# Patient Record
Sex: MALE | Race: WHITE | Employment: OTHER | URBAN - METROPOLITAN AREA
[De-identification: names, ages, dates, MRNs, and addresses within clinical notes are randomized per-mention and may not be internally consistent; named-entity substitution may affect disease eponyms.]

---

## 2019-12-01 ENCOUNTER — VIRTUAL VISIT (OUTPATIENT)
Dept: FAMILY MEDICINE | Facility: OTHER | Age: 44
End: 2019-12-01

## 2019-12-02 NOTE — PROGRESS NOTES
"Date: 2019 13:42:41  Clinician: Josh Lance  Clinician NPI: [id number]  Patient: Beto Tran  Patient : 1975  Patient Address: [address] [place] [id number]  Patient Phone: [phone]  Visit Protocol: URI  Patient Summary:  Beto is a 44 year old ( : 1975 ) male who initiated a Visit for cold, sinus infection, or influenza. When asked the question \"Please sign me up to receive news, health information and promotions. \", Beto responded \"Yes\".    Beto states his symptoms started gradually 3-6 days ago.   His symptoms consist of facial pain or pressure, nasal congestion, a headache, a sore throat, malaise, and rhinitis.   Symptom details     Nasal secretions: The color of his mucus is yellow.    Sore throat: Beto reports having moderate throat pain (4-6 on a 10 point pain scale), does not have exudate on his tonsils, and can swallow liquids. The lymph nodes in his neck are not enlarged. A rash has not appeared on the skin since the sore throat started.     Facial pain or pressure: The facial pain or pressure feels worse when bending over or leaning forward.     Headache: He states the headache is moderate (4-6 on a 10 point pain scale).      Beto denies having teeth pain, cough, myalgias, enlarged lymph nodes, chills, ear pain, fever, and wheezing. He also denies double sickening (worsening symptoms after initial improvement), taking antibiotic medication for the symptoms, and having recent facial or sinus surgery in the past 60 days. He is not experiencing dyspnea.   Precipitating events  Within the past week, Beto has not been exposed to someone with strep throat.   Pertinent medical history  Beto had 2 sinus infections within the past year.   Weight: 155 lbs   Beto smokes or uses smokeless tobacco.     MEDICATIONS: penicillin V potassium oral, ALLERGIES: NKDA  Clinician Response:  Angella Pérez,  Based on the information " provided, you have viral sinusitis, also known as a sinus infection. Sinus infections are caused by bacteria or a virus and symptoms are almost always identical. The difference between the 2 types of infections is timing.  Sinus infections start as viral infections and symptoms improve on their own in about 7 days. If symptoms have not improved after 7 days or have even worsened, a bacterial infection may have developed.  Medication information  Because you have a viral infection, antibiotics will not help you get better. Treating a viral infection with antibiotics could actually make you feel worse.  For more information on why I am not prescribing antibiotics, please watch this video: Antibiotics Aren't Always the Answer.  I am prescribing:     Fluticasone 50 mcg/actuation nasal spray. Inhale 2 sprays in each nostril 1 time per day; after 1 week, may adjust to 1 - 2 sprays in each nostril 1 time per day. This medication takes several days to start working, so keep taking it even if it doesn't help right away. There are no refills with this prescription.   Unless you are allergic to the over-the-counter medication(s) below, I recommend using:       Ibuprofen (Advil or store brand) 200 mg oral tablet. Take 1-3 tablets (200-600 mg) by mouth every 8 hours to help with the discomfort. Make sure to take the ibuprofen with food. Do not exceed 2400 mg in 24 hours.    A decongestant such as Sudafed PE or store brand.     Over-the-counter medications do not require a prescription. Ask the pharmacist if you have any questions.  Self care  The following tips will keep you as comfortable as possible while you recover:     Rest    Drink plenty of water and other liquids    Take a hot shower to loosen congestion    Use throat lozenges    Gargle with warm salt water (1/4 teaspoon of salt per 8 ounce glass of water)    Suck on frozen items such as popsicles or ice cubes    Drink hot tea with lemon and honey     Also, as your  provider, I need you to know that becoming tobacco-free is the most important thing you can do to protect your current and future health.  When to seek care  Please be seen in a clinic or urgent care if new symptoms develop, or symptoms become worse.  Call 911 or go to the emergency room if you feel that your throat is closing off, you suddenly develop a rash, you are unable to swallow fluids, you are drooling, or you are having difficulty breathing.   Diagnosis: Viral sinusitis  Diagnosis ICD: J01.90  Prescription: fluticasone 50 mcg/actuation nasal spray,suspension 1 120 spray aerosol with adapter (grams), 30 days supply. Inhale 2 sprays in each nostril 1 time per day; after 1 week, may adjust to 1 - 2 sprays in each nostril 1 time per day.. Refills: 0, Refill as needed: no, Allow substitutions: yes

## 2020-02-24 ENCOUNTER — HEALTH MAINTENANCE LETTER (OUTPATIENT)
Age: 45
End: 2020-02-24

## 2020-12-13 ENCOUNTER — HEALTH MAINTENANCE LETTER (OUTPATIENT)
Age: 45
End: 2020-12-13

## 2021-04-17 ENCOUNTER — HEALTH MAINTENANCE LETTER (OUTPATIENT)
Age: 46
End: 2021-04-17

## 2021-09-26 ENCOUNTER — HEALTH MAINTENANCE LETTER (OUTPATIENT)
Age: 46
End: 2021-09-26

## 2021-12-21 ENCOUNTER — APPOINTMENT (OUTPATIENT)
Dept: GENERAL RADIOLOGY | Facility: CLINIC | Age: 46
DRG: 841 | End: 2021-12-21
Attending: EMERGENCY MEDICINE
Payer: MEDICARE

## 2021-12-21 ENCOUNTER — HOSPITAL ENCOUNTER (INPATIENT)
Facility: CLINIC | Age: 46
LOS: 6 days | Discharge: HOME | DRG: 841 | End: 2021-12-28
Attending: EMERGENCY MEDICINE | Admitting: INTERNAL MEDICINE
Payer: MEDICARE

## 2021-12-21 DIAGNOSIS — M25.551 HIP PAIN, RIGHT: ICD-10-CM

## 2021-12-21 DIAGNOSIS — Z51.5 HOSPICE CARE PATIENT: Primary | ICD-10-CM

## 2021-12-21 DIAGNOSIS — C90.00 MULTIPLE MYELOMA NOT HAVING ACHIEVED REMISSION (H): ICD-10-CM

## 2021-12-21 LAB — SARS-COV-2 RNA RESP QL NAA+PROBE: NEGATIVE

## 2021-12-21 PROCEDURE — C9803 HOPD COVID-19 SPEC COLLECT: HCPCS

## 2021-12-21 PROCEDURE — 73502 X-RAY EXAM HIP UNI 2-3 VIEWS: CPT

## 2021-12-21 PROCEDURE — 87635 SARS-COV-2 COVID-19 AMP PRB: CPT | Performed by: INTERNAL MEDICINE

## 2021-12-21 PROCEDURE — 99222 1ST HOSP IP/OBS MODERATE 55: CPT | Performed by: INTERNAL MEDICINE

## 2021-12-21 PROCEDURE — 99285 EMERGENCY DEPT VISIT HI MDM: CPT | Mod: 25

## 2021-12-21 PROCEDURE — 250N000013 HC RX MED GY IP 250 OP 250 PS 637: Performed by: EMERGENCY MEDICINE

## 2021-12-21 PROCEDURE — G0378 HOSPITAL OBSERVATION PER HR: HCPCS

## 2021-12-21 RX ORDER — MORPHINE SULFATE 15 MG/1
30 TABLET, FILM COATED, EXTENDED RELEASE ORAL ONCE
Status: COMPLETED | OUTPATIENT
Start: 2021-12-21 | End: 2021-12-21

## 2021-12-21 RX ORDER — MORPHINE SULFATE 15 MG/1
60 TABLET, FILM COATED, EXTENDED RELEASE ORAL ONCE
Status: COMPLETED | OUTPATIENT
Start: 2021-12-21 | End: 2021-12-21

## 2021-12-21 RX ADMIN — MORPHINE SULFATE 60 MG: 15 TABLET, EXTENDED RELEASE ORAL at 21:50

## 2021-12-21 RX ADMIN — MORPHINE SULFATE 30 MG: 15 TABLET, EXTENDED RELEASE ORAL at 21:50

## 2021-12-21 ASSESSMENT — ENCOUNTER SYMPTOMS
BACK PAIN: 1
COUGH: 0
FEVER: 0

## 2021-12-21 ASSESSMENT — MIFFLIN-ST. JEOR: SCORE: 1568

## 2021-12-22 PROCEDURE — 120N000004 HC R&B MS OVERFLOW

## 2021-12-22 PROCEDURE — 250N000013 HC RX MED GY IP 250 OP 250 PS 637: Performed by: INTERNAL MEDICINE

## 2021-12-22 PROCEDURE — 250N000013 HC RX MED GY IP 250 OP 250 PS 637: Performed by: NURSE PRACTITIONER

## 2021-12-22 PROCEDURE — 250N000013 HC RX MED GY IP 250 OP 250 PS 637: Performed by: HOSPITALIST

## 2021-12-22 PROCEDURE — 99232 SBSQ HOSP IP/OBS MODERATE 35: CPT | Performed by: HOSPITALIST

## 2021-12-22 PROCEDURE — 99223 1ST HOSP IP/OBS HIGH 75: CPT | Performed by: NURSE PRACTITIONER

## 2021-12-22 PROCEDURE — G0378 HOSPITAL OBSERVATION PER HR: HCPCS

## 2021-12-22 PROCEDURE — 99207 PR CDG-MDM COMPONENT: MEETS LOW - DOWN CODED: CPT | Performed by: HOSPITALIST

## 2021-12-22 RX ORDER — GABAPENTIN 600 MG/1
1200 TABLET ORAL 3 TIMES DAILY
Status: ON HOLD | COMMUNITY
End: 2022-01-13

## 2021-12-22 RX ORDER — LACTULOSE 10 G/15ML
20 SOLUTION ORAL DAILY PRN
Status: ON HOLD | COMMUNITY
End: 2022-01-02

## 2021-12-22 RX ORDER — GABAPENTIN 600 MG/1
1200 TABLET ORAL 3 TIMES DAILY
Status: DISCONTINUED | OUTPATIENT
Start: 2021-12-22 | End: 2021-12-22

## 2021-12-22 RX ORDER — ACETAMINOPHEN 500 MG
1000 TABLET ORAL EVERY 8 HOURS PRN
Status: ON HOLD | COMMUNITY
End: 2022-01-13

## 2021-12-22 RX ORDER — IBUPROFEN 600 MG/1
600 TABLET, FILM COATED ORAL EVERY 6 HOURS PRN
Status: DISCONTINUED | OUTPATIENT
Start: 2021-12-22 | End: 2021-12-25

## 2021-12-22 RX ORDER — NALOXONE HYDROCHLORIDE 0.4 MG/ML
0.2 INJECTION, SOLUTION INTRAMUSCULAR; INTRAVENOUS; SUBCUTANEOUS
Status: DISCONTINUED | OUTPATIENT
Start: 2021-12-22 | End: 2021-12-28 | Stop reason: HOSPADM

## 2021-12-22 RX ORDER — ONDANSETRON 2 MG/ML
4 INJECTION INTRAMUSCULAR; INTRAVENOUS EVERY 6 HOURS PRN
Status: DISCONTINUED | OUTPATIENT
Start: 2021-12-22 | End: 2021-12-28 | Stop reason: HOSPADM

## 2021-12-22 RX ORDER — PROCHLORPERAZINE MALEATE 10 MG
10 TABLET ORAL EVERY 4 HOURS PRN
Status: ON HOLD | COMMUNITY
End: 2022-01-13

## 2021-12-22 RX ORDER — GABAPENTIN 600 MG/1
1200 TABLET ORAL 3 TIMES DAILY
Status: DISCONTINUED | OUTPATIENT
Start: 2021-12-22 | End: 2021-12-28 | Stop reason: HOSPADM

## 2021-12-22 RX ORDER — MORPHINE SULFATE 100 MG/1
200 TABLET ORAL EVERY 6 HOURS
Status: DISCONTINUED | OUTPATIENT
Start: 2021-12-22 | End: 2021-12-22

## 2021-12-22 RX ORDER — MORPHINE SULFATE 15 MG/1
60 TABLET ORAL EVERY 4 HOURS PRN
Status: DISCONTINUED | OUTPATIENT
Start: 2021-12-22 | End: 2021-12-22

## 2021-12-22 RX ORDER — MORPHINE SULFATE 100 MG/1
200 TABLET ORAL EVERY 6 HOURS
Status: ON HOLD | COMMUNITY
End: 2021-12-28

## 2021-12-22 RX ORDER — IBUPROFEN 600 MG/1
600 TABLET, FILM COATED ORAL 3 TIMES DAILY PRN
Status: ON HOLD | COMMUNITY
End: 2022-01-13

## 2021-12-22 RX ORDER — SENNOSIDES 8.6 MG
1 TABLET ORAL 2 TIMES DAILY
Status: ON HOLD | COMMUNITY
End: 2022-01-02

## 2021-12-22 RX ORDER — ONDANSETRON 4 MG/1
4 TABLET, ORALLY DISINTEGRATING ORAL EVERY 6 HOURS PRN
Status: DISCONTINUED | OUTPATIENT
Start: 2021-12-22 | End: 2021-12-22

## 2021-12-22 RX ORDER — ONDANSETRON 4 MG/1
8 TABLET, ORALLY DISINTEGRATING ORAL EVERY 8 HOURS PRN
Status: DISCONTINUED | OUTPATIENT
Start: 2021-12-22 | End: 2021-12-28 | Stop reason: HOSPADM

## 2021-12-22 RX ORDER — BISACODYL 10 MG
10 SUPPOSITORY, RECTAL RECTAL DAILY PRN
Status: ON HOLD | COMMUNITY
End: 2022-01-13

## 2021-12-22 RX ORDER — MORPHINE SULFATE 30 MG/1
180 TABLET, FILM COATED, EXTENDED RELEASE ORAL EVERY 6 HOURS
Status: DISCONTINUED | OUTPATIENT
Start: 2021-12-22 | End: 2021-12-28 | Stop reason: HOSPADM

## 2021-12-22 RX ORDER — LORAZEPAM 2 MG/ML
3 CONCENTRATE ORAL
Status: ON HOLD | COMMUNITY
End: 2021-12-28

## 2021-12-22 RX ORDER — LORATADINE 10 MG/1
10 TABLET ORAL DAILY PRN
COMMUNITY

## 2021-12-22 RX ORDER — MORPHINE SULFATE 100 MG/1
200 TABLET ORAL EVERY 12 HOURS SCHEDULED
Status: DISCONTINUED | OUTPATIENT
Start: 2021-12-22 | End: 2021-12-22

## 2021-12-22 RX ORDER — METHOCARBAMOL 750 MG/1
750 TABLET, FILM COATED ORAL 3 TIMES DAILY
Status: DISCONTINUED | OUTPATIENT
Start: 2021-12-22 | End: 2021-12-23

## 2021-12-22 RX ORDER — POLYETHYLENE GLYCOL 3350 17 G/17G
17 POWDER, FOR SOLUTION ORAL 2 TIMES DAILY PRN
Status: DISCONTINUED | OUTPATIENT
Start: 2021-12-22 | End: 2021-12-28 | Stop reason: HOSPADM

## 2021-12-22 RX ORDER — NICOTINE 21 MG/24HR
1 PATCH, TRANSDERMAL 24 HOURS TRANSDERMAL DAILY
Status: DISCONTINUED | OUTPATIENT
Start: 2021-12-22 | End: 2021-12-28 | Stop reason: HOSPADM

## 2021-12-22 RX ORDER — SENNOSIDES 8.6 MG
1 TABLET ORAL DAILY
Status: DISCONTINUED | OUTPATIENT
Start: 2021-12-22 | End: 2021-12-28 | Stop reason: HOSPADM

## 2021-12-22 RX ORDER — IBUPROFEN 600 MG/1
600 TABLET, FILM COATED ORAL 3 TIMES DAILY
Status: DISCONTINUED | OUTPATIENT
Start: 2021-12-22 | End: 2021-12-28 | Stop reason: HOSPADM

## 2021-12-22 RX ORDER — MORPHINE SULFATE 30 MG/1
60 TABLET ORAL
Status: ON HOLD | COMMUNITY
End: 2021-12-28

## 2021-12-22 RX ORDER — NALOXONE HYDROCHLORIDE 0.4 MG/ML
0.4 INJECTION, SOLUTION INTRAMUSCULAR; INTRAVENOUS; SUBCUTANEOUS
Status: DISCONTINUED | OUTPATIENT
Start: 2021-12-22 | End: 2021-12-28 | Stop reason: HOSPADM

## 2021-12-22 RX ORDER — LORAZEPAM 2 MG/ML
3 CONCENTRATE ORAL EVERY 4 HOURS PRN
Status: DISCONTINUED | OUTPATIENT
Start: 2021-12-22 | End: 2021-12-27

## 2021-12-22 RX ORDER — OXYCODONE HYDROCHLORIDE 20 MG/1
90 TABLET ORAL EVERY 6 HOURS PRN
Status: DISCONTINUED | OUTPATIENT
Start: 2021-12-22 | End: 2021-12-22

## 2021-12-22 RX ORDER — POLYETHYLENE GLYCOL 3350 17 G/17G
1 POWDER, FOR SOLUTION ORAL DAILY
Status: ON HOLD | COMMUNITY
End: 2022-01-13

## 2021-12-22 RX ORDER — ACETAMINOPHEN 500 MG
1000 TABLET ORAL EVERY 8 HOURS PRN
Status: DISCONTINUED | OUTPATIENT
Start: 2021-12-22 | End: 2021-12-28 | Stop reason: HOSPADM

## 2021-12-22 RX ORDER — LORAZEPAM 2 MG/ML
3 CONCENTRATE ORAL
Status: DISCONTINUED | OUTPATIENT
Start: 2021-12-22 | End: 2021-12-22

## 2021-12-22 RX ORDER — ONDANSETRON 4 MG/1
4 TABLET, ORALLY DISINTEGRATING ORAL EVERY 6 HOURS PRN
Status: DISCONTINUED | OUTPATIENT
Start: 2021-12-22 | End: 2021-12-28 | Stop reason: HOSPADM

## 2021-12-22 RX ORDER — MULTIPLE VITAMINS W/ MINERALS TAB 9MG-400MCG
1 TAB ORAL DAILY
COMMUNITY

## 2021-12-22 RX ORDER — METHOCARBAMOL 500 MG/1
500 TABLET, FILM COATED ORAL 4 TIMES DAILY
Status: DISCONTINUED | OUTPATIENT
Start: 2021-12-22 | End: 2021-12-22

## 2021-12-22 RX ORDER — ACETAMINOPHEN 325 MG/1
650 TABLET ORAL EVERY 6 HOURS PRN
Status: DISCONTINUED | OUTPATIENT
Start: 2021-12-22 | End: 2021-12-22

## 2021-12-22 RX ORDER — LORAZEPAM 2 MG/ML
3 CONCENTRATE ORAL 2 TIMES DAILY PRN
Status: DISCONTINUED | OUTPATIENT
Start: 2021-12-22 | End: 2021-12-22

## 2021-12-22 RX ORDER — HYDROMORPHONE HCL IN WATER/PF 6 MG/30 ML
0.2 PATIENT CONTROLLED ANALGESIA SYRINGE INTRAVENOUS
Status: DISCONTINUED | OUTPATIENT
Start: 2021-12-22 | End: 2021-12-22

## 2021-12-22 RX ORDER — ONDANSETRON 2 MG/ML
4 INJECTION INTRAMUSCULAR; INTRAVENOUS EVERY 6 HOURS PRN
Status: DISCONTINUED | OUTPATIENT
Start: 2021-12-22 | End: 2021-12-22

## 2021-12-22 RX ORDER — SENNA AND DOCUSATE SODIUM 50; 8.6 MG/1; MG/1
1 TABLET, FILM COATED ORAL 2 TIMES DAILY PRN
Status: ON HOLD | COMMUNITY
End: 2022-01-13

## 2021-12-22 RX ORDER — MORPHINE SULFATE 30 MG/1
30 TABLET, FILM COATED, EXTENDED RELEASE ORAL EVERY 12 HOURS SCHEDULED
Status: DISCONTINUED | OUTPATIENT
Start: 2021-12-22 | End: 2021-12-22

## 2021-12-22 RX ORDER — ACETAMINOPHEN 325 MG/1
975 TABLET ORAL EVERY 8 HOURS
Status: DISCONTINUED | OUTPATIENT
Start: 2021-12-22 | End: 2021-12-28 | Stop reason: HOSPADM

## 2021-12-22 RX ORDER — HYOSCYAMINE SULFATE 0.125 MG
0.12 TABLET ORAL EVERY 4 HOURS PRN
Status: ON HOLD | COMMUNITY
End: 2022-01-02

## 2021-12-22 RX ORDER — METHOCARBAMOL 750 MG/1
750 TABLET, FILM COATED ORAL AT BEDTIME
Status: ON HOLD | COMMUNITY
End: 2022-01-13

## 2021-12-22 RX ORDER — POLYETHYLENE GLYCOL 3350 17 G/17G
17 POWDER, FOR SOLUTION ORAL DAILY
Status: DISCONTINUED | OUTPATIENT
Start: 2021-12-22 | End: 2021-12-28 | Stop reason: HOSPADM

## 2021-12-22 RX ORDER — MORPHINE SULFATE 15 MG/1
60 TABLET ORAL
Status: DISCONTINUED | OUTPATIENT
Start: 2021-12-22 | End: 2021-12-23

## 2021-12-22 RX ORDER — LORAZEPAM 1 MG/1
2 TABLET ORAL EVERY 4 HOURS PRN
Status: DISCONTINUED | OUTPATIENT
Start: 2021-12-22 | End: 2021-12-22

## 2021-12-22 RX ADMIN — STANDARDIZED SENNA CONCENTRATE 1 TABLET: 8.6 TABLET ORAL at 08:22

## 2021-12-22 RX ADMIN — GABAPENTIN 1200 MG: 600 TABLET, FILM COATED ORAL at 19:45

## 2021-12-22 RX ADMIN — MORPHINE SULFATE 180 MG: 30 TABLET, FILM COATED, EXTENDED RELEASE ORAL at 10:15

## 2021-12-22 RX ADMIN — METHOCARBAMOL 750 MG: 750 TABLET ORAL at 13:05

## 2021-12-22 RX ADMIN — IBUPROFEN 600 MG: 600 TABLET ORAL at 15:42

## 2021-12-22 RX ADMIN — GABAPENTIN 1200 MG: 600 TABLET, FILM COATED ORAL at 08:19

## 2021-12-22 RX ADMIN — OMEPRAZOLE 20 MG: 20 CAPSULE, DELAYED RELEASE ORAL at 08:19

## 2021-12-22 RX ADMIN — ACETAMINOPHEN 975 MG: 325 TABLET, FILM COATED ORAL at 01:16

## 2021-12-22 RX ADMIN — ACETAMINOPHEN 975 MG: 325 TABLET, FILM COATED ORAL at 17:19

## 2021-12-22 RX ADMIN — LORAZEPAM 3 MG: 2 LIQUID ORAL at 20:12

## 2021-12-22 RX ADMIN — ACETAMINOPHEN 975 MG: 325 TABLET, FILM COATED ORAL at 10:15

## 2021-12-22 RX ADMIN — DICLOFENAC SODIUM 4 G: 10 GEL TOPICAL at 15:43

## 2021-12-22 RX ADMIN — NICOTINE 1 PATCH: 14 PATCH, EXTENDED RELEASE TRANSDERMAL at 11:50

## 2021-12-22 RX ADMIN — METHOCARBAMOL 750 MG: 750 TABLET ORAL at 19:45

## 2021-12-22 RX ADMIN — MORPHINE SULFATE 60 MG: 15 TABLET ORAL at 06:17

## 2021-12-22 RX ADMIN — MORPHINE SULFATE 180 MG: 30 TABLET, FILM COATED, EXTENDED RELEASE ORAL at 21:56

## 2021-12-22 RX ADMIN — MORPHINE SULFATE 180 MG: 30 TABLET, FILM COATED, EXTENDED RELEASE ORAL at 15:42

## 2021-12-22 RX ADMIN — GABAPENTIN 1200 MG: 600 TABLET, FILM COATED ORAL at 13:04

## 2021-12-22 RX ADMIN — POLYETHYLENE GLYCOL 3350 17 G: 17 POWDER, FOR SOLUTION ORAL at 10:17

## 2021-12-22 RX ADMIN — METHOCARBAMOL 500 MG: 500 TABLET ORAL at 08:19

## 2021-12-22 RX ADMIN — IBUPROFEN 600 MG: 600 TABLET ORAL at 10:15

## 2021-12-22 RX ADMIN — DICLOFENAC SODIUM 4 G: 10 GEL TOPICAL at 19:47

## 2021-12-22 RX ADMIN — MORPHINE SULFATE 30 MG: 30 TABLET, FILM COATED, EXTENDED RELEASE ORAL at 08:20

## 2021-12-22 ASSESSMENT — MIFFLIN-ST. JEOR: SCORE: 1587.5

## 2021-12-22 ASSESSMENT — ACTIVITIES OF DAILY LIVING (ADL)
ADLS_ACUITY_SCORE: 13

## 2021-12-22 NOTE — PLAN OF CARE
PRIMARY DIAGNOSIS: GENERALIZED WEAKNESS    OUTPATIENT/OBSERVATION GOALS TO BE MET BEFORE DISCHARGE  1. Orthostatic performed: N/A    2. Tolerating PO medications: Yes    3. Return to near baseline physical activity: No    4. Cleared for discharge by consultants (if involved): No    Discharge Planner Nurse   Safe discharge environment identified: No  Barriers to discharge: Yes       Entered by: Luna Jang 12/22/2021 4:39 PM     Please review provider order for any additional goals.   Nurse to notify provider when observation goals have been met and patient is ready for discharge.    Refused vitals signs today.

## 2021-12-22 NOTE — PLAN OF CARE
Cares PRIMARY DIAGNOSIS: Multiple Myeloma  OUTPATIENT/OBSERVATION GOALS TO BE MET BEFORE DISCHARGE:  1. ADLs back to baseline: No    2. Activity and level of assistance: Up with standby assistance.    3. Pain status: Improved-controlled with oral pain medications.    4. Return to near baseline physical activity: No     Discharge Planner Nurse   Safe discharge environment identified: Yes  Barriers to discharge: Yes       Entered by: Maria Ines Villarreal 12/22/2021 2:05 AM   Pt is A&Ox4, SBA w/walker. Pt denied any CP, SOB and N/V. Pt complained of pain frustrated he can't have morphine until 8am. Paged doctor and was able to give Morphine. Urinal at bedside. Pt is able to make needs known. Call light within reach and bed alarm on for safety.   Temp: 98.4  F (36.9  C) Temp src: Oral BP: 113/65 Pulse: 69   Resp: 16 SpO2: 97 % O2 Device: None (Room air)    Please review provider order for any additional goals.   Nurse to notify provider when observation goals have been met and patient is ready for discharge.

## 2021-12-22 NOTE — PHARMACY-ADMISSION MEDICATION HISTORY
After med rec completed this morning with [facility] pharmacist via phone.  [facility] faxed a med list to pharmacy.  Based on this med list, the following medications were added (note: these medications were NOT reviewed with the [facility] pharmacist-reviewed fills back to 11/16 with pharmacist there):  Dulcolax prn, loratadine prn, lactulose prn, hyoscyamine prn, omeprazole, mvi, relistor prn, senokot s prn, senna.    Med list has some older Rxs on it that have been reordered with New Rx doses more recently per [facility] pharmacist fill history.    Prior to Admission medications    Medication Sig Last Dose Taking? Auth Provider   acetaminophen (TYLENOL) 500 MG tablet Take 1,000 mg by mouth every 8 hours as needed for mild pain  Yes Unknown, Entered By History   bisacodyl (DULCOLAX) 10 MG suppository Place 10 mg rectally daily as needed for constipation  Yes Unknown, Entered By History   gabapentin (NEURONTIN) 600 MG tablet Take 1,200 mg by mouth 3 times daily  Yes Unknown, Entered By History   hyoscyamine (LEVSIN) 0.125 MG tablet Take 0.125 mg by mouth every 4 hours as needed for cramping  Yes Unknown, Entered By History   ibuprofen (ADVIL/MOTRIN) 600 MG tablet Take 600 mg by mouth 3 times daily  Yes Unknown, Entered By History   lactulose (CHRONULAC) 10 GM/15ML solution Take 20 g by mouth daily as needed for constipation  Yes Unknown, Entered By History   loratadine (CLARITIN) 10 MG tablet Take 10 mg by mouth daily as needed for allergies  Yes Unknown, Entered By History   LORazepam (ATIVAN) 2 MG/ML (HIGH CONC) oral solution Take 3 mg by mouth every 2 hours  Yes Unknown, Entered By History   methocarbamol (ROBAXIN) 750 MG tablet Take 750 mg by mouth 3 times daily  Yes Unknown, Entered By History   methylnaltrexone (RELISTOR) 12 MG/0.6ML SOLN injection Inject 12 mg Subcutaneous Every other day as needed  Yes Unknown, Entered By History   morphine (MS CONTIN) 100 MG 12 hr tablet Take 200 mg by mouth every 6 hours  Yes Unknown,  Entered By History   morphine (MSIR) 30 MG IR tablet Take 60 mg by mouth every 2 hours as needed for severe pain  Yes Unknown, Entered By History   multivitamin w/minerals (THERA-VIT-M) tablet Take 1 tablet by mouth daily  Yes Unknown, Entered By History   omeprazole (PRILOSEC) 20 MG DR capsule Take 40 mg by mouth daily  Yes Unknown, Entered By History   polyethylene glycol (MIRALAX) 17 g packet Take 1 packet by mouth daily  Yes Unknown, Entered By History   prochlorperazine (COMPAZINE) 10 MG tablet Take 10 mg by mouth every 4 hours as needed for nausea or vomiting  Yes Unknown, Entered By History   SENNA-docusate sodium (SENNA S) 8.6-50 MG tablet Take 1 tablet by mouth 2 times daily as needed  Yes Unknown, Entered By History   sennosides (SENOKOT) 8.6 MG tablet Take 1 tablet by mouth 2 times daily  Yes Unknown, Entered By History

## 2021-12-22 NOTE — PROGRESS NOTES
[facility]    Medicine Progress Note - Hospitalist Service       Date of Admission:  12/21/2021    Assessment & Plan           Beto Tran is a 46 year old  male with a significant past medical history of advanced multiple myeloma who has failed multiple treatments, chronic pain, thoracic outlet syndrome now on hospice. Admitted on 12/21/2021 with intractable pain    Advanced multiple myeloma    - treated at [place]    - failed: velcade, carfilzomib, pamalidomide, cytoxin, stem cell transplant    - currently was on hospice ([facility])    - apparently they were making med changes and didn't feel like they were able to control his pain and instructed him to come to the hospital     - Pain/Pall care consult pending    - social work consult    - cont home regimen (although we need to give MS Contin 180mg not his home 200mg due to our lack of the correct mg tabs)    - also currently has MSIR 60mg Q2hour PRN      History of thoracic outlet syndrome    - s/p 1st rib resection and sclenectomy    Home hospice    - SW consult to determine if needs new hospice and home equipment needs/resourced    Offered to call significant other, he states he is in contact with her       Diet: Regular Diet Adult    DVT Prophylaxis: at his baseline mobility  Tavares Catheter: Not present  Central Lines: None  Code Status: No CPR- Do NOT Intubate      Disposition Plan   Expected Discharge: 12/23/2021        The patient's care was discussed with the Bedside Nurse and Patient.    Bob Erickson MD  Hospitalist Service  [facility]  Securely message with the Vocera Web Console (learn more here)  Text page via [employer] Paging/Directory        Clinically Significant Risk Factors Present on Admission                    ______________________________________________________________________    Interval History   Patient doing better. Rough night. Pain was not controlled last night. Better now  after meds.    Data reviewed today: I reviewed all medications, new labs and imaging results over the last 24 hours. I personally reviewed the pelvic xray image(s) showing lytic lesions.    Physical Exam   Vital Signs: Temp: 97.4  F (36.3  C) Temp src: Oral BP: 124/89 Pulse: 65   Resp: 16 SpO2: 95 % O2 Device: None (Room air)    Weight: 162 lbs 11.2 oz  Constitutional: awake, alert, cooperative, no apparent distress, and appears stated age  Eyes: Lids and lashes normal, pupils equal, round and reactive to light, extra ocular muscles intact, sclera clear, conjunctiva normal  ENT: Normocephalic, without obvious abnormality, atraumatic, sinuses nontender on palpation, external ears without lesions, oral pharynx with moist mucous membranes, tonsils without erythema or exudates, gums normal and good dentition.  Respiratory: No increased work of breathing, good air exchange, clear to auscultation bilaterally, no crackles or wheezing  Cardiovascular: Normal apical impulse, regular rate and rhythm, normal S1 and S2, no S3 or S4, and no murmur noted  GI: No scars, normal bowel sounds, soft, non-distended, non-tender, no masses palpated, no hepatosplenomegally    Data   No lab results found in last 7 days.    Recent Results (from the past 24 hour(s))   XR Pelvis w Hip Right 1 View    Narrative    EXAM: XR PELVIS AND HIP RIGHT 1 VIEW  LOCATION: [facility]  DATE/TIME: 12/21/2021 8:40 PM    INDICATION: Multiple myeloma, fall, right hip pain  COMPARISON: Left hip radiographic exam 02/06/2015      Impression    IMPRESSION: Innumerable lytic lesions are present throughout the pelvis, lower lumbar spine and both proximal femora compatible with diffuse multiple myeloma. Findings have markedly progressed compared with prior exam. No acute displaced pathologic   fracture is identified. Largely preserved hip joint spaces. No superior or inferior pubic ramus fracture. Post L5 vertebroplasty with likely chronic L5  compression fracture.

## 2021-12-22 NOTE — PLAN OF CARE
ROOM # 226    Living Situation (if not independent, order SW consult):  Facility name:Lives at home with girlfriend  : Angelito (girlfriend) [phone]    Activity level at baseline: Assist x1 with walker  Activity level on admit: Assist x1 with walker        Patient registered to observation; given Patient Bill of Rights; given the opportunity to ask questions about observation status and their plan of care.  Patient has been oriented to the observation room, bathroom and call light is in place.    Discussed discharge goals and expectations with patient/family.

## 2021-12-22 NOTE — ED PROVIDER NOTES
History   Chief Complaint:  No chief complaint on file.       The history is provided by the patient.      Beto Tran is a 46 year old male with history of osteoporosis who presents with right hip pain after hitting it on the wheelchair. Patient waited a few days before coming in to see if there would be any improvement. The lack of improvement brought him to the ED on recommendation of his , and he was running out of medication. He denies fever, cough, and chest pain.  Apparently, he was on hospice, however, they felt he needed higher level of care than being on at home.  They were unable to secure this and apparently hospice dropped him according to him due to him being unsafe at home.  He does not have anyone prescribing pain medication for him and is about to run out.      Review of Systems   Constitutional: Negative for fever.   Respiratory: Negative for cough.    Cardiovascular: Negative for chest pain.   Musculoskeletal: Positive for back pain.        Right Hip Pain     All other systems reviewed and are negative.      Allergies:  The patient has no known allergies.     Medications:  acyclovir  clonazepam  cyclobenzaprine  dexamethasone  gabapentin  lubiprostone  morphine  omeprazole  ondansetron  oxycodone  polyethylene glycol  prochlorperazine  senna  tamsulosin  haloperidol  loratadine  lorazepam  methocarbamol  sulfamethoxazole  vicodin  medical cannabis  haloperidol  bisacodyl  methylatropine bromide  bactrim  furosemide  alendronate  diazepam  guaifenesin  lactulose  methadone  zolpidem    Past Medical History:     Anesthesia complication   Broken rib   Cancer  Gastro-oesophageal reflux disease   Multiple myeloma   Osteoporosis   Thoracic outlet syndrome  Lesion of ulnar nerve  Generalized anxiety disorder  Anxiety  Depression  Neuropathy  Otitis media  Sinusitis  Tobacco use  Gluten-sensitive enteropathy  Loose stools    Past Surgical History:    Optical tracking system  kyphoplasty  Right thumb surgery  Resect first rib     Family History:    Mother: Fibromyalgia  Father: Fibromyalgia    Social History:  Patient arrived to ED by car.  He is accompanied by a significant other.    Physical Exam     Patient Vitals for the past 24 hrs:   BP Temp Temp src Pulse Resp SpO2   12/21/21 2200 131/80 -- -- 74 -- 97 %   12/21/21 2155 -- -- -- -- -- 97 %   12/21/21 2132 123/76 -- -- 74 -- --   12/21/21 2100 114/73 -- -- 74 -- --   12/21/21 2000 111/70 -- -- 88 -- 94 %   12/21/21 1921 126/83 98.8  F (37.1  C) Temporal 103 18 98 %       Physical Exam  Constitutional: Well appearing.  HEENT: Atraumatic.  PERRL.  EOMI.  Moist mucous membranes.  Neck: Soft.  Supple.  Cardiac: Regular rate and rhythm.  No murmur or rub.  Respiratory: Clear to auscultation bilaterally.  No respiratory distress.  No wheezing, rhonchi, or rales.  Abdomen: Soft and nontender.   Nondistended.  Musculoskeletal: Tenderness to the right hip and pain with any range of motion.  Normal range of motion.  Neurologic: Alert and oriented x3.  Normal tone and bulk. 5/5 strength in bilateral upper and lower extremities, with right leg limited secondary to injury at the hip.  Sensation to light touch intact throughout.   Skin: No rashes.  No edema.  Psych: Normal affect.  Normal behavior.      Emergency Department Course      Imaging:  XR Pelvis w Hip Right 1 View   Final Result   IMPRESSION: Innumerable lytic lesions are present throughout the pelvis, lower lumbar spine and both proximal femora compatible with diffuse multiple myeloma. Findings have markedly progressed compared with prior exam. No acute displaced pathologic    fracture is identified. Largely preserved hip joint spaces. No superior or inferior pubic ramus fracture. Post L5 vertebroplasty with likely chronic L5 compression fracture.        Report per radiology    Laboratory:  Labs Ordered and Resulted from Time of ED Arrival to Time of ED Departure - No data to display      Procedures      Emergency Department Course:             Reviewed:  I reviewed nursing notes, vitals, past medical history and Care Everywhere    Assessments:  1947 I obtained history and examined the patient as noted above.   2122 I rechecked the patient and explained findings.       Consults:  1010 I consulted with Dr. Martínez of hospital services.    Interventions:  P.o. MS Contin    Disposition:  The patient was admitted to the hospital under the care of Dr. Martínez.     Impression & Plan   Medical Decision Making:  Beto Tran is a 46-year-old man who is afebrile and hemodynamically stable.  I reviewed his history.  X-ray of the right hip demonstrated no acute fracture or other acute abnormality.  He was currently on hospice, however, for some unclear reason, he was dropped by his hospice care and he does not have anyone prescribing pain medication and is unable to care for himself at home.  He was admitted to the hospitalist service for observation for pain control and disposition planning.    Diagnosis:    ICD-10-CM    1. Hip pain, right  M25.551    2. Multiple myeloma not having achieved remission (H)  C90.00        Scribe Disclosure:  I, Aakash Mace, am serving as a scribe at 7:47 PM on 12/21/2021 to document services personally performed by Oziel Gill MD based on my observations and the provider's statements to me.            Oziel Gill MD  12/23/21 2123

## 2021-12-22 NOTE — CONSULTS
[facility]  Pain Service Consultation   Text Page    Date of Admission:  12/21/2021    Assessment & Plan   Beto Tran is a 46 year old male who was admitted on 12/21/2021. I was asked by  Rome Martínez MD  to see the patient for pain management in the context of high opioid tolerance, long term hospice patient with multiple myeloma.    PLAN:   1)  Opioids: reasonable to begin slow taper of opioids   Keep Ms Contin at 180 mg every 6 hrs (this is dose reduction from 200 mg every 6 hrs)  Morphine IR 60 mg every 2 hrs prn  (from 90 mg ~10-12 doses per day)    Opioids Treatment Goal:   -Improvement in function    2)Non-opioid multimodal medication therapy  -Topical:Menthol gel to affected areas, Voltaren 1% Topical Gel  4 gram total to affected areas ( right low back, hands and chest)  -N-SAIDS:Ibuprofen 600 mg every 8 hrs, and 600 mg prn for total of 2400 mg per day   -Muscle Relaxants:Methocarbamol  750 mg tid   -Adjuvants:Acetaminophen 975 mg every 8 hrs and 1000 mg prn( limit 2 doses of prn per day) , Gabapentin  1220 mg tid ( consider cross over to lyrica )   -Antidepresants/anxiolytics: consider adding Duloxetine may be helpful,    3)  Non-medication interventions  Relaxation, Meditation, Distraction, Essential oils per nursing    4)  Constipation Prophylaxis    Continue to Monitor, Bowel Meds PRN per Constipation Order Set, Senna-S 1 every day, Polyethylene Glycol every day prn     5) Medication Risk reduction strategies   -monitor for sedation   -Capnography per protocol   -narcan for opioid reversal     6)  Pain Education  -Opioid safe use, storage and disposal information included in DC AVS    7)  DC Planning   Discussed goal of Opioid therapy as above with  Patient, who is willing to taper opioids   Patient is at risk for opioid withdrawal and may benefit from the following therapy:unless faster taper is  indicated 10 % taper every 2 weeks   Continued outpatient management  of pain per  To be determined, needs to est with new hospice, does not have PCP  Disposition: unclear   Support systems:  Girlfriend, has PCA care   Outpatient Referrals: new hospice vendor   The following risk factors have been identified for unintentional overdose: patient is on multiple sedating medications , patient is a smoker, patient is taking a high amount of opioids in 24 hour period and patient has anxiety, depression or PTSD. Discharge with intra-nasal naloxone if discharged to home with opioids  >40 mg MME/day.  Plan for education prior to discharge.    ASSESSMENT  1)  Acute on chronic pain multiple myeloma relapse, long term hospice patient with history of vertebral compression fractures thoracicolumbar     2)  Patient with chronic pain, on chronic opioid therapy managed by  rAiel Morse MD   Baseline 2700 mg Daily Morphine Equivalent as dispensed.  Patient has a high opioid tolerance.     Patient's opioid use in past 24 hours: Morphine  = 150 mg Daily Morphine Equivalent    3)  Risk factors for opioid related harms  -Concurrent benzodiazepine use  -High opioid dose (>50 MME/day)  -Anxiety/depression  -Opioid has recently been changed  -opioid withdrawal mild     4)  Opioid induced side-effects:  -Constipation  No   -Nausea/Vomit no   -Sedation no   -Urinary Retention no     5)  Other/Related:    -Depression/anxiety  -Deconditioning   - suspect opioid hyperalgesia     Time Spent on this Encounter   Total unit/floor time 70  minutes, time consisted of the following, examination of the patient, reviewing the record and completing documentation. >50% of time spent in counseling and coordination of care.  Time spend counseling with patient consisted of the following topics, care planning for discharge and symptom management.  Time spent in coordination of care with Bedside Nurse Susy Jang RN  and   JANE Armas .     Kasia Luis RN, PGMT-BC, APRN, CNP, ACHPN    Pain Management and Palliative Care  [facility]  Pgr: [phone]      Reason for Consult   Reason for consult: I was asked to evaluate this patient for  pain management in the context of high opioid tolerance, long term hospice patient with multiple myeloma..    Primary Care Physician   Primary Care Physician:Physician Carolynn Ref-Primary  Pain Specialist:  Ariel Morse     Chief Complaint   Wide spread intractable cancer pain, new right hip pain  Opioid withdrawal mild     History is obtained from the patient and electronic health record    History of Present Illness   Beto Tran is a 46 year old male who presents with intractable cancer pain from end stage multiple myeloma on high dose opioids and benzodiazepines.  He states he has been on hospice for last 7 years ( other notes say 10 years). He has been with several hospice agencies and was dismissed from [facility], the most recent agency. He is not clear as to why this happened. States he is in need of more PCA service and alternate housing.  He has a history of multiple vertebral fractures, lytic lesions of the thoracic and lumbar spine. He is a long time smoker with pulmonary emphysema. He has a history of anxiety and reflux.  He is not sure how much the medication, specifically opioids and gabapentin are helping him and seemed interested in a dose reduction. He reports his pain in the low back stiffness in both hands, pain in his chest and right hip/leg.  The hip and leg pain are new after a bump into the towel rack recently.  He came to the hospital as he ran out of medication when dismissed from [facility].        CURRENT PAIN:  His pain is located in the as above   It is described as Burning, Penetrating, Sharp, Shooting and Tender  He rates it as ranging between 7/10 and 8/10  The average is 7/10 on a scale of 0-10  Currently it is rated as 7/10  It improves by  Medication, rest   It worsens by  Medication wear off    He  been compliant with the recommendations while in the hospital.      PAIN HISTORY:  The pain is mainly located in the  Same   It is described as Burning, Penetrating, Sharp, Shooting, Stabbing, Tender and Unbearable  Rates it as ranging between 7/10 and 8/10  Currently it is rated as 7/10  It improves by  As above   It worsens by  As above   He  been compliant with the recommendations while in the hospital.      PAST PAIN TREATMENT:   Medications: as above   Non-phamacologic modalities: heat, ice rest   Previous interventions/surgeries: none     [place] Board of Pharmacy Data Base Reviewed:    YES; As expected, no concern for misuse/abuse of controlled medications based on this report.  OPIOID RISK SSMJW=677           D.I.R.E Score: Patient Selection for Chronic Opioid Analgesia    For each factor, rate the patient's score from 1 - 3 based on the explanations on the right.       Diagnosis             3         1 = Benign chronic condition with minimal objective findings or no definite medical diagnosis.  Examples:  fibromyalgia, migraine, headaches, non-specific back pain.  2 = Slowly progressive condition concordant with moderate pain, or fixed condition with moderate objective findings.  Examples: failed back surgery syndrome, back pain with moderate degenerative changes, neuropathic pain.  3 = Advanced condition concordant with severe pain with objective findings.  Examples: severe ischemic vascular disease, advanced neuropathy, severe spinal stenosis.    Intractability             2         1 = Few therapies have been tried and the patient takes a passive role in his/her pain management process.   2 = Most costomary treatments have been tried but the patient is not fully engaged in the pain management process, or barriers prevent (insurance, transportation, medical illness)  3 = Patient fully engaged in a spectrum of appropriate treatments but with inadequate response.    Risk   (Risk = Total of P+C+R+S  below)       Psychological             2         1 = Serious personality dysfunction or mental illness interfering with care.  Examples: personality disorder, severe affective disorder, significant personality issues.  2 = Personality or mental health interferes moderately.  Example: depression or anxiety disorder.  3 = Good communication with the clinic.  No significant personality dysfunction or mental illness.       Chemical      Health             2         1 = Active or very recent use of illicit drugs, excessive alcohol, or prescription drug abuse.  2 = Chemical coper (uses medications to cope with stress) or history of chemical dependency in remission.  3 = No CD history.  Not drug-focused or chemically reliant       Reliability             2         1 = History of numerous problems: medication misuse, missed appointments, rarely follows through.  2 = Occasional difficulties with compliance, but generally reliable.  3 = Highly reliable patient with medications, appointments and treatment.       Social      Support             2         1= Life in chaos.  Little family support and few close relationships.  Loss of most normal life roles.  2 = Reduction in some relationships and life roles.  3 = Supportive family/close relationships.  Involved in work or school and no social isolation.    Efficacy score             2         1 = Poor function or minimal pain relief despite moderate to high doses.  2 = Moderate benefit with function improved in a number of ways (or insufficient info - hasn't tried opioid yet or very low doses or too short a trial.  3 = Good improvement in pain and function and quality of life with stable doses over time.                                    15    Total score = D + I + R + E    Score 7-13: Not a suitable candidate for long-term opioid analgesia  Score 14-21: May be a good candidate for long-term opioid analgesia    Copyright 2013 Timothy León MD, The DIRE Score: Predicting  Outcomes of Opioid Prescribing for Chronic Pain. The Journal of Pain. 7(9) (September), 2006:671-681      COWs 4 (chills 1, joint pain 1,anxiety 2)     Past Medical History   I have reviewed this patient's medical history and updated it with pertinent information if needed.   Past Medical History:   Diagnosis Date     Anesthesia complication     states woke up during procedure (endoscopic ultrasound) in June of 2014     Broken rib      Cancer (H)      Gastro-oesophageal reflux disease      Multiple myeloma (H)      Osteoporosis        Past Surgical History   I have reviewed this patient's surgical history and updated it with pertinent information if needed.  Past Surgical History:   Procedure Laterality Date     OPTICAL TRACKING SYSTEM KYPHOPLASTY N/A 12/16/2014    Procedure: OPTICAL TRACKING SYSTEM KYPHOPLASTY;  Surgeon: Abner Vasquez MD;  Location: [facility] OR     ORTHOPEDIC SURGERY      right thumb surgery     RESECT FIRST RIB  8/14/2012    Procedure: RESECT FIRST RIB;  Left First Rib Resection & Scalentectomy;  Surgeon: Keith Tucker MD;  Location: [facility] OR         Prior to Admission Medications   Prior to Admission Medications   Prescriptions Last Dose Informant Patient Reported? Taking?   LORazepam (ATIVAN) 2 MG/ML (HIGH CONC) oral solution   Yes Yes   Sig: Take 3 mg by mouth every 2 hours   acetaminophen (TYLENOL) 500 MG tablet   Yes Yes   Sig: Take 1,000 mg by mouth every 8 hours as needed for mild pain   gabapentin (NEURONTIN) 600 MG tablet   Yes Yes   Sig: Take 1,200 mg by mouth 3 times daily   ibuprofen (ADVIL/MOTRIN) 600 MG tablet   Yes Yes   Sig: Take 600 mg by mouth 3 times daily   methocarbamol (ROBAXIN) 750 MG tablet   Yes Yes   Sig: Take 750 mg by mouth 3 times daily   morphine (MS CONTIN) 100 MG 12 hr tablet   Yes Yes   Sig: Take 200 mg by mouth every 6 hours   morphine (MSIR) 30 MG IR tablet   Yes Yes   Sig: Take 60 mg by mouth every 2 hours as needed for severe pain   polyethylene glycol  (MIRALAX) 17 g packet   Yes Yes   Sig: Take 1 packet by mouth daily      Facility-Administered Medications: None     Allergies   Allergies   Allergen Reactions     Nka [No Known Allergies]        Social History   I have reviewed this patient's social history and updated it with pertinent information if needed. Beto RODRIGUEZ Peggydwightsalteresa  reports that he has been smoking cigarettes. He has a 6.25 pack-year smoking history. He has never used smokeless tobacco. He reports current alcohol use. He reports that he does not use drugs.    Family History   I have reviewed this patient's family history and updated it with pertinent information if needed.   Family History   Problem Relation Age of Onset     Unknown/Adopted No family hx of      Family History Negative No family hx of      Asthma No family hx of      C.A.D. No family hx of      Diabetes No family hx of      Hypertension No family hx of      Cerebrovascular Disease No family hx of      Breast Cancer No family hx of      Cancer - colorectal No family hx of      Prostate Cancer No family hx of      Alcohol/Drug No family hx of      Allergies No family hx of      Alzheimer Disease No family hx of      Anesthesia Reaction No family hx of      Arthritis No family hx of      Blood Disease No family hx of      Cancer No family hx of      Cardiovascular No family hx of      Circulatory No family hx of      Congenital Anomalies No family hx of      Connective Tissue Disorder No family hx of      Depression No family hx of      Endocrine Disease No family hx of      Eye Disorder No family hx of      Genetic Disorder No family hx of      Gastrointestinal Disease No family hx of      Genitourinary Problems No family hx of      Gynecology No family hx of      Heart Disease No family hx of      Lipids No family hx of      Musculoskeletal Disorder No family hx of      Neurologic Disorder No family hx of      Obesity No family hx of      Osteoporosis No family hx of      Psychotic  Disorder No family hx of      Respiratory No family hx of      Thyroid Disease No family hx of      Hearing Loss No family hx of      Family history of addiction  None     Review of Systems   The 10 point Review of Systems is negative other than noted in the HPI or here.    Denies Bowel or bladder dysfunction    Physical Exam   Temp:  [97.4  F (36.3  C)-98.8  F (37.1  C)] 97.4  F (36.3  C)  Pulse:  [] 65  Resp:  [16-18] 16  BP: (111-140)/(65-95) 124/89  SpO2:  [94 %-98 %] 95 %  162 lbs 11.2 oz  GEN:  Alert, oriented x 3, appears comfortable, No apparent distress. Talkative tangential   HEENT:  Normocephalic/atraumatic, no scleral icterus, no nasal discharge, mouth moist.  CV:  RRR, S1, S2; no murmurs or other irregularities noted.  +3 DP/PT pulses bilaterally; no edema bilateral lower extremeties.  RESP:  Clear to auscultation bilaterally without rales/rhonchi/wheezing/retractions.  Symmetric chest rise on inhalation noted.  Normal respiratory effort.  ABD:  Rounded, soft, non-tender/non-distended.  +BS  EXT:  Edema & pulses as noted above.  Color, moisture and sensation intact x 4.     M/S:   Tender to palpation  Right hip and leg .    SKIN:  Dry to touch, no exanthems noted in the visualized areas.    NEURO: Symmetric strength +5/5.  Sensation to touch intact all extremities.   There is no area of allodynia or hyperesthesia.  PAIN BEHAVIOR: Cooperative  Psych: mildly anxious affect.  Calm, cooperative, conversant appropriately.       Data   Results for orders placed or performed during the hospital encounter of 12/21/21 (from the past 24 hour(s))   XR Pelvis w Hip Right 1 View    Narrative    EXAM: XR PELVIS AND HIP RIGHT 1 VIEW  LOCATION: [facility]  DATE/TIME: 12/21/2021 8:40 PM    INDICATION: Multiple myeloma, fall, right hip pain  COMPARISON: Left hip radiographic exam 02/06/2015      Impression    IMPRESSION: Innumerable lytic lesions are present throughout the pelvis, lower  lumbar spine and both proximal femora compatible with diffuse multiple myeloma. Findings have markedly progressed compared with prior exam. No acute displaced pathologic   fracture is identified. Largely preserved hip joint spaces. No superior or inferior pubic ramus fracture. Post L5 vertebroplasty with likely chronic L5 compression fracture.   Asymptomatic COVID-19 Virus (Coronavirus) by PCR Nasopharyngeal    Specimen: Nasopharyngeal; Swab   Result Value Ref Range    SARS CoV2 PCR Negative Negative    Narrative    Testing was performed using the [employer]  SARS-CoV-2 & Influenza A/B Assay on the [employer]  Peri  System.  This test should be ordered for the detection of SARS-COV-2 in individuals who meet SARS-CoV-2 clinical and/or epidemiological criteria. Test performance is unknown in asymptomatic patients.  This test is for in vitro diagnostic use under the FDA EUA for laboratories certified under CLIA to perform moderate and/or high complexity testing. This test has not been FDA cleared or approved.  A negative test does not rule out the presence of PCR inhibitors in the specimen or target RNA in concentration below the limit of detection for the assay. The possibility of a false negative should be considered if the patient's recent exposure or clinical presentation suggests COVID-19.  [facility] [email or web address] are certified under the Clinical Laboratory Improvement Amendments of 1988 (CLIA-88) as qualified to perform moderate and/or high complexity laboratory testing.

## 2021-12-22 NOTE — PROVIDER NOTIFICATION
Pt is requesting morphine he's frustrated he cant have until 8am. Can you please change Dilaudid (says doesn't work but will try) to PO he wants all meds to be PO

## 2021-12-22 NOTE — ED TRIAGE NOTES
Stated bump his right hip on against the rail while attempting to get into his wheelchair. Stated that he is on hospice care at home, but his care team stated that he needs more assistance than can be provided at home, so patient wants to look for a transition care unit or a long term care facility. History of multiple myeloma. ABCs intact.

## 2021-12-22 NOTE — PROGRESS NOTES
Care Management Follow Up    Length of Stay (days): 0    Expected Discharge Date: 12/23/2021     Concerns to be Addressed:       Patient plan of care discussed at interdisciplinary rounds: Yes    Anticipated Discharge Disposition:  Home     Anticipated Discharge Services:  TBD  Anticipated Discharge DME:  None    Education Provided on the Discharge Plan:  Yes  Patient/Family in Agreement with the Plan:  Yes    Referrals Placed by CM/SW:  New primary care provider appointment  Private pay costs discussed: Not applicable    Additional Information:  CM updated by ROGER that patient wants to establish with a [facility] provider at the [facility]. Contacted scheduling line ([phone]). Patient prefers a male, an MD (not a PA or NP) and more mature. Scheduled a hospital follow up appointment to establish care with Dr. Abner Beltran for Friday, January 7, 2022 at 1:30pm. Updated ROGER and AVS.     RN CC will continue to follow for discharge planning.    Adilene Perez RN, BSN, CPHN, CM  Inpatient Care Coordination - Obs  [facility]  [phone]

## 2021-12-22 NOTE — PLAN OF CARE
PRIMARY DIAGNOSIS: GENERALIZED WEAKNESS    OUTPATIENT/OBSERVATION GOALS TO BE MET BEFORE DISCHARGE  1. Orthostatic performed: N/A    2. Tolerating PO medications: Yes    3. Return to near baseline physical activity: No    4. Cleared for discharge by consultants (if involved): No    Discharge Planner Nurse   Safe discharge environment identified: No  Barriers to discharge: Yes, pain management, PT consult       Entered by: Luna Jang 12/22/2021 12:10 PM     Please review provider order for any additional goals.   Nurse to notify provider when observation goals have been met and patient is ready for discharge.

## 2021-12-22 NOTE — H&P
[facility]    Hospitalist Admission Note    Name: Beto Tran    MRN: [id number]  YOB: 1975    Age: 46 year old  Date of admission: 12/21/2021  Primary care provider: No Ref-Primary, Physician  Admitting physician : Rome Martínez M.D. ,M.B.A.            Assessment:       Brief summary of admission assessment:    Beto Tran is a 46 year old  male with a significant past medical history of advanced multiple myeloma which failed multiple treatments, malignant pain on chronic narcotic medications and hospice care who presents with intractable symptoms of pain and inability of hospice care team to provide care at home.    Patient has been on hospice since 2015 and his significant other has been assisting him at home in addition to hospice team visits twice a week and medication refills until last Friday  When they decided to stop their service and fill narcotic medications.    He developed exacerbation of his pain in his right hip bumped against a wheelchair during transfer last Friday.    On presentation to emergency department, patient was hemodynamically stable but required pain medication and x-ray of the right hip and pelvis showed no acute fracture but innumerable lytic lesions throughout the pelvis, lower lumbar spine and both proximal femoral compatible with multiple myeloma.    Patient was recommended admission to observation for pain control and placement needs.          Admission diagnoses:       #1.  Right hip pain following hitting it on the wheelchair last Friday     #2.  Multiple myeloma with innumerable bony lytic lesions on hospice-hospice team unable to care for him due to increased need for assistance at home    #3.  Chronic intractable pain secondary to malignancy on multiple pain medications  --Pharmacy team to reconcile medications but per patient and significant other at bedside patient has been on MS Contin 200 mg every  2 hours, oxycodone 90 mg every 4 hours as needed, gabapentin 900 mg 3 times a day and multiple medications to be reviewed by pharmacy team    #4.  Failure to thrive at home:  --Inability to continue hospice care at home due to increased need  --Patient unable to ambulate due to pain last few months now        Active comorbid medical conditions:    Adjustment disorder with mixed anxiety and depressed mood    Medical marijuana use    Cancer associated pain    Pain Low Back Unspecified    Neuropathy Peripheral    Abuse Tobacco Smoking      Care Teams      Team Member Relationship Specialty Start Date End Date   [place] P.A.-C.    [address] [place] [address]    [phone] (Work)    [phone] (Fax)   PCP - General Family Medicine 5/18/21     Palomo Marcano M.D.    [address] [place] [id number]    [phone] (Work)    [phone] (Fax)   Primary Hematologist Hematology 9/3/21     Susy Chacon R.N., O.C.N.    [address] [place] [id number]    [phone] (Work)   Registered Nurse Hematology 9/3/21          COVID-19 Testing : pending   COVID -19 Vaccination Status :   COVID-19 vaccine series (3 - Booster for Pfizer series) 11/08/2021 05/08/2021, 04/17/2021                    Plan/MDM:       # Admission Status: Will admit patient to hospitalist service as inpatient as patient likely need over two mid night stay  in the hospital.     # Care plan(MDM):      Admit to obs bed     Pain control - resume home medications, IV Dilaudid as needed.     consult for placement need and coordination of care     Management team consultation to assist with review of his medications which appears to be too much narcotic medications    Bowel regimen    Pharmacy consult for medication reconciliation-I entered some of his home medications in consultation with him    # Supportive care:Pain management: acetominophen, anxiolytics, oral narcotics and IV narcotics    #  Diet:Diet advanced    # Activity:Advance activity as tolerated    # Education/Counseling :Anticipatory guidance offered    # Consults:Inpatient consult with      # VTE prophylactic measures:prophylaxis against venous thromboembolism with lovenox     # Therapies:none       # Additional orders:    --Care plan discussed with the patient/family and agreed to care plan   --Patient will be transferred to care of hospitalist attending for further evaluation and management as appropriate   --Old medical orders reviewed   --imaging result independently reviewed by me     (See orders placed for this visit by me )     - Home medication reviewed and will be continued as appropriate once pharmacy reconciliation is completed         Code Status/Disposition:     # Code Status:DNR / DNI      # Disposition:anticipate discharge to skilled care facility and Anticipate discharge in 2 days        Disclaimer: This note consists of symbols derived from keyboarding, dictation and/or voice recognition software. As a result, there may be errors in the script that have gone undetected. Please consider this when interpreting information found in this chart.             Chief Complaint:     Failure to thrive at home, hip pain  History is obtained from the patient          History of Present Illness:      This patient is a 46 year old  male with a significant past medical history of unfortunate history of multiple myeloma with lytic bone lesions and intractable pain who presents with the following condition requiring a hospital admission:    Failure to thrive at home, intractable pain at home    Nikko is a 46-year-old male who has multiple myeloma which failed multiple treatments in the past including [facility] providers.  He has been on hospice since 2015 and is relatively doing well with hospice care at home and help of his significant other.  Over the last couple months, patient has been progressively getting very weak and  painful and unable to ambulate.  Hospice team has been visiting him twice a week with PCA in the middle of the week caring for him.  Due to increased need for care at home, hospice care team decided to stop their services and medication refills.  He also had right hip pain after he bumped against his wheelchair.  He did not come to the emergency department at time but his pain progressively got worse and they decided to come the emergency department for evaluation.                   Past Medical History:     Past Medical History:   Diagnosis Date     Anesthesia complication     states woke up during procedure (endoscopic ultrasound) in 2014     Broken rib      Cancer (H)      Gastro-oesophageal reflux disease      Multiple myeloma (H)      Osteoporosis             Past Surgical History:     Past Surgical History:   Procedure Laterality Date     OPTICAL TRACKING SYSTEM KYPHOPLASTY N/A 2014    Procedure: OPTICAL TRACKING SYSTEM KYPHOPLASTY;  Surgeon: Abner Vasquez MD;  Location: [facility] OR     ORTHOPEDIC SURGERY      right thumb surgery     RESECT FIRST RIB  2012    Procedure: RESECT FIRST RIB;  Left First Rib Resection & Scalentectomy;  Surgeon: Keith Tucker MD;  Location:  OR             Social History:     Social History     Tobacco Use     Smoking status: Current Some Day Smoker     Packs/day: 0.25     Years: 25.00     Pack years: 6.25     Types: Cigarettes     Last attempt to quit: 2012     Years since quittin.8     Smokeless tobacco: Never Used     Tobacco comment: 5-7 cigarettes/day    Substance Use Topics     Alcohol use: Yes     Comment: social             Family History:     Family History   Problem Relation Age of Onset     Unknown/Adopted No family hx of      Family History Negative No family hx of      Asthma No family hx of      C.A.D. No family hx of      Diabetes No family hx of      Hypertension No family hx of      Cerebrovascular Disease No family hx of       Breast Cancer No family hx of      Cancer - colorectal No family hx of      Prostate Cancer No family hx of      Alcohol/Drug No family hx of      Allergies No family hx of      Alzheimer Disease No family hx of      Anesthesia Reaction No family hx of      Arthritis No family hx of      Blood Disease No family hx of      Cancer No family hx of      Cardiovascular No family hx of      Circulatory No family hx of      Congenital Anomalies No family hx of      Connective Tissue Disorder No family hx of      Depression No family hx of      Endocrine Disease No family hx of      Eye Disorder No family hx of      Genetic Disorder No family hx of      Gastrointestinal Disease No family hx of      Genitourinary Problems No family hx of      Gynecology No family hx of      Heart Disease No family hx of      Lipids No family hx of      Musculoskeletal Disorder No family hx of      Neurologic Disorder No family hx of      Obesity No family hx of      Osteoporosis No family hx of      Psychotic Disorder No family hx of      Respiratory No family hx of      Thyroid Disease No family hx of      Hearing Loss No family hx of             Allergies:     Allergies   Allergen Reactions     Nka [No Known Allergies]              Medications:        Prior to Admission medications    Medication Sig Last Dose Taking? Auth Provider   acyclovir (ZOVIRAX) 400 MG tablet Take 1 tablet (400 mg) by mouth 2 times daily   Bruce Lee MD   calcium-vitamin D (CALTRATE) 600-400 MG-UNIT per tablet Take 1 tablet by mouth 2 times daily (with meals)   Jyothi Lagos APRN CNP   clonazePAM (KLONOPIN) 1 MG tablet Take 1 tablet (1 mg) by mouth every 12 hours   Bruce Lee MD   cyclobenzaprine (FLEXERIL) 10 MG tablet Take 1 tablet (10 mg) by mouth 3 times daily as needed for muscle spasms   Bruce Lee MD   dexamethasone (DECADRON) 4 MG tablet Take 10 tablets (40 mg) by mouth once a week   Mica Gross MD    gabapentin (NEURONTIN) 600 MG tablet Take 1.5 tablets (900 mg) by mouth 3 times daily   Bruce Lee MD   lactulose (CHRONULAC) 10 GM/15ML solution Take 30 mLs (20 g) by mouth 2 times daily   Bruce Lee MD   lidocaine (LIDODERM) 5 % patch Place 1 patch onto the skin every 24 hours   Bruce Lee MD   lubiprostone (AMITIZA) 24 MCG capsule Take 1 capsule (24 mcg) by mouth 2 times daily (with meals)   Bruce Lee MD   morphine (MS CONTIN) 100 MG 12 hr tablet Take 1 tablet (100 mg) by mouth 3 times daily   Bruce Lee MD   omeprazole 20 MG tablet Take 1 tablet (20 mg) by mouth daily   Kimberly Ko PA-C   ondansetron (ZOFRAN) 4 MG tablet Take 2 tablets (8 mg) by mouth every 8 hours as needed for nausea   Mica Gross MD   oxyCODONE HCl (ROXICODONE) 20 MG TABS immediate release tablet Take 20-30 mg by mouth every 4 hours as needed   Bruce Lee MD   polyethylene glycol (MIRALAX) powder Take 17 g by mouth 2 times daily   Christine Hanley MD   prochlorperazine (COMPAZINE) 10 MG tablet Take 1 tablet (10 mg) by mouth every 6 hours as needed (Nausea/Vomiting)   Mica Gross MD   senna (SENOKOT) 8.6 MG tablet Take 2 tablets by mouth 2 times daily   Christine Hanley MD   tamsulosin (FLOMAX) 0.4 MG 24 hr capsule Take 1 capsule (0.4 mg) by mouth daily   Bruce Lee MD          Review of Systems:     A Comprehensive greater than 10 system review of systems was carried out.  Pertinent positives and negatives are noted above in HPI.  Otherwise negative for contributory information.           Physical Exam:     Vital signs were reviewed    Temp:  [98.8  F (37.1  C)] 98.8  F (37.1  C)  Pulse:  [] 74  Resp:  [18] 18  BP: (111-131)/(70-83) 131/80  SpO2:  [94 %-98 %] 97 %        GEN: awake, alert, cooperative, no apparent distress, oriented x 3.  Chronically sick looking    NECK:Supple ,no mass or thyromegaly     HEENT:   Normocephalic/atraumatic, no scleral icterus, no nasal discharge, mouth moist.    CV:  Regular rate and rhythm, no murmur or JVD.  S1 + S2 noted, no S3 or S4.    LUNGS:  Clear to auscultation bilaterally without rales/rhonchi/wheezing/retractions.  Symmetric chest rise on inhalation noted.    ABD:  Active bowel sounds, soft, non-tender/non-distended.  No rebound/guarding/rigidity.    EXT:  No edema.  No cyanosis.  No joint synovitis noted.Lower extremity pulses are normal bilaterally     LGS: No cervical or axillary lymphadenopathy     SKIN:  Dry to touch, warm ,no exanthems noted in the visualized areas.    Neurologic:Grossly intact,non focal . No acute focal neurologic deficit     Psychaitric exam: Mood and affect normal                Data:       All laboratory and imaging data in the past 24 hours reviewed     Results for orders placed or performed during the hospital encounter of 12/21/21   XR Pelvis w Hip Right 1 View     Status: None    Narrative    EXAM: XR PELVIS AND HIP RIGHT 1 VIEW  LOCATION: [facility]  DATE/TIME: 12/21/2021 8:40 PM    INDICATION: Multiple myeloma, fall, right hip pain  COMPARISON: Left hip radiographic exam 02/06/2015      Impression    IMPRESSION: Innumerable lytic lesions are present throughout the pelvis, lower lumbar spine and both proximal femora compatible with diffuse multiple myeloma. Findings have markedly progressed compared with prior exam. No acute displaced pathologic   fracture is identified. Largely preserved hip joint spaces. No superior or inferior pubic ramus fracture. Post L5 vertebroplasty with likely chronic L5 compression fracture.   Asymptomatic COVID-19 Virus (Coronavirus) by PCR Nasopharyngeal     Status: Normal    Specimen: Nasopharyngeal; Swab   Result Value Ref Range    SARS CoV2 PCR Negative Negative    Narrative    Testing was performed using the [employer]  SARS-CoV-2 & Influenza A/B Assay on the karey  Peri  System.  This test should be  ordered for the detection of SARS-COV-2 in individuals who meet SARS-CoV-2 clinical and/or epidemiological criteria. Test performance is unknown in asymptomatic patients.  This test is for in vitro diagnostic use under the FDA EUA for laboratories certified under CLIA to perform moderate and/or high complexity testing. This test has not been FDA cleared or approved.  A negative test does not rule out the presence of PCR inhibitors in the specimen or target RNA in concentration below the limit of detection for the assay. The possibility of a false negative should be considered if the patient's recent exposure or clinical presentation suggests COVID-19.  [facility] are certified under the Clinical Laboratory Improvement Amendments of 1988 (CLIA-88) as qualified to perform moderate and/or high complexity laboratory testing.            Recent Results (from the past 48 hour(s))   XR Pelvis w Hip Right 1 View    Narrative    EXAM: XR PELVIS AND HIP RIGHT 1 VIEW  LOCATION: [facility]  DATE/TIME: 12/21/2021 8:40 PM    INDICATION: Multiple myeloma, fall, right hip pain  COMPARISON: Left hip radiographic exam 02/06/2015      Impression    IMPRESSION: Innumerable lytic lesions are present throughout the pelvis, lower lumbar spine and both proximal femora compatible with diffuse multiple myeloma. Findings have markedly progressed compared with prior exam. No acute displaced pathologic   fracture is identified. Largely preserved hip joint spaces. No superior or inferior pubic ramus fracture. Post L5 vertebroplasty with likely chronic L5 compression fracture.        All imaging studies reviewed by me.         Patient`s old medical records reviewed and case discussed with the ED physician.    ED course-Reviewed  and care plan discussed with Oziel Gill MD

## 2021-12-22 NOTE — UTILIZATION REVIEW
Admission Status; Secondary Review Determination    Under the authority of the Utilization Management Committee, the utilization review process indicated a secondary review on the above patient. The review outcome is based on review of the medical records, discussions with staff, and applying clinical experience noted on the date of the review.    (x) Inpatient Status Appropriate - This patient's medical care is consistent with medical management for inpatient care and reasonable inpatient medical practice.    RATIONALE FOR DETERMINATION: 46-year-old male with history of advanced multiple myeloma that has been refractive to multiple treatments and suffering from significant intractable pain due to the widespread lytic lesions in his bones.  Due to the significant need for pain medications hospice felt they could no longer manage patient as an outpatient and referred patient to hospitalization.  Patient requiring significant multifactorial medical management with large doses of narcotics, NSAIDs and acetaminophen.  Patient will require greater than 2 nights in the hospital for adequate pain control prior to discharge arrangements appropriate for inpatient care.    At the time of admission with the information available to the attending physician more than 2 nights Hospital complex care was anticipated, based on patient risk of adverse outcome if treated as outpatient and complex care required. Inpatient admission is appropriate based on the Medicare guidelines.    This document was produced using voice recognition software    The information on this document is developed by the utilization review team in order for the business office to ensure compliance. This only denotes the appropriateness of proper admission status and does not reflect the quality of care rendered.    The definitions of Inpatient Status and Observation Status used in making the determination above are those provided in the CMS Coverage  Manual, Chapter 1 and Chapter 6, section 70.4.    Sincerely,    Edmundo Brown MD  Utilization Review  Physician Advisor  [facility].

## 2021-12-22 NOTE — ED NOTES
[facility]  ED Nurse Handoff Report    Beto Tran is a 46 year old male   ED Chief complaint: No chief complaint on file.  . ED Diagnosis:   Final diagnoses:   None     Allergies:   Allergies   Allergen Reactions     Nka [No Known Allergies]        Code Status: Full Code  Activity level - Baseline/Home:  Stand by Assist. Activity Level - Current:   Stand by Assist. Lift room needed: No. Bariatric: No   Needed: No   Isolation: No. Infection: Not Applicable.     Vital Signs:   Vitals:    12/21/21 2100 12/21/21 2132 12/21/21 2155 12/21/21 2200   BP: 114/73 123/76  131/80   Pulse: 74 74  74   Resp:       Temp:       TempSrc:       SpO2:   97% 97%       Cardiac Rhythm:  ,      Pain level:    Patient confused: No. Patient Falls Risk: No.   Elimination Status: Has voided   Patient Report - Initial Complaint: Hip pain. Focused Assessment: Hip pain   Tests Performed:   XR Pelvis w Hip Right 1 View   Final Result   IMPRESSION: Innumerable lytic lesions are present throughout the pelvis, lower lumbar spine and both proximal femora compatible with diffuse multiple myeloma. Findings have markedly progressed compared with prior exam. No acute displaced pathologic    fracture is identified. Largely preserved hip joint spaces. No superior or inferior pubic ramus fracture. Post L5 vertebroplasty with likely chronic L5 compression fracture.        . Abnormal Results: See above.   Treatments provided: PO pain meds  Family Comments: Fiance at bedside  OBS brochure/video discussed/provided to patient:  N/A  ED Medications:   Medications   morphine (MS CONTIN) 12 hr tablet 30 mg (30 mg Oral Given 12/21/21 2150)   morphine (MS CONTIN) 12 hr tablet 60 mg (60 mg Oral Given 12/21/21 2150)     Drips infusing:  No  For the majority of the shift, the patient's behavior Green. Interventions performed were NA.    Sepsis treatment initiated: No     Patient tested for COVID 19 prior to admission: NO    ED Nurse  Name/Phone Number: Oksana White RN,   10:11 PM  RECEIVING UNIT ED HANDOFF REVIEW    Above ED Nurse Handoff Report was reviewed: Yes  Reviewed by: Maria Ines Villarreal RN on December 21, 2021 at 11:05 PM

## 2021-12-22 NOTE — DISCHARGE INSTRUCTIONS
Your hospital follow up appointment to establish care has been scheduled for you with Dr. Abner Beltran at [facility] for Friday, January 7, 2022 at 1:30pm. Please wear a mask, arrive at 1:10pm to complete paperwork; bring your hospital discharge instructions, a photo ID, insurance information and any new medications with you to your appointment. Please call the clinic at [phone] if you need to reschedule.    [address]   [address]   [facility] [place] [address] [phone]

## 2021-12-22 NOTE — PHARMACY-ADMISSION MEDICATION HISTORY
Admission medication history interview status for this patient is complete. See [facility] admission navigator for allergy information, prior to admission medications and immunization status.     Medication history interview source(s):Patient and [facility]  Medication history resources (including written lists, pill bottles, clinic record):None  Primary pharmacy:[facility] delivered meds through [facility] ([phone])    Changes made to PTA medication list:  Added: MS Contin 200mg, Morphine IR 60mg, methocarbamol, ibuprofen, lorazepam, gabapentin, tylenol, mirlax  Deleted: acyclovir, ca+d, clonazeapm, flexeril, dexamethasone, gabapentin 900mg tid, lactulose, lidocaine patch, lubiprostone, MScontin 100mg, prilosec, zofran, oxycodone 20mg tabs, miralax bid, compazine, senna, flomax (all on Epic PTA med list from 2015)  Changed: ----    Actions taken by pharmacist (provider contacted, etc):phoned [facility] (pt never enrolled in their program), spoke to pt-pt states all meds come packaged from pharmacy hospice program uses.  Called [facility] x 2 for RN/pharmacy assistance     Additional medication history information:pt upset with delay in pain med.   [facility], pt had rx for dexamethasone 4mg daily x 14 days on 11/26/21-this medicaiton should be gone, not added to PTA med list    Medication reconciliation/reorder completed by provider prior to medication history? Yes, sticky note and MD paged to address PTA meds      For patients on insulin therapy:N    Prior to Admission medications    Medication Sig Last Dose Taking? Auth Provider   acetaminophen (TYLENOL) 500 MG tablet Take 1,000 mg by mouth every 8 hours as needed for mild pain  Yes Unknown, Entered By History   gabapentin (NEURONTIN) 600 MG tablet Take 1,200 mg by mouth 3 times daily  Yes Unknown, Entered By History   ibuprofen (ADVIL/MOTRIN) 600 MG tablet Take 600 mg by mouth 3 times daily  Yes Unknown, Entered By History    LORazepam (ATIVAN) 2 MG/ML (HIGH CONC) oral solution Take 3 mg by mouth every 2 hours  Yes Unknown, Entered By History   methocarbamol (ROBAXIN) 750 MG tablet Take 750 mg by mouth 3 times daily  Yes Unknown, Entered By History   morphine (MS CONTIN) 100 MG 12 hr tablet Take 200 mg by mouth every 6 hours  Yes Unknown, Entered By History   morphine (MSIR) 30 MG IR tablet Take 60 mg by mouth every 2 hours as needed for severe pain  Yes Unknown, Entered By History   polyethylene glycol (MIRALAX) 17 g packet Take 1 packet by mouth daily  Yes Unknown, Entered By History

## 2021-12-22 NOTE — PROGRESS NOTES
Cross cover notified of patient frustrated with current pain medication orders.  He has metastatic multiple myeloma and is on very high doses of opiates at baseline.  Unfortunately I do not see any documentation through our chart or Care Everywhere to say exactly what is on.  He is requesting oxycodone IR 90 mg tablets and says he has been taking this at home.  Our pharmacist and I did review his .  He has not received oxycodone at least the [place]  under this name in the last 6 months.   He has been receiving very large quantities of extended release morphine sulfate 100 mg tablets every week to 2 weeks.  Additionally he receives large quantities of 30 mg morphine sulfate instant release tablets at a similar time.  I think it may be possible he is confusing oxycodone IR with morphine sulfate IR and if so he may be taking 90 mg dosing.  -His admitting provider ordered MS Contin total 230 mg every 12 hours  -I have discontinued the IV Dilaudid and ordered morphine sulfate instant release 60 mg every 4 hours as needed until we can gather further information.  If not sedated and pain not alleviated or if we have collateral information increase to 90 mg.  He may be confusing oxycodone IR with morphine sulfate IR if that is the case we should increase back to the 90 mg he is been taking at previous frequency.

## 2021-12-22 NOTE — CONSULTS
Care Management Initial Consult    General Information  Assessment completed with: Patient,    Type of CM/SW Visit: Initial Assessment    Primary Care Provider verified and updated as needed:     Readmission within the last 30 days:        Reason for Consult: discharge planning  Advance Care Planning:            Communication Assessment  Patient's communication style: spoken language (English or Bilingual)    Hearing Difficulty or Deaf: no   Wear Glasses or Blind: yes    Cognitive  Cognitive/Neuro/Behavioral: WDL                      Living Environment:   People in home: other (see comments) (on and off with girlfriend)     Current living Arrangements: condominium      Able to return to prior arrangements: other (see comments)  Living Arrangement Comments:  (cant return home alone)    Family/Social Support:  Care provided by: self  Provides care for: no one, unable/limited ability to care for self  Marital Status: Single  Significant Other          Description of Support System: Uninvolved    Support Assessment: Lacks necessary supervision and assistance    Current Resources:   Patient receiving home care services: No     Community Resources: County Worker  Equipment currently used at home: walker, standard  Supplies currently used at home:  None noted    Employment/Financial:  Employment Status: disabled        Financial Concerns:   none          Lifestyle & Psychosocial Needs:  Social Determinants of Health     Tobacco Use: High Risk     Smoking Tobacco Use: Current Some Day Smoker     Smokeless Tobacco Use: Never Used   Alcohol Use: Not on file   Financial Resource Strain: Not on file   Food Insecurity: Not on file   Transportation Needs: Not on file   Physical Activity: Not on file   Stress: Not on file   Social Connections: Not on file   Intimate Partner Violence: Not on file   Depression: Not on file   Housing Stability: Not on file       Functional Status:  Prior to admission patient needed assistance:  Patient lives at his moms isis in [place] as he cant stay on his 2nd level apartment in [place]. His long term significant other was staying with him but she has left and is unknown when she will return. Apparently, this is a pattern according to his previous Hospice provider [facility]. Patient was discharged from their services on 12/16 due to it no longer being a safe situation. Patient was unwilling to explore a facility at that time.        Mental Health Status:      None noted    Chemical Dependency Status:      None noted          Values/Beliefs:  Spiritual, Cultural Beliefs, Hoahaoism Practices, Values that affect care:     None noted            Additional Information:  SW met with patient at bedside for assessment of discharge planning needs. Patient is alert and oriented X3, conversant, and able to participate in discharge planning. Patient was not very forth coming on why he was discharged from hospice services. He is more concerned at this time about getting his pain medications. ROGER explained we would need to set him up with a local PCP.     Patient has a CADI Waiver and his  is Franny Mijares ([phone]). He currently has no services in his home but says things are being worked. As of now the  is off until after the holidays so no way to know how soon services could get started.     ROGER will follow up with patient tomorrow about the VA report and the concerns about returning home alone. ROGER sent TCU referrals in the local area.     JANE Acevedo [facility]  [phone]  [address]   [facility]. [id number]  [facility]                       JANE Beltrán       no

## 2021-12-22 NOTE — PLAN OF CARE
PRIMARY DIAGNOSIS: GENERALIZED WEAKNESS    OUTPATIENT/OBSERVATION GOALS TO BE MET BEFORE DISCHARGE  1. Orthostatic performed: N/A    2. Tolerating PO medications: Yes    3. Return to near baseline physical activity: No    4. Cleared for discharge by consultants (if involved): No    Discharge Planner Nurse   Safe discharge environment identified: No  Barriers to discharge: Yes, pain management, PT consult       Entered by: Luna Jang 12/22/2021 9:35 AM     Please review provider order for any additional goals.   Nurse to notify provider when observation goals have been met and patient is ready for discharge.

## 2021-12-23 LAB
CREAT SERPL-MCNC: 0.63 MG/DL (ref 0.66–1.25)
GFR SERPL CREATININE-BSD FRML MDRD: >90 ML/MIN/1.73M2

## 2021-12-23 PROCEDURE — 36415 COLL VENOUS BLD VENIPUNCTURE: CPT | Performed by: INTERNAL MEDICINE

## 2021-12-23 PROCEDURE — 120N000004 HC R&B MS OVERFLOW

## 2021-12-23 PROCEDURE — 99232 SBSQ HOSP IP/OBS MODERATE 35: CPT | Performed by: HOSPITALIST

## 2021-12-23 PROCEDURE — 99207 PR CDG-MDM COMPONENT: MEETS LOW - DOWN CODED: CPT | Performed by: HOSPITALIST

## 2021-12-23 PROCEDURE — 82565 ASSAY OF CREATININE: CPT | Performed by: INTERNAL MEDICINE

## 2021-12-23 PROCEDURE — 250N000013 HC RX MED GY IP 250 OP 250 PS 637: Performed by: INTERNAL MEDICINE

## 2021-12-23 PROCEDURE — 99232 SBSQ HOSP IP/OBS MODERATE 35: CPT | Performed by: NURSE PRACTITIONER

## 2021-12-23 PROCEDURE — 250N000013 HC RX MED GY IP 250 OP 250 PS 637: Performed by: HOSPITALIST

## 2021-12-23 PROCEDURE — 250N000013 HC RX MED GY IP 250 OP 250 PS 637: Performed by: NURSE PRACTITIONER

## 2021-12-23 RX ORDER — LORAZEPAM 2 MG/ML
2 CONCENTRATE ORAL ONCE
Status: COMPLETED | OUTPATIENT
Start: 2021-12-23 | End: 2021-12-23

## 2021-12-23 RX ORDER — MORPHINE SULFATE 15 MG/1
60 TABLET ORAL ONCE
Status: COMPLETED | OUTPATIENT
Start: 2021-12-23 | End: 2021-12-23

## 2021-12-23 RX ORDER — METHOCARBAMOL 750 MG/1
750 TABLET, FILM COATED ORAL 4 TIMES DAILY
Status: DISCONTINUED | OUTPATIENT
Start: 2021-12-23 | End: 2021-12-28

## 2021-12-23 RX ORDER — FLUORIDE TOOTHPASTE
5 TOOTHPASTE DENTAL 4 TIMES DAILY
Status: DISCONTINUED | OUTPATIENT
Start: 2021-12-23 | End: 2021-12-28 | Stop reason: HOSPADM

## 2021-12-23 RX ORDER — MORPHINE SULFATE 15 MG/1
90 TABLET ORAL
Status: DISCONTINUED | OUTPATIENT
Start: 2021-12-23 | End: 2021-12-28

## 2021-12-23 RX ADMIN — Medication 5 ML: at 19:52

## 2021-12-23 RX ADMIN — IBUPROFEN 600 MG: 600 TABLET ORAL at 23:41

## 2021-12-23 RX ADMIN — LORAZEPAM 3 MG: 2 LIQUID ORAL at 22:02

## 2021-12-23 RX ADMIN — STANDARDIZED SENNA CONCENTRATE 1 TABLET: 8.6 TABLET ORAL at 08:27

## 2021-12-23 RX ADMIN — GABAPENTIN 1200 MG: 600 TABLET, FILM COATED ORAL at 14:14

## 2021-12-23 RX ADMIN — IBUPROFEN 600 MG: 600 TABLET ORAL at 10:02

## 2021-12-23 RX ADMIN — MORPHINE SULFATE 60 MG: 15 TABLET ORAL at 16:20

## 2021-12-23 RX ADMIN — MORPHINE SULFATE 60 MG: 15 TABLET ORAL at 08:27

## 2021-12-23 RX ADMIN — GABAPENTIN 1200 MG: 600 TABLET, FILM COATED ORAL at 08:27

## 2021-12-23 RX ADMIN — POLYETHYLENE GLYCOL 3350 17 G: 17 POWDER, FOR SOLUTION ORAL at 08:27

## 2021-12-23 RX ADMIN — METHOCARBAMOL 750 MG: 750 TABLET ORAL at 19:50

## 2021-12-23 RX ADMIN — OMEPRAZOLE 20 MG: 20 CAPSULE, DELAYED RELEASE ORAL at 08:27

## 2021-12-23 RX ADMIN — MORPHINE SULFATE 180 MG: 30 TABLET, FILM COATED, EXTENDED RELEASE ORAL at 22:02

## 2021-12-23 RX ADMIN — ACETAMINOPHEN 975 MG: 325 TABLET, FILM COATED ORAL at 08:27

## 2021-12-23 RX ADMIN — IBUPROFEN 600 MG: 600 TABLET ORAL at 15:52

## 2021-12-23 RX ADMIN — MORPHINE SULFATE 90 MG: 15 TABLET ORAL at 23:41

## 2021-12-23 RX ADMIN — NICOTINE 1 PATCH: 14 PATCH, EXTENDED RELEASE TRANSDERMAL at 08:26

## 2021-12-23 RX ADMIN — METHOCARBAMOL 750 MG: 750 TABLET ORAL at 14:14

## 2021-12-23 RX ADMIN — MORPHINE SULFATE 60 MG: 15 TABLET ORAL at 14:14

## 2021-12-23 RX ADMIN — MORPHINE SULFATE 180 MG: 30 TABLET, FILM COATED, EXTENDED RELEASE ORAL at 03:44

## 2021-12-23 RX ADMIN — MORPHINE SULFATE 90 MG: 15 TABLET ORAL at 20:00

## 2021-12-23 RX ADMIN — LORAZEPAM 2 MG: 2 LIQUID ORAL at 17:31

## 2021-12-23 RX ADMIN — MORPHINE SULFATE 180 MG: 30 TABLET, FILM COATED, EXTENDED RELEASE ORAL at 16:09

## 2021-12-23 RX ADMIN — MORPHINE SULFATE 60 MG: 15 TABLET ORAL at 06:04

## 2021-12-23 RX ADMIN — DICLOFENAC SODIUM 4 G: 10 GEL TOPICAL at 17:33

## 2021-12-23 RX ADMIN — GABAPENTIN 1200 MG: 600 TABLET, FILM COATED ORAL at 19:50

## 2021-12-23 RX ADMIN — MORPHINE SULFATE 180 MG: 30 TABLET, FILM COATED, EXTENDED RELEASE ORAL at 10:02

## 2021-12-23 RX ADMIN — LORAZEPAM 3 MG: 2 LIQUID ORAL at 03:57

## 2021-12-23 RX ADMIN — Medication 5 ML: at 17:32

## 2021-12-23 RX ADMIN — METHOCARBAMOL 750 MG: 750 TABLET ORAL at 08:27

## 2021-12-23 RX ADMIN — ACETAMINOPHEN 975 MG: 325 TABLET, FILM COATED ORAL at 17:31

## 2021-12-23 RX ADMIN — METHOCARBAMOL 750 MG: 750 TABLET ORAL at 16:19

## 2021-12-23 ASSESSMENT — ACTIVITIES OF DAILY LIVING (ADL)
ADLS_ACUITY_SCORE: 13
ADLS_ACUITY_SCORE: 11
ADLS_ACUITY_SCORE: 11
ADLS_ACUITY_SCORE: 13
ADLS_ACUITY_SCORE: 13
ADLS_ACUITY_SCORE: 11
ADLS_ACUITY_SCORE: 13
ADLS_ACUITY_SCORE: 13
ADLS_ACUITY_SCORE: 11
ADLS_ACUITY_SCORE: 10
ADLS_ACUITY_SCORE: 13
ADLS_ACUITY_SCORE: 13
ADLS_ACUITY_SCORE: 10
ADLS_ACUITY_SCORE: 10
ADLS_ACUITY_SCORE: 13
ADLS_ACUITY_SCORE: 10
ADLS_ACUITY_SCORE: 11
ADLS_ACUITY_SCORE: 13
ADLS_ACUITY_SCORE: 10
ADLS_ACUITY_SCORE: 13
ADLS_ACUITY_SCORE: 9
ADLS_ACUITY_SCORE: 13
ADLS_ACUITY_SCORE: 11
ADLS_ACUITY_SCORE: 10

## 2021-12-23 NOTE — PLAN OF CARE
"Pt complaining of pain very frustrated w/pain management plans on calling the \"Big guys\" tomorrow as he met w/ SW today & feels she \"labeled him\"& now thinks plan is to just D/C him too soon. Pt is asking me to page for pain medication o :  "

## 2021-12-23 NOTE — PLAN OF CARE
"Cares From: 1900 to 0700    Pt is A&Ox4, SBA w/walker. Pt denied any CP, SOB and N/V. Pt complained of pain rating it 10/10. Pt calm, cooperative but frustrated with his treatment plan for pain thus far. Pt said his day 12/22 here was a \"total waste\" Pt used bedside commode had BM, Urinal at bedside. Pt is able to make needs known. Call light within reach and bed alarm on for safety.     Temp: 98.4  F (36.9  C) Temp src: Oral BP: 118/72 Pulse: 76   Resp: 18 SpO2: 94 % O2 Device: None (Room air)    "

## 2021-12-23 NOTE — PROGRESS NOTES
Care Management Follow Up    Length of Stay (days): 1    Expected Discharge Date: 12/23/2021       Additional Information:  SW received a call from patients  Franny Mijares. She said patient needs a reassessment with the [facility]. He currently does not receive any services on the CADI Waiver. Franny says services will not be starting any time soon, the earliest would be Feb 1st.     She was aware of the VA report, it was opened but now that he is in the hospital it will probably be closed since he is in the hospital.     She said she talked with Elio about going to a group home but he refused. Franny said that he would probably need to go to a facility since he would not be able to care from himself.     Franny  ([phone]) will be off next week but she asks that we update her when he is discharged.     Addendum (1:30pm): SW spoke with patient about things. He has changed from obs to inpatient. SW also discussed he would have tp have a primary care giver in order to discharge home. SW also informed him that PT/OT were ordered to determine his appropriate  for a TCU.  called and said she spoke to patients mom July ([phone]). She herself is in a hospital out of state and would not be able to help.     Patients [place] address is [address] [place] [address] [address] [address].        Addendum (2:45pm): SW received a page that patient wanted to see me and call his mom Jessica together. Patient called his mom on his iphone. It should be noted that patient reordered ROGER's conversation. His mom had the impression that we were kicking him out of the hospital tonight. ROGER assured her that we were not and that we did not have a safe discharge plan. ROGER explained we had PT/OT evals pending and once those are done we would have a better idea of the discharge plan. ROGER also explained [facility] would not be able to get services in place until 2/1/2022. Jessica wanted patients Doctor to give her a call. ROGER informed Dr Erickson of  this.     Patient wanted to see in writing his records of why he was admitted. SW explained he would need to go through medical records for those. Patient said he was thinking of leaving. SW explained if he did that I would need to file a VA report as no one is at the house to assist him.           JANE Acevedo [facility]  [phone]  [address]   [facility]. [id number]  [facility]

## 2021-12-23 NOTE — PROGRESS NOTES
[facility]  Pain Management Progress Note  Text Page   will transition to palliative follow up vs pain follow up in am   Assessment & Plan   Beto Tran is a 46 year old male who was admitted on 12/21/2021.   I was asked by  Rome Martínez MD  to see the patient for pain management in the context of high opioid tolerance, long term hospice patient with multiple myeloma.  PLAN:   1)  Opioids: reasonable to begin slow taper of opioids   Keep Ms Contin at 180 mg every 6 hrs (this is dose reduction from 200 mg every 6 hrs)  Morphine IR 90 mg every 2 hrs prn  (baseline  90 mg ~10-12 doses per day)     Opioids Treatment Goal:   -Improvement in function     2)Non-opioid multimodal medication therapy  -Topical:Menthol gel to affected areas, Voltaren 1% Topical Gel  4 gram total to affected areas ( right low back, hands and chest)  -N-SAIDS:Ibuprofen 600 mg every 8 hrs, and 600 mg prn for total of 2400 mg per day   -Muscle Relaxants:Methocarbamol  750 mg tid   -Adjuvants:Acetaminophen 975 mg every 8 hrs and 1000 mg prn( limit 2 doses of prn per day) , Gabapentin  1220 mg tid ( consider cross over to lyrica )   -Antidepresants/anxiolytics: consider adding Duloxetine may be helpful,  -biotene qid, oral gel prn, hospitalist to assess need for oral abx-- possible tooth abscess    3)  Non-medication interventions  Relaxation, Meditation, Distraction, Essential oils per nursing     4)  Constipation Prophylaxis    Continue to Monitor, Bowel Meds PRN per Constipation Order Set, Senna-S 1 every day, Polyethylene Glycol every day prn      5) Medication Risk reduction strategies   -monitor for sedation   -Capnography per protocol   -narcan for opioid reversal   -opioid withdrawal      6)  Pain Education  -Opioid safe use, storage and disposal information included in DC AVS     7)  DC Planning   Discussed goal of Opioid therapy as above with  Patient, who is willing to taper opioids   Patient is at  risk for opioid withdrawal and may benefit from the following therapy:unless faster taper is  indicated 10 % taper every 2 weeks   Continued outpatient management of pain per  To be determined, needs to est with new hospice, does not have PCP  Disposition: unclear   Support systems:  Girlfriend, has PCA care   Outpatient Referrals: new hospice vendor   The following risk factors have been identified for unintentional overdose: patient is on multiple sedating medications , patient is a smoker, patient is taking a high amount of opioids in 24 hour period and patient has anxiety, depression or PTSD. Discharge with intra-nasal naloxone if discharged to home with opioids  >40 mg MME/day.  Plan for education prior to discharge.     ASSESSMENT  1)  Acute on chronic pain multiple myeloma relapse, long term hospice patient with history of vertebral compression fractures thoracicolumbar      2)  Patient with chronic pain, on chronic opioid therapy managed by  Ariel Morse MD   Baseline 2700 mg Daily Morphine Equivalent as dispensed.  Patient has a high opioid tolerance.      Patient's opioid use in past 24 hours: Morphine  = 810 mg Daily Morphine Equivalent   (baseline 2700 mg per day)    3)  Risk factors for opioid related harms  -Concurrent benzodiazepine use  -High opioid dose (>50 MME/day)  -Anxiety/depression  -Opioid has recently been changed  -opioid withdrawal mild      4)  Opioid induced side-effects:  -Constipation  No   -Nausea/Vomit no   -Sedation no   -Urinary Retention no      5)  Other/Related:    -Depression/anxiety  -Deconditioning   - suspect opioid hyperalgesia   -placement concerns/palliative complex concerns     Kasia Luis RN, PGMT-BC, APRN, CNP, ACHPN  Pain Management and Palliative Care   [facility]  Pgr: [phone]    Time Spent on this Encounter   Total unit/floor time 25 minutes, time consisted of the following, examination of the patient, reviewing the  record and completing documentation. >50% of time spent in counseling and coordination of care.  Time spend counseling with patient consisted of the following topics, symptom management.  Time spent in coordination of care with Bedside Nurse Erasto Gore RN  and Hospitalist Bob Erickson MD.       Interval History   Chart reviewed  Patient upset and anxious painful all over. Can't do self cares due to pain  Does not understand why he is in hospital.  States his dad will come to take care of him until other arrangement. Feels he has been lied to.    Review of Systems    CONSTITUTIONAL: NEGATIVE for fever, chills, change in weight  ENT/MOUTH: NEGATIVE for ear, mouth and throat problems  RESP: NEGATIVE for significant cough or SOB  CV: NEGATIVE for chest pain, palpitations or peripheral edema    COWS= moderate opioid w/drawl     7/26    Physical Exam   Temp:  [97.6  F (36.4  C)-98.4  F (36.9  C)] 97.6  F (36.4  C)  Pulse:  [69-87] 87  Resp:  [16-18] 16  BP: (118-123)/(71-77) 121/71  SpO2:  [94 %-97 %] 97 %  162 lbs 11.2 oz  GEN:  Alert, oriented x 3, appears comfortable, NAD.  HEENT:  Normocephalic/atraumatic, no scleral icterus, no nasal discharge, mouth moist.  CV:  RRR, S1, S2; no murmurs or other irregularities noted.  +3 DP/PT pulses bilatererally; no edema BLE.  RESP:  Clear to auscultation bilaterally without rales/rhonchi/wheezing/retractions.  Symmetric chest rise on inhalation noted.  Normal respiratory effort.  ABD:  Rounded, soft, non-tender/non-distended.  +BS  EXT:  Edema & pulses as noted above.  CMS intact x 4.     M/S:   Tender to palpation exquisite throughout more in hands .    SKIN:  Dry to touch, no exanthems noted in the visualized areas.    NEURO: Symmetric strength +5/5.  Sensation to touch intact all extremities.   There is no area of allodynia or hyperesthesia.  PAIN BEHAVIOR: Cooperative  Psych: anxious/ agitated affect.  cooperative, conversant appropriately.    Medications        acetaminophen  975 mg Oral Q8H     artificial saliva  5 mL Swish & Spit 4x Daily     diclofenac  4 g Topical 4x Daily     gabapentin  1,200 mg Oral TID     ibuprofen  600 mg Oral TID     LORazepam  2 mg Oral Once     methocarbamol  750 mg Oral 4x Daily     morphine  180 mg Oral Q6H     morphine  60 mg Oral Once     nicotine  1 patch Transdermal Daily     nicotine   Transdermal Q8H     omeprazole  20 mg Oral Daily     polyethylene glycol  17 g Oral Daily     sennosides  1 tablet Oral Daily       Data   Results for orders placed or performed during the hospital encounter of 12/21/21 (from the past 24 hour(s))   Creatinine   Result Value Ref Range    Creatinine 0.63 (L) 0.66 - 1.25 mg/dL    GFR Estimate >90 >60 mL/min/1.73m2

## 2021-12-23 NOTE — PROGRESS NOTES
[facility]    Medicine Progress Note - Hospitalist Service       Date of Admission:  12/21/2021    Assessment & Plan           Beto Tran is a 46 year old  male with a significant past medical history of advanced multiple myeloma who has failed multiple treatments, chronic pain, thoracic outlet syndrome now on hospice. Admitted on 12/21/2021 with intractable pain    Advanced multiple myeloma    - treated at [facility]    - failed: velcade, carfilzomib, pamalidomide, cytoxin, stem cell transplant    - currently was on hospice ([facility])    - apparently they were making med changes and didn't feel like they were able to control his pain and instructed him to come to the hospital    - Pain/Pall care have seen patient and made recs    - social work is working on placement    - will need long term placement    - cont home regimen (although we need to give MS Contin 180mg not his home 200mg due to our lack of the correct mg tabs)    - also currently has MSIR 60mg Q2hour PRN      History of thoracic outlet syndrome    - s/p 1st rib resection and sclenectomy    Home hospice    -Apparently his previous home hospice dropped him as a patient, because the girlfriend got mad and left and therefore he had no caregiver at home and the hospice did not feel like this was a safe living situation for him    - Plan is for long-term care with hospice    No labs needed in am    I did call his significant other.  She states that she has multiple musculoskeletal issues and cannot care for him by herself anymore.  They have tried to get more help in, but are unable to.  She feels that he needs to go to a facility.  In terms of the event that resulted in him being dropped by his hospice, she states that she just had needed a break from taking care of him and left to go to her home.    Called his mother as well ([phone]). Had to leave a message.       Diet: Regular Diet Adult    DVT Prophylaxis: at  his baseline mobility  Tavares Catheter: Not present  Central Lines: None  Code Status: No CPR- Do NOT Intubate      Disposition Plan   Expected Discharge: 12/23/2021        The patient's care was discussed with the Bedside Nurse and Patient.    Bob Erickson MD  Hospitalist Service  [facility]  Securely message with the Vocera Web Console (learn more here)  Text page via Two[employer] Paging/Directory        Clinically Significant Risk Factors Present on Admission                    ______________________________________________________________________    Interval History   Patient is not happy about not being able to go home. Would like to speak with social work.    Data reviewed today: I reviewed all medications, new labs and imaging results over the last 24 hours. I personally reviewed the pelvic xray image(s) showing lytic lesions.    Physical Exam   Vital Signs: Temp: 97.6  F (36.4  C) Temp src: Oral BP: 121/71 Pulse: 87   Resp: 16 SpO2: 97 % O2 Device: None (Room air)    Weight: 162 lbs 11.2 oz  Constitutional: awake, alert, cooperative, no apparent distress, and appears stated age  Eyes: Lids and lashes normal, pupils equal, round and reactive to light, extra ocular muscles intact, sclera clear, conjunctiva normal  ENT: Normocephalic, without obvious abnormality, atraumatic, sinuses nontender on palpation, external ears without lesions, oral pharynx with moist mucous membranes, tonsils without erythema or exudates, gums normal and good dentition.  Respiratory: No increased work of breathing, good air exchange, clear to auscultation bilaterally, no crackles or wheezing  Cardiovascular: Normal apical impulse, regular rate and rhythm, normal S1 and S2, no S3 or S4, and no murmur noted  GI: No scars, normal bowel sounds, soft, non-distended, non-tender, no masses palpated, no hepatosplenomegally    Data   Recent Labs   Lab 12/23/21  0519   CR 0.63*       No results found for this or any previous  visit (from the past 24 hour(s)).

## 2021-12-24 PROCEDURE — 99233 SBSQ HOSP IP/OBS HIGH 50: CPT | Performed by: NURSE PRACTITIONER

## 2021-12-24 PROCEDURE — 250N000009 HC RX 250: Performed by: NURSE PRACTITIONER

## 2021-12-24 PROCEDURE — 250N000013 HC RX MED GY IP 250 OP 250 PS 637: Performed by: HOSPITALIST

## 2021-12-24 PROCEDURE — 250N000013 HC RX MED GY IP 250 OP 250 PS 637: Performed by: INTERNAL MEDICINE

## 2021-12-24 PROCEDURE — 120N000004 HC R&B MS OVERFLOW

## 2021-12-24 PROCEDURE — 250N000013 HC RX MED GY IP 250 OP 250 PS 637: Performed by: NURSE PRACTITIONER

## 2021-12-24 PROCEDURE — 99232 SBSQ HOSP IP/OBS MODERATE 35: CPT | Performed by: INTERNAL MEDICINE

## 2021-12-24 PROCEDURE — 999N000111 HC STATISTIC OT IP EVAL DEFER

## 2021-12-24 RX ORDER — PROCHLORPERAZINE MALEATE 5 MG
10 TABLET ORAL EVERY 6 HOURS PRN
Status: DISCONTINUED | OUTPATIENT
Start: 2021-12-24 | End: 2021-12-28 | Stop reason: HOSPADM

## 2021-12-24 RX ORDER — MORPHINE SULFATE 15 MG/1
15 TABLET, FILM COATED, EXTENDED RELEASE ORAL ONCE
Status: COMPLETED | OUTPATIENT
Start: 2021-12-24 | End: 2021-12-24

## 2021-12-24 RX ORDER — BISACODYL 10 MG
10 SUPPOSITORY, RECTAL RECTAL DAILY PRN
Status: DISCONTINUED | OUTPATIENT
Start: 2021-12-24 | End: 2021-12-28 | Stop reason: HOSPADM

## 2021-12-24 RX ORDER — LACTULOSE 10 G/15ML
20 SOLUTION ORAL DAILY PRN
Status: DISCONTINUED | OUTPATIENT
Start: 2021-12-24 | End: 2021-12-28 | Stop reason: HOSPADM

## 2021-12-24 RX ORDER — LORATADINE 10 MG/1
10 TABLET ORAL DAILY PRN
Status: DISCONTINUED | OUTPATIENT
Start: 2021-12-24 | End: 2021-12-28 | Stop reason: HOSPADM

## 2021-12-24 RX ORDER — MORPHINE SULFATE 15 MG/1
15 TABLET, FILM COATED, EXTENDED RELEASE ORAL EVERY 6 HOURS
Status: DISPENSED | OUTPATIENT
Start: 2021-12-24 | End: 2021-12-27

## 2021-12-24 RX ADMIN — OMEPRAZOLE 20 MG: 20 CAPSULE, DELAYED RELEASE ORAL at 08:37

## 2021-12-24 RX ADMIN — PROCHLORPERAZINE MALEATE 10 MG: 5 TABLET ORAL at 21:59

## 2021-12-24 RX ADMIN — MORPHINE SULFATE 180 MG: 30 TABLET, FILM COATED, EXTENDED RELEASE ORAL at 21:52

## 2021-12-24 RX ADMIN — ACETAMINOPHEN 975 MG: 325 TABLET, FILM COATED ORAL at 16:48

## 2021-12-24 RX ADMIN — METHOCARBAMOL 750 MG: 750 TABLET ORAL at 16:51

## 2021-12-24 RX ADMIN — MORPHINE SULFATE 90 MG: 15 TABLET ORAL at 01:50

## 2021-12-24 RX ADMIN — Medication: at 00:24

## 2021-12-24 RX ADMIN — GABAPENTIN 1200 MG: 600 TABLET, FILM COATED ORAL at 08:37

## 2021-12-24 RX ADMIN — METHOCARBAMOL 750 MG: 750 TABLET ORAL at 08:37

## 2021-12-24 RX ADMIN — MORPHINE SULFATE 180 MG: 30 TABLET, FILM COATED, EXTENDED RELEASE ORAL at 16:50

## 2021-12-24 RX ADMIN — ACETAMINOPHEN 975 MG: 325 TABLET, FILM COATED ORAL at 10:25

## 2021-12-24 RX ADMIN — METHOCARBAMOL 750 MG: 750 TABLET ORAL at 19:29

## 2021-12-24 RX ADMIN — MORPHINE SULFATE 15 MG: 15 TABLET, EXTENDED RELEASE ORAL at 16:54

## 2021-12-24 RX ADMIN — MORPHINE SULFATE 90 MG: 15 TABLET ORAL at 16:49

## 2021-12-24 RX ADMIN — GABAPENTIN 1200 MG: 600 TABLET, FILM COATED ORAL at 19:29

## 2021-12-24 RX ADMIN — IBUPROFEN 600 MG: 600 TABLET ORAL at 16:51

## 2021-12-24 RX ADMIN — GABAPENTIN 1200 MG: 600 TABLET, FILM COATED ORAL at 16:50

## 2021-12-24 RX ADMIN — MORPHINE SULFATE 15 MG: 15 TABLET, EXTENDED RELEASE ORAL at 12:19

## 2021-12-24 RX ADMIN — POLYETHYLENE GLYCOL 3350 17 G: 17 POWDER, FOR SOLUTION ORAL at 08:37

## 2021-12-24 RX ADMIN — LORAZEPAM 3 MG: 2 LIQUID ORAL at 21:53

## 2021-12-24 RX ADMIN — ACETAMINOPHEN 1000 MG: 500 TABLET, FILM COATED ORAL at 00:56

## 2021-12-24 RX ADMIN — NICOTINE 1 PATCH: 14 PATCH, EXTENDED RELEASE TRANSDERMAL at 08:37

## 2021-12-24 RX ADMIN — MORPHINE SULFATE 90 MG: 15 TABLET ORAL at 08:42

## 2021-12-24 RX ADMIN — MORPHINE SULFATE 180 MG: 30 TABLET, FILM COATED, EXTENDED RELEASE ORAL at 10:26

## 2021-12-24 RX ADMIN — DICLOFENAC SODIUM 4 G: 10 GEL TOPICAL at 19:33

## 2021-12-24 RX ADMIN — MORPHINE SULFATE 180 MG: 30 TABLET, FILM COATED, EXTENDED RELEASE ORAL at 04:00

## 2021-12-24 RX ADMIN — MORPHINE SULFATE 15 MG: 15 TABLET, EXTENDED RELEASE ORAL at 21:52

## 2021-12-24 RX ADMIN — IBUPROFEN 600 MG: 600 TABLET ORAL at 12:19

## 2021-12-24 RX ADMIN — Medication 5 ML: at 16:54

## 2021-12-24 RX ADMIN — MORPHINE SULFATE 90 MG: 15 TABLET ORAL at 06:10

## 2021-12-24 RX ADMIN — MORPHINE SULFATE 90 MG: 15 TABLET ORAL at 19:29

## 2021-12-24 RX ADMIN — METHOCARBAMOL 750 MG: 750 TABLET ORAL at 12:19

## 2021-12-24 ASSESSMENT — ACTIVITIES OF DAILY LIVING (ADL)
ADLS_ACUITY_SCORE: 11
ADLS_ACUITY_SCORE: 12
ADLS_ACUITY_SCORE: 11
ADLS_ACUITY_SCORE: 12
ADLS_ACUITY_SCORE: 12
ADLS_ACUITY_SCORE: 11
ADLS_ACUITY_SCORE: 12
ADLS_ACUITY_SCORE: 11
ADLS_ACUITY_SCORE: 11
ADLS_ACUITY_SCORE: 12
ADLS_ACUITY_SCORE: 11
ADLS_ACUITY_SCORE: 12
ADLS_ACUITY_SCORE: 11

## 2021-12-24 NOTE — PLAN OF CARE
"1500 - 0730  /68 (BP Location: Right arm)   Pulse 78   Temp 97.3  F (36.3  C) (Oral)   Resp 16   Ht 1.727 m (5' 8\")   Wt 73.8 kg (162 lb 11.2 oz)   SpO2 95%   BMI 24.74 kg/m    VSS on RA. AxOx4 and able to make needs known. Ambulating Ax1 w/ walker pivot, bed alarm active. Tolerating regular diet. Reporting 7-10/10 pain in hips, chest, and back. Schd analgesia administered w/ mild relief, also requiring PRN analgesia (see MAR). Pt stated that he is experiencing more upper body pain than usual, repositioning declined, heat applied w/ mild relief. No IV access. Nicotine patch in place on right shoulder. Pt received shower, and shaved. Plan to ADAT, administer Lovenox, PTAs, and analgesia as ordered (see MAR), while consulting SW and Pain management team. Will continue to provide supportive cares.  "

## 2021-12-24 NOTE — PLAN OF CARE
PT: Per discussion with pt and Pain & Palliative, not appropriate for PT consult at this time. Pt on hospice at this time. Will complete orders at this time. TCU per pain MD not appropriate in his situation.

## 2021-12-24 NOTE — PROGRESS NOTES
[facility]    Medicine Progress Note - Hospitalist Service       Date of Admission:  12/21/2021    Assessment & Plan           Beto Tran is a 46 year old  man with a significant past medical history of advanced multiple myeloma who has failed multiple treatments, chronic pain, thoracic outlet syndrome now on hospice. Admitted on 12/21/2021 with intractable pain    Advanced multiple myeloma  Has been on home hospice    - treated at [facility]    - failed: velcade, carfilzomib, pamalidomide, cytoxin, stem cell transplant    - most recently was on home hospice ([facility]), however, agency seemed to have abandoned patient without clear plan moving forward and he ran out of medications. Patient should have been admitted for either inpatient hospice or respite, however, instead they discharged him and recommended that he come to the hospital.   - palliative care team has been following and getting patient back on his MS Contin. Currently on increased MS Contin 195 mg PO q 6 hours for 3 days.   - Morphine IR 90 mg PRN.  - Robaxin 750 mg QID.  - Continue gabapentin, though consider change to Lyrica in future.  - Could also consider addition of duloxetine in future.    History of thoracic outlet syndrome    - s/p 1st rib resection and sclenectomy    Advanced Care Planning in setting of Multiple Myeloma and chronic pain  - Code status: DNR/DNI  - Decision-maker if patient unable to participate: Previous advanced directives noted girlfriend. However, per patient, unclear on what direction he wants to go moving forward. Thinks he plans to change this to his cousin, Landy, but is still thinking about it. Would be reasonable to complete new POLST on discharge.    - Disposition / goals: Patient's goals are to return home with hospice (new agency). Working on seeing if father able to go home with him for period. Patient open to hiring additional help if needed. Girlfriend no longer involved  and not option for assistance. Patient does not want PT/OT or desire placement.      Diet: Regular Diet Adult    DVT Prophylaxis: at his baseline mobility. SCDs.   Tavares Catheter: Not present  Central Lines: None  Code Status: No CPR- Do NOT Intubate      Disposition Plan   Expected Discharge: 12/27/2021        The patient's care was discussed with the Bedside Nurse, Patient and Palliative Care Consultant.    Laurence Muir MD  Hospitalist Service  [facility]  Securely message with the Vocera Web Console (learn more here)  Text page via [email or web address] Paging/Directory        Clinically Significant Risk Factors Present on Admission                  ______________________________________________________________________    Interval History   Patient seen by palliative care with medications adjusted to improve pain control after withdrawal from MS Contin. Continues to have pain. Patient clear that his goals are to return home with hospice. Clarifying discharge options with that goal in mind.     Data reviewed today: I reviewed all medications, new labs and imaging results over the last 24 hours. I personally reviewed the pelvic xray image(s) showing lytic lesions. Discussed findings with patient.     Physical Exam   Vital Signs: Temp: 97.9  F (36.6  C) Temp src: Oral BP: 110/68 Pulse: 77   Resp: 16 SpO2: 92 % O2 Device: None (Room air)    Weight: 162 lbs 11.2 oz    Constitutional: Patent resting in room, talking to dad on phone on my entering. Alert and oriented. Non-toxic. No acute distress.  HEENT: NCAT. Pupils equally round and reactive to light. EOMI. Moist oral mucosa, though put mask on shortly after I entered.   Respiratory: Clear to auscultation bilaterally. No crackles or wheezes. No increased work of breathing.   Cardiovascular: Regular rate and rhythm. No murmur.  GI: Soft, nontender, nondistended. Normoactive bowel sounds.   Musculoskeletal: No gross deformities.   Neurologic: Alert and  oriented x3. No focal neurologic deficits. Did not assess gait.      Data   Recent Labs   Lab 12/23/21  0519   CR 0.63*       No results found for this or any previous visit (from the past 24 hour(s)).

## 2021-12-24 NOTE — PROGRESS NOTES
Care from 6979-5696:    A&Ox4, VSS on RA. Pain 10/10 to back, lower legs, arms, generalized. Pt receiving scheduled MS and PRN MS, robaxin, ibuprofen. Denies nausea/ SOB. Tolerating diet. Using urinal at bedside, voiding adequately. Pain/pallative following. Continue to monitor.       Temp: 97.6  F (36.4  C) Temp src: Oral BP: 121/71 Pulse: 87   Resp: 16 SpO2: 97 % O2 Device: None (Room air)

## 2021-12-24 NOTE — PROGRESS NOTES
Care from 5322-6545:    Pt alert and oriented x4, vitally stable on RA. Pt states his pain is increased this morning to 9/10 to entire back, legs and arms. Pain/pallative following, continuing current pain regimen, one time dose MS 15mg added. Assist x1 with walker. Tolerating regular diet, denies nausea. LS CTA, BS active x4. OT/PT following. Pt needing long term placement at discharge. Continue to monitor.       Temp: 97.9  F (36.6  C) Temp src: Oral BP: 110/68 Pulse: 77   Resp: 16 SpO2: 92 % O2 Device: None (Room air)

## 2021-12-24 NOTE — PLAN OF CARE
OT: Orders received. Chart reviewed and discussed with care team.  OT not indicated as goals of care still being established with likely hospice.  Defer discharge recommendations to care team.  Will complete IP OT orders.

## 2021-12-24 NOTE — PROGRESS NOTES
[facility]  Palliative Care Progress Note  Text Page     Assessment & Plan   Recommendations:  1. Goals of Care- No CPR- Do NOT Intubate  Hospitalization goals discussed  Comfort driven care, no escalation of care and stay with in the IP hospice framework   Decisional Capacity- Intact. Patient does not have an advance directive. Per  informed consent policy next of kin should be involved if patient becomes unable. Next of kin is father Matty Tran,   FRANCI  Would be reasonable to complete on discharge. Patient currently on hospice      2. Pain metastatic multiple myeloma. Opioid withdrawal   Patient's opioid use in past 24 hours:  Morphine 1260 mg Daily Morphine Equivalent ( 50% less from baseline   [place] Board of Pharmacy Data Base Reviewed:    YES; As expected, no concern for misuse/abuse of controlled medications based on this report.  ORT  NA    -Ms Contin at 180 mg every 6 hrs , ADD 15 mg to each dose x 3 day (12/24-12/27) Ms Contin 15 mg once today   -Morphine IR 90 mg every 2 hrs prn  (baseline  90 mg ~10-12 doses per day)  - Robaxin 750 mg qid (consider reduction to tid 12/27)  -Gabapentin 1200 mg tid (future consideration with cross over to Lyrica  -topicals diclofenac, menthol gel may self administer  - no Physical Therapy /OT not covered with hospice benefit, up as tolerated   -consider in future to add duloxetine  -oral gel prn may self administer     3. Dyspnea   none noted continue to monitor     4. Anxiety increase related to social distress  -ativan 0.5 mg oral solution every 4 hrs prn  - decline  4. Spiritual Care  Oriented to Spiritual Health as part of Palliative Care team. Spiritual Health Services declined at this time.  Spiritual Background: spiritual not Mandaeism     5. Care Planning  Appreciate Care of Laurence Zaidi in care coordination and discharge planning. LTC vs home with father and increase care services on hospice with new vendor   Medications  for discharge  Too be determined     Medical Decision Making and Goals of Care:  Discussed on December 24, 2021    Kasia Luis, KAREN CNP   Feeling much better today, pain continues, but not as wide spread.  Still confused and anxious about what the plan is and why he was admitted.  Does not want to go to long term care and states his dad can provide care.      Kasia Luis RN,PGMT-BC,  APRN, CNP, ACHPN  Pain Management and Palliative Care  [facility]  Pgr: [phone]      Time Spent on this Encounter   Total unit/floor time 90 minutes, time consisted of the following, examination of the patient, reviewing the record and completing documentation. >50% of time spent in counseling and coordination of care, Bedside Nurse Meena Hayes RN , Hospitalist  Laurence Muir MD  and Physical Therapist Carlee Still.  Time spend counseling with patient consisted of the following topics, symptom management.    Total Visit Time: 90  o For Outpatient, 'Total Visit Time' = Face-to-Face time only.  o For Inpatient, 'Total Visit Time' = Unit Floor + Face-to-Face time.    Total Face-to-Face Prolonged Service Time: 35    Content of the Prolonged Time:  Symptom management and care coordination    Review of Systems    CONSTITUTIONAL: NEGATIVE for fever, chills, change in weight  ENT/MOUTH: NEGATIVE for ear, mouth and throat problems  RESP: NEGATIVE for significant cough or SOB  CV: NEGATIVE for chest pain, palpitations or peripheral edema    Palliative Symptom Review (0=no symptom/no concern, 1=mild, 2=moderate, 3=severe):      Pain: 2-moderate      Fatigue: 1-mild      Nausea: 0-none      Constipation: 0-none      Diarrhea: 0-none      Depressive Symptoms: 0-none      Anxiety: 1-mild      Drowsiness: 0-none      Poor Appetite: 0-none      Shortness of Breath: 0-none      Insomnia: 2-moderate      Other:        Overall (0 good/no concerns, 3 very poor):  2    Physical Exam    Temp:  [97.3  F (36.3  C)-98  F (36.7  C)] 97.9  F (36.6  C)  Pulse:  [77-95] 77  Resp:  [16] 16  BP: (104-129)/(68-79) 110/68  SpO2:  [92 %-97 %] 92 %  162 lbs 11.2 oz  Exam:  GEN:  Alert, oriented x 3, appears comfortable, NAD.  HEENT:  Normocephalic/atraumatic, no scleral icterus, no nasal discharge, mouth moist.  CV:  RRR, S1, S2; no murmurs or other irregularities noted.  +3 DP/PT pulses bilatererally; no edema BLE.  RESP:  Clear to auscultation bilaterally without rales/rhonchi/wheezing/retractions.  Symmetric chest rise on inhalation noted.  Normal respiratory effort.  ABD:  Rounded, soft, non-tender/non-distended.  +BS  EXT:  Edema & pulses as noted above.  CMS intact x 4.     M/S:   Tender to palpation  Hands and shoulder,but less.    SKIN:  Dry to touch, no exanthems noted in the visualized areas.    NEURO: Symmetric strength +5/5.  Sensation to touch intact all extremities.   Less widespread allodynia or hyperesthesia.  PAIN BEHAVIOR: Cooperative  Psych:  Normal affect.  Calm, cooperative, conversant appropriately.    Medications       acetaminophen  975 mg Oral Q8H     artificial saliva  5 mL Swish & Spit 4x Daily     diclofenac  4 g Topical 4x Daily     gabapentin  1,200 mg Oral TID     ibuprofen  600 mg Oral TID     methocarbamol  750 mg Oral 4x Daily     morphine  180 mg Oral Q6H     nicotine  1 patch Transdermal Daily     nicotine   Transdermal Q8H     omeprazole  20 mg Oral Daily     polyethylene glycol  17 g Oral Daily     sennosides  1 tablet Oral Daily       Data   No results found for this or any previous visit (from the past 24 hour(s)).

## 2021-12-24 NOTE — PROGRESS NOTES
Care Management Follow Up    Length of Stay (days): 2    Expected Discharge Date: 12/25/2021     Referrals Placed by CM/ROGER:  hospice  Private pay costs discussed: Not applicable    Additional Information:  Sw called and spoke with [facility] ([phone]). They would consider taking the patient if he has someone that is staying with him 24/7. They would take him if he admits to a LTC placement.     ROGER faxed referral at [phone].    JANE Acevedo [facility]  [phone]  [address]   [facility]. [id number]  [facility]             JANE Beltrán

## 2021-12-25 PROCEDURE — 120N000004 HC R&B MS OVERFLOW

## 2021-12-25 PROCEDURE — 250N000013 HC RX MED GY IP 250 OP 250 PS 637: Performed by: INTERNAL MEDICINE

## 2021-12-25 PROCEDURE — 250N000013 HC RX MED GY IP 250 OP 250 PS 637: Performed by: NURSE PRACTITIONER

## 2021-12-25 PROCEDURE — 99232 SBSQ HOSP IP/OBS MODERATE 35: CPT | Performed by: INTERNAL MEDICINE

## 2021-12-25 PROCEDURE — 250N000013 HC RX MED GY IP 250 OP 250 PS 637: Performed by: HOSPITALIST

## 2021-12-25 RX ORDER — IBUPROFEN 600 MG/1
600 TABLET, FILM COATED ORAL DAILY PRN
Status: DISCONTINUED | OUTPATIENT
Start: 2021-12-25 | End: 2021-12-28 | Stop reason: HOSPADM

## 2021-12-25 RX ADMIN — GABAPENTIN 1200 MG: 600 TABLET, FILM COATED ORAL at 13:19

## 2021-12-25 RX ADMIN — MORPHINE SULFATE 180 MG: 30 TABLET, FILM COATED, EXTENDED RELEASE ORAL at 21:34

## 2021-12-25 RX ADMIN — METHOCARBAMOL 750 MG: 750 TABLET ORAL at 16:34

## 2021-12-25 RX ADMIN — IBUPROFEN 600 MG: 600 TABLET ORAL at 23:17

## 2021-12-25 RX ADMIN — IBUPROFEN 600 MG: 600 TABLET ORAL at 10:56

## 2021-12-25 RX ADMIN — IBUPROFEN 600 MG: 600 TABLET ORAL at 16:34

## 2021-12-25 RX ADMIN — ACETAMINOPHEN 975 MG: 325 TABLET, FILM COATED ORAL at 01:01

## 2021-12-25 RX ADMIN — MORPHINE SULFATE 15 MG: 15 TABLET, EXTENDED RELEASE ORAL at 10:25

## 2021-12-25 RX ADMIN — GABAPENTIN 1200 MG: 600 TABLET, FILM COATED ORAL at 21:35

## 2021-12-25 RX ADMIN — MORPHINE SULFATE 15 MG: 15 TABLET, EXTENDED RELEASE ORAL at 21:36

## 2021-12-25 RX ADMIN — LORAZEPAM 3 MG: 2 LIQUID ORAL at 10:59

## 2021-12-25 RX ADMIN — GABAPENTIN 1200 MG: 600 TABLET, FILM COATED ORAL at 10:56

## 2021-12-25 RX ADMIN — NICOTINE 1 PATCH: 14 PATCH, EXTENDED RELEASE TRANSDERMAL at 10:58

## 2021-12-25 RX ADMIN — LORAZEPAM 3 MG: 2 LIQUID ORAL at 02:05

## 2021-12-25 RX ADMIN — MORPHINE SULFATE 180 MG: 30 TABLET, FILM COATED, EXTENDED RELEASE ORAL at 10:25

## 2021-12-25 RX ADMIN — MORPHINE SULFATE 15 MG: 15 TABLET, EXTENDED RELEASE ORAL at 16:34

## 2021-12-25 RX ADMIN — MORPHINE SULFATE 180 MG: 30 TABLET, FILM COATED, EXTENDED RELEASE ORAL at 16:35

## 2021-12-25 RX ADMIN — MORPHINE SULFATE 90 MG: 15 TABLET ORAL at 00:49

## 2021-12-25 RX ADMIN — ACETAMINOPHEN 975 MG: 325 TABLET, FILM COATED ORAL at 16:42

## 2021-12-25 RX ADMIN — LORAZEPAM 3 MG: 2 LIQUID ORAL at 23:17

## 2021-12-25 RX ADMIN — OMEPRAZOLE 20 MG: 20 CAPSULE, DELAYED RELEASE ORAL at 10:56

## 2021-12-25 RX ADMIN — IBUPROFEN 600 MG: 600 TABLET ORAL at 01:01

## 2021-12-25 RX ADMIN — ACETAMINOPHEN 975 MG: 325 TABLET, FILM COATED ORAL at 10:55

## 2021-12-25 RX ADMIN — METHOCARBAMOL 750 MG: 750 TABLET ORAL at 21:36

## 2021-12-25 RX ADMIN — METHOCARBAMOL 750 MG: 750 TABLET ORAL at 10:55

## 2021-12-25 ASSESSMENT — ACTIVITIES OF DAILY LIVING (ADL)
ADLS_ACUITY_SCORE: 10
ADLS_ACUITY_SCORE: 12
ADLS_ACUITY_SCORE: 10
ADLS_ACUITY_SCORE: 12
ADLS_ACUITY_SCORE: 10
ADLS_ACUITY_SCORE: 12
ADLS_ACUITY_SCORE: 10
ADLS_ACUITY_SCORE: 12
ADLS_ACUITY_SCORE: 10
ADLS_ACUITY_SCORE: 12
ADLS_ACUITY_SCORE: 12
ADLS_ACUITY_SCORE: 10
ADLS_ACUITY_SCORE: 12
ADLS_ACUITY_SCORE: 10
ADLS_ACUITY_SCORE: 12
ADLS_ACUITY_SCORE: 10
ADLS_ACUITY_SCORE: 12

## 2021-12-25 NOTE — PROGRESS NOTES
"PRIMARY DIAGNOSIS: Pain  OUTPATIENT/OBSERVATION GOALS TO BE MET BEFORE DISCHARGE:  1. ADLs back to baseline: No    2. Activity and level of assistance: Up with standby assistance. Up with GB and walker    3. Pain status: Improved but still requiring IV narcotics.    4. Return to near baseline physical activity: No     Discharge Planner Nurse   Safe discharge environment identified: TBD  Barriers to discharge: Yes       Entered by: Franny De La Garza 12/25/2021 4:37 AM     Please review provider order for any additional goals.   Nurse to notify provider when observation goals have been met and patient is ready for discharge.  Patient very hostile/uncooperative with cares. Fired nursing assistant and only wanted writer. Patient refused full head to toe and would only let writer get VS but stated, \"I don't know why you have to do an assessment or take my vitals, I'm on hospice.\" Told writer he was upset with multiple aspects regarding his care, specifically pain management. He feels that \"no one here is trying to help.\" He told writer he \"just wanted to sleep\" he does not want to be woken up for his 0400 pain medications. Writer educated patient on staying on top of his scheduled pain medications so that he doesn't have to jordan his pain but patient informed writer that \"he doesn't care\" and that \"he wants to be left alone and sleep.\" Ativan given. Writer and MITALI Coughlin verified that patient was asleep at 0400 and did not give him his scheduled pain medications as requested. Will continue to monitor. [facility] will take if patient admits to LTC placement.   "

## 2021-12-25 NOTE — PROGRESS NOTES
"PRIMARY DIAGNOSIS: Pain  OUTPATIENT/OBSERVATION GOALS TO BE MET BEFORE DISCHARGE:  1. ADLs back to baseline: No    2. Activity and level of assistance: Up with standby assistance.    3. Pain status: Improved but still requiring IV narcotics.    4. Return to near baseline physical activity: No     Discharge Planner Nurse   Safe discharge environment identified: No  Barriers to discharge: No       Entered by: Franny De La Garza 12/25/2021 5:21 AM     Please review provider order for any additional goals.   Nurse to notify prPlease review provider order for any additional goals.   Nurse to notify provider when observation goals have been met and patient is ready for discharge.  Patient very hostile/uncooperative with cares. Fired nursing assistant and only wanted writer. Patient refused full head to toe and would only let writer get VS but stated, \"I don't know why you have to do an assessment or take my vitals, I'm on hospice.\" Told writer he was upset with multiple aspects regarding his care, specifically pain management. He feels that \"no one here is trying to help.\" He told writer he \"just wanted to sleep\" he does not want to be woken up for his 0400 pain medications. Writer educated patient on staying on top of his scheduled pain medications so that he doesn't have to jordan his pain but patient informed writer that \"he doesn't care\" and that \"he wants to be left alone and sleep.\" Writer and MITALI Coughlin verified that patient was asleep at 0400 and did not give him his scheduled pain medications as requested. Will continue to monitor. [facility] will take if patient admits to LTC placement. ovider when observation goals have been met and patient is ready for discharge.  "

## 2021-12-25 NOTE — PROGRESS NOTES
Per previous nurse, do not wake up for morning meds and assessment. Patient alert and oriented and able to make needs known. Safety check done and patient sleeping.

## 2021-12-25 NOTE — PLAN OF CARE
"/80 (BP Location: Right arm)   Pulse 90   Temp 98  F (36.7  C) (Oral)   Resp 16   Ht 1.727 m (5' 8\")   Wt 73.8 kg (162 lb 11.2 oz)   SpO2 96%   BMI 24.74 kg/m    Neuro: a/o x4  Cardiac: WNL  Lungs: WNL  GI: bedside commode  : urinal  Pain: 8-10/10  IV: none  Meds: neurontin, tylenol, robaxin, ativan, morphine  Diet: Reg  Activity: assist x1, walker  Plan: Long term care being investigated. Will continue to monitor and provide cares.      "

## 2021-12-25 NOTE — PROGRESS NOTES
[facility]    Medicine Progress Note - Hospitalist Service       Date of Admission:  12/21/2021    Assessment & Plan           Beto Tran is a 46 year old  man with a significant past medical history of advanced multiple myeloma who has failed multiple treatments, chronic pain, thoracic outlet syndrome now on hospice. Admitted on 12/21/2021 with intractable pain    Advanced multiple myeloma  Has been on home hospice    - treated at [facility]    - failed: velcade, carfilzomib, pamalidomide, cytoxin, stem cell transplant    - most recently was on home hospice ([facility]), however, agency discharged patient when no one home with him, allowing him to run out of medications. Rather than admitting for  either inpatient hospice or respite, they discharged him and recommended that he come to the hospital.   - palliative care team has been following and getting patient back on his MS Contin. Currently on increased MS Contin 195 mg PO q 6 hours for 3 days.   - Morphine IR 90 mg PRN.  - Robaxin 750 mg QID.  - Continue gabapentin, though can consider change to Lyrica in future.  - Could also consider addition of duloxetine in future.    History of thoracic outlet syndrome    - s/p 1st rib resection and sclenectomy    Advanced Care Planning in setting of Multiple Myeloma and chronic pain  - Code status: DNR/DNI  - Decision-maker if patient unable to participate: Previous advanced directives noted girlfriend. However, per patient, unclear on what direction he wants to go moving forward. Thinks he plans to change this to his cousin, Landy, but is still thinking about it. Would be reasonable to complete new POLST on discharge.    - Disposition / goals: Patient's goals are to return home with hospice (new agency). Working on seeing if father able to go home with him for period. Patient open to hiring additional help if needed. Girlfriend no longer involved and not option for assistance. Patient  does not want PT/OT or desire placement. Ultimately, if no hospice agency able to  patient based on needs at home, other option would be to consider outpatient palliative care follow up for management of pain medications. Could continue to follow patient in outpatient setting until evidence of further decline given patient's wishes.      Diet: Regular Diet Adult    DVT Prophylaxis: at his baseline mobility. SCDs.   Tavares Catheter: Not present  Central Lines: None  Code Status: No CPR- Do NOT Intubate      Disposition Plan   Expected Discharge: 12/27/2021        The patient's care was discussed with the Bedside Nurse, Care Coordinator/ and Patient.    Laurence Muir MD  Hospitalist Service  [facility]  Securely message with the Vocera Web Console (learn more here)  Text page via [employer] Paging/Directory        Clinically Significant Risk Factors Present on Admission                  ______________________________________________________________________    Interval History   Patient did decline some of his morning medications though takes them when he would like them. Patient reports that he did not sleep well overnight and had a rough morning regarding pain (note missed doses when patient wanting to be sleeping), but is feeling better now and planning to take a nap. No new complaints.     Data reviewed today: I reviewed all medications, new labs and imaging results over the last 24 hours. No new imaging.     Physical Exam   Vital Signs: Temp: 98  F (36.7  C) Temp src: Oral BP: 119/66 Pulse: 66   Resp: 16 SpO2: 95 % O2 Device: None (Room air)    Weight: 162 lbs 11.2 oz    Constitutional: Patent resting in room. Alert and oriented. Non-toxic. No acute distress.  HEENT: NCAT. Pupils equally round and reactive to light. EOMI.   Respiratory: Clear to auscultation bilaterally. No crackles or wheezes. No increased work of breathing.   Cardiovascular: Regular rate and rhythm. No  murmur.  GI: Soft, nontender, nondistended. Normoactive bowel sounds.   Musculoskeletal: No gross deformities.   Neurologic: Alert and oriented x3. No focal neurologic deficits. Did not assess gait.      Data   Recent Labs   Lab 12/23/21  0519   CR 0.63*       No results found for this or any previous visit (from the past 24 hour(s)).

## 2021-12-25 NOTE — PROGRESS NOTES
Care Management Follow Up    Length of Stay (days): 3    Expected Discharge Date: 12/27/2021     Additional Information:  SW received a message from Alyce at [facility] ([phone]). She wanted to know if anyone was able to stay with the patient once he discharged home. Informed her at this time I dont have confirmation on this. They will not run a benefit check until they get confirmation. Informed her to follow up with the SW on Monday.     JANE Acevedo [facility]  [phone]  [address]   [facility]. [address]  [facility]           JANE Beltrán

## 2021-12-25 NOTE — PLAN OF CARE
"Blood pressure 118/80, pulse 90, temperature 98  F (36.7  C), temperature source Oral, resp. rate 16, height 1.727 m (5' 8\"), weight 73.8 kg (162 lb 11.2 oz), SpO2 96 %.    Care: 0372-1082 pm    Neuro:  Patient is alert, orientated x 4, expressing his frustration with medical  Data he is receiving.  Pain:     Regular scheduled morphine given with additional prn dose.  Improvement in pain noted.  Patient rates his pain a 7 on a 1-10 scale with improvement to a 3-4.  Patient centers over his back, bilateral legs and arms.    CV:       WDL  PULM:   WDL  GI:         WDL  :        Patient is using urinal to void.    Plan:      TCU and Long term care being investigated.      "

## 2021-12-25 NOTE — PROGRESS NOTES
NUTRITION BRIEF NOTE  Assessment due for positive malnutrition risk screen on admit. Upon chart review, hospice with LTC at discharge. Defer assessment. Please page/consult as needed.     Taylor Smiley MS, RDN-AP, LD, CNSC  3rd floor/ICU: [phone]  All other floors: [phone]  Weekend/holiday: [phone]  Office: [phone]

## 2021-12-25 NOTE — PLAN OF CARE
"0900 - 1530  /66 (BP Location: Right arm)   Pulse 66   Temp 98  F (36.7  C) (Oral)   Resp 16   Ht 1.727 m (5' 8\")   Wt 73.8 kg (162 lb 11.2 oz)   SpO2 95%   BMI 24.74 kg/m    VSS on RA. AxOx4 and able to make needs known. Ambulating SBA to Ax1 w/ walker, bed alarm active. Tolerating regular diet. Reported 10/10 generalized pain, Schd analgesia administered w/ moderate relief, down to 6/10. Schd Senna and Miralax held for loose stool. 0800 given late due to Pt's need to rest, 1200 Robaxin held to avoid concurrent dosing. Plan to manage pain while working on home care or placement. Pt is agreeable to POC and resting peacefully. Will continue to provide supportive cares.  "

## 2021-12-26 PROCEDURE — 99232 SBSQ HOSP IP/OBS MODERATE 35: CPT | Performed by: INTERNAL MEDICINE

## 2021-12-26 PROCEDURE — 250N000013 HC RX MED GY IP 250 OP 250 PS 637: Performed by: HOSPITALIST

## 2021-12-26 PROCEDURE — 250N000013 HC RX MED GY IP 250 OP 250 PS 637: Performed by: NURSE PRACTITIONER

## 2021-12-26 PROCEDURE — 250N000013 HC RX MED GY IP 250 OP 250 PS 637: Performed by: INTERNAL MEDICINE

## 2021-12-26 PROCEDURE — 120N000004 HC R&B MS OVERFLOW

## 2021-12-26 RX ADMIN — LORAZEPAM 3 MG: 2 LIQUID ORAL at 22:18

## 2021-12-26 RX ADMIN — POLYETHYLENE GLYCOL 3350 17 G: 17 POWDER, FOR SOLUTION ORAL at 08:10

## 2021-12-26 RX ADMIN — ACETAMINOPHEN 975 MG: 325 TABLET, FILM COATED ORAL at 10:33

## 2021-12-26 RX ADMIN — METHOCARBAMOL 750 MG: 750 TABLET ORAL at 12:50

## 2021-12-26 RX ADMIN — MORPHINE SULFATE 90 MG: 15 TABLET ORAL at 12:49

## 2021-12-26 RX ADMIN — MORPHINE SULFATE 15 MG: 15 TABLET, EXTENDED RELEASE ORAL at 10:33

## 2021-12-26 RX ADMIN — MORPHINE SULFATE 90 MG: 15 TABLET ORAL at 08:09

## 2021-12-26 RX ADMIN — ACETAMINOPHEN 975 MG: 325 TABLET, FILM COATED ORAL at 00:35

## 2021-12-26 RX ADMIN — MORPHINE SULFATE 15 MG: 15 TABLET, EXTENDED RELEASE ORAL at 15:52

## 2021-12-26 RX ADMIN — MORPHINE SULFATE 180 MG: 30 TABLET, FILM COATED, EXTENDED RELEASE ORAL at 22:12

## 2021-12-26 RX ADMIN — OMEPRAZOLE 20 MG: 20 CAPSULE, DELAYED RELEASE ORAL at 08:09

## 2021-12-26 RX ADMIN — MORPHINE SULFATE 180 MG: 30 TABLET, FILM COATED, EXTENDED RELEASE ORAL at 15:52

## 2021-12-26 RX ADMIN — MORPHINE SULFATE 90 MG: 15 TABLET ORAL at 20:00

## 2021-12-26 RX ADMIN — GABAPENTIN 1200 MG: 600 TABLET, FILM COATED ORAL at 19:55

## 2021-12-26 RX ADMIN — ACETAMINOPHEN 975 MG: 325 TABLET, FILM COATED ORAL at 17:47

## 2021-12-26 RX ADMIN — IBUPROFEN 600 MG: 600 TABLET ORAL at 12:50

## 2021-12-26 RX ADMIN — IBUPROFEN 600 MG: 600 TABLET ORAL at 15:52

## 2021-12-26 RX ADMIN — GABAPENTIN 1200 MG: 600 TABLET, FILM COATED ORAL at 08:09

## 2021-12-26 RX ADMIN — MORPHINE SULFATE 180 MG: 30 TABLET, FILM COATED, EXTENDED RELEASE ORAL at 04:27

## 2021-12-26 RX ADMIN — MORPHINE SULFATE 90 MG: 15 TABLET ORAL at 17:47

## 2021-12-26 RX ADMIN — NICOTINE 1 PATCH: 14 PATCH, EXTENDED RELEASE TRANSDERMAL at 08:10

## 2021-12-26 RX ADMIN — GABAPENTIN 1200 MG: 600 TABLET, FILM COATED ORAL at 12:52

## 2021-12-26 RX ADMIN — LORAZEPAM 3 MG: 2 LIQUID ORAL at 18:09

## 2021-12-26 RX ADMIN — Medication 5 ML: at 19:55

## 2021-12-26 RX ADMIN — DICLOFENAC SODIUM 4 G: 10 GEL TOPICAL at 19:55

## 2021-12-26 RX ADMIN — Medication 5 ML: at 15:54

## 2021-12-26 RX ADMIN — METHOCARBAMOL 750 MG: 750 TABLET ORAL at 15:52

## 2021-12-26 RX ADMIN — METHOCARBAMOL 750 MG: 750 TABLET ORAL at 19:55

## 2021-12-26 RX ADMIN — DICLOFENAC SODIUM 4 G: 10 GEL TOPICAL at 15:54

## 2021-12-26 RX ADMIN — MORPHINE SULFATE 15 MG: 15 TABLET, EXTENDED RELEASE ORAL at 04:28

## 2021-12-26 RX ADMIN — LORAZEPAM 3 MG: 2 LIQUID ORAL at 08:18

## 2021-12-26 RX ADMIN — METHOCARBAMOL 750 MG: 750 TABLET ORAL at 08:09

## 2021-12-26 RX ADMIN — MORPHINE SULFATE 180 MG: 30 TABLET, FILM COATED, EXTENDED RELEASE ORAL at 10:33

## 2021-12-26 RX ADMIN — MORPHINE SULFATE 15 MG: 15 TABLET, EXTENDED RELEASE ORAL at 22:12

## 2021-12-26 ASSESSMENT — ACTIVITIES OF DAILY LIVING (ADL)
ADLS_ACUITY_SCORE: 11
ADLS_ACUITY_SCORE: 12
ADLS_ACUITY_SCORE: 11

## 2021-12-26 NOTE — PLAN OF CARE
"INPATIENT NOTE [id number]    Patient alert and oriented, tolerating regular diet and PO medications - prefers Ativan in apple juice, assist x1 with walker/pivot to commode. Pain - generalized pain 5-7/10; scheduled morphine, tylenol, ibuprofen and robaxin given with patient reported improved - no PRN morphine given, one PRN dose Ativan given prior to bedtime. One loose BM this shift, pt using urinals at bedside independently. Handoff for pt to rest undisturbed as able throughout the night. SW and PTA hospice coordinating discharge planning - either placement or increased help from family (father). Continued monitoring, pain management, and supportive cares provided.    /72 (BP Location: Right arm)   Pulse 87   Temp 97.3  F (36.3  C) (Oral)   Resp 16   Ht 1.727 m (5' 8\")   Wt 73.8 kg (162 lb 11.2 oz)   SpO2 95%   BMI 24.74 kg/m      Shawn Mccord RN on 12/25/2021    "

## 2021-12-26 NOTE — PROGRESS NOTES
[facility]    Medicine Progress Note - Hospitalist Service       Date of Admission:  12/21/2021    Assessment & Plan           Beto Tran is a 46 year old  man with a significant past medical history of advanced multiple myeloma who has failed multiple treatments, chronic pain, thoracic outlet syndrome now on hospice. Admitted on 12/21/2021 with intractable pain    Advanced multiple myeloma  Has been on home hospice    - has been treated at [facility]    - failed: velcade, carfilzomib, pamalidomide, cytoxin, stem cell transplant    - most recently was on home hospice ([facility]), however, agency discharged patient when no one home with him, allowing him to run out of medications. Rather than admitting for  either inpatient hospice or respite, they discharged him and recommended that he come to the hospital.   - palliative care team has been following and getting patient back on his MS Contin. Currently on increased MS Contin 195 mg PO q 6 hours for 3 days through 12/27 morning and then back to 180 mg PO q 6 hours.   - Morphine IR 90 mg PRN.  - Robaxin 750 mg QID.  - Continue gabapentin, though can consider change to Lyrica in future.  - Could also consider addition of duloxetine in future.  - Pain and palliative team to see again on 12/27 with any additional recommendations. See below for disposition planning, though suspect patient may be best served staying home independently with outpatient palliative care. At some point if he cannot live independently, his 82-year-old father (Matty, [phone]) has also offered that patient could come live with him in [place] for further care/hospice.     History of thoracic outlet syndrome    - s/p 1st rib resection and sclenectomy    Advanced Care Planning in setting of Multiple Myeloma and chronic pain  - Code status: DNR/DNI  - Decision-maker if patient unable to participate: Previous advanced directives noted girlfriend. However,  per patient, unclear on what direction he wants to go moving forward. Thinks he plans to change this to his cousin, Landy, but is still thinking about it. Would be reasonable to complete new POLST on discharge depending on ongoing discussion.    - Disposition / goals:    -Patient's goals are to return home with hospice (new agency). There have been questions about whether father could stay with him, if patient required to have someone at home. Patient's father is 82 and lives in [place]. If patient cannot live independently, his 82-year-old father (Matty, [phone]) has  offered that patient could come live with him in [place] for further care/hospice. Matty notes that it would be more difficult for him to come here to stay at patient's condo. Patient is also open to hiring in-home help if needed. His girlfriend is no longer consistently involved and not option for assistance.    - Patient does not want PT/OT or desire placement.    - Ultimately, if no hospice agency able to  patient based on perceived needs at home, other option would be to consider outpatient palliative care follow up for management of pain medications. Could continue to follow patient in outpatient setting until evidence of further decline given patient's wishes. Placing outpatient referral in case this is ultimately best option in line with patient's goals of care. Suspect patient may be best served staying home independently with outpatient palliative care (if a clinic would agree to follow him and prescribe medications).         Diet: Regular Diet Adult    DVT Prophylaxis: at his baseline mobility. SCDs.   Tavares Catheter: Not present  Central Lines: None  Code Status: No CPR- Do NOT Intubate      Disposition Plan   Expected Discharge: 12/27/2021        The patient's care was discussed with the Bedside Nurse, Patient and Patient's Family.    Laurence Muir MD  Hospitalist Service  [facility]  Hospital  Securely message with the Vocera Web Console (learn more here)  Text page via [employer] Paging/Directory        Clinically Significant Risk Factors Present on Admission                  ______________________________________________________________________    Interval History   No acute events. Awaiting further information from social work tomorrow regarding hospice agency availability. Patient independent in room. Updated patient's father. At some point if patient cannot live independently, his 82-year-old father (Matty, [phone]) has offered that patient could come live with him in [place] for further care/hospice. Seems would be more difficult for father to come here and stay with patient.     Data reviewed today: I reviewed all medications, new labs and imaging results over the last 24 hours. No new imaging.     Physical Exam   Vital Signs: Temp: 97  F (36.1  C) Temp src: Oral BP: 129/78 Pulse: 79   Resp: 15 SpO2: 95 % O2 Device: None (Room air)    Weight: 162 lbs 11.2 oz    Constitutional: Appears older than actual age. Patent resting in room. Independent. Alert and oriented. Non-toxic. No acute distress.  HEENT: NCAT. Pupils equally round and reactive to light. EOMI.   Respiratory: Clear to auscultation bilaterally. No crackles or wheezes. No increased work of breathing.   Cardiovascular: Regular rate and rhythm. No murmur.  GI: Soft, nontender, nondistended. Normoactive bowel sounds.   Musculoskeletal: No gross deformities.   Neurologic: Alert and oriented x3. No focal neurologic deficits. Normal gait at time of exam.       Data   Recent Labs   Lab 12/23/21  0519   CR 0.63*       No results found for this or any previous visit (from the past 24 hour(s)).

## 2021-12-26 NOTE — PROGRESS NOTES
Care from [id number]:    Pt alert and oriented x4, vitally stable on RA. Pt having pain to back, bilat arms and bilat legs. Pain/pallative following, continuing current pain regimen, see MAR. Numbness to LUE and BLE's. Assist x1 with walker. Tolerating regular diet, denies nausea. OT/PT following. Plan for pt to possibly discharge home on hospice. Continue to monitor.     Temp: 97  F (36.1  C) Temp src: Oral BP: 129/78 Pulse: 79   Resp: 15 SpO2: 95 % O2 Device: None (Room air)

## 2021-12-26 NOTE — PLAN OF CARE
"PRIMARY DIAGNOSIS: ACUTE PAIN  OUTPATIENT/OBSERVATION GOALS TO BE MET BEFORE DISCHARGE:  1. Pain Status: Improved-controlled with oral pain medications.    2. Return to near baseline physical activity: Yes    3. Cleared for discharge by consultants (if involved): Yes    Discharge Planner Nurse   Safe discharge environment identified: Yes  Barriers to discharge: Yes       Entered by: Luis Miguel Alvarado 12/26/2021 3:25 AM    /74 (BP Location: Right arm)   Pulse 80   Temp 98.3  F (36.8  C) (Oral)   Resp 18   Ht 1.727 m (5' 8\")   Wt 73.8 kg (162 lb 11.2 oz)   SpO2 95%   BMI 24.74 kg/m       Pt is alert and oriented x4. Pt VSS. No acute distress noted. Pt states pain 7/10. Pt is on scheduled morphine, tylenol. Pt is assist of one in transfer with a walker. Pt abdomen is non tender and soft. Pt lungs are clear to auscultation bilaterally. Pt states he needs to sleep and should not be waken up until 4am. Pt is sleeping in bed and call light within reach. Will continue to monitor and assess pt.   Please review provider order for any additional goals.   Nurse to notify provider when observation goals have been met and patient is ready for discharge.  "

## 2021-12-27 PROCEDURE — 250N000013 HC RX MED GY IP 250 OP 250 PS 637: Performed by: NURSE PRACTITIONER

## 2021-12-27 PROCEDURE — 99232 SBSQ HOSP IP/OBS MODERATE 35: CPT | Performed by: NURSE PRACTITIONER

## 2021-12-27 PROCEDURE — 99207 PR CDG-CODE CATEGORY CHANGED: CPT | Performed by: HOSPITALIST

## 2021-12-27 PROCEDURE — 250N000013 HC RX MED GY IP 250 OP 250 PS 637: Performed by: INTERNAL MEDICINE

## 2021-12-27 PROCEDURE — 250N000013 HC RX MED GY IP 250 OP 250 PS 637: Performed by: HOSPITALIST

## 2021-12-27 PROCEDURE — 99232 SBSQ HOSP IP/OBS MODERATE 35: CPT | Performed by: HOSPITALIST

## 2021-12-27 PROCEDURE — 120N000004 HC R&B MS OVERFLOW

## 2021-12-27 RX ORDER — MORPHINE SULFATE 20 MG/ML
90 SOLUTION ORAL
Status: DISCONTINUED | OUTPATIENT
Start: 2021-12-27 | End: 2021-12-28

## 2021-12-27 RX ORDER — LORAZEPAM 2 MG/ML
4 CONCENTRATE ORAL EVERY 6 HOURS
Status: DISCONTINUED | OUTPATIENT
Start: 2021-12-27 | End: 2021-12-28 | Stop reason: HOSPADM

## 2021-12-27 RX ORDER — MORPHINE SULFATE 30 MG/1
30 TABLET, FILM COATED, EXTENDED RELEASE ORAL EVERY 6 HOURS
Status: DISCONTINUED | OUTPATIENT
Start: 2021-12-27 | End: 2021-12-28 | Stop reason: HOSPADM

## 2021-12-27 RX ADMIN — MORPHINE SULFATE 90 MG: 15 TABLET ORAL at 18:13

## 2021-12-27 RX ADMIN — MORPHINE SULFATE 90 MG: 15 TABLET ORAL at 05:38

## 2021-12-27 RX ADMIN — MORPHINE SULFATE 90 MG: 15 TABLET ORAL at 02:35

## 2021-12-27 RX ADMIN — GABAPENTIN 1200 MG: 600 TABLET, FILM COATED ORAL at 20:25

## 2021-12-27 RX ADMIN — IBUPROFEN 600 MG: 600 TABLET ORAL at 00:35

## 2021-12-27 RX ADMIN — MORPHINE SULFATE 180 MG: 30 TABLET, FILM COATED, EXTENDED RELEASE ORAL at 16:06

## 2021-12-27 RX ADMIN — LORAZEPAM 3 MG: 2 LIQUID ORAL at 10:24

## 2021-12-27 RX ADMIN — ACETAMINOPHEN 975 MG: 325 TABLET, FILM COATED ORAL at 09:56

## 2021-12-27 RX ADMIN — OMEPRAZOLE 20 MG: 20 CAPSULE, DELAYED RELEASE ORAL at 09:23

## 2021-12-27 RX ADMIN — METHOCARBAMOL 750 MG: 750 TABLET ORAL at 20:25

## 2021-12-27 RX ADMIN — MORPHINE SULFATE 90 MG: 15 TABLET ORAL at 13:45

## 2021-12-27 RX ADMIN — LORAZEPAM 3 MG: 2 LIQUID ORAL at 02:35

## 2021-12-27 RX ADMIN — MORPHINE SULFATE 90 MG: 15 TABLET ORAL at 11:23

## 2021-12-27 RX ADMIN — GABAPENTIN 1200 MG: 600 TABLET, FILM COATED ORAL at 14:37

## 2021-12-27 RX ADMIN — MORPHINE SULFATE 90 MG: 15 TABLET ORAL at 00:36

## 2021-12-27 RX ADMIN — NICOTINE 1 PATCH: 14 PATCH, EXTENDED RELEASE TRANSDERMAL at 18:13

## 2021-12-27 RX ADMIN — METHOCARBAMOL 750 MG: 750 TABLET ORAL at 16:08

## 2021-12-27 RX ADMIN — MORPHINE SULFATE 30 MG: 30 TABLET, FILM COATED, EXTENDED RELEASE ORAL at 22:33

## 2021-12-27 RX ADMIN — METHOCARBAMOL 750 MG: 750 TABLET ORAL at 09:22

## 2021-12-27 RX ADMIN — POLYETHYLENE GLYCOL 3350 17 G: 17 POWDER, FOR SOLUTION ORAL at 09:23

## 2021-12-27 RX ADMIN — LORAZEPAM 4 MG: 2 LIQUID ORAL at 14:37

## 2021-12-27 RX ADMIN — IBUPROFEN 600 MG: 600 TABLET ORAL at 16:08

## 2021-12-27 RX ADMIN — ACETAMINOPHEN 975 MG: 325 TABLET, FILM COATED ORAL at 00:36

## 2021-12-27 RX ADMIN — ACETAMINOPHEN 975 MG: 325 TABLET, FILM COATED ORAL at 18:13

## 2021-12-27 RX ADMIN — NICOTINE 1 PATCH: 14 PATCH, EXTENDED RELEASE TRANSDERMAL at 09:30

## 2021-12-27 RX ADMIN — MORPHINE SULFATE 180 MG: 30 TABLET, FILM COATED, EXTENDED RELEASE ORAL at 22:31

## 2021-12-27 RX ADMIN — LORAZEPAM 4 MG: 2 LIQUID ORAL at 20:25

## 2021-12-27 RX ADMIN — GABAPENTIN 1200 MG: 600 TABLET, FILM COATED ORAL at 09:22

## 2021-12-27 RX ADMIN — MORPHINE SULFATE 180 MG: 30 TABLET, FILM COATED, EXTENDED RELEASE ORAL at 09:56

## 2021-12-27 RX ADMIN — MORPHINE SULFATE 90 MG: 15 TABLET ORAL at 20:25

## 2021-12-27 RX ADMIN — IBUPROFEN 600 MG: 600 TABLET ORAL at 09:57

## 2021-12-27 RX ADMIN — PROCHLORPERAZINE MALEATE 10 MG: 5 TABLET ORAL at 09:55

## 2021-12-27 RX ADMIN — MORPHINE SULFATE 30 MG: 30 TABLET, FILM COATED, EXTENDED RELEASE ORAL at 16:07

## 2021-12-27 RX ADMIN — MORPHINE SULFATE 90 MG: 20 SOLUTION ORAL at 22:33

## 2021-12-27 RX ADMIN — MORPHINE SULFATE 90 MG: 15 TABLET ORAL at 09:22

## 2021-12-27 RX ADMIN — MORPHINE SULFATE 90 MG: 15 TABLET ORAL at 16:06

## 2021-12-27 RX ADMIN — MORPHINE SULFATE 15 MG: 15 TABLET, EXTENDED RELEASE ORAL at 04:06

## 2021-12-27 RX ADMIN — MORPHINE SULFATE 180 MG: 30 TABLET, FILM COATED, EXTENDED RELEASE ORAL at 04:04

## 2021-12-27 RX ADMIN — LORAZEPAM 3 MG: 2 LIQUID ORAL at 06:28

## 2021-12-27 RX ADMIN — METHOCARBAMOL 750 MG: 750 TABLET ORAL at 11:23

## 2021-12-27 ASSESSMENT — ACTIVITIES OF DAILY LIVING (ADL)
ADLS_ACUITY_SCORE: 11

## 2021-12-27 NOTE — PLAN OF CARE
[id number]    VSS on RA. AxOx4 and able to make needs known. Ambulating Ax1 w/ walker to WC, which is baseline, bed alarm active. No IV access. Reporting 9/10 pain from his neck down his back. Pt states no relief from pain regimen. Pain time adjusted meds to try and get him back on track to home dosing. SW working to try and find hospice vs palliative plan for discharge.

## 2021-12-27 NOTE — PLAN OF CARE
"1500 - 2300  /75 (BP Location: Right arm)   Pulse 84   Temp 98.1  F (36.7  C) (Oral)   Resp 16   Ht 1.727 m (5' 8\")   Wt 73.8 kg (162 lb 11.2 oz)   SpO2 95%   BMI 24.74 kg/m    VSS on RA. AxOx4 and able to make needs known. Ambulating Ax1 w/ walker, bed alarm active. Tolerating regular diet. No IV access. Reporting 6/10 upper body pain, Schd and PRN analgesia administered w/ mild relief. Plan to continue pain management while consulting pain team and SW. Pt is agreeable to POC and resting peacefully. Will continue to provide supportive cares.  "

## 2021-12-27 NOTE — CONSULTS
Hospice referral received from Elizabeth DURAN.  Pt chart reviewed, [facility] will need a solid caregiving plan in place prior to being able to accept this pt onto hospice.  Writer left  for pt to discuss and awaiting call back.  Of note, pt will require a F2F prior to being accepted into hospice.    Thank you for this referral.    Kerry Khoury RN PN  [facility]  Referral Specialist  [phone]

## 2021-12-27 NOTE — PROGRESS NOTES
"[facility]    Hospitalist Progress Note      Assessment & Plan   Beto Tran is a 46 year old  man with a significant past medical history of advanced multiple myeloma who has failed multiple treatments, chronic pain, thoracic outlet syndrome now on hospice who was admitted after continued pain, discharged from hospice agency due to concern of his ability to care for himself at home.     #Advanced multiple myeloma.  Has been on home hospice: Has been treated at [facility]. Failed velcade, carfilzomib, pamalidomide, cytoxin, stem cell transplant.  Most recently was on home hospice ([facility]), however, agency discharged patient due to nobody being home with him and he was running out of his pain medications.  Rather than admitting for either inpatient hospice or respite, they discharged him and recommended that he come to the hospital.  - Reviewed medical record. Patient was discharged from [facility] back in Sep 2021 where he was on Hematology service.  Per discharge summary, \"given his progressive disease, combined with debility, he is no longer a candidate for any disease directed therapy and life expectancy is likely < 6 months.\"  He was transitioned to more comfort focused plan with hospice support on discharge.     - palliative care team has been following and getting patient back on his home pain regimen. Patient requiring significant pain medications at baseline.  At baseline at home he is on 200 mg MS Contin every 6 hours, 60 mg IR morphine every 2 hours prn.  He also uses ativan 3 mg every 2 hours.    -Appreciate palliative assistance with management of pain medications.  Continue 180 mg MS contin every 6 hours, 60 mg IR morphine every 2 hours prn, lorazepam 3 mg every 4 hours prn.  -Bowel regimen.   - Robaxin 750 mg QID.  - Continue gabapentin, though can consider change to Lyrica in future.  - Could also consider addition of duloxetine in future.  - Pain and " palliative team consulted.  Working on finding another hospice agency to take over his care.  He voices strong preference for discharge back home and is not willing to go to a facility. At some point if he cannot live independently, his 82-year-old father (Matty, [phone]) has also offered that patient could come live with him in [place] for further care/hospice.      #History of thoracic outlet syndrome: s/p 1st rib resection and sclenectomy     Diet: Regular Diet Adult    DVT Prophylaxis: at his baseline mobility. SCDs.   Dispo: Pending placement with hospice agency vs. Palliative clinic. Appreciate SW assistance.   Code Status: No CPR- Do NOT Intubate      Demetrio Herrera MD  Text Page    Interval History   Assumed care. No events. Still endorsing diffuse pain but helped with pain meds. No CP. Lying in bed. Conversational. He again affirms he is not willing to go to a facility today.     -Data reviewed today: I reviewed all new labs and imaging results over the last 24 hours.    Physical Exam   Temp: 97.9  F (36.6  C) Temp src: Oral BP: 112/76 Pulse: 67   Resp: 16 SpO2: 94 % O2 Device: None (Room air)    Vitals:    12/21/21 2346 12/22/21 0433   Weight: 71.8 kg (158 lb 6.4 oz) 73.8 kg (162 lb 11.2 oz)     Vital Signs with Ranges  Temp:  [97.5  F (36.4  C)-98.3  F (36.8  C)] 97.9  F (36.6  C)  Pulse:  [] 67  Resp:  [16-18] 16  BP: (112-132)/(75-86) 112/76  SpO2:  [94 %-96 %] 94 %  I/O last 3 completed shifts:  In: 500 [P.O.:500]  Out: 1225 [Urine:1225]    Constitutional: Appears older than actual age. NAD. Conversational.   HEENT: MMM. No oral lesions. No elevation of JVD noted.   Respiratory: Clear to auscultation bilaterally. No crackles or wheezes. No increased work of breathing.   Cardiovascular: Regular rate and rhythm. No murmur.  GI: Soft, nontender, nondistended. Normoactive bowel sounds.   Musculoskeletal: No gross deformities.   Neurologic: Alert and oriented x3. No focal neurologic deficits.  Normal gait at time of exam.     Medications       acetaminophen  975 mg Oral Q8H     artificial saliva  5 mL Swish & Spit 4x Daily     diclofenac  4 g Topical 4x Daily     gabapentin  1,200 mg Oral TID     ibuprofen  600 mg Oral TID     methocarbamol  750 mg Oral 4x Daily     morphine  180 mg Oral Q6H     nicotine  1 patch Transdermal Daily     nicotine   Transdermal Q8H     omeprazole  20 mg Oral Daily     polyethylene glycol  17 g Oral Daily     sennosides  1 tablet Oral Daily       Data   Recent Labs   Lab 12/23/21  0519   CR 0.63*       No results found for this or any previous visit (from the past 24 hour(s)).

## 2021-12-27 NOTE — PLAN OF CARE
For VS and complete assessments, please see documentation in flowsheets.     Pertinent shift assessments: VSS. A&Ox4. Transfers x1 assist. C/O generalized pain PRN morphine given x2. Denies CP/SOB/Dizziness/ Light-headedness. No IV access, will continue current POC.

## 2021-12-27 NOTE — PROGRESS NOTES
[facility]  Palliative Care Progress Note  Text Page     Assessment & Plan   Recommendations:  1. Goals of Care- No CPR- Do NOT Intubate  Hospitalization goals discussed  Comfort driven care, no escalation of care and stay with in the IP hospice framework   Decisional Capacity- Intact. Patient does not have an advance directive. Per  informed consent policy next of kin should be involved if patient becomes unable. Next of kin is father Matty Tran,   FRANCI  Would be reasonable to complete on discharge. Patient currently on hospice      2. Pain metastatic multiple myeloma. Opioid hyperalgesia   DAILY CHRONIC USE= 2280 DAILY MME/DAY  Patient's opioid use in past 24 hours:  Morphine 1800 mg Daily Morphine Equivalent   Anticipated Morphine total for 12/27-28 = 1905 MG    [place] Board of Pharmacy Data Base Reviewed:    YES; As expected, no concern for misuse/abuse of controlled medications based on this report.  ORT  NA    -Ms Contin at 210 mg every 6 hrs    mg-Morphine IR 90 mg every 2 hrs prn  (baseline  90 mg ~10-12 doses per day),  Adjust 12/28  - Robaxin 750 mg qid (consider reduction to tid 12/27)  -Gabapentin 1200 mg tid (future consideration with cross over to Lyrica  -topicals diclofenac, menthol gel may self administer  - no Physical Therapy /OT not covered with hospice benefit, up as tolerated   -consider in future to add duloxetine  -oral gel prn may self administer     3. Dyspnea   none noted continue to monitor     4. Anxiety increase related to social distress  -ativan 4  mg oral solution every 6 hrs ,stagger doses from MsContin   - decline  4. Spiritual Care  Oriented to Spiritual Health as part of Palliative Care team. Spiritual Health Services declined at this time.  Spiritual Background: spiritual not Jehovah's witness     5. Care Planning  Appreciate Care of  JANE Martin in care coordination and discharge planning. LTC vs home vs group home with father and  increase care services on hospice with new vendor   Medications for discharge  Too be determined     Interval visit:  Wide spread pain, feels from weather front. Hands right more than left, shoulder and back still worse  Wants to be awaken for meds,  Challenged to keep on time with frequent doses.     Medical Decision Making and Goals of Care:    Discussed on December 24, 2021  KAREN Bustos CNP   Feeling much better today, pain continues, but not as wide spread.  Still confused and anxious about what the plan is and why he was admitted.  Does not want to go to long term care and states his dad can provide care.      Kasia Luis RN,PGMT-BC,  APRN, CNP, ACHPN  Pain Management and Palliative Care  [facility]  Pgr: [phone]      Time Spent on this Encounter   Total unit/floor time 25 minutes, time consisted of the following, examination of the patient, reviewing the record and completing documentation. >50% of time spent in counseling and coordination of care, Bedside Nurse Cris Mejia RN , Hospitalist Demetrio Herrera MD  and  JANE Martin.  Time spend counseling with patient consisted of the following topics, symptom management.    Review of Systems    CONSTITUTIONAL: NEGATIVE for fever, chills, change in weight  ENT/MOUTH: NEGATIVE for ear, mouth and throat problems  RESP: NEGATIVE for significant cough or SOB  CV: NEGATIVE for chest pain, palpitations or peripheral edema    Palliative Symptom Review (0=no symptom/no concern, 1=mild, 2=moderate, 3=severe):      Pain: 3-severe      Fatigue: 1-mild      Nausea: 0-none      Constipation: 0-none      Diarrhea: 0-none      Depressive Symptoms: 0-none      Anxiety: 1-mild      Drowsiness: 0-none      Poor Appetite: 0-none      Shortness of Breath: 0-none      Insomnia: 2-moderate      Other:        Overall (0 good/no concerns, 3 very poor):  2    Physical Exam   Temp:  [97.5  F  (36.4  C)-98.3  F (36.8  C)] 97.9  F (36.6  C)  Pulse:  [] 67  Resp:  [16-18] 16  BP: (112-132)/(75-86) 112/76  SpO2:  [94 %-96 %] 94 %  162 lbs 11.2 oz  Exam:  GEN:  Alert, oriented x 3, appears comfortable, NAD.  HEENT:  Normocephalic/atraumatic, no scleral icterus, no nasal discharge, mouth moist.  CV: normal rate no edema BLE.  RESP:  Symmetric chest rise on inhalation noted.  Normal respiratory effort.  ABD:  Rounded, soft, non-tender/non-distended.  +BS  EXT:  Edema & pulses as noted above.  CMS intact x 4.     M/S:   Tender to palpation  Hands and shoulder,but less.    SKIN:  Dry to touch, no exanthems noted in the visualized areas.    NEURO:   Sensation to touch intact all extremities.    widespread allodynia or hyperesthesia.  PAIN BEHAVIOR: Cooperative  Psych:  Normal affect.  Calm, cooperative, conversant appropriately.    Medications       acetaminophen  975 mg Oral Q8H     artificial saliva  5 mL Swish & Spit 4x Daily     diclofenac  4 g Topical 4x Daily     gabapentin  1,200 mg Oral TID     ibuprofen  600 mg Oral TID     LORazepam  4 mg Oral Q6H     methocarbamol  750 mg Oral 4x Daily     morphine  180 mg Oral Q6H     morphine  30 mg Oral Q6H     nicotine  1 patch Transdermal Daily     nicotine   Transdermal Q8H     omeprazole  20 mg Oral Daily     polyethylene glycol  17 g Oral Daily     sennosides  1 tablet Oral Daily       Data   No results found for this or any previous visit (from the past 24 hour(s)).

## 2021-12-28 VITALS
DIASTOLIC BLOOD PRESSURE: 71 MMHG | RESPIRATION RATE: 18 BRPM | TEMPERATURE: 98.3 F | OXYGEN SATURATION: 95 % | WEIGHT: 162.7 LBS | HEART RATE: 99 BPM | BODY MASS INDEX: 24.66 KG/M2 | HEIGHT: 68 IN | SYSTOLIC BLOOD PRESSURE: 123 MMHG

## 2021-12-28 PROCEDURE — 250N000013 HC RX MED GY IP 250 OP 250 PS 637: Performed by: NURSE PRACTITIONER

## 2021-12-28 PROCEDURE — 99239 HOSP IP/OBS DSCHRG MGMT >30: CPT | Performed by: HOSPITALIST

## 2021-12-28 PROCEDURE — 99231 SBSQ HOSP IP/OBS SF/LOW 25: CPT | Performed by: NURSE PRACTITIONER

## 2021-12-28 PROCEDURE — 99207 PR CDG-CODE CATEGORY CHANGED: CPT | Performed by: HOSPITALIST

## 2021-12-28 PROCEDURE — 250N000013 HC RX MED GY IP 250 OP 250 PS 637: Performed by: INTERNAL MEDICINE

## 2021-12-28 PROCEDURE — 250N000013 HC RX MED GY IP 250 OP 250 PS 637: Performed by: HOSPITALIST

## 2021-12-28 RX ORDER — LORAZEPAM 2 MG/ML
4 CONCENTRATE ORAL EVERY 6 HOURS
Qty: 15 ML | Refills: 0 | Status: ON HOLD | OUTPATIENT
Start: 2021-12-28 | End: 2022-01-13

## 2021-12-28 RX ORDER — MORPHINE SULFATE 30 MG/1
90 TABLET ORAL
Qty: 24 TABLET | Refills: 0 | Status: ON HOLD | OUTPATIENT
Start: 2021-12-28 | End: 2022-01-02

## 2021-12-28 RX ORDER — ONDANSETRON 8 MG/1
8 TABLET, ORALLY DISINTEGRATING ORAL EVERY 8 HOURS PRN
Qty: 3 TABLET | Refills: 0 | Status: ON HOLD | OUTPATIENT
Start: 2021-12-28 | End: 2022-01-13

## 2021-12-28 RX ORDER — MORPHINE SULFATE 15 MG/1
90 TABLET ORAL
Status: DISCONTINUED | OUTPATIENT
Start: 2021-12-28 | End: 2021-12-28 | Stop reason: HOSPADM

## 2021-12-28 RX ORDER — MORPHINE SULFATE 30 MG/1
30 TABLET, FILM COATED, EXTENDED RELEASE ORAL EVERY 6 HOURS
Qty: 4 TABLET | Refills: 0 | Status: ON HOLD | OUTPATIENT
Start: 2021-12-28 | End: 2022-01-13

## 2021-12-28 RX ORDER — METHOCARBAMOL 750 MG/1
750 TABLET, FILM COATED ORAL 3 TIMES DAILY
Status: DISCONTINUED | OUTPATIENT
Start: 2021-12-28 | End: 2021-12-28 | Stop reason: HOSPADM

## 2021-12-28 RX ORDER — BISACODYL 10 MG
10 SUPPOSITORY, RECTAL RECTAL DAILY PRN
Qty: 1 SUPPOSITORY | Refills: 0 | Status: ON HOLD | OUTPATIENT
Start: 2021-12-28 | End: 2022-01-02

## 2021-12-28 RX ORDER — HALOPERIDOL 2 MG/1
2 TABLET ORAL 4 TIMES DAILY
Qty: 6 TABLET | Refills: 0 | Status: ON HOLD | OUTPATIENT
Start: 2021-12-28 | End: 2022-01-13

## 2021-12-28 RX ORDER — FLUORIDE TOOTHPASTE
5 TOOTHPASTE DENTAL 4 TIMES DAILY
Qty: 59 ML | Refills: 0 | Status: ON HOLD | OUTPATIENT
Start: 2021-12-28 | End: 2022-01-02

## 2021-12-28 RX ORDER — MORPHINE SULFATE 20 MG/ML
90 SOLUTION ORAL
Status: DISCONTINUED | OUTPATIENT
Start: 2021-12-28 | End: 2021-12-28

## 2021-12-28 RX ORDER — MORPHINE SULFATE 60 MG/1
180 TABLET, FILM COATED, EXTENDED RELEASE ORAL EVERY 6 HOURS
Qty: 12 TABLET | Refills: 0 | Status: ON HOLD | OUTPATIENT
Start: 2021-12-28 | End: 2022-01-13

## 2021-12-28 RX ADMIN — MORPHINE SULFATE 180 MG: 30 TABLET, FILM COATED, EXTENDED RELEASE ORAL at 10:29

## 2021-12-28 RX ADMIN — MORPHINE SULFATE 90 MG: 20 SOLUTION ORAL at 01:04

## 2021-12-28 RX ADMIN — GABAPENTIN 1200 MG: 600 TABLET, FILM COATED ORAL at 14:21

## 2021-12-28 RX ADMIN — LORAZEPAM 4 MG: 2 LIQUID ORAL at 03:47

## 2021-12-28 RX ADMIN — METHOCARBAMOL 750 MG: 750 TABLET ORAL at 08:49

## 2021-12-28 RX ADMIN — MORPHINE SULFATE 90 MG: 15 TABLET ORAL at 10:45

## 2021-12-28 RX ADMIN — NICOTINE 1 PATCH: 14 PATCH, EXTENDED RELEASE TRANSDERMAL at 10:35

## 2021-12-28 RX ADMIN — ACETAMINOPHEN 975 MG: 325 TABLET, FILM COATED ORAL at 08:50

## 2021-12-28 RX ADMIN — OMEPRAZOLE 20 MG: 20 CAPSULE, DELAYED RELEASE ORAL at 08:49

## 2021-12-28 RX ADMIN — MORPHINE SULFATE 90 MG: 15 TABLET ORAL at 14:31

## 2021-12-28 RX ADMIN — MORPHINE SULFATE 180 MG: 30 TABLET, FILM COATED, EXTENDED RELEASE ORAL at 03:47

## 2021-12-28 RX ADMIN — METHOCARBAMOL 750 MG: 750 TABLET ORAL at 14:21

## 2021-12-28 RX ADMIN — IBUPROFEN 600 MG: 600 TABLET ORAL at 01:04

## 2021-12-28 RX ADMIN — LORAZEPAM 4 MG: 2 LIQUID ORAL at 14:32

## 2021-12-28 RX ADMIN — MORPHINE SULFATE 90 MG: 20 SOLUTION ORAL at 06:49

## 2021-12-28 RX ADMIN — MORPHINE SULFATE 30 MG: 30 TABLET, FILM COATED, EXTENDED RELEASE ORAL at 10:30

## 2021-12-28 RX ADMIN — ACETAMINOPHEN 975 MG: 325 TABLET, FILM COATED ORAL at 01:04

## 2021-12-28 RX ADMIN — LORAZEPAM 4 MG: 2 LIQUID ORAL at 08:51

## 2021-12-28 RX ADMIN — MORPHINE SULFATE 30 MG: 30 TABLET, FILM COATED, EXTENDED RELEASE ORAL at 03:54

## 2021-12-28 RX ADMIN — GABAPENTIN 1200 MG: 600 TABLET, FILM COATED ORAL at 08:49

## 2021-12-28 RX ADMIN — IBUPROFEN 600 MG: 600 TABLET ORAL at 10:30

## 2021-12-28 RX ADMIN — POLYETHYLENE GLYCOL 3350 17 G: 17 POWDER, FOR SOLUTION ORAL at 08:51

## 2021-12-28 RX ADMIN — MORPHINE SULFATE 90 MG: 20 SOLUTION ORAL at 03:48

## 2021-12-28 ASSESSMENT — ACTIVITIES OF DAILY LIVING (ADL)
ADLS_ACUITY_SCORE: 12
ADLS_ACUITY_SCORE: 11
ADLS_ACUITY_SCORE: 11
ADLS_ACUITY_SCORE: 12
ADLS_ACUITY_SCORE: 11
ADLS_ACUITY_SCORE: 11
ADLS_ACUITY_SCORE: 12
ADLS_ACUITY_SCORE: 11
ADLS_ACUITY_SCORE: 11
ADLS_ACUITY_SCORE: 12
ADLS_ACUITY_SCORE: 11
ADLS_ACUITY_SCORE: 12
ADLS_ACUITY_SCORE: 11

## 2021-12-28 NOTE — PLAN OF CARE
"Patient's After Visit Summary was reviewed with patient.   Patient verbalized understanding of After Visit Summary, recommended follow up and was given an opportunity to ask questions.   Discharge medications sent home with patient/family: See Discharge summary to see what medications were sent home.    Discharged with [facility] with all belongings. Patient medically cleared to discharge. Patient stated he would take pain medications when he gets home. HE will transport home with an hospice agency.   /71 (BP Location: Right arm)   Pulse 99   Temp 98.3  F (36.8  C) (Oral)   Resp 18   Ht 1.727 m (5' 8\")   Wt 73.8 kg (162 lb 11.2 oz)   SpO2 95%   BMI 24.74 kg/m          "

## 2021-12-28 NOTE — DISCHARGE SUMMARY
"[facility]    Discharge Summary  Hospitalist    Date of Admission:  12/21/2021  Date of Discharge:  12/28/2021  Discharging Provider: Demetrio Herrera MD  Date of Service (when I saw the patient): 12/28/21    Discharge Diagnoses   #Advanced multiple myeloma  #Chronic Pain secondary to malignancy  #History of thoracic outlet syndrome     Hospital Course   Beto Tran is a 46 year old  man with a significant past medical history of advanced multiple myeloma who has failed multiple treatments, chronic pain, thoracic outlet syndrome now on hospice who was admitted after continued pain, discharged from hospice agency due to concern of his ability to care for himself at home.      #Advanced multiple myeloma.  Has been on home hospice: Has been treated at [facility]. Failed velcade, carfilzomib, pamalidomide, cytoxin, stem cell transplant.  Most recently was on home hospice, however agency discharged patient due to nobody being home with him and he was running out of his pain medications.  Rather than admitting for either inpatient hospice or respite, they discharged him and recommended that he come to the hospital.  - Reviewed medical record. Patient was discharged from [facility] back in Sep 2021 where he was on Hematology service.  Per discharge summary, \"given his progressive disease, combined with debility, he is no longer a candidate for any disease directed therapy and life expectancy is likely < 6 months.\"  He was transitioned to more comfort focused plan with hospice support on discharge.     - palliative care team has been following and getting patient back on his home pain regimen. Patient requiring significant pain medications at baseline.   -Pt was adamant on going home as he notes he is fairly independent and not willing to go to facility.  It is his primary goal to be home with hospice support.  SW was consulted and [facility] did accept him for admission.  He was " offered to stay another night for admission to hospice tomorrow athome but was not willing to stay another night. Palliative care did order discharge pain medications prior to discharge.  He will follow up with home hospice in next 24 hours.  I did offer to call his father at time of discharge but he declined.      #History of thoracic outlet syndrome: s/p 1st rib resection and sclenectomy    Demetrio Herrera MD    Code Status   DNR / DNI       Primary Care Physician   Physician No Ref-Primary    Physical Exam   Temp: 98  F (36.7  C) Temp src: Oral BP: 123/71 Pulse: 80   Resp: 16 SpO2: 93 % O2 Device: None (Room air)    Vitals:    12/21/21 2346 12/22/21 0433   Weight: 71.8 kg (158 lb 6.4 oz) 73.8 kg (162 lb 11.2 oz)     Vital Signs with Ranges  Temp:  [97.7  F (36.5  C)-98  F (36.7  C)] 98  F (36.7  C)  Pulse:  [] 80  Resp:  [16] 16  BP: (120-155)/() 123/71  SpO2:  [93 %-96 %] 93 %  I/O last 3 completed shifts:  In: -   Out: 400 [Urine:400]    Constitutional: Appears older than actual age. NAD. Conversational.   HEENT: MMM. No oral lesions. No elevation of JVD noted.   Respiratory: Clear to auscultation bilaterally. No crackles or wheezes. No increased work of breathing.   Cardiovascular: Regular rate and rhythm. No murmur.  GI: Soft, nontender, nondistended. Normoactive bowel sounds.   Musculoskeletal: No gross deformities.   Neurologic: Alert and oriented x3. No focal neurologic deficits. Normal gait at time of exam.     Discharge Disposition   Discharged to home  Condition at discharge: Stable    Consultations This Hospital Stay   PHARMACY IP CONSULT  SOCIAL WORK IP CONSULT  PAIN MANAGEMENT ADULT IP CONSULT  SOCIAL WORK IP CONSULT  PHYSICAL THERAPY ADULT IP CONSULT  OCCUPATIONAL THERAPY ADULT IP CONSULT    Time Spent on this Encounter   Demetrio CARD MD, personally saw the patient today and spent greater than 30 minutes discharging this patient.    Discharge Orders      Reason for your hospital stay     You were hospitalized due to acute on chronic pain related to multiple myeloma.  You were discharged by previous hospice agency and while resuming your previous pain regimen, SW did assist with setting up home hospice with new agency.  You will follow up with home hospice in coming day for continued symptom management.     Follow-up and recommended labs and tests     Follow up with home hospice in next day.     Activity    Your activity upon discharge: activity as tolerated     No CPR- Do NOT Intubate     Diet    Follow this diet upon discharge: Orders Placed This Encounter      Regular Diet Adult     Discharge Medications   Current Discharge Medication List      START taking these medications    Details   artificial saliva (BIOTENE DRY MOUTHWASH) LIQD liquid Swish and spit 5 mLs in mouth 4 times daily  Qty: 59 mL, Refills: 0    Associated Diagnoses: Multiple myeloma not having achieved remission (H); Hospice care patient      !! bisacodyl (DULCOLAX) 10 MG suppository Place 1 suppository (10 mg) rectally daily as needed for constipation  Qty: 1 suppository, Refills: 0    Associated Diagnoses: Multiple myeloma not having achieved remission (H); Hospice care patient      haloperidol (HALDOL) 2 MG tablet Take 1 tablet (2 mg) by mouth 4 times daily  Qty: 6 tablet, Refills: 0    Associated Diagnoses: Multiple myeloma not having achieved remission (H); Hospice care patient      ondansetron (ZOFRAN-ODT) 8 MG ODT tab Take 1 tablet (8 mg) by mouth every 8 hours as needed for nausea  Qty: 3 tablet, Refills: 0    Associated Diagnoses: Multiple myeloma not having achieved remission (H); Hospice care patient       !! - Potential duplicate medications found. Please discuss with provider.      CONTINUE these medications which have CHANGED    Details   LORazepam (ATIVAN) 2 MG/ML (HIGH CONC) oral solution Take 2 mLs (4 mg) by mouth every 6 hours  Qty: 15 mL, Refills: 0    Associated Diagnoses: Multiple myeloma not having achieved  remission (H); Hospice care patient      !! morphine (MS CONTIN) 30 MG CR tablet Take 1 tablet (30 mg) by mouth every 6 hours  Qty: 4 tablet, Refills: 0    Associated Diagnoses: Multiple myeloma not having achieved remission (H); Hospice care patient      !! morphine (MS CONTIN) 60 MG 12 hr tablet Take 3 tablets (180 mg) by mouth every 6 hours  Qty: 12 tablet, Refills: 0    Associated Diagnoses: Multiple myeloma not having achieved remission (H); Hospice care patient      morphine (MSIR) 30 MG IR tablet Take 3 tablets (90 mg) by mouth every 3 hours as needed for severe pain  Qty: 24 tablet, Refills: 0    Associated Diagnoses: Multiple myeloma not having achieved remission (H); Hospice care patient       !! - Potential duplicate medications found. Please discuss with provider.      CONTINUE these medications which have NOT CHANGED    Details   acetaminophen (TYLENOL) 500 MG tablet Take 1,000 mg by mouth every 8 hours as needed for mild pain      !! bisacodyl (DULCOLAX) 10 MG suppository Place 10 mg rectally daily as needed for constipation      gabapentin (NEURONTIN) 600 MG tablet Take 1,200 mg by mouth 3 times daily      hyoscyamine (LEVSIN) 0.125 MG tablet Take 0.125 mg by mouth every 4 hours as needed for cramping      ibuprofen (ADVIL/MOTRIN) 600 MG tablet Take 600 mg by mouth 3 times daily      lactulose (CHRONULAC) 10 GM/15ML solution Take 20 g by mouth daily as needed for constipation      loratadine (CLARITIN) 10 MG tablet Take 10 mg by mouth daily as needed for allergies      methocarbamol (ROBAXIN) 750 MG tablet Take 750 mg by mouth 3 times daily      methylnaltrexone (RELISTOR) 12 MG/0.6ML SOLN injection Inject 12 mg Subcutaneous Every other day as needed      multivitamin w/minerals (THERA-VIT-M) tablet Take 1 tablet by mouth daily      omeprazole (PRILOSEC) 20 MG DR capsule Take 40 mg by mouth daily      polyethylene glycol (MIRALAX) 17 g packet Take 1 packet by mouth daily      prochlorperazine  (COMPAZINE) 10 MG tablet Take 10 mg by mouth every 4 hours as needed for nausea or vomiting      SENNA-docusate sodium (SENNA S) 8.6-50 MG tablet Take 1 tablet by mouth 2 times daily as needed      sennosides (SENOKOT) 8.6 MG tablet Take 1 tablet by mouth 2 times daily       !! - Potential duplicate medications found. Please discuss with provider.      STOP taking these medications       omeprazole 20 MG tablet Comments:   Reason for Stopping:         ondansetron (ZOFRAN) 4 MG tablet Comments:   Reason for Stopping:             Allergies   Allergies   Allergen Reactions     Nka [No Known Allergies]      Data   Most Recent 3 CBC's:  Recent Labs   Lab Test 06/18/15  1330 06/12/15  1018 06/08/15  1413   WBC 6.4 3.9* 4.2   HGB 11.7* 11.3* 10.9*   * 101* 101*   PLT 53* 44* 52*      Most Recent 3 BMP's:  Recent Labs   Lab Test 12/23/21  0519 06/18/15  1330 06/12/15  1018 06/08/15  1413 05/19/15  1159 02/20/15  1236 01/30/15  0933 01/07/15  1155 12/23/14  1116   NA  --   --   --   --   --   --  138 137 138   POTASSIUM  --  3.2* 3.6 3.9   < >  --  3.2* 3.9 3.7   CHLORIDE  --   --   --   --   --   --  104 104 106   CO2  --   --   --   --   --   --  28 32 27   BUN  --   --   --   --   --   --  12 8 10   CR 0.63*  --   --   --   --  0.60* 0.61* 0.52* 0.67   ANIONGAP  --   --   --   --   --   --  6 1* 5   JOSE  --   --   --   --   --  9.5 9.2 9.6 9.1   GLC  --   --   --   --   --   --  97 93 80    < > = values in this interval not displayed.     Most Recent 2 LFT's:  Recent Labs   Lab Test 01/30/15  0933 01/07/15  1155   AST 9 11   ALT 23 21   ALKPHOS 77 93   BILITOTAL 0.2 0.2     Most Recent INR's and Anticoagulation Dosing History:  Anticoagulation Dose History     Recent Dosing and Labs 5/20/2014    INR 1.0        Most Recent 3 Troponin's:No lab results found.  Most Recent Cholesterol Panel:  Recent Labs   Lab Test 04/18/14  0000 04/14/14  1125   CHOL  --  159   LDL  --  93   HDL  --  33*   TRIG 244 164*     Most  Recent 6 Bacteria Isolates From Any Culture (See EPIC Reports for Culture Details):  Recent Labs   Lab Test 12/19/14  0151 12/12/14  1415 10/21/14  0146   CULT No growth No growth No growth     Most Recent TSH, T4 and A1c Labs:  Recent Labs   Lab Test 06/11/14  1747 04/14/14  1125   TSH 1.72 1.25   T4  --  0.78

## 2021-12-28 NOTE — PROGRESS NOTES
[facility]  Palliative Care Progress Note  Text Page     Assessment & Plan   Recommendations:  1. Goals of Care- No CPR- Do NOT Intubate  Hospitalization goals discussed  Comfort driven care, no escalation of care and stay with in the IP hospice framework   Decisional Capacity- Intact. Patient does not have an advance directive. Per  informed consent policy next of kin should be involved if patient becomes unable. Next of kin is father Matty Tran,   FRANCI  Would be reasonable to complete on discharge. Patient currently on hospice      2. Pain metastatic multiple myeloma. Opioid hyperalgesia   DAILY CHRONIC USE= 2280 DAILY MME/DAY  Patient's opioid use in past 24 hours:  Morphine 1710 mg Daily Morphine Equivalent   [place] Board of Pharmacy Data Base Reviewed:    YES; As expected, no concern for misuse/abuse of controlled medications based on this report.  ORT  NA    -Ms Contin at 210 mg every 6 hrs    -Morphine IR 90 mg every 3 hrs prn  (baseline  90 mg ~10-12 doses per day)  - Robaxin 750 mg tid  -Gabapentin 1200 mg tid (future consideration with cross over to Lyrica)  -topicals diclofenac, menthol gel may self administer  - no Physical Therapy /OT not covered with hospice benefit, up as tolerated   -consider in future to add duloxetine  -oral gel prn may self administer     3. Dyspnea   none noted continue to monitor     4. Anxiety increase related to social distress  -ativan 4  mg oral solution every 6 hrs ,stagger doses from MsContin     4. Spiritual Care  Oriented to Spiritual Health as part of Palliative Care team. Spiritual Health Services declined at this time.  Spiritual Background: spiritual not [employer]     5. Care Planning  Appreciate Care of  JANE Martin in care coordination and discharge planning. LTC vs home vs group home with father and increase care services on hospice with new vendor     Interval visit:  Plan for discharge to hospice today.  Symptoms are  stable.  Prescriptions written to discharge pharmacy, [facility] to complete assessment this evening/tomorrow.    Medical Decision Making and Goals of Care:    Discussed on December 28, 2021 :  Stable condition, discussed morphine wean and reviewed plan set forth by Kasia Luis yesterday.  Denied questions or concerns.      Paulette Amador RN, APRN, CNP  Pain Management and Palliative Care  [facility]  Pgr: [phone]      Time Spent on this Encounter   Total unit/floor time 15 minutes, time consisted of the following, examination of the patient, reviewing the record and completing documentation. >50% of time spent in counseling and coordination of care, Bedside Nurse Mary Marshall, Hospitalist Demetrio Herrera MD  and  JANE Martin.  Time spend counseling with patient consisted of the following topics, symptom management.    Review of Systems    CONSTITUTIONAL: NEGATIVE for fever, chills, change in weight  ENT/MOUTH: NEGATIVE for ear, mouth and throat problems  RESP: NEGATIVE for significant cough or SOB  CV: NEGATIVE for chest pain, palpitations or peripheral edema    Palliative Symptom Review (0=no symptom/no concern, 1=mild, 2=moderate, 3=severe):      Pain: 3-severe      Fatigue: 1-mild      Nausea: 0-none      Constipation: 0-none      Diarrhea: 0-none      Depressive Symptoms: 0-none      Anxiety: 1-mild      Drowsiness: 0-none      Poor Appetite: 0-none      Shortness of Breath: 0-none      Insomnia: 2-moderate      Other:        Overall (0 good/no concerns, 3 very poor):  2    Physical Exam   Temp:  [97.7  F (36.5  C)-98  F (36.7  C)] 98  F (36.7  C)  Pulse:  [] 80  Resp:  [16] 16  BP: (112-155)/() 123/71  SpO2:  [93 %-96 %] 93 %  162 lbs 11.2 oz  Exam:  GEN:  Alert, oriented x 3, appears comfortable, NAD.  HEENT:  Normocephalic/atraumatic, no scleral icterus, no nasal discharge, mouth moist.  CV: normal rate no edema BLE.  RESP:   Symmetric chest rise on inhalation noted.  Normal respiratory effort.  ABD:  Rounded, soft, non-tender/non-distended.  +BS  EXT:  Edema & pulses as noted above.  CMS intact x 4.     M/S:   Tender to palpation  Hands and shoulder,but less.    SKIN:  Dry to touch, no exanthems noted in the visualized areas.    NEURO:   Sensation to touch intact all extremities.    widespread allodynia or hyperesthesia.  PAIN BEHAVIOR: Cooperative  Psych:  Normal affect.  Calm, cooperative, conversant appropriately.    Medications       acetaminophen  975 mg Oral Q8H     artificial saliva  5 mL Swish & Spit 4x Daily     diclofenac  4 g Topical 4x Daily     gabapentin  1,200 mg Oral TID     ibuprofen  600 mg Oral TID     LORazepam  4 mg Oral Q6H     methocarbamol  750 mg Oral 4x Daily     morphine  180 mg Oral Q6H     morphine  30 mg Oral Q6H     nicotine  1 patch Transdermal Daily     nicotine   Transdermal Q8H     omeprazole  20 mg Oral Daily     polyethylene glycol  17 g Oral Daily     sennosides  1 tablet Oral Daily       Data   No results found for this or any previous visit (from the past 24 hour(s)).

## 2021-12-28 NOTE — PLAN OF CARE
"Care from [id number]    Inpatient Progress Note:  For complete assessment see flow sheet documentation.    /76 (BP Location: Right arm)   Pulse 77   Temp 97.8  F (36.6  C) (Oral)   Resp 16   Ht 1.727 m (5' 8\")   Wt 73.8 kg (162 lb 11.2 oz)   SpO2 95%   BMI 24.74 kg/m         Orientation: A&O x 4  Neuro: WNL  Pain status: All over pain from neck to low back.   Activity: Assist 1 with walker  Peripheral edema: None noted  Resp: WNL  Cardiac: WNL  GI: WNL  :  WNL  Skin: WNL  LDA: No IV access  Diet: Regular  Consults: Pain and SW is following  Discharge Plan: SW working on palliative vs hospice and working on a plan.    Will continue to monitor and provide cares.     Estefani Gold RN   "

## 2021-12-28 NOTE — PROGRESS NOTES
"Care Management Follow Up    Length of Stay (days): 6    Expected Discharge Date: 12/29/2021     Concerns to be Addressed:   Discharge planning    Patient plan of care discussed at interdisciplinary rounds: Yes    Anticipated Discharge Disposition:  Home with Hospice     Anticipated Discharge Services:  Hospice, CADI waiver services (PCA, Homemaking, Volunteer services)  Anticipated Discharge DME:  TBD    Patient/family educated on Medicare website which has current facility and service quality ratings:  Yes  Education Provided on the Discharge Plan:  Yes  Patient/Family in Agreement with the Plan:  Yes    Referrals Placed by CM/SW:  Hospice  Private pay costs discussed: Not applicable    Additional Information:  ROGER received email response from [facility]-\"per chart review, [facility] will need a solid caregiving plan in place prior to being able to accept this pt onto hospice. Writer left  for pt to discuss and awaiting call back. Of note, patient will require a face to face prior to being accepted into hospice\".     ROGER placed call to [facility], [phone], spoke with Belen. Fax: [phone]. Explained patient's situation and made referral. They can review via Epic. They will review and return call to .     ROGER will continue to follow.     ROLAND Martin        "

## 2021-12-28 NOTE — PROGRESS NOTES
Care Management Discharge Note    Discharge Date: 12/29/2021     Discharge Disposition: Home with hospice     Discharge Services: [facility]    Discharge DME: Chair, bed, bedside table, walker, commode    Discharge Transportation:  [facility] WC transport arranged for 1530 today.     Private pay costs discussed: Not applicable    Patient/family educated on Medicare website which has current facility and service quality ratings:  Yes    Education Provided on the Discharge Plan:  Yes  Persons Notified of Discharge Plans: Patient, [facility]  Patient/Family in Agreement with the Plan:  Yes    Handoff Referral Completed: No    Additional Information:  [facility] has accepted referral. Spoke with Ana ([phone]). She has ordered DME (listed above) and will call patient to arrange delivery as he has DME from previous agency still at his home. Ana will sign consents at patients home this evening or tomorrow, with hospice RN to enroll patient at his home tomorrow. Hospice agency requests 24 hours of comfort meds be filled and sent with--paged palliative who is ordering meds.     HE WC transport (patient has his own WC) arranged for 1530 today to [address] [facility] [address].     ROLAND Martin

## 2021-12-29 NOTE — PLAN OF CARE
Pt A&Ox4, VSS on RA. Pt c/o 6-7/10 pain, controlled w/ scheduled morphine, ativan, and robaxin as well as PRN morphine. Up w/ SBA - Ax1 w/ walker, pivot to w/c. Using urinal at bedside. Nicotine patch in place L arm. Plan for pt to discharge home this afternoon via [facility] w/c transport, once medications for discharge obtained.

## 2022-01-01 ENCOUNTER — HOSPITAL ENCOUNTER (INPATIENT)
Facility: CLINIC | Age: 47
LOS: 12 days | Discharge: HOSPICE | DRG: 842 | End: 2022-01-13
Attending: EMERGENCY MEDICINE | Admitting: INTERNAL MEDICINE
Payer: MEDICARE

## 2022-01-01 ENCOUNTER — APPOINTMENT (OUTPATIENT)
Dept: PHYSICAL THERAPY | Facility: CLINIC | Age: 47
DRG: 480 | End: 2022-01-01
Payer: MEDICARE

## 2022-01-01 ENCOUNTER — APPOINTMENT (OUTPATIENT)
Dept: GENERAL RADIOLOGY | Facility: CLINIC | Age: 47
DRG: 480 | End: 2022-01-01
Attending: ORTHOPAEDIC SURGERY
Payer: MEDICARE

## 2022-01-01 ENCOUNTER — APPOINTMENT (OUTPATIENT)
Dept: CT IMAGING | Facility: CLINIC | Age: 47
DRG: 842 | End: 2022-01-01
Attending: EMERGENCY MEDICINE
Payer: MEDICARE

## 2022-01-01 ENCOUNTER — APPOINTMENT (OUTPATIENT)
Dept: GENERAL RADIOLOGY | Facility: CLINIC | Age: 47
DRG: 480 | End: 2022-01-01
Attending: PHYSICIAN ASSISTANT
Payer: MEDICARE

## 2022-01-01 VITALS
RESPIRATION RATE: 18 BRPM | WEIGHT: 156.6 LBS | HEIGHT: 69 IN | HEART RATE: 85 BPM | TEMPERATURE: 97.5 F | SYSTOLIC BLOOD PRESSURE: 103 MMHG | BODY MASS INDEX: 23.19 KG/M2 | OXYGEN SATURATION: 95 % | DIASTOLIC BLOOD PRESSURE: 71 MMHG

## 2022-01-01 DIAGNOSIS — M54.6 CHRONIC THORACIC BACK PAIN, UNSPECIFIED BACK PAIN LATERALITY: ICD-10-CM

## 2022-01-01 DIAGNOSIS — C90.00 MULTIPLE MYELOMA NOT HAVING ACHIEVED REMISSION (H): ICD-10-CM

## 2022-01-01 DIAGNOSIS — Z51.5 HOSPICE CARE PATIENT: ICD-10-CM

## 2022-01-01 DIAGNOSIS — G89.29 CHRONIC THORACIC BACK PAIN, UNSPECIFIED BACK PAIN LATERALITY: ICD-10-CM

## 2022-01-01 DIAGNOSIS — M54.2 NECK PAIN: ICD-10-CM

## 2022-01-01 LAB
ANION GAP SERPL CALCULATED.3IONS-SCNC: 4 MMOL/L (ref 3–14)
ANION GAP SERPL CALCULATED.3IONS-SCNC: 5 MMOL/L (ref 3–14)
ANION GAP SERPL CALCULATED.3IONS-SCNC: 5 MMOL/L (ref 3–14)
ANION GAP SERPL CALCULATED.3IONS-SCNC: 6 MMOL/L (ref 3–14)
BACTERIA SPT CULT: NORMAL
BASE EXCESS BLDA CALC-SCNC: -2.1 MMOL/L (ref -9–1.8)
BASOPHILS # BLD AUTO: 0 10E3/UL (ref 0–0.2)
BASOPHILS NFR BLD AUTO: 0 %
BASOPHILS NFR BLD AUTO: 0 %
BASOPHILS NFR BLD AUTO: 1 %
BUN SERPL-MCNC: 11 MG/DL (ref 7–30)
BUN SERPL-MCNC: 12 MG/DL (ref 7–30)
BUN SERPL-MCNC: 17 MG/DL (ref 7–30)
BUN SERPL-MCNC: 31 MG/DL (ref 7–30)
CALCIUM SERPL-MCNC: 14 MG/DL (ref 8.5–10.1)
CALCIUM SERPL-MCNC: 7.8 MG/DL (ref 8.5–10.1)
CALCIUM SERPL-MCNC: 7.8 MG/DL (ref 8.5–10.1)
CALCIUM SERPL-MCNC: 7.9 MG/DL (ref 8.5–10.1)
CHLORIDE BLD-SCNC: 107 MMOL/L (ref 94–109)
CHLORIDE BLD-SCNC: 111 MMOL/L (ref 94–109)
CHLORIDE BLD-SCNC: 112 MMOL/L (ref 94–109)
CHLORIDE BLD-SCNC: 112 MMOL/L (ref 94–109)
CO2 SERPL-SCNC: 21 MMOL/L (ref 20–32)
CO2 SERPL-SCNC: 23 MMOL/L (ref 20–32)
CO2 SERPL-SCNC: 24 MMOL/L (ref 20–32)
CO2 SERPL-SCNC: 26 MMOL/L (ref 20–32)
CREAT SERPL-MCNC: 0.39 MG/DL (ref 0.66–1.25)
CREAT SERPL-MCNC: 0.5 MG/DL (ref 0.66–1.25)
CREAT SERPL-MCNC: 0.5 MG/DL (ref 0.66–1.25)
CREAT SERPL-MCNC: 0.63 MG/DL (ref 0.66–1.25)
EOSINOPHIL # BLD AUTO: 0 10E3/UL (ref 0–0.7)
EOSINOPHIL # BLD AUTO: 0 10E3/UL (ref 0–0.7)
EOSINOPHIL # BLD AUTO: 0.4 10E3/UL (ref 0–0.7)
EOSINOPHIL NFR BLD AUTO: 0 %
EOSINOPHIL NFR BLD AUTO: 1 %
EOSINOPHIL NFR BLD AUTO: 4 %
ERYTHROCYTE [DISTWIDTH] IN BLOOD BY AUTOMATED COUNT: 13.8 % (ref 10–15)
ERYTHROCYTE [DISTWIDTH] IN BLOOD BY AUTOMATED COUNT: 16.3 % (ref 10–15)
ERYTHROCYTE [DISTWIDTH] IN BLOOD BY AUTOMATED COUNT: 16.7 % (ref 10–15)
ERYTHROCYTE [DISTWIDTH] IN BLOOD BY AUTOMATED COUNT: 16.7 % (ref 10–15)
ERYTHROCYTE [DISTWIDTH] IN BLOOD BY AUTOMATED COUNT: 16.9 % (ref 10–15)
GFR SERPL CREATININE-BSD FRML MDRD: >90 ML/MIN/1.73M2
GLUCOSE BLD-MCNC: 115 MG/DL (ref 70–99)
GLUCOSE BLD-MCNC: 130 MG/DL (ref 70–99)
GLUCOSE BLD-MCNC: 92 MG/DL (ref 70–99)
GLUCOSE BLD-MCNC: 98 MG/DL (ref 70–99)
GRAM STAIN RESULT: NORMAL
HCO3 BLD-SCNC: 21 MMOL/L (ref 21–28)
HCT VFR BLD AUTO: 22.2 % (ref 40–53)
HCT VFR BLD AUTO: 22.7 % (ref 40–53)
HCT VFR BLD AUTO: 22.9 % (ref 40–53)
HCT VFR BLD AUTO: 25.2 % (ref 40–53)
HCT VFR BLD AUTO: 27 % (ref 40–53)
HGB BLD-MCNC: 7.6 G/DL (ref 13.3–17.7)
HGB BLD-MCNC: 7.7 G/DL (ref 13.3–17.7)
HGB BLD-MCNC: 7.7 G/DL (ref 13.3–17.7)
HGB BLD-MCNC: 8.6 G/DL (ref 13.3–17.7)
HGB BLD-MCNC: 8.7 G/DL (ref 13.3–17.7)
IMM GRANULOCYTES # BLD: 0 10E3/UL
IMM GRANULOCYTES # BLD: 0.1 10E3/UL
IMM GRANULOCYTES # BLD: 0.2 10E3/UL
IMM GRANULOCYTES NFR BLD: 0 %
IMM GRANULOCYTES NFR BLD: 1 %
IMM GRANULOCYTES NFR BLD: 2 %
LYMPHOCYTES # BLD AUTO: 1 10E3/UL (ref 0.8–5.3)
LYMPHOCYTES # BLD AUTO: 1.7 10E3/UL (ref 0.8–5.3)
LYMPHOCYTES # BLD AUTO: 3.3 10E3/UL (ref 0.8–5.3)
LYMPHOCYTES NFR BLD AUTO: 11 %
LYMPHOCYTES NFR BLD AUTO: 28 %
LYMPHOCYTES NFR BLD AUTO: 34 %
MAGNESIUM SERPL-MCNC: 1.7 MG/DL (ref 1.6–2.3)
MAGNESIUM SERPL-MCNC: 1.8 MG/DL (ref 1.6–2.3)
MAGNESIUM SERPL-MCNC: 1.9 MG/DL (ref 1.6–2.3)
MAGNESIUM SERPL-MCNC: 2.2 MG/DL (ref 1.6–2.3)
MAGNESIUM SERPL-MCNC: 2.3 MG/DL (ref 1.6–2.3)
MAGNESIUM SERPL-MCNC: 2.4 MG/DL (ref 1.6–2.3)
MAGNESIUM SERPL-MCNC: 2.5 MG/DL (ref 1.6–2.3)
MAGNESIUM SERPL-MCNC: 2.6 MG/DL (ref 1.6–2.3)
MCH RBC QN AUTO: 31.8 PG (ref 26.5–33)
MCH RBC QN AUTO: 31.9 PG (ref 26.5–33)
MCH RBC QN AUTO: 32.2 PG (ref 26.5–33)
MCH RBC QN AUTO: 32.2 PG (ref 26.5–33)
MCH RBC QN AUTO: 33.2 PG (ref 26.5–33)
MCHC RBC AUTO-ENTMCNC: 31.9 G/DL (ref 31.5–36.5)
MCHC RBC AUTO-ENTMCNC: 33.6 G/DL (ref 31.5–36.5)
MCHC RBC AUTO-ENTMCNC: 33.9 G/DL (ref 31.5–36.5)
MCHC RBC AUTO-ENTMCNC: 34.2 G/DL (ref 31.5–36.5)
MCHC RBC AUTO-ENTMCNC: 34.5 G/DL (ref 31.5–36.5)
MCV RBC AUTO: 100 FL (ref 78–100)
MCV RBC AUTO: 94 FL (ref 78–100)
MCV RBC AUTO: 95 FL (ref 78–100)
MCV RBC AUTO: 95 FL (ref 78–100)
MCV RBC AUTO: 96 FL (ref 78–100)
MONOCYTES # BLD AUTO: 0.3 10E3/UL (ref 0–1.3)
MONOCYTES # BLD AUTO: 0.5 10E3/UL (ref 0–1.3)
MONOCYTES # BLD AUTO: 0.5 10E3/UL (ref 0–1.3)
MONOCYTES NFR BLD AUTO: 3 %
MONOCYTES NFR BLD AUTO: 5 %
MONOCYTES NFR BLD AUTO: 8 %
NEUTROPHILS # BLD AUTO: 3.8 10E3/UL (ref 1.6–8.3)
NEUTROPHILS # BLD AUTO: 5.6 10E3/UL (ref 1.6–8.3)
NEUTROPHILS # BLD AUTO: 7.9 10E3/UL (ref 1.6–8.3)
NEUTROPHILS NFR BLD AUTO: 57 %
NEUTROPHILS NFR BLD AUTO: 62 %
NEUTROPHILS NFR BLD AUTO: 83 %
NRBC # BLD AUTO: 0 10E3/UL
NRBC BLD AUTO-RTO: 0 /100
O2/TOTAL GAS SETTING VFR VENT: 95 %
PCO2 BLD: 28 MM HG (ref 35–45)
PH BLD: 7.48 [PH] (ref 7.35–7.45)
PHOSPHATE SERPL-MCNC: 1.3 MG/DL (ref 2.5–4.5)
PHOSPHATE SERPL-MCNC: 2 MG/DL (ref 2.5–4.5)
PHOSPHATE SERPL-MCNC: 2.2 MG/DL (ref 2.5–4.5)
PHOSPHATE SERPL-MCNC: 2.3 MG/DL (ref 2.5–4.5)
PHOSPHATE SERPL-MCNC: 2.5 MG/DL (ref 2.5–4.5)
PHOSPHATE SERPL-MCNC: 2.6 MG/DL (ref 2.5–4.5)
PHOSPHATE SERPL-MCNC: 2.8 MG/DL (ref 2.5–4.5)
PHOSPHATE SERPL-MCNC: 2.8 MG/DL (ref 2.5–4.5)
PHOSPHATE SERPL-MCNC: 3 MG/DL (ref 2.5–4.5)
PLATELET # BLD AUTO: 136 10E3/UL (ref 150–450)
PLATELET # BLD AUTO: 155 10E3/UL (ref 150–450)
PLATELET # BLD AUTO: 175 10E3/UL (ref 150–450)
PLATELET # BLD AUTO: 179 10E3/UL (ref 150–450)
PLATELET # BLD AUTO: 201 10E3/UL (ref 150–450)
PLATELET # BLD AUTO: 224 10E3/UL (ref 150–450)
PLATELET # BLD AUTO: 271 10E3/UL (ref 150–450)
PO2 BLD: 50 MM HG (ref 80–105)
POTASSIUM BLD-SCNC: 3 MMOL/L (ref 3.4–5.3)
POTASSIUM BLD-SCNC: 3.8 MMOL/L (ref 3.4–5.3)
POTASSIUM BLD-SCNC: 3.8 MMOL/L (ref 3.4–5.3)
POTASSIUM BLD-SCNC: 3.9 MMOL/L (ref 3.4–5.3)
POTASSIUM BLD-SCNC: 3.9 MMOL/L (ref 3.4–5.3)
POTASSIUM BLD-SCNC: 4.2 MMOL/L (ref 3.4–5.3)
POTASSIUM BLD-SCNC: 4.5 MMOL/L (ref 3.4–5.3)
POTASSIUM BLD-SCNC: 4.7 MMOL/L (ref 3.4–5.3)
POTASSIUM BLD-SCNC: 4.7 MMOL/L (ref 3.4–5.3)
RBC # BLD AUTO: 2.36 10E6/UL (ref 4.4–5.9)
RBC # BLD AUTO: 2.39 10E6/UL (ref 4.4–5.9)
RBC # BLD AUTO: 2.42 10E6/UL (ref 4.4–5.9)
RBC # BLD AUTO: 2.62 10E6/UL (ref 4.4–5.9)
RBC # BLD AUTO: 2.7 10E6/UL (ref 4.4–5.9)
SARS-COV-2 RNA RESP QL NAA+PROBE: NEGATIVE
SODIUM SERPL-SCNC: 138 MMOL/L (ref 133–144)
SODIUM SERPL-SCNC: 138 MMOL/L (ref 133–144)
SODIUM SERPL-SCNC: 139 MMOL/L (ref 133–144)
SODIUM SERPL-SCNC: 141 MMOL/L (ref 133–144)
TRANSFERRIN SERPL-MCNC: 184 MG/DL (ref 212–360)
WBC # BLD AUTO: 6.1 10E3/UL (ref 4–11)
WBC # BLD AUTO: 7.8 10E3/UL (ref 4–11)
WBC # BLD AUTO: 9.4 10E3/UL (ref 4–11)
WBC # BLD AUTO: 9.5 10E3/UL (ref 4–11)
WBC # BLD AUTO: 9.8 10E3/UL (ref 4–11)

## 2022-01-01 PROCEDURE — 82803 BLOOD GASES ANY COMBINATION: CPT | Performed by: STUDENT IN AN ORGANIZED HEALTH CARE EDUCATION/TRAINING PROGRAM

## 2022-01-01 PROCEDURE — C9113 INJ PANTOPRAZOLE SODIUM, VIA: HCPCS | Performed by: STUDENT IN AN ORGANIZED HEALTH CARE EDUCATION/TRAINING PROGRAM

## 2022-01-01 PROCEDURE — 250N000011 HC RX IP 250 OP 636: Performed by: STUDENT IN AN ORGANIZED HEALTH CARE EDUCATION/TRAINING PROGRAM

## 2022-01-01 PROCEDURE — 250N000009 HC RX 250: Performed by: INTERNAL MEDICINE

## 2022-01-01 PROCEDURE — 250N000013 HC RX MED GY IP 250 OP 250 PS 637: Performed by: INTERNAL MEDICINE

## 2022-01-01 PROCEDURE — 85025 COMPLETE CBC W/AUTO DIFF WBC: CPT | Performed by: INTERNAL MEDICINE

## 2022-01-01 PROCEDURE — 250N000012 HC RX MED GY IP 250 OP 636 PS 637: Performed by: INTERNAL MEDICINE

## 2022-01-01 PROCEDURE — 84100 ASSAY OF PHOSPHORUS: CPT | Performed by: ORTHOPAEDIC SURGERY

## 2022-01-01 PROCEDURE — 250N000013 HC RX MED GY IP 250 OP 250 PS 637

## 2022-01-01 PROCEDURE — 99232 SBSQ HOSP IP/OBS MODERATE 35: CPT | Performed by: INTERNAL MEDICINE

## 2022-01-01 PROCEDURE — 83735 ASSAY OF MAGNESIUM: CPT | Performed by: ORTHOPAEDIC SURGERY

## 2022-01-01 PROCEDURE — 84132 ASSAY OF SERUM POTASSIUM: CPT | Performed by: INTERNAL MEDICINE

## 2022-01-01 PROCEDURE — 36600 WITHDRAWAL OF ARTERIAL BLOOD: CPT

## 2022-01-01 PROCEDURE — 999N000157 HC STATISTIC RCP TIME EA 10 MIN

## 2022-01-01 PROCEDURE — 250N000013 HC RX MED GY IP 250 OP 250 PS 637: Performed by: NURSE PRACTITIONER

## 2022-01-01 PROCEDURE — 250N000011 HC RX IP 250 OP 636: Performed by: EMERGENCY MEDICINE

## 2022-01-01 PROCEDURE — 97116 GAIT TRAINING THERAPY: CPT | Mod: GP | Performed by: PHYSICAL THERAPIST

## 2022-01-01 PROCEDURE — 36415 COLL VENOUS BLD VENIPUNCTURE: CPT | Performed by: ORTHOPAEDIC SURGERY

## 2022-01-01 PROCEDURE — 250N000011 HC RX IP 250 OP 636: Performed by: INTERNAL MEDICINE

## 2022-01-01 PROCEDURE — 250N000011 HC RX IP 250 OP 636

## 2022-01-01 PROCEDURE — 250N000011 HC RX IP 250 OP 636: Performed by: ORTHOPAEDIC SURGERY

## 2022-01-01 PROCEDURE — C9803 HOPD COVID-19 SPEC COLLECT: HCPCS

## 2022-01-01 PROCEDURE — 250N000013 HC RX MED GY IP 250 OP 250 PS 637: Performed by: ORTHOPAEDIC SURGERY

## 2022-01-01 PROCEDURE — 99356 PR PROLONGED SERV,INPATIENT,1ST HR: CPT | Performed by: NURSE PRACTITIONER

## 2022-01-01 PROCEDURE — 97530 THERAPEUTIC ACTIVITIES: CPT | Mod: GP | Performed by: PHYSICAL THERAPIST

## 2022-01-01 PROCEDURE — 36415 COLL VENOUS BLD VENIPUNCTURE: CPT | Performed by: EMERGENCY MEDICINE

## 2022-01-01 PROCEDURE — 120N000001 HC R&B MED SURG/OB

## 2022-01-01 PROCEDURE — 258N000003 HC RX IP 258 OP 636: Performed by: STUDENT IN AN ORGANIZED HEALTH CARE EDUCATION/TRAINING PROGRAM

## 2022-01-01 PROCEDURE — 85027 COMPLETE CBC AUTOMATED: CPT | Performed by: STUDENT IN AN ORGANIZED HEALTH CARE EDUCATION/TRAINING PROGRAM

## 2022-01-01 PROCEDURE — 999N000215 HC STATISTIC HFNC ADULT NON-CPAP

## 2022-01-01 PROCEDURE — 99233 SBSQ HOSP IP/OBS HIGH 50: CPT | Performed by: INTERNAL MEDICINE

## 2022-01-01 PROCEDURE — 94660 CPAP INITIATION&MGMT: CPT

## 2022-01-01 PROCEDURE — 99233 SBSQ HOSP IP/OBS HIGH 50: CPT | Performed by: STUDENT IN AN ORGANIZED HEALTH CARE EDUCATION/TRAINING PROGRAM

## 2022-01-01 PROCEDURE — 96375 TX/PRO/DX INJ NEW DRUG ADDON: CPT

## 2022-01-01 PROCEDURE — 99233 SBSQ HOSP IP/OBS HIGH 50: CPT | Performed by: NURSE PRACTITIONER

## 2022-01-01 PROCEDURE — 85025 COMPLETE CBC W/AUTO DIFF WBC: CPT | Performed by: EMERGENCY MEDICINE

## 2022-01-01 PROCEDURE — 250N000011 HC RX IP 250 OP 636: Performed by: NURSE PRACTITIONER

## 2022-01-01 PROCEDURE — 99239 HOSP IP/OBS DSCHRG MGMT >30: CPT | Performed by: HOSPITALIST

## 2022-01-01 PROCEDURE — U0005 INFEC AGEN DETEC AMPLI PROBE: HCPCS | Performed by: STUDENT IN AN ORGANIZED HEALTH CARE EDUCATION/TRAINING PROGRAM

## 2022-01-01 PROCEDURE — 99220 PR INITIAL OBSERVATION CARE,LEVEL III: CPT | Performed by: PHYSICIAN ASSISTANT

## 2022-01-01 PROCEDURE — 84132 ASSAY OF SERUM POTASSIUM: CPT | Performed by: STUDENT IN AN ORGANIZED HEALTH CARE EDUCATION/TRAINING PROGRAM

## 2022-01-01 PROCEDURE — 80048 BASIC METABOLIC PNL TOTAL CA: CPT | Performed by: EMERGENCY MEDICINE

## 2022-01-01 PROCEDURE — G1004 CDSM NDSC: HCPCS

## 2022-01-01 PROCEDURE — 250N000013 HC RX MED GY IP 250 OP 250 PS 637: Performed by: EMERGENCY MEDICINE

## 2022-01-01 PROCEDURE — 80048 BASIC METABOLIC PNL TOTAL CA: CPT | Performed by: INTERNAL MEDICINE

## 2022-01-01 PROCEDURE — 97110 THERAPEUTIC EXERCISES: CPT | Mod: GP | Performed by: PHYSICAL THERAPIST

## 2022-01-01 PROCEDURE — 80048 BASIC METABOLIC PNL TOTAL CA: CPT | Performed by: STUDENT IN AN ORGANIZED HEALTH CARE EDUCATION/TRAINING PROGRAM

## 2022-01-01 PROCEDURE — 71045 X-RAY EXAM CHEST 1 VIEW: CPT

## 2022-01-01 PROCEDURE — 85049 AUTOMATED PLATELET COUNT: CPT | Performed by: ORTHOPAEDIC SURGERY

## 2022-01-01 PROCEDURE — 96374 THER/PROPH/DIAG INJ IV PUSH: CPT

## 2022-01-01 PROCEDURE — 258N000003 HC RX IP 258 OP 636: Performed by: EMERGENCY MEDICINE

## 2022-01-01 PROCEDURE — 97110 THERAPEUTIC EXERCISES: CPT | Mod: GP

## 2022-01-01 PROCEDURE — 36415 COLL VENOUS BLD VENIPUNCTURE: CPT | Performed by: INTERNAL MEDICINE

## 2022-01-01 PROCEDURE — 97530 THERAPEUTIC ACTIVITIES: CPT | Mod: GP

## 2022-01-01 PROCEDURE — 999N000226 HC STATISTIC SLP IP EVAL DEFER: Performed by: SPEECH-LANGUAGE PATHOLOGIST

## 2022-01-01 PROCEDURE — 72125 CT NECK SPINE W/O DYE: CPT | Mod: ME

## 2022-01-01 PROCEDURE — 73552 X-RAY EXAM OF FEMUR 2/>: CPT | Mod: 50

## 2022-01-01 PROCEDURE — 99285 EMERGENCY DEPT VISIT HI MDM: CPT | Mod: 25

## 2022-01-01 RX ORDER — HYDROXYZINE HYDROCHLORIDE 50 MG/1
50 TABLET, FILM COATED ORAL EVERY 6 HOURS
Status: DISCONTINUED | OUTPATIENT
Start: 2022-01-01 | End: 2022-01-01 | Stop reason: HOSPADM

## 2022-01-01 RX ORDER — FUROSEMIDE 10 MG/ML
20 INJECTION INTRAMUSCULAR; INTRAVENOUS EVERY 6 HOURS
Status: COMPLETED | OUTPATIENT
Start: 2022-01-01 | End: 2022-01-01

## 2022-01-01 RX ORDER — GABAPENTIN 600 MG/1
1200 TABLET ORAL ONCE
Status: COMPLETED | OUTPATIENT
Start: 2022-01-01 | End: 2022-01-01

## 2022-01-01 RX ORDER — LORAZEPAM 2 MG/ML
.5-1 CONCENTRATE ORAL EVERY 4 HOURS PRN
Status: DISCONTINUED | OUTPATIENT
Start: 2022-01-01 | End: 2022-01-01 | Stop reason: HOSPADM

## 2022-01-01 RX ORDER — PREDNISONE 20 MG/1
40 TABLET ORAL DAILY
Status: DISCONTINUED | OUTPATIENT
Start: 2022-01-01 | End: 2022-01-01 | Stop reason: HOSPADM

## 2022-01-01 RX ORDER — LORAZEPAM 2 MG/ML
.5-1 CONCENTRATE ORAL EVERY 6 HOURS PRN
Status: DISCONTINUED | OUTPATIENT
Start: 2022-01-01 | End: 2022-01-01

## 2022-01-01 RX ORDER — MORPHINE SULFATE 30 MG/1
30 TABLET, FILM COATED, EXTENDED RELEASE ORAL 4 TIMES DAILY
Qty: 4 TABLET | Refills: 0 | Status: SHIPPED | OUTPATIENT
Start: 2022-01-01 | End: 2022-01-01

## 2022-01-01 RX ORDER — PREGABALIN 300 MG/1
300 CAPSULE ORAL 2 TIMES DAILY
Qty: 8 CAPSULE | Refills: 0 | Status: SHIPPED | OUTPATIENT
Start: 2022-01-01

## 2022-01-01 RX ORDER — MORPHINE SULFATE 4 MG/ML
4 INJECTION, SOLUTION INTRAMUSCULAR; INTRAVENOUS
Status: DISCONTINUED | OUTPATIENT
Start: 2022-01-01 | End: 2022-01-01

## 2022-01-01 RX ORDER — LORAZEPAM 2 MG/ML
.5-1 CONCENTRATE ORAL 2 TIMES DAILY PRN
Qty: 1 ML | Refills: 0 | Status: SHIPPED | OUTPATIENT
Start: 2022-01-01

## 2022-01-01 RX ORDER — MORPHINE SULFATE 15 MG/1
15 TABLET, FILM COATED, EXTENDED RELEASE ORAL 4 TIMES DAILY
Qty: 4 TABLET | Refills: 0 | Status: SHIPPED | OUTPATIENT
Start: 2022-01-01 | End: 2022-01-01

## 2022-01-01 RX ORDER — CALCIUM CARBONATE 500 MG/1
500 TABLET, CHEWABLE ORAL 3 TIMES DAILY PRN
Status: DISCONTINUED | OUTPATIENT
Start: 2022-01-01 | End: 2022-01-01

## 2022-01-01 RX ORDER — LORAZEPAM 2 MG/ML
.5-1 CONCENTRATE ORAL 2 TIMES DAILY
Status: DISCONTINUED | OUTPATIENT
Start: 2022-01-01 | End: 2022-01-01 | Stop reason: HOSPADM

## 2022-01-01 RX ORDER — PREDNISONE 10 MG/1
TABLET ORAL
Qty: 23 TABLET | Refills: 0 | Status: SHIPPED | OUTPATIENT
Start: 2022-01-01 | End: 2022-01-01

## 2022-01-01 RX ORDER — BISACODYL 10 MG
10 SUPPOSITORY, RECTAL RECTAL DAILY PRN
Qty: 2 SUPPOSITORY | Refills: 0 | Status: SHIPPED | OUTPATIENT
Start: 2022-01-01

## 2022-01-01 RX ORDER — LORAZEPAM 1 MG/1
2 TABLET ORAL
Status: DISCONTINUED | OUTPATIENT
Start: 2022-01-01 | End: 2022-01-01

## 2022-01-01 RX ORDER — LORAZEPAM 2 MG/ML
1-2 CONCENTRATE ORAL 2 TIMES DAILY
Status: DISCONTINUED | OUTPATIENT
Start: 2022-01-01 | End: 2022-01-01

## 2022-01-01 RX ORDER — AMOXICILLIN 250 MG
1 CAPSULE ORAL 2 TIMES DAILY PRN
Status: DISCONTINUED | OUTPATIENT
Start: 2022-01-01 | End: 2022-01-01 | Stop reason: HOSPADM

## 2022-01-01 RX ORDER — FUROSEMIDE 10 MG/ML
20 INJECTION INTRAMUSCULAR; INTRAVENOUS ONCE
Status: COMPLETED | OUTPATIENT
Start: 2022-01-01 | End: 2022-01-01

## 2022-01-01 RX ADMIN — HYDROXYZINE HYDROCHLORIDE 50 MG: 50 TABLET, FILM COATED ORAL at 01:03

## 2022-01-01 RX ADMIN — MORPHINE SULFATE 4 MG: 4 INJECTION INTRAVENOUS at 21:30

## 2022-01-01 RX ADMIN — NAPROXEN 500 MG: 250 TABLET ORAL at 08:41

## 2022-01-01 RX ADMIN — MORPHINE SULFATE 60 MG: 15 TABLET ORAL at 21:35

## 2022-01-01 RX ADMIN — CALCIUM CARBONATE (ANTACID) CHEW TAB 500 MG 1000 MG: 500 CHEW TAB at 12:09

## 2022-01-01 RX ADMIN — MULTIPLE VITAMINS W/ MINERALS TAB 1 TABLET: TAB at 08:41

## 2022-01-01 RX ADMIN — FAMOTIDINE 10 MG: 10 TABLET ORAL at 10:04

## 2022-01-01 RX ADMIN — HYDROXYZINE HYDROCHLORIDE 100 MG: 50 TABLET, FILM COATED ORAL at 06:11

## 2022-01-01 RX ADMIN — MORPHINE SULFATE 330 MG: 100 TABLET, FILM COATED, EXTENDED RELEASE ORAL at 18:41

## 2022-01-01 RX ADMIN — GABAPENTIN 1200 MG: 600 TABLET, FILM COATED ORAL at 19:51

## 2022-01-01 RX ADMIN — PREGABALIN 300 MG: 300 CAPSULE ORAL at 08:41

## 2022-01-01 RX ADMIN — GABAPENTIN 1200 MG: 600 TABLET, FILM COATED ORAL at 21:29

## 2022-01-01 RX ADMIN — FLUTICASONE PROPIONATE 1 SPRAY: 50 SPRAY, METERED NASAL at 10:06

## 2022-01-01 RX ADMIN — ACETAMINOPHEN 975 MG: 325 TABLET, FILM COATED ORAL at 21:35

## 2022-01-01 RX ADMIN — NAPROXEN 500 MG: 250 TABLET ORAL at 17:58

## 2022-01-01 RX ADMIN — LORAZEPAM 2 MG: 2 LIQUID ORAL at 02:46

## 2022-01-01 RX ADMIN — HYDROXYZINE HYDROCHLORIDE 50 MG: 50 TABLET, FILM COATED ORAL at 18:07

## 2022-01-01 RX ADMIN — ENOXAPARIN SODIUM 40 MG: 40 INJECTION SUBCUTANEOUS at 10:27

## 2022-01-01 RX ADMIN — METHOCARBAMOL 750 MG: 750 TABLET ORAL at 08:58

## 2022-01-01 RX ADMIN — FUROSEMIDE 20 MG: 10 INJECTION, SOLUTION INTRAMUSCULAR; INTRAVENOUS at 13:08

## 2022-01-01 RX ADMIN — MORPHINE SULFATE 345 MG: 100 TABLET, FILM COATED, EXTENDED RELEASE ORAL at 14:09

## 2022-01-01 RX ADMIN — METHOCARBAMOL 750 MG: 750 TABLET ORAL at 20:25

## 2022-01-01 RX ADMIN — POTASSIUM & SODIUM PHOSPHATES POWDER PACK 280-160-250 MG 1 PACKET: 280-160-250 PACK at 09:56

## 2022-01-01 RX ADMIN — HYDROXYZINE HYDROCHLORIDE 50 MG: 50 TABLET, FILM COATED ORAL at 18:09

## 2022-01-01 RX ADMIN — HYDROXYZINE HYDROCHLORIDE 100 MG: 50 TABLET, FILM COATED ORAL at 00:01

## 2022-01-01 RX ADMIN — GABAPENTIN 1200 MG: 600 TABLET, FILM COATED ORAL at 08:24

## 2022-01-01 RX ADMIN — SODIUM CHLORIDE 500 MG: 9 INJECTION, SOLUTION INTRAVENOUS at 04:32

## 2022-01-01 RX ADMIN — PREGABALIN 300 MG: 300 CAPSULE ORAL at 08:27

## 2022-01-01 RX ADMIN — NICOTINE 1 PATCH: 21 PATCH, EXTENDED RELEASE TRANSDERMAL at 08:23

## 2022-01-01 RX ADMIN — LORAZEPAM 1 MG: 2 LIQUID ORAL at 17:08

## 2022-01-01 RX ADMIN — Medication 2 G: at 21:26

## 2022-01-01 RX ADMIN — PREDNISONE 40 MG: 20 TABLET ORAL at 09:20

## 2022-01-01 RX ADMIN — METHOCARBAMOL 750 MG: 750 TABLET ORAL at 08:24

## 2022-01-01 RX ADMIN — MORPHINE SULFATE 60 MG: 15 TABLET ORAL at 11:51

## 2022-01-01 RX ADMIN — HYDROXYZINE HYDROCHLORIDE 50 MG: 50 TABLET, FILM COATED ORAL at 12:03

## 2022-01-01 RX ADMIN — LORAZEPAM 1 MG: 2 LIQUID ORAL at 10:05

## 2022-01-01 RX ADMIN — ENOXAPARIN SODIUM 40 MG: 40 INJECTION SUBCUTANEOUS at 11:30

## 2022-01-01 RX ADMIN — MORPHINE SULFATE 60 MG: 15 TABLET ORAL at 20:25

## 2022-01-01 RX ADMIN — MORPHINE SULFATE 60 MG: 15 TABLET ORAL at 15:49

## 2022-01-01 RX ADMIN — HYDROXYZINE HYDROCHLORIDE 50 MG: 50 TABLET, FILM COATED ORAL at 00:16

## 2022-01-01 RX ADMIN — HYDROXYZINE HYDROCHLORIDE 100 MG: 50 TABLET, FILM COATED ORAL at 18:41

## 2022-01-01 RX ADMIN — METHOCARBAMOL 750 MG: 750 TABLET ORAL at 20:21

## 2022-01-01 RX ADMIN — METHOCARBAMOL 750 MG: 750 TABLET ORAL at 08:27

## 2022-01-01 RX ADMIN — MORPHINE SULFATE 345 MG: 100 TABLET, FILM COATED, EXTENDED RELEASE ORAL at 11:51

## 2022-01-01 RX ADMIN — GABAPENTIN 1200 MG: 600 TABLET, FILM COATED ORAL at 08:40

## 2022-01-01 RX ADMIN — METHOCARBAMOL 750 MG: 750 TABLET ORAL at 21:26

## 2022-01-01 RX ADMIN — PREDNISONE 40 MG: 20 TABLET ORAL at 10:04

## 2022-01-01 RX ADMIN — CALCIUM CARBONATE (ANTACID) CHEW TAB 500 MG 1000 MG: 500 CHEW TAB at 18:16

## 2022-01-01 RX ADMIN — NICOTINE 1 PATCH: 21 PATCH, EXTENDED RELEASE TRANSDERMAL at 09:20

## 2022-01-01 RX ADMIN — LORAZEPAM 1 MG: 2 LIQUID ORAL at 20:15

## 2022-01-01 RX ADMIN — GABAPENTIN 1200 MG: 600 TABLET, FILM COATED ORAL at 14:20

## 2022-01-01 RX ADMIN — FLUTICASONE PROPIONATE 1 SPRAY: 50 SPRAY, METERED NASAL at 08:33

## 2022-01-01 RX ADMIN — NICOTINE 1 PATCH: 21 PATCH, EXTENDED RELEASE TRANSDERMAL at 08:58

## 2022-01-01 RX ADMIN — LORAZEPAM 1 MG: 2 LIQUID ORAL at 00:11

## 2022-01-01 RX ADMIN — ACETAMINOPHEN 975 MG: 325 TABLET, FILM COATED ORAL at 21:27

## 2022-01-01 RX ADMIN — ACETAMINOPHEN 975 MG: 325 TABLET, FILM COATED ORAL at 05:56

## 2022-01-01 RX ADMIN — MULTIPLE VITAMINS W/ MINERALS TAB 1 TABLET: TAB at 09:30

## 2022-01-01 RX ADMIN — MORPHINE SULFATE 300 MG: 30 TABLET, FILM COATED, EXTENDED RELEASE ORAL at 05:56

## 2022-01-01 RX ADMIN — MORPHINE SULFATE 345 MG: 100 TABLET, FILM COATED, EXTENDED RELEASE ORAL at 18:30

## 2022-01-01 RX ADMIN — GABAPENTIN 1200 MG: 600 TABLET, FILM COATED ORAL at 21:27

## 2022-01-01 RX ADMIN — FAMOTIDINE 10 MG: 10 TABLET ORAL at 08:25

## 2022-01-01 RX ADMIN — PREGABALIN 300 MG: 300 CAPSULE ORAL at 20:25

## 2022-01-01 RX ADMIN — GABAPENTIN 1200 MG: 600 TABLET, FILM COATED ORAL at 08:58

## 2022-01-01 RX ADMIN — MORPHINE SULFATE 330 MG: 100 TABLET, FILM COATED, EXTENDED RELEASE ORAL at 06:11

## 2022-01-01 RX ADMIN — MORPHINE SULFATE 60 MG: 15 TABLET ORAL at 13:12

## 2022-01-01 RX ADMIN — FAMOTIDINE 10 MG: 10 TABLET ORAL at 08:58

## 2022-01-01 RX ADMIN — MORPHINE SULFATE 300 MG: 30 TABLET, FILM COATED, EXTENDED RELEASE ORAL at 00:00

## 2022-01-01 RX ADMIN — ACETAMINOPHEN 975 MG: 325 TABLET, FILM COATED ORAL at 22:55

## 2022-01-01 RX ADMIN — GABAPENTIN 1200 MG: 600 TABLET, FILM COATED ORAL at 14:11

## 2022-01-01 RX ADMIN — NAPROXEN 500 MG: 250 TABLET ORAL at 11:33

## 2022-01-01 RX ADMIN — POTASSIUM & SODIUM PHOSPHATES POWDER PACK 280-160-250 MG 1 PACKET: 280-160-250 PACK at 10:05

## 2022-01-01 RX ADMIN — MORPHINE SULFATE 60 MG: 15 TABLET ORAL at 16:17

## 2022-01-01 RX ADMIN — LORAZEPAM 2 MG: 2 LIQUID ORAL at 20:24

## 2022-01-01 RX ADMIN — MORPHINE SULFATE 300 MG: 30 TABLET, FILM COATED, EXTENDED RELEASE ORAL at 17:40

## 2022-01-01 RX ADMIN — FLUTICASONE PROPIONATE 1 SPRAY: 50 SPRAY, METERED NASAL at 08:26

## 2022-01-01 RX ADMIN — PREGABALIN 300 MG: 300 CAPSULE ORAL at 08:24

## 2022-01-01 RX ADMIN — HYDROXYZINE HYDROCHLORIDE 100 MG: 50 TABLET, FILM COATED ORAL at 23:59

## 2022-01-01 RX ADMIN — LORAZEPAM 2 MG: 2 LIQUID ORAL at 21:19

## 2022-01-01 RX ADMIN — MORPHINE SULFATE 330 MG: 100 TABLET, FILM COATED, EXTENDED RELEASE ORAL at 00:07

## 2022-01-01 RX ADMIN — FLUTICASONE PROPIONATE 1 SPRAY: 50 SPRAY, METERED NASAL at 08:32

## 2022-01-01 RX ADMIN — NICOTINE 1 PATCH: 21 PATCH, EXTENDED RELEASE TRANSDERMAL at 10:04

## 2022-01-01 RX ADMIN — MORPHINE SULFATE 330 MG: 100 TABLET, FILM COATED, EXTENDED RELEASE ORAL at 12:02

## 2022-01-01 RX ADMIN — HYDROXYZINE HYDROCHLORIDE 50 MG: 50 TABLET, FILM COATED ORAL at 06:10

## 2022-01-01 RX ADMIN — HYDROXYZINE HYDROCHLORIDE 50 MG: 50 TABLET, FILM COATED ORAL at 06:04

## 2022-01-01 RX ADMIN — SULFAMETHOXAZOLE AND TRIMETHOPRIM 320 MG: 80; 16 INJECTION, SOLUTION, CONCENTRATE INTRAVENOUS at 00:48

## 2022-01-01 RX ADMIN — HYDROXYZINE HYDROCHLORIDE 50 MG: 50 TABLET, FILM COATED ORAL at 00:20

## 2022-01-01 RX ADMIN — SULFAMETHOXAZOLE AND TRIMETHOPRIM 320 MG: 80; 16 INJECTION, SOLUTION, CONCENTRATE INTRAVENOUS at 08:37

## 2022-01-01 RX ADMIN — Medication 4 G: at 20:48

## 2022-01-01 RX ADMIN — MULTIPLE VITAMINS W/ MINERALS TAB 1 TABLET: TAB at 08:59

## 2022-01-01 RX ADMIN — ACETAMINOPHEN 975 MG: 325 TABLET, FILM COATED ORAL at 06:04

## 2022-01-01 RX ADMIN — ACETAMINOPHEN 975 MG: 325 TABLET, FILM COATED ORAL at 13:49

## 2022-01-01 RX ADMIN — FLUTICASONE PROPIONATE 1 SPRAY: 50 SPRAY, METERED NASAL at 08:47

## 2022-01-01 RX ADMIN — KETOROLAC TROMETHAMINE 30 MG: 30 INJECTION, SOLUTION INTRAMUSCULAR; INTRAVENOUS at 04:32

## 2022-01-01 RX ADMIN — NAPROXEN 500 MG: 250 TABLET ORAL at 18:32

## 2022-01-01 RX ADMIN — GABAPENTIN 1200 MG: 600 TABLET, FILM COATED ORAL at 09:19

## 2022-01-01 RX ADMIN — MULTIPLE VITAMINS W/ MINERALS TAB 1 TABLET: TAB at 08:24

## 2022-01-01 RX ADMIN — ENOXAPARIN SODIUM 40 MG: 40 INJECTION SUBCUTANEOUS at 11:52

## 2022-01-01 RX ADMIN — ENOXAPARIN SODIUM 40 MG: 40 INJECTION SUBCUTANEOUS at 08:23

## 2022-01-01 RX ADMIN — LORAZEPAM 1 MG: 2 LIQUID ORAL at 18:18

## 2022-01-01 RX ADMIN — GABAPENTIN 1200 MG: 600 TABLET, FILM COATED ORAL at 08:28

## 2022-01-01 RX ADMIN — HYDROXYZINE HYDROCHLORIDE 50 MG: 50 TABLET, FILM COATED ORAL at 00:02

## 2022-01-01 RX ADMIN — SODIUM CHLORIDE 500 MG: 9 INJECTION, SOLUTION INTRAVENOUS at 05:59

## 2022-01-01 RX ADMIN — MORPHINE SULFATE 345 MG: 100 TABLET, FILM COATED, EXTENDED RELEASE ORAL at 06:39

## 2022-01-01 RX ADMIN — FAMOTIDINE 10 MG: 10 TABLET ORAL at 08:27

## 2022-01-01 RX ADMIN — FAMOTIDINE 10 MG: 10 TABLET ORAL at 08:41

## 2022-01-01 RX ADMIN — GABAPENTIN 1200 MG: 600 TABLET, FILM COATED ORAL at 13:49

## 2022-01-01 RX ADMIN — POTASSIUM & SODIUM PHOSPHATES POWDER PACK 280-160-250 MG 2 PACKET: 280-160-250 PACK at 13:49

## 2022-01-01 RX ADMIN — ACETAMINOPHEN 975 MG: 325 TABLET, FILM COATED ORAL at 00:01

## 2022-01-01 RX ADMIN — METHOCARBAMOL 750 MG: 750 TABLET ORAL at 20:06

## 2022-01-01 RX ADMIN — PREGABALIN 300 MG: 300 CAPSULE ORAL at 09:20

## 2022-01-01 RX ADMIN — PREGABALIN 300 MG: 300 CAPSULE ORAL at 19:52

## 2022-01-01 RX ADMIN — POTASSIUM & SODIUM PHOSPHATES POWDER PACK 280-160-250 MG 2 PACKET: 280-160-250 PACK at 09:49

## 2022-01-01 RX ADMIN — PREGABALIN 300 MG: 300 CAPSULE ORAL at 10:03

## 2022-01-01 RX ADMIN — LORAZEPAM 1 MG: 2 LIQUID ORAL at 00:19

## 2022-01-01 RX ADMIN — MORPHINE SULFATE 4 MG: 4 INJECTION INTRAVENOUS at 21:22

## 2022-01-01 RX ADMIN — LORAZEPAM 0.5 MG: 2 LIQUID ORAL at 09:16

## 2022-01-01 RX ADMIN — MORPHINE SULFATE 345 MG: 100 TABLET, FILM COATED, EXTENDED RELEASE ORAL at 00:02

## 2022-01-01 RX ADMIN — ACETAMINOPHEN 975 MG: 325 TABLET, FILM COATED ORAL at 13:50

## 2022-01-01 RX ADMIN — PROCHLORPERAZINE EDISYLATE 10 MG: 5 INJECTION INTRAMUSCULAR; INTRAVENOUS at 17:04

## 2022-01-01 RX ADMIN — MORPHINE SULFATE 330 MG: 100 TABLET, FILM COATED, EXTENDED RELEASE ORAL at 06:00

## 2022-01-01 RX ADMIN — MORPHINE SULFATE 345 MG: 100 TABLET, FILM COATED, EXTENDED RELEASE ORAL at 18:06

## 2022-01-01 RX ADMIN — ACETAMINOPHEN 975 MG: 325 TABLET, FILM COATED ORAL at 14:27

## 2022-01-01 RX ADMIN — LORAZEPAM 1 MG: 1 TABLET ORAL at 00:14

## 2022-01-01 RX ADMIN — POTASSIUM & SODIUM PHOSPHATES POWDER PACK 280-160-250 MG 1 PACKET: 280-160-250 PACK at 14:00

## 2022-01-01 RX ADMIN — MORPHINE SULFATE 60 MG: 15 TABLET ORAL at 01:50

## 2022-01-01 RX ADMIN — NAPROXEN 500 MG: 250 TABLET ORAL at 10:03

## 2022-01-01 RX ADMIN — METHOCARBAMOL 750 MG: 750 TABLET ORAL at 08:40

## 2022-01-01 RX ADMIN — ACETAMINOPHEN 975 MG: 325 TABLET, FILM COATED ORAL at 06:13

## 2022-01-01 RX ADMIN — HYDROXYZINE HYDROCHLORIDE 50 MG: 50 TABLET, FILM COATED ORAL at 06:13

## 2022-01-01 RX ADMIN — ACETAMINOPHEN 975 MG: 325 TABLET, FILM COATED ORAL at 14:11

## 2022-01-01 RX ADMIN — METHOCARBAMOL 750 MG: 750 TABLET ORAL at 20:41

## 2022-01-01 RX ADMIN — PANTOPRAZOLE SODIUM 40 MG: 40 INJECTION, POWDER, FOR SOLUTION INTRAVENOUS at 08:41

## 2022-01-01 RX ADMIN — CALCIUM CARBONATE (ANTACID) CHEW TAB 500 MG 1000 MG: 500 CHEW TAB at 18:32

## 2022-01-01 RX ADMIN — PREDNISONE 40 MG: 20 TABLET ORAL at 08:24

## 2022-01-01 RX ADMIN — LORAZEPAM 2 MG: 2 LIQUID ORAL at 22:56

## 2022-01-01 RX ADMIN — LORAZEPAM 1 MG: 2 LIQUID ORAL at 00:01

## 2022-01-01 RX ADMIN — MORPHINE SULFATE 60 MG: 15 TABLET ORAL at 08:57

## 2022-01-01 RX ADMIN — ACETAMINOPHEN 975 MG: 325 TABLET, FILM COATED ORAL at 06:10

## 2022-01-01 RX ADMIN — ACETAMINOPHEN 975 MG: 325 TABLET, FILM COATED ORAL at 06:18

## 2022-01-01 RX ADMIN — GABAPENTIN 1200 MG: 600 TABLET, FILM COATED ORAL at 19:58

## 2022-01-01 RX ADMIN — ENOXAPARIN SODIUM 40 MG: 40 INJECTION SUBCUTANEOUS at 12:16

## 2022-01-01 RX ADMIN — PREDNISONE 40 MG: 20 TABLET ORAL at 08:26

## 2022-01-01 RX ADMIN — NICOTINE 1 PATCH: 21 PATCH, EXTENDED RELEASE TRANSDERMAL at 08:56

## 2022-01-01 RX ADMIN — MORPHINE SULFATE 330 MG: 100 TABLET, FILM COATED, EXTENDED RELEASE ORAL at 17:57

## 2022-01-01 RX ADMIN — METHOCARBAMOL 750 MG: 750 TABLET ORAL at 13:49

## 2022-01-01 RX ADMIN — ACETAMINOPHEN 975 MG: 325 TABLET, FILM COATED ORAL at 14:20

## 2022-01-01 RX ADMIN — FLUTICASONE PROPIONATE 1 SPRAY: 50 SPRAY, METERED NASAL at 11:26

## 2022-01-01 RX ADMIN — PREDNISONE 40 MG: 20 TABLET ORAL at 08:58

## 2022-01-01 RX ADMIN — POLYETHYLENE GLYCOL 3350 17 G: 17 POWDER, FOR SOLUTION ORAL at 08:57

## 2022-01-01 RX ADMIN — GABAPENTIN 1200 MG: 600 TABLET, FILM COATED ORAL at 20:06

## 2022-01-01 RX ADMIN — MORPHINE SULFATE 60 MG: 15 TABLET ORAL at 11:31

## 2022-01-01 RX ADMIN — MORPHINE SULFATE 330 MG: 100 TABLET, FILM COATED, EXTENDED RELEASE ORAL at 00:19

## 2022-01-01 RX ADMIN — NAPROXEN 500 MG: 250 TABLET ORAL at 18:09

## 2022-01-01 RX ADMIN — MORPHINE SULFATE 345 MG: 100 TABLET, FILM COATED, EXTENDED RELEASE ORAL at 12:22

## 2022-01-01 RX ADMIN — FAMOTIDINE 10 MG: 10 TABLET ORAL at 09:19

## 2022-01-01 RX ADMIN — NAPROXEN 500 MG: 250 TABLET ORAL at 08:29

## 2022-01-01 RX ADMIN — NAPROXEN 500 MG: 250 TABLET ORAL at 18:07

## 2022-01-01 RX ADMIN — MULTIPLE VITAMINS W/ MINERALS TAB 1 TABLET: TAB at 11:33

## 2022-01-01 RX ADMIN — HYDROXYZINE HYDROCHLORIDE 50 MG: 50 TABLET, FILM COATED ORAL at 00:25

## 2022-01-01 RX ADMIN — DICLOFENAC SODIUM 4 G: 10 GEL TOPICAL at 15:50

## 2022-01-01 RX ADMIN — MORPHINE SULFATE 345 MG: 100 TABLET, FILM COATED, EXTENDED RELEASE ORAL at 00:12

## 2022-01-01 RX ADMIN — ACETAMINOPHEN 975 MG: 325 TABLET, FILM COATED ORAL at 00:13

## 2022-01-01 RX ADMIN — ONDANSETRON HYDROCHLORIDE 4 MG: 2 INJECTION, SOLUTION INTRAMUSCULAR; INTRAVENOUS at 21:07

## 2022-01-01 RX ADMIN — NICOTINE 1 PATCH: 21 PATCH, EXTENDED RELEASE TRANSDERMAL at 08:45

## 2022-01-01 RX ADMIN — Medication 1 G: at 10:07

## 2022-01-01 RX ADMIN — MORPHINE SULFATE 330 MG: 100 TABLET, FILM COATED, EXTENDED RELEASE ORAL at 17:58

## 2022-01-01 RX ADMIN — PREGABALIN 300 MG: 300 CAPSULE ORAL at 20:06

## 2022-01-01 RX ADMIN — LORAZEPAM 1 MG: 2 LIQUID ORAL at 20:25

## 2022-01-01 RX ADMIN — Medication 1 G: at 09:16

## 2022-01-01 RX ADMIN — ACETAMINOPHEN 975 MG: 325 TABLET, FILM COATED ORAL at 22:11

## 2022-01-01 RX ADMIN — GABAPENTIN 1200 MG: 600 TABLET, FILM COATED ORAL at 14:00

## 2022-01-01 RX ADMIN — ENOXAPARIN SODIUM 40 MG: 40 INJECTION SUBCUTANEOUS at 11:51

## 2022-01-01 RX ADMIN — ACETAMINOPHEN 975 MG: 325 TABLET, FILM COATED ORAL at 00:58

## 2022-01-01 RX ADMIN — METHOCARBAMOL 750 MG: 750 TABLET ORAL at 09:19

## 2022-01-01 RX ADMIN — HYDROXYZINE HYDROCHLORIDE 50 MG: 50 TABLET, FILM COATED ORAL at 12:23

## 2022-01-01 RX ADMIN — HYDROXYZINE HYDROCHLORIDE 50 MG: 50 TABLET, FILM COATED ORAL at 06:03

## 2022-01-01 RX ADMIN — HYDROXYZINE HYDROCHLORIDE 50 MG: 50 TABLET, FILM COATED ORAL at 18:32

## 2022-01-01 RX ADMIN — HYDROXYZINE HYDROCHLORIDE 100 MG: 50 TABLET, FILM COATED ORAL at 11:34

## 2022-01-01 RX ADMIN — MORPHINE SULFATE 345 MG: 100 TABLET, FILM COATED, EXTENDED RELEASE ORAL at 18:08

## 2022-01-01 RX ADMIN — MULTIPLE VITAMINS W/ MINERALS TAB 1 TABLET: TAB at 16:20

## 2022-01-01 RX ADMIN — LORAZEPAM 1 MG: 2 LIQUID ORAL at 16:07

## 2022-01-01 RX ADMIN — MORPHINE SULFATE 330 MG: 100 TABLET, FILM COATED, EXTENDED RELEASE ORAL at 12:14

## 2022-01-01 RX ADMIN — PREGABALIN 300 MG: 300 CAPSULE ORAL at 20:21

## 2022-01-01 RX ADMIN — NAPROXEN 500 MG: 250 TABLET ORAL at 17:57

## 2022-01-01 RX ADMIN — KETOROLAC TROMETHAMINE 30 MG: 30 INJECTION, SOLUTION INTRAMUSCULAR; INTRAVENOUS at 10:43

## 2022-01-01 RX ADMIN — MORPHINE SULFATE 300 MG: 30 TABLET, FILM COATED, EXTENDED RELEASE ORAL at 12:07

## 2022-01-01 RX ADMIN — MORPHINE SULFATE 345 MG: 100 TABLET, FILM COATED, EXTENDED RELEASE ORAL at 06:12

## 2022-01-01 RX ADMIN — NAPROXEN 500 MG: 250 TABLET ORAL at 08:26

## 2022-01-01 RX ADMIN — PANTOPRAZOLE SODIUM 40 MG: 40 INJECTION, POWDER, FOR SOLUTION INTRAVENOUS at 08:26

## 2022-01-01 RX ADMIN — METHOCARBAMOL 750 MG: 750 TABLET ORAL at 19:58

## 2022-01-01 RX ADMIN — HYDROXYZINE HYDROCHLORIDE 100 MG: 50 TABLET, FILM COATED ORAL at 18:19

## 2022-01-01 RX ADMIN — MORPHINE SULFATE 60 MG: 15 TABLET ORAL at 19:51

## 2022-01-01 RX ADMIN — POTASSIUM & SODIUM PHOSPHATES POWDER PACK 280-160-250 MG 1 PACKET: 280-160-250 PACK at 21:26

## 2022-01-01 RX ADMIN — DICLOFENAC SODIUM 4 G: 10 GEL TOPICAL at 16:35

## 2022-01-01 RX ADMIN — HYDROXYZINE HYDROCHLORIDE 100 MG: 50 TABLET, FILM COATED ORAL at 05:56

## 2022-01-01 RX ADMIN — POTASSIUM & SODIUM PHOSPHATES POWDER PACK 280-160-250 MG 1 PACKET: 280-160-250 PACK at 13:51

## 2022-01-01 RX ADMIN — NICOTINE 1 PATCH: 21 PATCH, EXTENDED RELEASE TRANSDERMAL at 08:40

## 2022-01-01 RX ADMIN — MORPHINE SULFATE 60 MG: 15 TABLET ORAL at 14:52

## 2022-01-01 RX ADMIN — MORPHINE SULFATE 60 MG: 15 TABLET ORAL at 23:12

## 2022-01-01 RX ADMIN — POLYETHYLENE GLYCOL 3350 17 G: 17 POWDER, FOR SOLUTION ORAL at 09:18

## 2022-01-01 RX ADMIN — POLYETHYLENE GLYCOL 3350 17 G: 17 POWDER, FOR SOLUTION ORAL at 10:05

## 2022-01-01 RX ADMIN — SODIUM CHLORIDE 500 MG: 9 INJECTION, SOLUTION INTRAVENOUS at 06:09

## 2022-01-01 RX ADMIN — HYDROXYZINE HYDROCHLORIDE 100 MG: 50 TABLET, FILM COATED ORAL at 00:20

## 2022-01-01 RX ADMIN — LORAZEPAM 1 MG: 2 LIQUID ORAL at 20:41

## 2022-01-01 RX ADMIN — PANTOPRAZOLE SODIUM 40 MG: 40 INJECTION, POWDER, FOR SOLUTION INTRAVENOUS at 08:57

## 2022-01-01 RX ADMIN — METHOCARBAMOL 750 MG: 750 TABLET ORAL at 10:03

## 2022-01-01 RX ADMIN — HYDROXYZINE HYDROCHLORIDE 50 MG: 50 TABLET, FILM COATED ORAL at 06:43

## 2022-01-01 RX ADMIN — MORPHINE SULFATE 330 MG: 100 TABLET, FILM COATED, EXTENDED RELEASE ORAL at 00:59

## 2022-01-01 RX ADMIN — FUROSEMIDE 20 MG: 10 INJECTION, SOLUTION INTRAMUSCULAR; INTRAVENOUS at 18:23

## 2022-01-01 RX ADMIN — Medication 4 G: at 14:31

## 2022-01-01 RX ADMIN — GABAPENTIN 1200 MG: 600 TABLET, FILM COATED ORAL at 20:41

## 2022-01-01 RX ADMIN — MORPHINE SULFATE 60 MG: 15 TABLET ORAL at 17:06

## 2022-01-01 RX ADMIN — POTASSIUM & SODIUM PHOSPHATES POWDER PACK 280-160-250 MG 1 PACKET: 280-160-250 PACK at 19:58

## 2022-01-01 RX ADMIN — GABAPENTIN 1200 MG: 600 TABLET, FILM COATED ORAL at 20:24

## 2022-01-01 RX ADMIN — MORPHINE SULFATE 60 MG: 15 TABLET ORAL at 06:15

## 2022-01-01 RX ADMIN — HYDROXYZINE HYDROCHLORIDE 50 MG: 50 TABLET, FILM COATED ORAL at 12:17

## 2022-01-01 RX ADMIN — MORPHINE SULFATE 60 MG: 15 TABLET ORAL at 01:25

## 2022-01-01 RX ADMIN — MORPHINE SULFATE 60 MG: 15 TABLET ORAL at 08:27

## 2022-01-01 RX ADMIN — HYDROMORPHONE HYDROCHLORIDE 1 MG: 1 INJECTION, SOLUTION INTRAMUSCULAR; INTRAVENOUS; SUBCUTANEOUS at 23:03

## 2022-01-01 RX ADMIN — MORPHINE SULFATE 60 MG: 15 TABLET ORAL at 12:34

## 2022-01-01 RX ADMIN — PREGABALIN 300 MG: 300 CAPSULE ORAL at 08:58

## 2022-01-01 RX ADMIN — ACETAMINOPHEN 975 MG: 325 TABLET, FILM COATED ORAL at 14:09

## 2022-01-01 RX ADMIN — MORPHINE SULFATE 60 MG: 15 TABLET ORAL at 12:54

## 2022-01-01 RX ADMIN — HYDROXYZINE HYDROCHLORIDE 50 MG: 50 TABLET, FILM COATED ORAL at 17:57

## 2022-01-01 RX ADMIN — ACETAMINOPHEN 975 MG: 325 TABLET, FILM COATED ORAL at 00:22

## 2022-01-01 RX ADMIN — MORPHINE SULFATE 330 MG: 100 TABLET, FILM COATED, EXTENDED RELEASE ORAL at 11:54

## 2022-01-01 RX ADMIN — LORAZEPAM 0.5 MG: 2 LIQUID ORAL at 20:19

## 2022-01-01 RX ADMIN — HYDROXYZINE HYDROCHLORIDE 100 MG: 50 TABLET, FILM COATED ORAL at 06:18

## 2022-01-01 RX ADMIN — MORPHINE SULFATE 300 MG: 30 TABLET, FILM COATED, EXTENDED RELEASE ORAL at 06:18

## 2022-01-01 RX ADMIN — GABAPENTIN 1200 MG: 600 TABLET, FILM COATED ORAL at 08:25

## 2022-01-01 RX ADMIN — LORAZEPAM 1 MG: 2 LIQUID ORAL at 20:04

## 2022-01-01 RX ADMIN — PROCHLORPERAZINE MALEATE 10 MG: 5 TABLET ORAL at 20:43

## 2022-01-01 RX ADMIN — NICOTINE 1 PATCH: 21 PATCH, EXTENDED RELEASE TRANSDERMAL at 08:22

## 2022-01-01 RX ADMIN — CEFTRIAXONE 1 G: 1 INJECTION, POWDER, FOR SOLUTION INTRAMUSCULAR; INTRAVENOUS at 18:31

## 2022-01-01 RX ADMIN — METHOCARBAMOL 750 MG: 750 TABLET ORAL at 13:59

## 2022-01-01 RX ADMIN — MORPHINE SULFATE 330 MG: 100 TABLET, FILM COATED, EXTENDED RELEASE ORAL at 23:59

## 2022-01-01 RX ADMIN — MORPHINE SULFATE 330 MG: 100 TABLET, FILM COATED, EXTENDED RELEASE ORAL at 06:09

## 2022-01-01 RX ADMIN — GABAPENTIN 1200 MG: 600 TABLET, FILM COATED ORAL at 08:27

## 2022-01-01 RX ADMIN — POTASSIUM & SODIUM PHOSPHATES POWDER PACK 280-160-250 MG 2 PACKET: 280-160-250 PACK at 20:24

## 2022-01-01 RX ADMIN — MORPHINE SULFATE 60 MG: 15 TABLET ORAL at 17:35

## 2022-01-01 RX ADMIN — MORPHINE SULFATE 4 MG: 4 INJECTION INTRAVENOUS at 17:59

## 2022-01-01 RX ADMIN — CEFTRIAXONE 1 G: 1 INJECTION, POWDER, FOR SOLUTION INTRAMUSCULAR; INTRAVENOUS at 17:44

## 2022-01-01 RX ADMIN — HYDROXYZINE HYDROCHLORIDE 100 MG: 50 TABLET, FILM COATED ORAL at 11:54

## 2022-01-01 RX ADMIN — HYDROXYZINE HYDROCHLORIDE 100 MG: 50 TABLET, FILM COATED ORAL at 17:41

## 2022-01-01 RX ADMIN — MULTIPLE VITAMINS W/ MINERALS TAB 1 TABLET: TAB at 08:25

## 2022-01-01 RX ADMIN — FUROSEMIDE 20 MG: 10 INJECTION, SOLUTION INTRAMUSCULAR; INTRAVENOUS at 00:02

## 2022-01-01 RX ADMIN — MORPHINE SULFATE 345 MG: 100 TABLET, FILM COATED, EXTENDED RELEASE ORAL at 12:16

## 2022-01-01 RX ADMIN — METHOCARBAMOL 750 MG: 750 TABLET ORAL at 14:27

## 2022-01-01 RX ADMIN — PANTOPRAZOLE SODIUM 40 MG: 40 INJECTION, POWDER, FOR SOLUTION INTRAVENOUS at 08:29

## 2022-01-01 RX ADMIN — HYDROXYZINE HYDROCHLORIDE 100 MG: 50 TABLET, FILM COATED ORAL at 12:06

## 2022-01-01 RX ADMIN — METHOCARBAMOL 750 MG: 750 TABLET ORAL at 08:25

## 2022-01-01 RX ADMIN — POTASSIUM & SODIUM PHOSPHATES POWDER PACK 280-160-250 MG 1 PACKET: 280-160-250 PACK at 20:24

## 2022-01-01 RX ADMIN — PREDNISONE 40 MG: 20 TABLET ORAL at 13:50

## 2022-01-01 RX ADMIN — METHOCARBAMOL 750 MG: 750 TABLET ORAL at 14:08

## 2022-01-01 RX ADMIN — NAPROXEN 500 MG: 250 TABLET ORAL at 18:41

## 2022-01-01 RX ADMIN — LORAZEPAM 1 MG: 2 LIQUID ORAL at 10:58

## 2022-01-01 RX ADMIN — ACETAMINOPHEN 975 MG: 325 TABLET, FILM COATED ORAL at 06:42

## 2022-01-01 RX ADMIN — PREGABALIN 300 MG: 300 CAPSULE ORAL at 08:28

## 2022-01-01 RX ADMIN — SODIUM CHLORIDE 500 MG: 9 INJECTION, SOLUTION INTRAVENOUS at 17:29

## 2022-01-01 RX ADMIN — PREGABALIN 300 MG: 300 CAPSULE ORAL at 21:26

## 2022-01-01 RX ADMIN — MORPHINE SULFATE 60 MG: 15 TABLET ORAL at 22:55

## 2022-01-01 RX ADMIN — METHOCARBAMOL 750 MG: 750 TABLET ORAL at 08:28

## 2022-01-01 RX ADMIN — NAPROXEN 500 MG: 250 TABLET ORAL at 09:20

## 2022-01-01 RX ADMIN — GABAPENTIN 1200 MG: 600 TABLET, FILM COATED ORAL at 10:03

## 2022-01-01 RX ADMIN — PREGABALIN 300 MG: 300 CAPSULE ORAL at 19:59

## 2022-01-01 RX ADMIN — MORPHINE SULFATE 60 MG: 15 TABLET ORAL at 16:15

## 2022-01-01 RX ADMIN — HYDROXYZINE HYDROCHLORIDE 100 MG: 50 TABLET, FILM COATED ORAL at 12:14

## 2022-01-01 RX ADMIN — GABAPENTIN 1200 MG: 600 TABLET, FILM COATED ORAL at 14:27

## 2022-01-01 RX ADMIN — GABAPENTIN 1200 MG: 600 TABLET, FILM COATED ORAL at 20:21

## 2022-01-01 RX ADMIN — LORAZEPAM 1 MG: 2 LIQUID ORAL at 00:46

## 2022-01-01 RX ADMIN — GABAPENTIN 1200 MG: 600 TABLET, FILM COATED ORAL at 13:50

## 2022-01-01 RX ADMIN — MORPHINE SULFATE 8 MG: 4 INJECTION INTRAVENOUS at 14:11

## 2022-01-01 RX ADMIN — LORAZEPAM 1 MG: 2 LIQUID ORAL at 00:57

## 2022-01-01 RX ADMIN — ACETAMINOPHEN 975 MG: 325 TABLET, FILM COATED ORAL at 06:03

## 2022-01-01 RX ADMIN — MORPHINE SULFATE 345 MG: 100 TABLET, FILM COATED, EXTENDED RELEASE ORAL at 00:20

## 2022-01-01 RX ADMIN — POTASSIUM & SODIUM PHOSPHATES POWDER PACK 280-160-250 MG 1 PACKET: 280-160-250 PACK at 20:47

## 2022-01-01 RX ADMIN — MORPHINE SULFATE 60 MG: 15 TABLET ORAL at 15:35

## 2022-01-01 RX ADMIN — POLYETHYLENE GLYCOL 3350 17 G: 17 POWDER, FOR SOLUTION ORAL at 08:47

## 2022-01-01 RX ADMIN — POTASSIUM & SODIUM PHOSPHATES POWDER PACK 280-160-250 MG 1 PACKET: 280-160-250 PACK at 08:28

## 2022-01-01 RX ADMIN — LORAZEPAM 1 MG: 2 LIQUID ORAL at 00:14

## 2022-01-01 RX ADMIN — FAMOTIDINE 10 MG: 10 TABLET ORAL at 11:33

## 2022-01-01 RX ADMIN — ACETAMINOPHEN 975 MG: 325 TABLET, FILM COATED ORAL at 23:17

## 2022-01-01 RX ADMIN — LORAZEPAM 1 MG: 2 LIQUID ORAL at 00:07

## 2022-01-01 RX ADMIN — NAPROXEN 500 MG: 250 TABLET ORAL at 08:57

## 2022-01-01 RX ADMIN — FAMOTIDINE 10 MG: 10 TABLET ORAL at 08:28

## 2022-01-01 RX ADMIN — HYDROXYZINE HYDROCHLORIDE 50 MG: 50 TABLET, FILM COATED ORAL at 11:52

## 2022-01-01 RX ADMIN — SODIUM CHLORIDE 500 MG: 9 INJECTION, SOLUTION INTRAVENOUS at 16:33

## 2022-01-01 RX ADMIN — METHOCARBAMOL 750 MG: 750 TABLET ORAL at 13:50

## 2022-01-01 RX ADMIN — LORAZEPAM 1 MG: 2 LIQUID ORAL at 08:26

## 2022-01-01 RX ADMIN — FLUTICASONE PROPIONATE 1 SPRAY: 50 SPRAY, METERED NASAL at 08:35

## 2022-01-01 RX ADMIN — PANTOPRAZOLE SODIUM 40 MG: 40 INJECTION, POWDER, FOR SOLUTION INTRAVENOUS at 09:05

## 2022-01-01 RX ADMIN — HYDROXYZINE HYDROCHLORIDE 100 MG: 50 TABLET, FILM COATED ORAL at 00:00

## 2022-01-01 RX ADMIN — DICLOFENAC SODIUM 4 G: 10 GEL TOPICAL at 08:30

## 2022-01-01 RX ADMIN — MORPHINE SULFATE 330 MG: 100 TABLET, FILM COATED, EXTENDED RELEASE ORAL at 11:33

## 2022-01-01 RX ADMIN — GABAPENTIN 1200 MG: 600 TABLET, FILM COATED ORAL at 14:08

## 2022-01-01 RX ADMIN — FLUTICASONE PROPIONATE 1 SPRAY: 50 SPRAY, METERED NASAL at 09:16

## 2022-01-01 RX ADMIN — LORAZEPAM 1 MG: 2 LIQUID ORAL at 11:31

## 2022-01-01 RX ADMIN — NAPROXEN 500 MG: 250 TABLET ORAL at 18:19

## 2022-01-01 RX ADMIN — PREGABALIN 300 MG: 300 CAPSULE ORAL at 08:25

## 2022-01-01 RX ADMIN — ACETAMINOPHEN 975 MG: 325 TABLET, FILM COATED ORAL at 14:00

## 2022-01-01 RX ADMIN — PANTOPRAZOLE SODIUM 40 MG: 40 INJECTION, POWDER, FOR SOLUTION INTRAVENOUS at 08:56

## 2022-01-01 RX ADMIN — HYDROXYZINE HYDROCHLORIDE 100 MG: 50 TABLET, FILM COATED ORAL at 17:57

## 2022-01-01 RX ADMIN — MORPHINE SULFATE 60 MG: 15 TABLET ORAL at 20:51

## 2022-01-01 RX ADMIN — POTASSIUM & SODIUM PHOSPHATES POWDER PACK 280-160-250 MG 1 PACKET: 280-160-250 PACK at 14:28

## 2022-01-01 RX ADMIN — PANTOPRAZOLE SODIUM 40 MG: 40 INJECTION, POWDER, FOR SOLUTION INTRAVENOUS at 08:23

## 2022-01-01 RX ADMIN — MORPHINE SULFATE 345 MG: 100 TABLET, FILM COATED, EXTENDED RELEASE ORAL at 06:05

## 2022-01-01 RX ADMIN — METHOCARBAMOL 750 MG: 750 TABLET ORAL at 14:20

## 2022-01-01 RX ADMIN — POLYETHYLENE GLYCOL 3350 17 G: 17 POWDER, FOR SOLUTION ORAL at 14:02

## 2022-01-01 RX ADMIN — NICOTINE 1 PATCH: 21 PATCH, EXTENDED RELEASE TRANSDERMAL at 08:28

## 2022-01-01 RX ADMIN — KETOROLAC TROMETHAMINE 30 MG: 30 INJECTION, SOLUTION INTRAMUSCULAR; INTRAVENOUS at 16:26

## 2022-01-01 RX ADMIN — LORAZEPAM 1 MG: 2 LIQUID ORAL at 08:59

## 2022-01-01 RX ADMIN — NAPROXEN 500 MG: 250 TABLET ORAL at 08:24

## 2022-01-01 RX ADMIN — GABAPENTIN 1200 MG: 600 TABLET, FILM COATED ORAL at 20:25

## 2022-01-01 RX ADMIN — MORPHINE SULFATE 300 MG: 30 TABLET, FILM COATED, EXTENDED RELEASE ORAL at 00:01

## 2022-01-01 RX ADMIN — ACETAMINOPHEN 975 MG: 325 TABLET, FILM COATED ORAL at 06:11

## 2022-01-01 RX ADMIN — METHOCARBAMOL 750 MG: 750 TABLET ORAL at 14:11

## 2022-01-01 RX ADMIN — PREGABALIN 300 MG: 300 CAPSULE ORAL at 20:41

## 2022-01-01 RX ADMIN — METHOCARBAMOL 750 MG: 750 TABLET ORAL at 19:51

## 2022-01-01 RX ADMIN — LORAZEPAM 1 MG: 2 LIQUID ORAL at 12:13

## 2022-01-01 RX ADMIN — LORAZEPAM 1 MG: 2 LIQUID ORAL at 06:05

## 2022-01-01 RX ADMIN — MORPHINE SULFATE 8 MG: 4 INJECTION INTRAVENOUS at 14:04

## 2022-01-01 RX ADMIN — MORPHINE SULFATE 330 MG: 100 TABLET, FILM COATED, EXTENDED RELEASE ORAL at 05:55

## 2022-01-01 RX ADMIN — MORPHINE SULFATE 330 MG: 100 TABLET, FILM COATED, EXTENDED RELEASE ORAL at 18:19

## 2022-01-01 ASSESSMENT — ACTIVITIES OF DAILY LIVING (ADL)
CHANGE_IN_FUNCTIONAL_STATUS_SINCE_ONSET_OF_CURRENT_ILLNESS/INJURY: NO
ADLS_ACUITY_SCORE: 14
ADLS_ACUITY_SCORE: 16
ADLS_ACUITY_SCORE: 12
ADLS_ACUITY_SCORE: 16
ADLS_ACUITY_SCORE: 14
DIFFICULTY_COMMUNICATING: NO
ADLS_ACUITY_SCORE: 14
ADLS_ACUITY_SCORE: 14
TRANSFERRING: 1-->ASSISTANCE (EQUIPMENT/PERSON) NEEDED
ADLS_ACUITY_SCORE: 16
ADLS_ACUITY_SCORE: 18
ADLS_ACUITY_SCORE: 16
ADLS_ACUITY_SCORE: 14
ADLS_ACUITY_SCORE: 18
ADLS_ACUITY_SCORE: 14
ADLS_ACUITY_SCORE: 22
ADLS_ACUITY_SCORE: 16
ADLS_ACUITY_SCORE: 14
ADLS_ACUITY_SCORE: 14
ADLS_ACUITY_SCORE: 22
ADLS_ACUITY_SCORE: 12
ADLS_ACUITY_SCORE: 14
ADLS_ACUITY_SCORE: 15
ADLS_ACUITY_SCORE: 16
ADLS_ACUITY_SCORE: 16
ADLS_ACUITY_SCORE: 18
ADLS_ACUITY_SCORE: 14
ADLS_ACUITY_SCORE: 17
ADLS_ACUITY_SCORE: 16
ADLS_ACUITY_SCORE: 14
ADLS_ACUITY_SCORE: 12
ADLS_ACUITY_SCORE: 14
ADLS_ACUITY_SCORE: 14
ADLS_ACUITY_SCORE: 17
ADLS_ACUITY_SCORE: 19
ADLS_ACUITY_SCORE: 17
ADLS_ACUITY_SCORE: 14
ADLS_ACUITY_SCORE: 14
ADLS_ACUITY_SCORE: 16
ADLS_ACUITY_SCORE: 17
ADLS_ACUITY_SCORE: 17
ADLS_ACUITY_SCORE: 22
ADLS_ACUITY_SCORE: 16
ADLS_ACUITY_SCORE: 17
ADLS_ACUITY_SCORE: 16
ADLS_ACUITY_SCORE: 19
ADLS_ACUITY_SCORE: 15
ADLS_ACUITY_SCORE: 17
ADLS_ACUITY_SCORE: 14
ADLS_ACUITY_SCORE: 17
ADLS_ACUITY_SCORE: 14
ADLS_ACUITY_SCORE: 22
ADLS_ACUITY_SCORE: 22
ADLS_ACUITY_SCORE: 17
ADLS_ACUITY_SCORE: 14
ADLS_ACUITY_SCORE: 16
ADLS_ACUITY_SCORE: 19
ADLS_ACUITY_SCORE: 17
ADLS_ACUITY_SCORE: 18
ADLS_ACUITY_SCORE: 12
ADLS_ACUITY_SCORE: 19
ADLS_ACUITY_SCORE: 15
ADLS_ACUITY_SCORE: 14
ADLS_ACUITY_SCORE: 14
ADLS_ACUITY_SCORE: 19
ADLS_ACUITY_SCORE: 14
ADLS_ACUITY_SCORE: 14
WALKING_OR_CLIMBING_STAIRS: AMBULATION DIFFICULTY, REQUIRES EQUIPMENT;AMBULATION DIFFICULTY, ASSISTANCE 1 PERSON
ADLS_ACUITY_SCORE: 17
ADLS_ACUITY_SCORE: 16
ADLS_ACUITY_SCORE: 15
ADLS_ACUITY_SCORE: 14
ADLS_ACUITY_SCORE: 16
ADLS_ACUITY_SCORE: 19
ADLS_ACUITY_SCORE: 19
ADLS_ACUITY_SCORE: 18
ADLS_ACUITY_SCORE: 14
ADLS_ACUITY_SCORE: 16
ADLS_ACUITY_SCORE: 17
ADLS_ACUITY_SCORE: 18
ADLS_ACUITY_SCORE: 14
ADLS_ACUITY_SCORE: 16
ADLS_ACUITY_SCORE: 14
ADLS_ACUITY_SCORE: 16
ADLS_ACUITY_SCORE: 12
ADLS_ACUITY_SCORE: 14
ADLS_ACUITY_SCORE: 15
ADLS_ACUITY_SCORE: 12
ADLS_ACUITY_SCORE: 14
ADLS_ACUITY_SCORE: 15
ADLS_ACUITY_SCORE: 14
ADLS_ACUITY_SCORE: 19
ADLS_ACUITY_SCORE: 16
DOING_ERRANDS_INDEPENDENTLY_DIFFICULTY: YES
ADLS_ACUITY_SCORE: 14
ADLS_ACUITY_SCORE: 18
ADLS_ACUITY_SCORE: 15
CONCENTRATING,_REMEMBERING_OR_MAKING_DECISIONS_DIFFICULTY: NO
ADLS_ACUITY_SCORE: 14
TOILETING_ISSUES: NO
ADLS_ACUITY_SCORE: 16
ADLS_ACUITY_SCORE: 19
ADLS_ACUITY_SCORE: 22
ADLS_ACUITY_SCORE: 14
ADLS_ACUITY_SCORE: 16
ADLS_ACUITY_SCORE: 15
ADLS_ACUITY_SCORE: 14
ADLS_ACUITY_SCORE: 14
ADLS_ACUITY_SCORE: 18
ADLS_ACUITY_SCORE: 12
ADLS_ACUITY_SCORE: 22
ADLS_ACUITY_SCORE: 18
ADLS_ACUITY_SCORE: 17
ADLS_ACUITY_SCORE: 17
ADLS_ACUITY_SCORE: 14
ADLS_ACUITY_SCORE: 22
ADLS_ACUITY_SCORE: 12
ADLS_ACUITY_SCORE: 16
ADLS_ACUITY_SCORE: 17
ADLS_ACUITY_SCORE: 12
ADLS_ACUITY_SCORE: 19
ADLS_ACUITY_SCORE: 14
ADLS_ACUITY_SCORE: 12
ADLS_ACUITY_SCORE: 19
ADLS_ACUITY_SCORE: 16
ADLS_ACUITY_SCORE: 15
ADLS_ACUITY_SCORE: 14
ADLS_ACUITY_SCORE: 22
ADLS_ACUITY_SCORE: 17
ADLS_ACUITY_SCORE: 12
ADLS_ACUITY_SCORE: 16
ADLS_ACUITY_SCORE: 18
ADLS_ACUITY_SCORE: 14
ADLS_ACUITY_SCORE: 15
ADLS_ACUITY_SCORE: 17
ADLS_ACUITY_SCORE: 14
ADLS_ACUITY_SCORE: 12
ADLS_ACUITY_SCORE: 18
ADLS_ACUITY_SCORE: 19
ADLS_ACUITY_SCORE: 16
ADLS_ACUITY_SCORE: 15
ADLS_ACUITY_SCORE: 17
ADLS_ACUITY_SCORE: 14
ADLS_ACUITY_SCORE: 22
ADLS_ACUITY_SCORE: 16
ADLS_ACUITY_SCORE: 18
ADLS_ACUITY_SCORE: 16
ADLS_ACUITY_SCORE: 22
ADLS_ACUITY_SCORE: 22
ADLS_ACUITY_SCORE: 16
TRANSFERRING: 1-->ASSISTANCE (EQUIPMENT/PERSON) NEEDED (NOT DEVELOPMENTALLY APPROPRIATE)
ADLS_ACUITY_SCORE: 17
ADLS_ACUITY_SCORE: 12
ADLS_ACUITY_SCORE: 18
ADLS_ACUITY_SCORE: 14
WALKING_OR_CLIMBING_STAIRS_DIFFICULTY: YES
ADLS_ACUITY_SCORE: 16
DRESSING/BATHING_DIFFICULTY: NO
FALL_HISTORY_WITHIN_LAST_SIX_MONTHS: YES
ADLS_ACUITY_SCORE: 12
WEAR_GLASSES_OR_BLIND: YES
ADLS_ACUITY_SCORE: 19
ADLS_ACUITY_SCORE: 16
ADLS_ACUITY_SCORE: 19
ADLS_ACUITY_SCORE: 17
ADLS_ACUITY_SCORE: 14
ADLS_ACUITY_SCORE: 17
ADLS_ACUITY_SCORE: 15
ADLS_ACUITY_SCORE: 16
ADLS_ACUITY_SCORE: 16
ADLS_ACUITY_SCORE: 19
ADLS_ACUITY_SCORE: 12
ADLS_ACUITY_SCORE: 18
ADLS_ACUITY_SCORE: 17
ADLS_ACUITY_SCORE: 14
ADLS_ACUITY_SCORE: 16
ADLS_ACUITY_SCORE: 16
NUMBER_OF_TIMES_PATIENT_HAS_FALLEN_WITHIN_LAST_SIX_MONTHS: 1
ADLS_ACUITY_SCORE: 14
ADLS_ACUITY_SCORE: 14
ADLS_ACUITY_SCORE: 17
ADLS_ACUITY_SCORE: 14
VISION_MANAGEMENT: GLASSES
ADLS_ACUITY_SCORE: 16
ADLS_ACUITY_SCORE: 19
ADLS_ACUITY_SCORE: 18
ADLS_ACUITY_SCORE: 17
ADLS_ACUITY_SCORE: 17
ADLS_ACUITY_SCORE: 14
ADLS_ACUITY_SCORE: 17
ADLS_ACUITY_SCORE: 16
ADLS_ACUITY_SCORE: 12
ADLS_ACUITY_SCORE: 22
DIFFICULTY_EATING/SWALLOWING: NO
ADLS_ACUITY_SCORE: 16
ADLS_ACUITY_SCORE: 12
ADLS_ACUITY_SCORE: 15
ADLS_ACUITY_SCORE: 16
ADLS_ACUITY_SCORE: 17

## 2022-01-01 ASSESSMENT — ENCOUNTER SYMPTOMS
BACK PAIN: 1
NUMBNESS: 1

## 2022-01-01 NOTE — LETTER
Carlee Marquez, [facility] ROGER   Inpatient Care Coordination   Supervisor  [facility]  [phone]

## 2022-01-02 LAB
POTASSIUM BLD-SCNC: 3.4 MMOL/L (ref 3.4–5.3)
SARS-COV-2 RNA RESP QL NAA+PROBE: NEGATIVE

## 2022-01-02 PROCEDURE — 84132 ASSAY OF SERUM POTASSIUM: CPT | Performed by: INTERNAL MEDICINE

## 2022-01-02 PROCEDURE — 99225 PR SUBSEQUENT OBSERVATION CARE,LEVEL II: CPT | Performed by: INTERNAL MEDICINE

## 2022-01-02 PROCEDURE — 250N000013 HC RX MED GY IP 250 OP 250 PS 637: Performed by: INTERNAL MEDICINE

## 2022-01-02 PROCEDURE — G0378 HOSPITAL OBSERVATION PER HR: HCPCS

## 2022-01-02 PROCEDURE — 250N000011 HC RX IP 250 OP 636: Performed by: PHYSICIAN ASSISTANT

## 2022-01-02 PROCEDURE — 87635 SARS-COV-2 COVID-19 AMP PRB: CPT | Performed by: EMERGENCY MEDICINE

## 2022-01-02 PROCEDURE — 120N000001 HC R&B MED SURG/OB

## 2022-01-02 PROCEDURE — 250N000011 HC RX IP 250 OP 636: Performed by: INTERNAL MEDICINE

## 2022-01-02 PROCEDURE — 99207 PR CDG-CODE CATEGORY CHANGED: CPT | Performed by: INTERNAL MEDICINE

## 2022-01-02 PROCEDURE — 36415 COLL VENOUS BLD VENIPUNCTURE: CPT | Performed by: INTERNAL MEDICINE

## 2022-01-02 PROCEDURE — 250N000013 HC RX MED GY IP 250 OP 250 PS 637: Performed by: EMERGENCY MEDICINE

## 2022-01-02 PROCEDURE — 250N000013 HC RX MED GY IP 250 OP 250 PS 637: Performed by: NURSE PRACTITIONER

## 2022-01-02 PROCEDURE — 250N000013 HC RX MED GY IP 250 OP 250 PS 637: Performed by: PHYSICIAN ASSISTANT

## 2022-01-02 RX ORDER — MORPHINE SULFATE 15 MG/1
90 TABLET ORAL
Status: ON HOLD | COMMUNITY
End: 2022-01-13

## 2022-01-02 RX ORDER — ONDANSETRON 4 MG/1
4 TABLET, ORALLY DISINTEGRATING ORAL EVERY 6 HOURS PRN
Status: DISCONTINUED | OUTPATIENT
Start: 2022-01-02 | End: 2022-01-13 | Stop reason: HOSPADM

## 2022-01-02 RX ORDER — NALOXONE HYDROCHLORIDE 0.4 MG/ML
0.4 INJECTION, SOLUTION INTRAMUSCULAR; INTRAVENOUS; SUBCUTANEOUS
Status: DISCONTINUED | OUTPATIENT
Start: 2022-01-02 | End: 2022-01-13 | Stop reason: HOSPADM

## 2022-01-02 RX ORDER — MORPHINE SULFATE 15 MG/1
30 TABLET, FILM COATED, EXTENDED RELEASE ORAL EVERY 6 HOURS
Status: DISCONTINUED | OUTPATIENT
Start: 2022-01-02 | End: 2022-01-02

## 2022-01-02 RX ORDER — MULTIPLE VITAMINS W/ MINERALS TAB 9MG-400MCG
1 TAB ORAL DAILY
Status: DISCONTINUED | OUTPATIENT
Start: 2022-01-02 | End: 2022-01-13 | Stop reason: HOSPADM

## 2022-01-02 RX ORDER — SODIUM CHLORIDE AND POTASSIUM CHLORIDE 150; 900 MG/100ML; MG/100ML
INJECTION, SOLUTION INTRAVENOUS CONTINUOUS
Status: DISCONTINUED | OUTPATIENT
Start: 2022-01-02 | End: 2022-01-02

## 2022-01-02 RX ORDER — ACETAMINOPHEN 500 MG
1000 TABLET ORAL 3 TIMES DAILY PRN
Status: DISCONTINUED | OUTPATIENT
Start: 2022-01-02 | End: 2022-01-03

## 2022-01-02 RX ORDER — NICOTINE 21 MG/24HR
1 PATCH, TRANSDERMAL 24 HOURS TRANSDERMAL DAILY
Status: DISCONTINUED | OUTPATIENT
Start: 2022-01-02 | End: 2022-01-13 | Stop reason: HOSPADM

## 2022-01-02 RX ORDER — METHOCARBAMOL 750 MG/1
750 TABLET, FILM COATED ORAL AT BEDTIME
Status: DISCONTINUED | OUTPATIENT
Start: 2022-01-02 | End: 2022-01-13 | Stop reason: HOSPADM

## 2022-01-02 RX ORDER — LORAZEPAM 2 MG/ML
4 CONCENTRATE ORAL EVERY 6 HOURS
Status: DISCONTINUED | OUTPATIENT
Start: 2022-01-02 | End: 2022-01-02

## 2022-01-02 RX ORDER — ONDANSETRON 4 MG/1
8 TABLET, ORALLY DISINTEGRATING ORAL EVERY 8 HOURS PRN
Status: DISCONTINUED | OUTPATIENT
Start: 2022-01-02 | End: 2022-01-02

## 2022-01-02 RX ORDER — POLYETHYLENE GLYCOL 3350 17 G/17G
17 POWDER, FOR SOLUTION ORAL DAILY
Status: DISCONTINUED | OUTPATIENT
Start: 2022-01-02 | End: 2022-01-13 | Stop reason: HOSPADM

## 2022-01-02 RX ORDER — HYOSCYAMINE SULFATE 0.125 MG
125 TABLET ORAL EVERY 4 HOURS PRN
Status: DISCONTINUED | OUTPATIENT
Start: 2022-01-02 | End: 2022-01-02

## 2022-01-02 RX ORDER — MORPHINE SULFATE 30 MG/1
180 TABLET, FILM COATED, EXTENDED RELEASE ORAL EVERY 6 HOURS
Status: DISCONTINUED | OUTPATIENT
Start: 2022-01-02 | End: 2022-01-13 | Stop reason: HOSPADM

## 2022-01-02 RX ORDER — SENNOSIDES 8.6 MG
1 TABLET ORAL 2 TIMES DAILY
Status: DISCONTINUED | OUTPATIENT
Start: 2022-01-02 | End: 2022-01-02

## 2022-01-02 RX ORDER — IBUPROFEN 600 MG/1
600 TABLET, FILM COATED ORAL 3 TIMES DAILY PRN
Status: DISCONTINUED | OUTPATIENT
Start: 2022-01-02 | End: 2022-01-08

## 2022-01-02 RX ORDER — BISACODYL 10 MG
10 SUPPOSITORY, RECTAL RECTAL DAILY PRN
Status: DISCONTINUED | OUTPATIENT
Start: 2022-01-02 | End: 2022-01-13 | Stop reason: HOSPADM

## 2022-01-02 RX ORDER — PROCHLORPERAZINE 25 MG
25 SUPPOSITORY, RECTAL RECTAL EVERY 12 HOURS PRN
Status: DISCONTINUED | OUTPATIENT
Start: 2022-01-02 | End: 2022-01-13 | Stop reason: HOSPADM

## 2022-01-02 RX ORDER — AMOXICILLIN 250 MG
1 CAPSULE ORAL 2 TIMES DAILY PRN
Status: DISCONTINUED | OUTPATIENT
Start: 2022-01-02 | End: 2022-01-13 | Stop reason: HOSPADM

## 2022-01-02 RX ORDER — NALOXONE HYDROCHLORIDE 0.4 MG/ML
0.2 INJECTION, SOLUTION INTRAMUSCULAR; INTRAVENOUS; SUBCUTANEOUS
Status: DISCONTINUED | OUTPATIENT
Start: 2022-01-02 | End: 2022-01-13 | Stop reason: HOSPADM

## 2022-01-02 RX ORDER — MORPHINE SULFATE 30 MG/1
30 TABLET, FILM COATED, EXTENDED RELEASE ORAL EVERY 6 HOURS
Status: DISCONTINUED | OUTPATIENT
Start: 2022-01-02 | End: 2022-01-05

## 2022-01-02 RX ORDER — LACTULOSE 10 G/15ML
20 SOLUTION ORAL DAILY PRN
Status: DISCONTINUED | OUTPATIENT
Start: 2022-01-02 | End: 2022-01-02

## 2022-01-02 RX ORDER — LORAZEPAM 2 MG/ML
4 CONCENTRATE ORAL EVERY 6 HOURS
Status: DISCONTINUED | OUTPATIENT
Start: 2022-01-02 | End: 2022-01-13 | Stop reason: HOSPADM

## 2022-01-02 RX ORDER — MORPHINE SULFATE 15 MG/1
90 TABLET ORAL
Status: DISCONTINUED | OUTPATIENT
Start: 2022-01-02 | End: 2022-01-05

## 2022-01-02 RX ORDER — GABAPENTIN 600 MG/1
1200 TABLET ORAL 3 TIMES DAILY
Status: DISCONTINUED | OUTPATIENT
Start: 2022-01-02 | End: 2022-01-03

## 2022-01-02 RX ORDER — PROCHLORPERAZINE MALEATE 10 MG
10 TABLET ORAL EVERY 4 HOURS PRN
Status: DISCONTINUED | OUTPATIENT
Start: 2022-01-02 | End: 2022-01-02

## 2022-01-02 RX ORDER — FLUORIDE TOOTHPASTE
5 TOOTHPASTE DENTAL 4 TIMES DAILY
Status: DISCONTINUED | OUTPATIENT
Start: 2022-01-02 | End: 2022-01-02

## 2022-01-02 RX ORDER — HALOPERIDOL 1 MG/1
2 TABLET ORAL EVERY 6 HOURS PRN
Status: DISCONTINUED | OUTPATIENT
Start: 2022-01-02 | End: 2022-01-13 | Stop reason: HOSPADM

## 2022-01-02 RX ORDER — PROCHLORPERAZINE MALEATE 10 MG
10 TABLET ORAL EVERY 6 HOURS PRN
Status: DISCONTINUED | OUTPATIENT
Start: 2022-01-02 | End: 2022-01-13 | Stop reason: HOSPADM

## 2022-01-02 RX ORDER — POTASSIUM CHLORIDE 1500 MG/1
40 TABLET, EXTENDED RELEASE ORAL ONCE
Status: COMPLETED | OUTPATIENT
Start: 2022-01-02 | End: 2022-01-02

## 2022-01-02 RX ORDER — BISACODYL 10 MG
10 SUPPOSITORY, RECTAL RECTAL DAILY PRN
Status: DISCONTINUED | OUTPATIENT
Start: 2022-01-02 | End: 2022-01-02

## 2022-01-02 RX ORDER — POTASSIUM CHLORIDE 7.45 MG/ML
10 INJECTION INTRAVENOUS
Status: DISCONTINUED | OUTPATIENT
Start: 2022-01-02 | End: 2022-01-02 | Stop reason: ALTCHOICE

## 2022-01-02 RX ORDER — ONDANSETRON 2 MG/ML
4 INJECTION INTRAMUSCULAR; INTRAVENOUS EVERY 6 HOURS PRN
Status: DISCONTINUED | OUTPATIENT
Start: 2022-01-02 | End: 2022-01-13 | Stop reason: HOSPADM

## 2022-01-02 RX ORDER — LORATADINE 10 MG/1
10 TABLET ORAL DAILY PRN
Status: DISCONTINUED | OUTPATIENT
Start: 2022-01-02 | End: 2022-01-13 | Stop reason: HOSPADM

## 2022-01-02 RX ADMIN — LORAZEPAM 4 MG: 2 LIQUID ORAL at 11:09

## 2022-01-02 RX ADMIN — MORPHINE SULFATE 90 MG: 15 TABLET ORAL at 16:40

## 2022-01-02 RX ADMIN — MORPHINE SULFATE 180 MG: 30 TABLET, FILM COATED, EXTENDED RELEASE ORAL at 04:35

## 2022-01-02 RX ADMIN — POLYETHYLENE GLYCOL 3350 17 G: 17 POWDER, FOR SOLUTION ORAL at 11:09

## 2022-01-02 RX ADMIN — MORPHINE SULFATE 90 MG: 15 TABLET ORAL at 22:06

## 2022-01-02 RX ADMIN — METHOCARBAMOL 750 MG: 750 TABLET ORAL at 22:04

## 2022-01-02 RX ADMIN — MORPHINE SULFATE 180 MG: 30 TABLET, FILM COATED, EXTENDED RELEASE ORAL at 11:05

## 2022-01-02 RX ADMIN — POTASSIUM CHLORIDE 40 MEQ: 20 TABLET, EXTENDED RELEASE ORAL at 14:23

## 2022-01-02 RX ADMIN — MORPHINE SULFATE 90 MG: 15 TABLET ORAL at 04:37

## 2022-01-02 RX ADMIN — GABAPENTIN 1200 MG: 600 TABLET, FILM COATED ORAL at 11:07

## 2022-01-02 RX ADMIN — GABAPENTIN 1200 MG: 600 TABLET, FILM COATED ORAL at 22:06

## 2022-01-02 RX ADMIN — GABAPENTIN 1200 MG: 600 TABLET, FILM COATED ORAL at 14:04

## 2022-01-02 RX ADMIN — MORPHINE SULFATE 90 MG: 15 TABLET ORAL at 14:03

## 2022-01-02 RX ADMIN — POTASSIUM CHLORIDE 40 MEQ: 1500 TABLET, EXTENDED RELEASE ORAL at 00:25

## 2022-01-02 RX ADMIN — LORAZEPAM 4 MG: 2 LIQUID ORAL at 16:40

## 2022-01-02 RX ADMIN — MORPHINE SULFATE 180 MG: 30 TABLET, FILM COATED, EXTENDED RELEASE ORAL at 16:39

## 2022-01-02 RX ADMIN — POTASSIUM CHLORIDE 10 MEQ: 7.46 INJECTION, SOLUTION INTRAVENOUS at 12:08

## 2022-01-02 RX ADMIN — MORPHINE SULFATE 30 MG: 30 TABLET, FILM COATED, EXTENDED RELEASE ORAL at 16:39

## 2022-01-02 RX ADMIN — LORAZEPAM 4 MG: 2 LIQUID ORAL at 22:07

## 2022-01-02 RX ADMIN — MORPHINE SULFATE 30 MG: 30 TABLET, FILM COATED, EXTENDED RELEASE ORAL at 11:31

## 2022-01-02 RX ADMIN — MULTIPLE VITAMINS W/ MINERALS TAB 1 TABLET: TAB at 11:07

## 2022-01-02 RX ADMIN — MORPHINE SULFATE 180 MG: 30 TABLET, FILM COATED, EXTENDED RELEASE ORAL at 22:04

## 2022-01-02 RX ADMIN — MORPHINE SULFATE 90 MG: 15 TABLET ORAL at 09:33

## 2022-01-02 RX ADMIN — NICOTINE 1 PATCH: 14 PATCH, EXTENDED RELEASE TRANSDERMAL at 11:34

## 2022-01-02 RX ADMIN — MORPHINE SULFATE 30 MG: 30 TABLET, FILM COATED, EXTENDED RELEASE ORAL at 22:07

## 2022-01-02 RX ADMIN — POTASSIUM CHLORIDE AND SODIUM CHLORIDE: 900; 150 INJECTION, SOLUTION INTRAVENOUS at 01:53

## 2022-01-02 ASSESSMENT — ACTIVITIES OF DAILY LIVING (ADL): ADLS_ACUITY_SCORE: 14

## 2022-01-02 ASSESSMENT — MIFFLIN-ST. JEOR: SCORE: 1582.97

## 2022-01-02 NOTE — PLAN OF CARE
General mood improved with pain better controlled once med rec. done later this am.  Still hurts all over but some better.  No appetite and very weak  Hands seem to give out at times and spills water when holding glass.  Declines even turning in bed as he states it hurts too much to move.  Encouraged to try to eat something for supper.  Stright cathed this am as was unable to void but has voided in urinal since.

## 2022-01-02 NOTE — ED TRIAGE NOTES
Pt on hospice for multiple myeloma, pt has to pathological fractures in his back and is have numbness down his right arm.  He is unable to control his pain at home.  Pt was sent by palliative care for further eval of pain and hand.

## 2022-01-02 NOTE — ED PROVIDER NOTES
History   Chief Complaint:  Back Pain       HPI   Beto Tran is a 46 year old male with history of advanced multiple myeloma who presents with back pain. The patient was recently seen in ED 12/21 for acute chronic pain secondary to multiple myeloma and was discharged with plan to enter hospice care. The patient states that he was unable to enter hospice care. He had appointment but unable to get into hospice. He states he has been having a difficult time with pain management at home and is having increasing pain and numbness to right arm. The patient has pathological fractures in his back. Patient notes he has been tapering his pain medications because he is running out and is now out of his medications. He also does not have a caregiver anymore.    Review of Systems   Musculoskeletal: Positive for back pain.   Neurological: Positive for numbness.   All other systems reviewed and are negative.    Allergies:  The patient has no known allergies.     Medications:  Dulcolax  Haldol  Ativan  Morphine  Zofran  Neurontin  Chronulac  Robaxin  Relistor  Prilosec  Compazine  Senna  Senokot    Past Medical History:     GERD  Multiple myeloma  Thoracic outlet syndrome    Past Surgical History:    Optical tracking system kyphoplasty  Right thumb surgery  Resect first rib  EGD     Social History:  The patient presents to the ED alone.     Physical Exam     Patient Vitals for the past 24 hrs:   BP Temp Temp src Pulse Resp SpO2   01/01/22 2057 (!) 144/84 98.6  F (37  C) Oral 101 16 98 %       Physical Exam  General: Sitting up in bed  Eyes:  The pupils are equal and round    Conjunctivae and sclerae are normal  ENT:    Wearing a mask  Neck:  Normal range of motion  CV:  Tachycardic rate, regular rhythm    Skin warm and well perfused   Resp:  Non labored breathing on room air    No tachypnea    No cough heard  GI:  Abdomen is soft, there is no rigidity    No distension    No rebound tenderness     No abdominal  tenderness  MS:  Pain on neck, upper back  Skin:  No rash or acute skin lesions noted  Neuro:   Awake, alert.      Speech is normal and fluent.    Face is symmetric.     Moves all extremities equally    Subjective numbness of right hand     Slight weakness on right hand though limited effort due to pain  Psych: Normal affect.  Appropriate interactions.    Emergency Department Course     Imaging:  Cervical spine CT w/o contrast   Final Result   IMPRESSION:   1. Diffuse lytic lesions throughout bony structures visualized on this study consistent with multiple myeloma.   2. Expansile destructive lytic lesion of left first rib and left T1 transverse process. Recommend clinical correlation for pain in this region.   3. No significant canal compromise or neural foraminal narrowing noted throughout cervical spine.        Report per radiology    Laboratory:  Labs Ordered and Resulted from Time of ED Arrival to Time of ED Departure   BASIC METABOLIC PANEL - Abnormal       Result Value    Sodium 138      Potassium 3.0 (*)     Chloride 107      Carbon Dioxide (CO2)        Anion Gap        Urea Nitrogen        Creatinine        Calcium        Glucose        GFR Estimate       CBC WITH PLATELETS AND DIFFERENTIAL - Abnormal    WBC Count 6.1      RBC Count 2.62 (*)     Hemoglobin 8.7 (*)     Hematocrit 25.2 (*)     MCV 96      MCH 33.2 (*)     MCHC 34.5      RDW 13.8      Platelet Count 224      % Neutrophils 62      % Lymphocytes 28      % Monocytes 8      % Eosinophils 1      % Basophils 1      % Immature Granulocytes 0      NRBCs per 100 WBC 0      Absolute Neutrophils 3.8      Absolute Lymphocytes 1.7      Absolute Monocytes 0.5      Absolute Eosinophils 0.0      Absolute Basophils 0.0      Absolute Immature Granulocytes 0.0      Absolute NRBCs 0.0     COVID-19 VIRUS (CORONAVIRUS) BY PCR        Procedures    Emergency Department Course:    Reviewed:  I reviewed nursing notes, vitals, past medical history and Care  Everywhere    Assessments:  2241 I obtained history and examined the patient as noted above.      I rechecked the patient and explained findings.     Consults:  2320 I spoke with Beverley Blair PA-C for Dr. Dobson of the Hospitalist service from [facility] regarding patient's presentation, findings, and plan of care.      Interventions:  Medications   gabapentin (NEURONTIN) tablet 1,200 mg (1,200 mg Oral Given 1/1/22 2129)   HYDROmorphone (DILAUDID) injection 1 mg (1 mg Intravenous Given 1/1/22 2303)     Disposition:  The patient was admitted to the hospital under the care of Dr. Dobson.     Impression & Plan     Medical Decision Making:  Beto Tran is a 46 year old male who presented to the ED with back pain.  Patient with chronic pain due to multiple myeloma.  Patient has worsening of his chronic pain because he is now out of his medications.  He was unable to enter hospice as outpatient. CT done with no pathologic lesion in cervical spine. Does have pathologic lesion in rib and thoracic.  He has end-stage disease and life expectancy less than 6 months.  Do not think further work-up for his symptoms are indicated at this time as there will be no intervention for these as hospice is the goal. Will admit for pain control, social work, hospice.    Diagnosis:    ICD-10-CM    1. Neck pain  M54.2    2. Chronic thoracic back pain, unspecified back pain laterality  M54.6     G89.29        Scribe Disclosure:  I, Sylvia Plummer, am serving as a scribe at 10:20 PM on 1/1/2022 to document services personally performed by Svetlana Anthony MD based on my observations and the provider's statements to me.             Svetlana Anthony MD  01/02/22 0019

## 2022-01-02 NOTE — ED NOTES
[facility]  ED Nurse Handoff Report    Beto Tran is a 46 year old male   ED Chief complaint: Back Pain  . ED Diagnosis:   Final diagnoses:   None     Allergies:   Allergies   Allergen Reactions     Nka [No Known Allergies]        Code Status: Full Code  Activity level - Baseline/Home:  Assist X 1. Activity Level - Current:   Assist X 1. Lift room needed: No. Bariatric: No   Needed: No   Isolation: No. Infection: Not Applicable.     Vital Signs:   Vitals:    01/01/22 2057   BP: (!) 144/84   Pulse: 101   Resp: 16   Temp: 98.6  F (37  C)   TempSrc: Oral   SpO2: 98%       Cardiac Rhythm:  ,      Pain level:    Patient confused: No. Patient Falls Risk: Yes.   Elimination Status: Has voided   Patient Report - Initial Complaint: Pt on hospice for multiple myeloma, pt has to pathological fractures in his back and is have numbness down his right arm.  He is unable to control his pain at home.  Pt was sent by palliative care for further eval of pain and hand.. Focused Assessment: resting   Tests Performed:   Cervical spine CT w/o contrast   Final Result   IMPRESSION:   1. Diffuse lytic lesions throughout bony structures visualized on this study consistent with multiple myeloma.   2. Expansile destructive lytic lesion of left first rib and left T1 transverse process. Recommend clinical correlation for pain in this region.   3. No significant canal compromise or neural foraminal narrowing noted throughout cervical spine.        . Abnormal Results: Labs Ordered and Resulted from Time of ED Arrival to Time of ED Departure - No data to display  .   Treatments provided: see mar  Family Comments: went home  OBS brochure/video discussed/provided to patient:  Yes  ED Medications:   Medications   gabapentin (NEURONTIN) tablet 1,200 mg (1,200 mg Oral Given 1/1/22 2129)   HYDROmorphone (DILAUDID) injection 1 mg (1 mg Intravenous Given 1/1/22 2303)     Drips infusing:  No  For the majority of the shift,  the patient's behavior Green. Interventions performed were se mar.    Sepsis treatment initiated: No     Patient tested for COVID 19 prior to admission: Yes    ED Nurse Name/Phone Number: Nikko Lechuga RN,   11:09 PM    RECEIVING UNIT ED HANDOFF REVIEW    Above ED Nurse Handoff Report was reviewed: Yes  Reviewed by: Carlee Marina RN on January 2, 2022 at 1:07 AM

## 2022-01-02 NOTE — PLAN OF CARE
PRIMARY DIAGNOSIS: ACUTE PAIN  OUTPATIENT/OBSERVATION GOALS TO BE MET BEFORE DISCHARGE:  1. Pain Status: Pt pain 10/10; meds not approved by pharmacy until around 0415. Pt sleeping after pain meds.     2. Return to near baseline physical activity: No    3. Cleared for discharge by consultants (if involved): No    Discharge Planner Nurse   Safe discharge environment identified: No  Barriers to discharge: Yes       Entered by: Carlee Marina 01/02/2022 5:43 AM     Please review provider order for any additional goals.   Nurse to notify provider when observation goals have been met and patient is ready for discharge.

## 2022-01-02 NOTE — PROGRESS NOTES
[facility]  Hospitalist Progress Note  Demetrio Andrade MD 01/02/2022    Reason for Stay (Diagnosis): hospice care         Assessment and Plan:      Summary of Stay: Beto Tran is a 46 year old  man with a significant past medical history of advanced multiple myeloma who has failed multiple treatments, chronic pain, thoracic outlet syndrome who was just discharged from [facility] (12/21-12/28) after being seen by Palliative care for pain med optimization and transition to a new hospice service.    He was briefly evaluated by [facility] as sounds like there was some misunderstanding as whether he was going to have an additional person helping him at home (father backed out) so he was not officially enrolled in [facility] and had not been able to receive the rest of his pain medications.  He ran of his medication and therefore presented to the emergency room for assistance.    Plans today:  - pt is hospice care  - SW to assist with hospice on discharge  - comfort pain medications  - Given focus on comfort, no plans for lab monitoring or further IVF  - discharge when outpatient hospice arranged       #Advanced multiple myeloma.    - on hospice care  - SW consult to assist with arrangement of hospice care on discharge    # chronic pain secondary to above  - continue pain meds including MS Contin     # Right hand paraesthesia    # History of thoracic outlet syndrome: s/p 1st rib resection and sclenectomy     #Known hx of anemia/hypercalcemia from MM     DVT Prophylaxis: none needed as patient on hospice care  Code Status: DNR / DNI  COVID PCR TESTING STATUS: Pending, Asymptomatic. No precautions.   [place] to OBS for now   Dispo:  When hospice is arranged        Interval History (Subjective):      Frustrated that hospice plans fell through and he required readmission to the hospital.  Had difficulty urinating this AM which he says is due to multiple staff members coming in and  "out of his room.  Urinating issues have resolved.  I spoke with pharmacy regarding scheduling of MS contin today.                    Physical Exam:      Last Vital Signs:  /66 (BP Location: Left arm)   Pulse 78   Temp 98.5  F (36.9  C) (Oral)   Resp 18   Ht 1.727 m (5' 8\")   Wt 73.3 kg (161 lb 11.2 oz)   SpO2 92%   BMI 24.59 kg/m        Intake/Output Summary (Last 24 hours) at 1/2/2022 1726  Last data filed at 1/2/2022 1300  Gross per 24 hour   Intake 175 ml   Output 950 ml   Net -775 ml       Constitutional: Awake, interactive, cooperative, appears comfortable   Respiratory: Clear to auscultation bilaterally, no crackles or wheezing   Cardiovascular: Regular rate and rhythm, normal S1 and S2, and no murmur noted   Abdomen: Normal bowel sounds, soft, non-distended, non-tender   Skin: No rashes, no cyanosis, dry to touch   Neuro: Alert and oriented x3, generalized weakness, numbness, memory loss   Extremities: No edema, normal range of motion   Other(s):        All other systems: Negative          Medications:      All current medications were reviewed with changes reflected in problem list.         Data:      All new lab and imaging data was reviewed.   Labs:  Recent Labs   Lab 01/02/22  0803 01/01/22  2304   NA  --  138   POTASSIUM 3.4 3.0*   CHLORIDE  --  107   CO2  --  26   ANIONGAP  --  5   GLC  --  98   BUN  --  11   CR  --  0.63*   GFRESTIMATED  --  >90   JOSE  --  14.0*     No results for input(s): NTBNPI, NTBNP in the last 168 hours.  Recent Labs   Lab 01/01/22  2304   WBC 6.1   HGB 8.7*   HCT 25.2*   MCV 96         Imaging:   Recent Results (from the past 24 hour(s))   Cervical spine CT w/o contrast    Narrative    EXAM: CT CERVICAL SPINE W/O CONTRAST  LOCATION: [facility]  DATE/TIME: 1/1/2022 10:05 PM    INDICATION: Acute neck pain with multiple myeloma.  COMPARISON: None.  TECHNIQUE: CT cervical spine without contrast. Dose reduction techniques were " performed.    FINDINGS: There is good anatomic alignment of the C1 and C2 vertebral bodies and also with the occipital condyles. The prevertebral soft tissues and the predental space are maintained. There is good anatomic alignment. The vertebral body heights are   maintained. There are diffuse lytic lesions seen throughout the bony structures visualized on this study consistent with the patient's known multiple myeloma. The vertebral body heights are well-maintained throughout with no evidence of a pathologic   compression fracture. There is though an expansile lytic lesion involving the left first rib with significant bony destruction also associated with expansion of the left T1 transverse process. Recommend clinical correlation for pain in this region.    There is no significant canal compromise or neural foraminal narrowing noted throughout the cervical spine. The lung apices are clear. The paraspinal soft tissues are unremarkable.      Impression    IMPRESSION:  1. Diffuse lytic lesions throughout bony structures visualized on this study consistent with multiple myeloma.  2. Expansile destructive lytic lesion of left first rib and left T1 transverse process. Recommend clinical correlation for pain in this region.  3. No significant canal compromise or neural foraminal narrowing noted throughout cervical spine.

## 2022-01-02 NOTE — PHARMACY-ADMISSION MEDICATION HISTORY
Admission medication history interview status for this patient is complete. See [facility] admission navigator for allergy information, prior to admission medications and immunization status.     Medication history interview done, indicate source(s): Patient  Medication history resources (including written lists, pill bottles, clinic record): Patient, [place]  Pharmacy: Patient is unsure which pharmacy, he states he gets his medications mailed to him    Changes made to PTA medication list:  Added:   Changed: methocarbamol 750mg three times daily--> at bedtime  Reported as Not Taking:   Removed: biotene, hoscyamine, lactulose, oneprazole, sennoside    Actions taken by pharmacist (provider contacted, etc):None     Additional medication history information: Per [facility] pharmacy where he last received his morphine prescriptions, patient recently had both prescriptions for MS contin 180 mg every 6hr AND MS contin 30 mg every 6hr filled. He states he takes what is prescribed on the bottle but does not remember the strength. He also gets immediate release morphine 90 mg every 3 hours as needed for severe pain.      Medication reconciliation/reorder completed by provider prior to medication history?  Y    Prior to Admission medications    Medication Sig Last Dose Taking? Auth Provider   acetaminophen (TYLENOL) 500 MG tablet Take 1,000 mg by mouth every 8 hours as needed for mild pain Past Month at Unknown time Yes Unknown, Entered By History   bisacodyl (DULCOLAX) 10 MG suppository Place 10 mg rectally daily as needed for constipation More than a month at Unknown time Yes Unknown, Entered By History   gabapentin (NEURONTIN) 600 MG tablet Take 1,200 mg by mouth 3 times daily Past Week at Unknown time Yes Unknown, Entered By History   haloperidol (HALDOL) 2 MG tablet Take 1 tablet (2 mg) by mouth 4 times daily  Yes Paulette Amador APRN CNP   loratadine (CLARITIN) 10 MG tablet Take 10 mg by mouth daily as needed for  allergies Past Month at Unknown time Yes Unknown, Entered By History   LORazepam (ATIVAN) 2 MG/ML (HIGH CONC) oral solution Take 2 mLs (4 mg) by mouth every 6 hours 12/31/2021 Yes Paulette Amador APRN CNP   methocarbamol (ROBAXIN) 750 MG tablet Take 750 mg by mouth At Bedtime  12/31/2021 Yes Unknown, Entered By History   morphine (MS CONTIN) 30 MG CR tablet Take 1 tablet (30 mg) by mouth every 6 hours 1/1/2022 at Unknown time Yes Paulette Amador APRN CNP   morphine (MS CONTIN) 60 MG 12 hr tablet Take 3 tablets (180 mg) by mouth every 6 hours 1/1/2022 at Unknown time Yes Paulette Amador APRN CNP   morphine (MSIR) 15 MG IR tablet Take 90 mg by mouth every 3 hours as needed for severe pain  Yes Unknown, Entered By History   multivitamin w/minerals (THERA-VIT-M) tablet Take 1 tablet by mouth daily Past Month at Unknown time Yes Unknown, Entered By History   ondansetron (ZOFRAN-ODT) 8 MG ODT tab Take 1 tablet (8 mg) by mouth every 8 hours as needed for nausea  Yes Paulette Amador APRN CNP   polyethylene glycol (MIRALAX) 17 g packet Take 1 packet by mouth daily Past Week at Unknown time Yes Unknown, Entered By History   prochlorperazine (COMPAZINE) 10 MG tablet Take 10 mg by mouth every 4 hours as needed for nausea or vomiting More than a month at Unknown time Yes Unknown, Entered By History   SENNA-docusate sodium (SENNA S) 8.6-50 MG tablet Take 1 tablet by mouth 2 times daily as needed More than a month at Unknown time Yes Unknown, Entered By History   ibuprofen (ADVIL/MOTRIN) 600 MG tablet Take 600 mg by mouth 3 times daily as needed for mild pain or inflammatory pain    Unknown, Entered By History   methylnaltrexone (RELISTOR) 12 MG/0.6ML SOLN injection Inject 12 mg Subcutaneous Every other day as needed   Unknown, Entered By History

## 2022-01-02 NOTE — PLAN OF CARE
Aox4. Pt reporting pain 9-10/10. Meds not approved by pharmacy until around 04:15. Scheduled Morphine and PRN Morphine given per MAR. Activity: pt reports using a walker, but did not ambulate this shift. Diet: Regular. Plan: manage pain, SW consult and hospice per MD note. Continue w/ POC.     PRIMARY DIAGNOSIS: ACUTE PAIN  OUTPATIENT/OBSERVATION GOALS TO BE MET BEFORE DISCHARGE:  1. Pain Status: Improved-controlled with oral pain medications.    2. Return to near baseline physical activity: No    3. Cleared for discharge by consultants (if involved): No    Discharge Planner Nurse   Safe discharge environment identified: No  Barriers to discharge: Yes       Entered by: Carlee Marina 01/02/2022 7:55 AM     Please review provider order for any additional goals.   Nurse to notify provider when observation goals have been met and patient is ready for discharge.

## 2022-01-02 NOTE — PROGRESS NOTES
"Pt c/o of lower abdominal pain 8/10, stating \"I need to pee but people keep coming in and I cannot\". This writer received order for straight cath x1.  Straight cath completed and done per protocol. Pt tolerated and urine output of 450cc (clear and oderless).   "

## 2022-01-03 PROCEDURE — 250N000011 HC RX IP 250 OP 636: Performed by: INTERNAL MEDICINE

## 2022-01-03 PROCEDURE — 120N000001 HC R&B MED SURG/OB

## 2022-01-03 PROCEDURE — 250N000013 HC RX MED GY IP 250 OP 250 PS 637: Performed by: INTERNAL MEDICINE

## 2022-01-03 PROCEDURE — 99225 PR SUBSEQUENT OBSERVATION CARE,LEVEL II: CPT | Performed by: INTERNAL MEDICINE

## 2022-01-03 PROCEDURE — 250N000013 HC RX MED GY IP 250 OP 250 PS 637: Performed by: NURSE PRACTITIONER

## 2022-01-03 PROCEDURE — 99207 PR CDG-CODE CATEGORY CHANGED: CPT | Performed by: NURSE PRACTITIONER

## 2022-01-03 PROCEDURE — G0378 HOSPITAL OBSERVATION PER HR: HCPCS

## 2022-01-03 PROCEDURE — 99226 ZZC SUBSEQUENT OBSERVATION CARE,LEVEL III: CPT | Performed by: NURSE PRACTITIONER

## 2022-01-03 PROCEDURE — 250N000013 HC RX MED GY IP 250 OP 250 PS 637: Performed by: PHYSICIAN ASSISTANT

## 2022-01-03 PROCEDURE — 258N000003 HC RX IP 258 OP 636: Performed by: INTERNAL MEDICINE

## 2022-01-03 PROCEDURE — 99207 PR CDG-CODE CATEGORY CHANGED: CPT | Performed by: INTERNAL MEDICINE

## 2022-01-03 RX ORDER — HYDROXYZINE HYDROCHLORIDE 25 MG/1
25 TABLET, FILM COATED ORAL EVERY 6 HOURS
Status: DISCONTINUED | OUTPATIENT
Start: 2022-01-03 | End: 2022-01-13 | Stop reason: HOSPADM

## 2022-01-03 RX ORDER — ACETAMINOPHEN 500 MG
1000 TABLET ORAL EVERY 8 HOURS
Status: DISCONTINUED | OUTPATIENT
Start: 2022-01-03 | End: 2022-01-13 | Stop reason: HOSPADM

## 2022-01-03 RX ORDER — ACETAMINOPHEN 325 MG/1
650 TABLET ORAL EVERY 4 HOURS PRN
Status: DISCONTINUED | OUTPATIENT
Start: 2022-01-03 | End: 2022-01-13 | Stop reason: HOSPADM

## 2022-01-03 RX ORDER — ZOLEDRONIC ACID 0.04 MG/ML
4 INJECTION, SOLUTION INTRAVENOUS ONCE
Status: DISCONTINUED | OUTPATIENT
Start: 2022-01-03 | End: 2022-01-03 | Stop reason: CLARIF

## 2022-01-03 RX ORDER — PREGABALIN 75 MG/1
150 CAPSULE ORAL 3 TIMES DAILY
Status: DISCONTINUED | OUTPATIENT
Start: 2022-01-03 | End: 2022-01-07

## 2022-01-03 RX ORDER — SODIUM CHLORIDE 9 MG/ML
INJECTION, SOLUTION INTRAVENOUS CONTINUOUS
Status: DISCONTINUED | OUTPATIENT
Start: 2022-01-03 | End: 2022-01-12

## 2022-01-03 RX ADMIN — GABAPENTIN 1200 MG: 600 TABLET, FILM COATED ORAL at 09:42

## 2022-01-03 RX ADMIN — LORAZEPAM 4 MG: 2 LIQUID ORAL at 10:28

## 2022-01-03 RX ADMIN — MORPHINE SULFATE 90 MG: 15 TABLET ORAL at 21:13

## 2022-01-03 RX ADMIN — MORPHINE SULFATE 180 MG: 30 TABLET, FILM COATED, EXTENDED RELEASE ORAL at 04:45

## 2022-01-03 RX ADMIN — ZOLEDRONIC ACID 4 MG: 4 INJECTION, SOLUTION, CONCENTRATE INTRAVENOUS at 21:47

## 2022-01-03 RX ADMIN — PROCHLORPERAZINE MALEATE 10 MG: 10 TABLET, FILM COATED ORAL at 17:23

## 2022-01-03 RX ADMIN — MORPHINE SULFATE 180 MG: 30 TABLET, FILM COATED, EXTENDED RELEASE ORAL at 17:05

## 2022-01-03 RX ADMIN — MULTIPLE VITAMINS W/ MINERALS TAB 1 TABLET: TAB at 09:42

## 2022-01-03 RX ADMIN — MORPHINE SULFATE 90 MG: 15 TABLET ORAL at 17:20

## 2022-01-03 RX ADMIN — LORAZEPAM 4 MG: 2 LIQUID ORAL at 17:11

## 2022-01-03 RX ADMIN — ACETAMINOPHEN 1000 MG: 500 TABLET, FILM COATED ORAL at 21:16

## 2022-01-03 RX ADMIN — HYDROXYZINE HYDROCHLORIDE 25 MG: 25 TABLET ORAL at 11:22

## 2022-01-03 RX ADMIN — MORPHINE SULFATE 90 MG: 15 TABLET ORAL at 01:13

## 2022-01-03 RX ADMIN — MORPHINE SULFATE 180 MG: 30 TABLET, FILM COATED, EXTENDED RELEASE ORAL at 23:18

## 2022-01-03 RX ADMIN — LORAZEPAM 4 MG: 2 LIQUID ORAL at 04:44

## 2022-01-03 RX ADMIN — HYDROXYZINE HYDROCHLORIDE 25 MG: 25 TABLET ORAL at 17:12

## 2022-01-03 RX ADMIN — MORPHINE SULFATE 30 MG: 30 TABLET, FILM COATED, EXTENDED RELEASE ORAL at 04:46

## 2022-01-03 RX ADMIN — HALOPERIDOL 2 MG: 1 TABLET ORAL at 00:08

## 2022-01-03 RX ADMIN — MORPHINE SULFATE 30 MG: 30 TABLET, FILM COATED, EXTENDED RELEASE ORAL at 17:12

## 2022-01-03 RX ADMIN — PREGABALIN 150 MG: 75 CAPSULE ORAL at 21:12

## 2022-01-03 RX ADMIN — POLYETHYLENE GLYCOL 3350 17 G: 17 POWDER, FOR SOLUTION ORAL at 09:48

## 2022-01-03 RX ADMIN — MORPHINE SULFATE 180 MG: 30 TABLET, FILM COATED, EXTENDED RELEASE ORAL at 10:25

## 2022-01-03 RX ADMIN — IBUPROFEN 600 MG: 600 TABLET, FILM COATED ORAL at 00:08

## 2022-01-03 RX ADMIN — NICOTINE 1 PATCH: 14 PATCH, EXTENDED RELEASE TRANSDERMAL at 09:45

## 2022-01-03 RX ADMIN — PREGABALIN 150 MG: 75 CAPSULE ORAL at 17:11

## 2022-01-03 RX ADMIN — HYDROXYZINE HYDROCHLORIDE 25 MG: 25 TABLET ORAL at 23:19

## 2022-01-03 RX ADMIN — MORPHINE SULFATE 30 MG: 30 TABLET, FILM COATED, EXTENDED RELEASE ORAL at 10:25

## 2022-01-03 RX ADMIN — MORPHINE SULFATE 90 MG: 15 TABLET ORAL at 13:51

## 2022-01-03 RX ADMIN — METHOCARBAMOL 750 MG: 750 TABLET ORAL at 21:16

## 2022-01-03 RX ADMIN — ACETAMINOPHEN 1000 MG: 500 TABLET, FILM COATED ORAL at 11:23

## 2022-01-03 RX ADMIN — MORPHINE SULFATE 30 MG: 30 TABLET, FILM COATED, EXTENDED RELEASE ORAL at 23:18

## 2022-01-03 RX ADMIN — SODIUM CHLORIDE: 9 INJECTION, SOLUTION INTRAVENOUS at 21:44

## 2022-01-03 RX ADMIN — ACETAMINOPHEN 1000 MG: 500 TABLET, FILM COATED ORAL at 00:08

## 2022-01-03 RX ADMIN — LORAZEPAM 4 MG: 2 LIQUID ORAL at 23:19

## 2022-01-03 NOTE — PLAN OF CARE
Seen by pain management.  See her notes.  Girl friend in and feels pain not managed well enough even though appears to be resting quietly.  Pain management did come talk to her about this  Did give one prn ms04 this afternoon.  Three partially incontinent loose stools on day shift.  Up with one to commode, very weak.  Does not like assistance or gait belt, even with caution nurse has. . Voiding well.  Appetite poor

## 2022-01-03 NOTE — PLAN OF CARE
Pt intermittently lethargic and alert. Pt reporting pain 9-10/10. PRN tylenol, ibuprofen, haldol, and IR morphine given per MAR. Pt refused 4 AM vitals. Pt stayed in bed all of shift sleeping intermittently.     PRIMARY DIAGNOSIS: ACUTE PAIN  OUTPATIENT/OBSERVATION GOALS TO BE MET BEFORE DISCHARGE:  1. Pain Status: No improvement noted. Consider adjustment in pain regimen.    2. Return to near baseline physical activity: No    3. Cleared for discharge by consultants (if involved): No    Discharge Planner Nurse   Safe discharge environment identified: No  Barriers to discharge: Yes       Entered by: Carlee Marina 01/03/2022 7:54 AM     Please review provider order for any additional goals.   Nurse to notify provider when observation goals have been met and patient is ready for discharge.

## 2022-01-03 NOTE — CONSULTS
Care Management Initial Consult    General Information  Assessment completed with: Patient,Spouse or significant other, Beto & Bharathi  Type of CM/SW Visit: Initial Assessment    Primary Care Provider verified and updated as needed:     Readmission within the last 30 days:        Reason for Consult: discharge planning  Advance Care Planning:       General Information Comments: Patient d=could not stay a wake during assessment    Communication Assessment  Patient's communication style: spoken language (English or Bilingual)    Hearing Difficulty or Deaf: no   Wear Glasses or Blind: yes    Cognitive  Cognitive/Neuro/Behavioral: .WDL except,arousability  Level of Consciousness: lethargic,somnolent  Arousal Level: arouses to repeated stimulation  Orientation: oriented x 4  Mood/Behavior: flat affect,labile  Best Language: 0 - No aphasia  Speech: clear    Living Environment:   People in home: significant other  Bharathi  Current living Arrangements: apartment      Able to return to prior arrangements: no  Living Arrangement Comments:  (Currently lives in his mother home which is one level)    Family/Social Support:  Care provided by: self,spouse/significant other  Provides care for: no one, unable/limited ability to care for self  Marital Status: Single  Significant Other       Bharathi  Description of Support System: Supportive,Involved    Support Assessment: Lacks necessary supervision and assistance,Lacks adequate physical care,Caregiver experiencing high stress,Limited social contact and support    Current Resources:   Patient receiving home care services: No     Community Resources: PCA,Other (see comment) (CADI worker)  Equipment currently used at home: shower chair,commode chair  Supplies currently used at home:      Employment/Financial:  Employment Status: disabled        Financial Concerns: No concerns identified      Lifestyle & Psychosocial Needs:  Social Determinants of Health     Tobacco Use: High Risk      Smoking Tobacco Use: Current Some Day Smoker     Smokeless Tobacco Use: Never Used   Alcohol Use: Not on file   Financial Resource Strain: Not on file   Food Insecurity: Not on file   Transportation Needs: Not on file   Physical Activity: Not on file   Stress: Not on file   Social Connections: Not on file   Intimate Partner Violence: Not on file   Depression: Not on file   Housing Stability: Not on file       Functional Status:  Prior to admission patient needed assistance: SO, Bharathi reports that patient needs help with all ADLs, only takes a few steps with a walker, usually stays in bed and pivots transfers to the commode.     Mental Health Status: SO reports no history          Chemical Dependency Status:  SO reports no history        Additional Information:  Met with patient and his significant other, Bharathi. Patient had a very difficult time staying awake during our conversation. SO other reports that recently patient has been going down hill. He is able to take a few steps with a walker and pivots transfers to the commode. She reports that he takes pain medication every 2 hours. She reports that she is just unable to care for patient by herself any longer. It is physically and mentally taking a toll on her. She can get any rest as she has to get up every 2 hours to give pain medications.    Discussed options for disposition.  1) Currently patient is receiving 5 hours of PCA services from the [facility]. If these ours could get increased to 5-8 a day she might be able to care for patient. These hours would give her time to sleep.  2) LTC in a nursing home, patient isn't in favor of this but we talked about it being an option.  3) Foster care/group home but he would have to get funding for this from the [facility]. Not sure how long this would take.  4) [facility] in [place].    SW will reach out to the [facility] to see if it's an option to increase home PCA services and foster care. Application will be made to  [facility] and referrals will be sent to LTC facilities.    MIKEY Russo   Inpatient Care Coordination   Supervisor  [facility]  [phone]    JANE Martini         [phone]

## 2022-01-03 NOTE — CONSULTS
[facility]  Palliative Care Consultation   Text Page  Discussed case with Dr. Bruce Lee.    Recommendations  Strongly consider cross over to suboxone   Establish with Primary care   Consult hematology for re evaluation of multiple myeloma  Discussed with Dr. Wendy Melgar  Behavioral health if patient agreeable to suboxone micro induction at present daily dose of Morphine (MME).    4 pm Discussed with Significant other Farhad she is concerned patient will be angry and resistant to transition to suboxone as well as current transition to Lyrica.   She is unable to care for patient at his home and emphasizes new plan such as TCU.  Informed no plans to transition to Suboxone until discussed with patient further. Patient agreed to Lyrica this am.      Assessment & Plan   Beto Tran is a 46 year old male who was admitted on 1/1/2022.   Consulted by Beverley Blair. Erinn  to assist with symptom management, goals of care and development of plan of care       Recommendations:  1. Goals of Care- No CPR- Do NOT Intubate  Hospitalization goals discussed  No change in goals of care, continues on comfort plan.   Decisional Capacity- Intact. Patient does not have an advance directive. Per  informed consent policy next of kin should be involved if patient becomes unable. Next of Kin is Matty Tran, father   POLST  None on file     2. Pain wide spread pain in the setting of end stage multiple myeloma. Pain more pronounce low back, hands( right >left) and right shoulder   -likely opioid withdrawal -mild to moderate    Patient's opioid use in past 24 hours: 1290 mg Morphine  = Daily Morphine Equivalent  [place] Board of Pharmacy Data Base Reviewed:    YES; As expected, no concern for misuse/abuse of controlled medications based on this report.  ORT   ( add dot phrase .FRHORT if warranted)  Chronic opioid use Morphine Equivalent= 1980 mg    3. Dyspnea  None     4. Spiritual Care  Oriented to  "Spiritual Health as part of Palliative Care team.  Spiritual Background: undesignated     5. Care Planning  Appreciate Care of Pamela LARA and Meena Sykes RN in d/c planning .  Medications for discharge to be determined    Medical Decision Making and Goals of Care:  Discussed on January 3, 2022 with Kasia JONES, CNP:  Familiar with patient from previous recent admission.  Feeling miserable today with widespread pain, achyness and \"flu like pain\".  He is not sure of next steps. Feels hopeless with options, let down that dad did not come to stay. He is coming to terms with alternate plans for discharge.  Discussed group home with people of potential of similar age.     Patient aware I discussed with social work.    Discusssed pain management changes done today and plan to streamline medication for ease of administration.  He is not open to use of cross over to Suboxone as then he has another layer of being looked at as a drug abuser.    Thank you for involving us in the patient's care.     Kasia Luis RN, PGMT-BC, APRN, CNP,ACHPN  Pain Management and Palliative Care  [facility]  Pgr: [phone]    Time Spent on this Encounter   Total unit/floor time 90  minutes, time consisted of the following, examination of the patient, reviewing the record and completing documentation. >50% of time spent in counseling and coordination of care, Bedside Nurse Mary Jo Severence, RN , Hospitalist Demetrio Andrade MD  and   JANE Vila, Meena Sykes RN , Bruce Lee MD, Wendy Melgar MD (Behavioral health ).  Time spend counseling with patient consisted of the following topics, symptom management.    Understanding of disease process:   This has not been discussed with patient  .    History of Present Illness   History is obtained from the patient and electronic health record    Beto Tran is a 46 year old male with a past " medical history of end stage multiple myeloma on hospice, Osteoporosis, GERD, high dose opioid use with dependence, who presents to the ED as he ran out of his opioids at home.  He was recently discharged from here with anticipation he would sign onto [facility]. His dad as caregiver did not arrive so the sign on to hospice did not take place.    The patient has been on hospice since 2014.  He has had several hospice vendors, but most recently the care setting of his home is no longer safe. He is unable to secure caregivers in the home, but resistant to alternate place that are congregate/senior care care.   The writer is familiar to this patient.Please review notes from prior admission on 12/21/20- 12/28/21. He was discharged from Hospice and instructed to go to hospital as girl friend (Farhad) and care giver had walked out.  He  has been hospitalized twice times in the past month for recurrent gaps in care and opioid withdrawal . There is no reported or documented weight loss.   He denies headache, fever, chest pain, SOB,  Changes in appetite, bowel or bladder habits         Past Medical History   I have reviewed this patient's medical history and updated it with pertinent information if needed.   Past Medical History:   Diagnosis Date     Anesthesia complication     states woke up during procedure (endoscopic ultrasound) in June of 2014     Broken rib      Cancer (H)      Gastro-oesophageal reflux disease      Multiple myeloma (H)      Osteoporosis        Past Surgical History   I have reviewed this patient's surgical history and updated it with pertinent information if needed.  Past Surgical History:   Procedure Laterality Date     OPTICAL TRACKING SYSTEM KYPHOPLASTY N/A 12/16/2014    Procedure: OPTICAL TRACKING SYSTEM KYPHOPLASTY;  Surgeon: Abner Vasquez MD;  Location: [facility] OR     ORTHOPEDIC SURGERY      right thumb surgery     RESECT FIRST RIB  8/14/2012    Procedure: RESECT FIRST RIB;  Left First Rib  Resection & Scalentectomy;  Surgeon: Keith Tucker MD;  Location: [facility] OR       Prior to Admission Medications   Prior to Admission Medications   Prescriptions Last Dose Informant Patient Reported? Taking?   LORazepam (ATIVAN) 2 MG/ML (HIGH CONC) oral solution 12/31/2021  No Yes   Sig: Take 2 mLs (4 mg) by mouth every 6 hours   SENNA-docusate sodium (SENNA S) 8.6-50 MG tablet More than a month at Unknown time  Yes Yes   Sig: Take 1 tablet by mouth 2 times daily as needed   acetaminophen (TYLENOL) 500 MG tablet Past Month at Unknown time  Yes Yes   Sig: Take 1,000 mg by mouth every 8 hours as needed for mild pain   bisacodyl (DULCOLAX) 10 MG suppository More than a month at Unknown time  Yes Yes   Sig: Place 10 mg rectally daily as needed for constipation   gabapentin (NEURONTIN) 600 MG tablet Past Week at Unknown time  Yes Yes   Sig: Take 1,200 mg by mouth 3 times daily   haloperidol (HALDOL) 2 MG tablet   No Yes   Sig: Take 1 tablet (2 mg) by mouth 4 times daily   ibuprofen (ADVIL/MOTRIN) 600 MG tablet   Yes No   Sig: Take 600 mg by mouth 3 times daily as needed for mild pain or inflammatory pain    loratadine (CLARITIN) 10 MG tablet Past Month at Unknown time  Yes Yes   Sig: Take 10 mg by mouth daily as needed for allergies   methocarbamol (ROBAXIN) 750 MG tablet 12/31/2021  Yes Yes   Sig: Take 750 mg by mouth At Bedtime    methylnaltrexone (RELISTOR) 12 MG/0.6ML SOLN injection   Yes No   Sig: Inject 12 mg Subcutaneous Every other day as needed   morphine (MS CONTIN) 30 MG CR tablet 1/1/2022 at Unknown time  No Yes   Sig: Take 1 tablet (30 mg) by mouth every 6 hours   morphine (MS CONTIN) 60 MG 12 hr tablet 1/1/2022 at Unknown time  No Yes   Sig: Take 3 tablets (180 mg) by mouth every 6 hours   morphine (MSIR) 15 MG IR tablet   Yes Yes   Sig: Take 90 mg by mouth every 3 hours as needed for severe pain   multivitamin w/minerals (THERA-VIT-M) tablet Past Month at Unknown time  Yes Yes   Sig: Take 1 tablet by  mouth daily   ondansetron (ZOFRAN-ODT) 8 MG ODT tab   No Yes   Sig: Take 1 tablet (8 mg) by mouth every 8 hours as needed for nausea   polyethylene glycol (MIRALAX) 17 g packet Past Week at Unknown time  Yes Yes   Sig: Take 1 packet by mouth daily   prochlorperazine (COMPAZINE) 10 MG tablet More than a month at Unknown time  Yes Yes   Sig: Take 10 mg by mouth every 4 hours as needed for nausea or vomiting      Facility-Administered Medications: None     Allergies   Allergies   Allergen Reactions     Nka [No Known Allergies]        Social History   I have updated and reviewed the following Social History Narrative:   Social History     Social History Narrative     Not on file               Living situation:  By self in own home        Support system:  Uncertain        Actual/Potential Caregiver:  Uncertain        Functional status:  No changes       Financial concerns:  Yes on CADI        Substance use disorder (past / present):  Opioid dependence        History of substance use/abuse:  None        Occupation:  On hospice   Family History   I have reviewed this patient's family history and updated it with pertinent information if needed.   Family History   Problem Relation Age of Onset     Unknown/Adopted No family hx of      Family History Negative No family hx of      Asthma No family hx of      C.A.D. No family hx of      Diabetes No family hx of      Hypertension No family hx of      Cerebrovascular Disease No family hx of      Breast Cancer No family hx of      Cancer - colorectal No family hx of      Prostate Cancer No family hx of      Alcohol/Drug No family hx of      Allergies No family hx of      Alzheimer Disease No family hx of      Anesthesia Reaction No family hx of      Arthritis No family hx of      Blood Disease No family hx of      Cancer No family hx of      Cardiovascular No family hx of      Circulatory No family hx of      Congenital Anomalies No family hx of      Connective Tissue Disorder No  family hx of      Depression No family hx of      Endocrine Disease No family hx of      Eye Disorder No family hx of      Genetic Disorder No family hx of      Gastrointestinal Disease No family hx of      Genitourinary Problems No family hx of      Gynecology No family hx of      Heart Disease No family hx of      Lipids No family hx of      Musculoskeletal Disorder No family hx of      Neurologic Disorder No family hx of      Obesity No family hx of      Osteoporosis No family hx of      Psychotic Disorder No family hx of      Respiratory No family hx of      Thyroid Disease No family hx of      Hearing Loss No family hx of        Review of Systems   The 10 point Review of Systems is negative other than noted in the HPI or here.     Palliative Symptom Review (0=no symptom/no concern, 1=mild, 2=moderate, 3=severe):      Pain: 3-severe      Fatigue: 2-moderate      Nausea: 0-none      Constipation: 0-none      Diarrhea: 0-none      Depressive Symptoms: 1-mild      Anxiety: 1-mild      Drowsiness: 0-none      Poor Appetite: 0-none      Shortness of Breath: 0-none      Insomnia: 2-moderate      Other: COWS score  1-mild score 3/26      Overall (0 good/no concerns, 3 very poor):  2    Physical Exam   Temp:  [98.1  F (36.7  C)-98.5  F (36.9  C)] 98.4  F (36.9  C)  Pulse:  [80-90] 80  Resp:  [16-24] 18  BP: (130-145)/(60-73) 130/65  SpO2:  [93 %-95 %] 95 %  161 lbs 11.2 oz  Exam:  GEN:  Alert, oriented x 3, appears comfortable, NAD.  HEENT:  Normocephalic/atraumatic, no scleral icterus, no nasal discharge, mouth moist.  CV:  RRR, S1, S2; no murmurs or other irregularities noted.  +3 DP/PT pulses bilatererally; no edema BLE.  RESP:  Clear to auscultation bilaterally without rales/rhonchi/wheezing/retractions.  Symmetric chest rise on inhalation noted.  Normal respiratory effort.  ABD:  Rounded, soft, non-tender/non-distended.  +BS  EXT:  Edema & pulses as noted above.  CMS intact x 4.     M/S:   Tender to palpation  throughout.    SKIN:  Dry to touch, no exanthems noted in the visualized areas.    NEURO: Symmetric strength +5/5.  Sensation to touch intact all extremities.   There is no area of allodynia or hyperesthesia.  PAIN BEHAVIOR: Cooperative  Psych:  Anxious and depressed affect.  Calm, cooperative, conversant appropriately.    Delirium Screen/CAM:  Delirium = (#1 and #2 = YES) + (#3 and/or #4)   1) Acute onset and fluctuating course:   No   (acute change in mental status from baseline over last 24 hours)  2) Inattention:   YES   (difficulty focusing, distractible, can't follow conversation)  3) Disorganized thinking:   YES   (score only if #1 and #2 are YES)  (rambling/irrelevant conversation, unclear/illogical thoughts, inconsistency)  4) Altered level of consciousness:   No   (score only if #1 and #2 are YES)  (other than alert, calm, cooperative)    Delirium/CAM score: 2/4  Interpretation:  1)  Delirium:  Absent  2)  Type:    3)  Severity:      Data   No results found for this or any previous visit (from the past 24 hour(s)).

## 2022-01-03 NOTE — PLAN OF CARE
"Pt is a/o, up with A1. Pain is a 10 gave morphine x2 IR, also on a significant pain regiment - see mar. Appetite poor. Refusing IV- stated \" if you dont pull it I am going to rip it out\" Writer removed IV. Saline locked. Drinking fluids. Pain and palliative following. Discharge pending.   "

## 2022-01-03 NOTE — UTILIZATION REVIEW
"Admission Status; Secondary Review Determination     Admission Date: 1/1/2022  8:49 PM       Under the authority of the Utilization Management Committee, the utilization review process indicated a secondary review on the above patient.  The review outcome is based on review of the medical records, discussions with staff, and applying clinical experience noted on the date of the review.          (x) Observation Status Appropriate - This patient does not meet hospital inpatient criteria and is placed in observation status. If this patient's primary payer is Medicare and was admitted as an inpatient, Condition Code 44 should be used and patient status changed to \"observation\".       RATIONALE FOR DETERMINATION      Brief clinical presentation, information copied from the chart, abbreviated and edited for relevant content:     Patient admitted primarily for coordination of care and medications needs. No inpatient criteria met.       Beto Tran is a 46 year old man with a significant past medical history of advanced multiple myeloma who has failed multiple treatments, chronic pain, thoracic outlet syndrome who was just discharged from [facility] (12/21-12/28) after being seen by Palliative care for pain med optimization and transition to a new hospice service.  He was briefly evaluated by [facility] as sounds like there was some misunderstanding as whether he was going to have an additional person helping him at home (father back out) so he was not officially enrolled in [facility] and had not been able to receive the rest of his pain medications.  He ran of his medication and therefore presented to the emergency room for assistance.SW to assist with hospice on discharge. Receiving comfort pain medications.       The severity of illness, intensity of cares provided, risk for adverse outcome, and expected LOS make the care appropriate for observation.       The information on this document is developed by " the utilization review team in order for the business office to ensure compliance.  This only denotes the appropriateness of proper admission status and does not reflect the quality of care rendered.         The definitions of Inpatient Status and Observation Status used in making the determination above are those provided in the CMS Coverage Manual, Chapter 1 and Chapter 6, section 70.4.      Sincerely,     Kandace Rader MD   Utilization Review/ Case Management  [facility].

## 2022-01-04 LAB
ANION GAP SERPL CALCULATED.3IONS-SCNC: 4 MMOL/L (ref 3–14)
BASOPHILS # BLD MANUAL: 0 10E3/UL (ref 0–0.2)
BASOPHILS NFR BLD MANUAL: 0 %
BUN SERPL-MCNC: 10 MG/DL (ref 7–30)
CA-I BLD-MCNC: 7.4 MG/DL (ref 4.4–5.2)
CALCIUM SERPL-MCNC: 12.6 MG/DL (ref 8.5–10.1)
CHLORIDE BLD-SCNC: 112 MMOL/L (ref 94–109)
CO2 SERPL-SCNC: 24 MMOL/L (ref 20–32)
CREAT SERPL-MCNC: 0.59 MG/DL (ref 0.66–1.25)
EOSINOPHIL # BLD MANUAL: 0.1 10E3/UL (ref 0–0.7)
EOSINOPHIL NFR BLD MANUAL: 2 %
ERYTHROCYTE [DISTWIDTH] IN BLOOD BY AUTOMATED COUNT: 14.8 % (ref 10–15)
GFR SERPL CREATININE-BSD FRML MDRD: >90 ML/MIN/1.73M2
GLUCOSE BLD-MCNC: 99 MG/DL (ref 70–99)
HCT VFR BLD AUTO: 22.7 % (ref 40–53)
HGB BLD-MCNC: 7.4 G/DL (ref 13.3–17.7)
LYMPHOCYTES # BLD MANUAL: 3 10E3/UL (ref 0.8–5.3)
LYMPHOCYTES NFR BLD MANUAL: 52 %
MAGNESIUM SERPL-MCNC: 1.4 MG/DL (ref 1.6–2.3)
MCH RBC QN AUTO: 33.3 PG (ref 26.5–33)
MCHC RBC AUTO-ENTMCNC: 32.6 G/DL (ref 31.5–36.5)
MCV RBC AUTO: 102 FL (ref 78–100)
MONOCYTES # BLD MANUAL: 0.1 10E3/UL (ref 0–1.3)
MONOCYTES NFR BLD MANUAL: 1 %
NEUTROPHILS # BLD MANUAL: 2.6 10E3/UL (ref 1.6–8.3)
NEUTROPHILS NFR BLD MANUAL: 45 %
PHOSPHATE SERPL-MCNC: 2.1 MG/DL (ref 2.5–4.5)
PLAT MORPH BLD: NORMAL
PLATELET # BLD AUTO: 180 10E3/UL (ref 150–450)
POTASSIUM BLD-SCNC: 3.5 MMOL/L (ref 3.4–5.3)
RBC # BLD AUTO: 2.22 10E6/UL (ref 4.4–5.9)
RBC MORPH BLD: NORMAL
SODIUM SERPL-SCNC: 140 MMOL/L (ref 133–144)
WBC # BLD AUTO: 5.7 10E3/UL (ref 4–11)

## 2022-01-04 PROCEDURE — 250N000013 HC RX MED GY IP 250 OP 250 PS 637: Performed by: NURSE PRACTITIONER

## 2022-01-04 PROCEDURE — 36415 COLL VENOUS BLD VENIPUNCTURE: CPT | Performed by: INTERNAL MEDICINE

## 2022-01-04 PROCEDURE — 99207 PR CDG-CODE CATEGORY CHANGED: CPT | Performed by: INTERNAL MEDICINE

## 2022-01-04 PROCEDURE — 250N000013 HC RX MED GY IP 250 OP 250 PS 637: Performed by: INTERNAL MEDICINE

## 2022-01-04 PROCEDURE — 250N000013 HC RX MED GY IP 250 OP 250 PS 637: Performed by: PHYSICIAN ASSISTANT

## 2022-01-04 PROCEDURE — 84100 ASSAY OF PHOSPHORUS: CPT | Performed by: INTERNAL MEDICINE

## 2022-01-04 PROCEDURE — 120N000001 HC R&B MED SURG/OB

## 2022-01-04 PROCEDURE — 99225 PR SUBSEQUENT OBSERVATION CARE,LEVEL II: CPT | Performed by: INTERNAL MEDICINE

## 2022-01-04 PROCEDURE — 82330 ASSAY OF CALCIUM: CPT | Performed by: INTERNAL MEDICINE

## 2022-01-04 PROCEDURE — 85027 COMPLETE CBC AUTOMATED: CPT | Performed by: INTERNAL MEDICINE

## 2022-01-04 PROCEDURE — 80048 BASIC METABOLIC PNL TOTAL CA: CPT | Performed by: INTERNAL MEDICINE

## 2022-01-04 PROCEDURE — 83735 ASSAY OF MAGNESIUM: CPT | Performed by: INTERNAL MEDICINE

## 2022-01-04 PROCEDURE — G0378 HOSPITAL OBSERVATION PER HR: HCPCS

## 2022-01-04 RX ADMIN — LORAZEPAM 4 MG: 2 LIQUID ORAL at 04:26

## 2022-01-04 RX ADMIN — MORPHINE SULFATE 180 MG: 30 TABLET, FILM COATED, EXTENDED RELEASE ORAL at 16:29

## 2022-01-04 RX ADMIN — LORAZEPAM 4 MG: 2 LIQUID ORAL at 22:29

## 2022-01-04 RX ADMIN — MORPHINE SULFATE 30 MG: 30 TABLET, FILM COATED, EXTENDED RELEASE ORAL at 10:37

## 2022-01-04 RX ADMIN — LORAZEPAM 4 MG: 2 LIQUID ORAL at 10:36

## 2022-01-04 RX ADMIN — PREGABALIN 150 MG: 75 CAPSULE ORAL at 10:36

## 2022-01-04 RX ADMIN — MORPHINE SULFATE 30 MG: 30 TABLET, FILM COATED, EXTENDED RELEASE ORAL at 16:30

## 2022-01-04 RX ADMIN — PREGABALIN 150 MG: 75 CAPSULE ORAL at 16:30

## 2022-01-04 RX ADMIN — HYDROXYZINE HYDROCHLORIDE 25 MG: 25 TABLET ORAL at 17:58

## 2022-01-04 RX ADMIN — MORPHINE SULFATE 30 MG: 30 TABLET, FILM COATED, EXTENDED RELEASE ORAL at 22:29

## 2022-01-04 RX ADMIN — HYDROXYZINE HYDROCHLORIDE 25 MG: 25 TABLET ORAL at 10:36

## 2022-01-04 RX ADMIN — MORPHINE SULFATE 180 MG: 30 TABLET, FILM COATED, EXTENDED RELEASE ORAL at 22:28

## 2022-01-04 RX ADMIN — MORPHINE SULFATE 180 MG: 30 TABLET, FILM COATED, EXTENDED RELEASE ORAL at 10:36

## 2022-01-04 RX ADMIN — HYDROXYZINE HYDROCHLORIDE 25 MG: 25 TABLET ORAL at 22:29

## 2022-01-04 RX ADMIN — POLYETHYLENE GLYCOL 3350 17 G: 17 POWDER, FOR SOLUTION ORAL at 10:37

## 2022-01-04 RX ADMIN — MORPHINE SULFATE 30 MG: 30 TABLET, FILM COATED, EXTENDED RELEASE ORAL at 04:24

## 2022-01-04 RX ADMIN — LORAZEPAM 4 MG: 2 LIQUID ORAL at 16:28

## 2022-01-04 RX ADMIN — METHOCARBAMOL 750 MG: 750 TABLET ORAL at 21:40

## 2022-01-04 RX ADMIN — HYDROXYZINE HYDROCHLORIDE 25 MG: 25 TABLET ORAL at 04:25

## 2022-01-04 RX ADMIN — NICOTINE 1 PATCH: 14 PATCH, EXTENDED RELEASE TRANSDERMAL at 10:35

## 2022-01-04 RX ADMIN — MORPHINE SULFATE 180 MG: 30 TABLET, FILM COATED, EXTENDED RELEASE ORAL at 04:25

## 2022-01-04 RX ADMIN — ACETAMINOPHEN 1000 MG: 500 TABLET, FILM COATED ORAL at 04:25

## 2022-01-04 RX ADMIN — MULTIPLE VITAMINS W/ MINERALS TAB 1 TABLET: TAB at 10:36

## 2022-01-04 RX ADMIN — ACETAMINOPHEN 1000 MG: 500 TABLET, FILM COATED ORAL at 20:31

## 2022-01-04 RX ADMIN — PREGABALIN 150 MG: 75 CAPSULE ORAL at 21:40

## 2022-01-04 NOTE — PLAN OF CARE
18:30 There are new orders for IV fluids and IV medication.  Need to obtain IV access before beginning these new orders.    22:58  Has c/o pain in back and neck.  PRN and scheduled pain meds given with minimal effect.  Pt is alert and oriented.  Flat affect.  Assist of 1 to pivot.  Lungs clear. No tele.  Regular diet.  States he has difficulty voiding.  He has trouble with starting the stream.  Sticky note left for MD to discuss possible flomax order.  He said he has used this in the past.  IV access obtained.  Zometa given and IV fluids started per orders.  Pain, hem/onc following.  DNR/DNI

## 2022-01-04 NOTE — PLAN OF CARE
"/54 (BP Location: Left arm, Patient Position: Semi-Aguilar's)   Pulse 85   Temp 97.1  F (36.2  C) (Axillary)   Resp 20   Ht 1.727 m (5' 8\")   Wt 73.3 kg (161 lb 11.2 oz)   SpO2 94%   BMI 24.59 kg/m      Pt refused first set of VS. Allowed RN to take second set of vitals. Pt very lethargic but wanted to take his scheduled pain medications. PIV access lost as PIV came out per pt. Attempted to insert PIV x2, but pt pulled hand as PIV was inserted. Flyer paged, but no access yet. MD paged about critical lab and no PIV access / no fluids. Continue w/ POC.     PRIMARY DIAGNOSIS: ACUTE PAIN  OUTPATIENT/OBSERVATION GOALS TO BE MET BEFORE DISCHARGE:  1. Pain Status: Improved-controlled with oral pain medications.    2. Return to near baseline physical activity: No    3. Cleared for discharge by consultants (if involved): No    Discharge Planner Nurse   Safe discharge environment identified: No  Barriers to discharge: No       Entered by: Carlee Marina 01/04/2022 7:40 AM     Please review provider order for any additional goals.   Nurse to notify provider when observation goals have been met and patient is ready for discharge.  "

## 2022-01-04 NOTE — PROGRESS NOTES
[facility]  Hospitalist Progress Note  Demetrio Andrade MD 01/03/2022    Reason for Stay (Diagnosis): metastatic mult myeloma, hypercalcemia, chronic pain due to myeloma         Assessment and Plan:      Summary of Stay: Beto Tran is a 46 year old  man with a significant past medical history of advanced multiple myeloma who has failed multiple treatments, chronic pain, thoracic outlet syndrome who was just discharged from [facility] (12/21-12/28) after being seen by Palliative care for pain med optimization and transition to a new hospice service.    He was briefly evaluated by [facility] as sounds like there was some misunderstanding as whether he was going to have an additional person helping him at home (father backed out) so he was not officially enrolled in [facility] and had not been able to receive the rest of his pain medications.  He ran of his medication and therefore presented to the emergency room for assistance.    In reviewing the patient's medical hx, he and sig other Farhad ([phone]) report that the patient was cared for at [place]. He underwent multiple chemotherapeutics and received stem cell transplant.  Despite aggressive cares treatment was felt to have failed and he was placed on hospice care.  He has been on hospice since 2015 and has received no treatment for his myeloma since that time.  He had a follow up with [place] this past summer to see if any new therapy had evolved and was told that there was nothing available to treat him.  He remains on hospice care.  Pt and sig other interested in second opinion while hospitalized to determine if he would be a candidate for any therapy.  They both acknowledge that the patient's symptoms have progressed over the past several months.       Plans today:  - zoledronic acid 4 mg IV to treat hypercalcemia of malignancy  -  ml/hr overnight  - follow BMP.  Check ionized calcium in AM  - [facility]  "consult - though they understand the chance is minimal, they are interested to see if any new therapy may have emerged for potential treatment   - I spoke with Kasia from pain/palliative service today     #Advanced multiple myeloma.    - see discussion above.  Previously treated at [facility] and has not received any therapy since 2015.  Has been on hospice since that time  -  consult to assist with discharge planning     # chronic pain secondary to above  - continue pain meds - appreciate assistance from Kasia of pain/palliative service     # Right hand paraesthesia     # History of thoracic outlet syndrome: s/p 1st rib resection and sclenectomy     #Known hx of anemia/hypercalcemia from MM   - zoledronic acid 4 mg IV today  - NS IVF  - recheck labs in AM  - check mag/phos     DVT Prophylaxis: none needed as patient on hospice care  Code Status: DNR / DNI  Dispo:  When disposition issues arranged.            Interval History (Subjective):      Talked with patient and sig other at bedside today.  Reviewed his long hx of failed treatment.  Has been on hospice since 2015, the last time he was treated for his malignancy.  Sig other reports he was doing reasonably well up until the last several months when he began to decline.                    Physical Exam:      Last Vital Signs:  /59 (BP Location: Left arm)   Pulse 92   Temp 98.2  F (36.8  C) (Oral)   Resp 18   Ht 1.727 m (5' 8\")   Wt 73.3 kg (161 lb 11.2 oz)   SpO2 92%   BMI 24.59 kg/m        Intake/Output Summary (Last 24 hours) at 1/3/2022 1842  Last data filed at 1/3/2022 1300  Gross per 24 hour   Intake 1025 ml   Output 725 ml   Net 300 ml       Constitutional: Awake, alert, cooperative, no apparent distress   Respiratory: Clear to auscultation bilaterally, no crackles or wheezing   Cardiovascular: Regular rate and rhythm, normal S1 and S2, and no murmur noted   Abdomen: Normal bowel sounds, soft, non-distended, non-tender   Skin: No " rashes, no cyanosis, dry to touch   Neuro: Alert and interactive, appears to have some confusion, no weakness, numbness, memory loss.  Sig other at bedside providing almost all the information   Extremities: No edema, normal range of motion   Other(s):        All other systems: Negative          Medications:      All current medications were reviewed with changes reflected in problem list.         Data:      All new lab and imaging data was reviewed.   Labs:  Recent Labs   Lab 01/02/22  0803 01/01/22  2304   NA  --  138   POTASSIUM 3.4 3.0*   CHLORIDE  --  107   CO2  --  26   ANIONGAP  --  5   GLC  --  98   BUN  --  11   CR  --  0.63*   GFRESTIMATED  --  >90   JOSE  --  14.0*     Recent Labs   Lab 01/01/22  2304   WBC 6.1   HGB 8.7*   HCT 25.2*   MCV 96         Imaging:   No results found for this or any previous visit (from the past 24 hour(s)).

## 2022-01-04 NOTE — PLAN OF CARE
PRIMARY DIAGNOSIS: ACUTE PAIN  OUTPATIENT/OBSERVATION GOALS TO BE MET BEFORE DISCHARGE:  1. Pain Status: No improvement noted. Consider adjustment in pain regimen.    2. Return to near baseline physical activity: No    3. Cleared for discharge by consultants (if involved): No    Discharge Planner Nurse   Safe discharge environment identified: No  Barriers to discharge: No       Entered by: Carlee Marina 01/04/2022 4:14 AM     Please review provider order for any additional goals.   Nurse to notify provider when observation goals have been met and patient is ready for discharge.

## 2022-01-04 NOTE — PLAN OF CARE
PRIMARY DIAGNOSIS: ACUTE PAIN  OUTPATIENT/OBSERVATION GOALS TO BE MET BEFORE DISCHARGE:  1. Pain Status: No improvement noted. Consider adjustment in pain regimen.    2. Return to near baseline physical activity: No    3. Cleared for discharge by consultants (if involved): No    Discharge Planner Nurse   Safe discharge environment identified: No  Barriers to discharge: Yes       Entered by: Luci Nieves 01/04/2022 12:11 PM     Please review provider order for any additional goals.   Nurse to notify provider when observation goals have been met and patient is ready for discharge.    Pt A&O x4, up with A1. Did not get OOB this shift. Bed alarm off, refused alarms. Uses urinal in bed. 0800 VSS, refused 1200 recheck of VS. Chronic pain, reported 9/10. Gave scheduled meds per palliative regimen. Drowsy entire shift, awakens to voice, but drifts back off to sleep. Slow to respond, becomes frustrated with staff easily. No IV access, writer attempted to place a new line x3, but after being unsuccessful pt refused further attempts by flying RN. MD aware. Calcium elevated- MD aware. Hemoc consulted for further assistance in POC. Palliative following. Discharge TBD. Will continue current POC.

## 2022-01-04 NOTE — PROVIDER NOTIFICATION
MD paged: Critical lab @ 07:30: Ionized Calcium 7.4. No IV fluids going as pt pulled PIV and not cooperating for PIV access. Thank you!

## 2022-01-04 NOTE — PROGRESS NOTES
[facility]  Hospitalist Progress Note  Demetrio Andrade MD 01/04/2022    Reason for Stay (Diagnosis): metastatic myeloma, hypercalcemia         Assessment and Plan:      Summary of Stay: Beto Tran is a 46 year old  man with a significant past medical history of advanced multiple myeloma who has failed multiple treatments, chronic pain, thoracic outlet syndrome who was just discharged from [facility] (12/21-12/28) after being seen by Palliative care for pain med optimization and transition to a new hospice service.    He was briefly evaluated by [facility] as sounds like there was some misunderstanding as whether he was going to have an additional person helping him at home (father backed out) so he was not officially enrolled in [facility] and had not been able to receive the rest of his pain medications.  He ran of his medication and therefore presented to the emergency room for assistance.     In reviewing the patient's medical hx, he and sig other Farhad ([phone]) report that the patient was cared for at [place]. He underwent multiple chemotherapeutics and received stem cell transplant.  Despite aggressive cares treatment was felt to have failed and he was placed on hospice care.  He has been on hospice since 2015 and has received no treatment for his myeloma since that time.  He had a follow up with [place] this past summer to see if any new therapy had evolved and was told that there was nothing available to treat him.  He remains on hospice care.  Pt and sig other requesting a second opinion while hospitalized to determine if he would be a candidate for any therapy, though both understand that this is very unlikely.  They both acknowledge that the patient's symptoms and performance status have progressively declined over the past several months.      Given his hypercalcemia of malignancy, administered a dose of Zometa on 1/3/22.  I also recommended IVF but the patient  "is now refusing replacement of his peripheral IV site     Plans today:  - oncology consulted per pt/sig other request (see discussion above).  Oncology can also assist with treatment of hypercalcemia  - SW involved for placement - anticipate continuing hospice on discharge  - appreciate palliative care input re: pain management     #Advanced multiple myeloma.    - see discussion above.  Previously treated at [facility] and has not received any therapy since 2015.  Has been on hospice since that time  - SW consult to assist with discharge planning     # chronic pain secondary to above  - continue pain meds - appreciate assistance from Kasia of pain/palliative service     # Right hand paraesthesia     # History of thoracic outlet syndrome: s/p 1st rib resection and sclenectomy     #Known hx of anemia/hypercalcemia from MM   - zoledronic acid 4 mg IV today  - NS IVF (not being given as pt refused having PIV replaced)  - considered Calcitonin, but given pt declining IVF treatment of hypercalcemia not sure of utility here       DVT Prophylaxis: none needed as patient on hospice care  Code Status: DNR / DNI  Dispo:  When disposition issues arranged.          Interval History (Subjective):      Does not want another IV placed.  Received IV dose of Zometa yesterday.                    Physical Exam:      Last Vital Signs:  /67 (BP Location: Left arm)   Pulse 78   Temp 98  F (36.7  C) (Axillary)   Resp 18   Ht 1.727 m (5' 8\")   Wt 73.3 kg (161 lb 11.2 oz)   SpO2 91%   BMI 24.59 kg/m        Intake/Output Summary (Last 24 hours) at 1/4/2022 1253  Last data filed at 1/4/2022 0927  Gross per 24 hour   Intake 951 ml   Output 1450 ml   Net -499 ml       Constitutional: Awake, alert, no apparent distress.  Nurse at bedside attempting to replace IV site (pt eventually refused this)   Respiratory: Clear to auscultation bilaterally, no crackles or wheezing   Cardiovascular: Regular rate and rhythm, normal S1 and S2, " and no murmur noted   Abdomen: Normal bowel sounds, soft, non-distended, non-tender   Skin: No rashes, no cyanosis, dry to touch   Neuro: Alert and oriented x3, no weakness, numbness, memory loss   Extremities: No edema, normal range of motion   Other(s): Flat affect, minimally interactive       All other systems: Negative          Medications:      All current medications were reviewed with changes reflected in problem list.         Data:      All new lab and imaging data was reviewed.   Labs:  Recent Labs   Lab 01/04/22  0708 01/02/22  0803 01/01/22  2304     --  138   POTASSIUM 3.5 3.4 3.0*   CHLORIDE 112*  --  107   CO2 24  --  26   ANIONGAP 4  --  5   GLC 99  --  98   BUN 10  --  11   CR 0.59*  --  0.63*   GFRESTIMATED >90  --  >90   JOSE 12.6*  --  14.0*     Recent Labs   Lab 01/04/22  0708   WBC 5.7   HGB 7.4*   HCT 22.7*   *         Imaging:   No results found for this or any previous visit (from the past 24 hour(s)).

## 2022-01-04 NOTE — CONSULTS
Oncology Consult:    I went to see the patient.  He looked away and closed his eyes when I entered the room and introduced myself.  He requested to be left alone and did not wish to be seen.    I did review his outside notes from [facility], where he has been treated.   He was diagnosed with multiple myeloma in 2014.  He underwent treatment with VRD September 2014-December 2014, with no response.  He then underwent treatment with carfilzomib, pomalidomide, dexamethasone, also with no response.  He underwent autologous peripheral stem cell transplant on 4/20/15.  At day 60 evaluation, he still had not responded, and he went on hospice.  He has had significant issues with pain and has been managed entirely by hospice.   He has been on hospice for 6 years with primary refractory multiple myeloma, which is unusual.  He did see his [facility] oncologist again in August 2021, who felt that he is not candidate for any further treatments.  It was felt that he was still appropriate for hospice, and it appeared in the chart documentation that he was not interested in disease directed therapy.      As above, patient did not wish to be seen by us today.   There are newer treatments for multiple myeloma in the last few years, but per chart review and with his reported recent declining functional status over the last few months, it is unlikely that he would be candidate for any further cancer-directed treatments.  I would agree with palliative care focused treatments and anticipate that he would continue hospice on discharge, and follow-up with his [facility] team as needed.    I did discuss with my team to stop by again tomorrow, if patient is open to oncology consult at that time.

## 2022-01-04 NOTE — PROGRESS NOTES
Care Management Follow Up    Length of Stay (days): 1    Expected Discharge Date: 01/05/2022     Concerns to be Addressed:  A place to live with supervision.  Patient plan of care discussed at interdisciplinary rounds: Yes    Anticipated Discharge Disposition:  Possible home with increased home care services, this would have to be approved by the [facility]. LTC referrals made. Application sent to [facility].     Anticipated Discharge Services:  Home care vs LTC    Anticipated Discharge DME:  Unclear at this time    Patient/family educated on Medicare website which has current facility and service quality ratings:  Yes    Education Provided on the Discharge Plan:  Yes    Patient/Family in Agreement with the Plan:  Patient would like to go home but his significant other can not provide 24/7 care for patient. He has no other family support the can help out on a daily basis.    Referrals Placed by CM/ROGER:  Yes, LTC with hospice  Private pay costs discussed: Not applicable    Additional Information:  Significant other is no longer able to care for patient 24/7. Patient receives 5 hours a week PCA services from the [facility]. Left message for his [employer] worker, Callie Boyer [phone] to discuss increasing patient's PCA hours at home.    Back p plan: LTC referrals made. Application sent to [facility].    SW will continue to follow patient to assist with placement/disposition.    MIKEY Russo   Inpatient Care Coordination   Supervisor  [facility]  [phone]          JANE Martini

## 2022-01-05 LAB
ANION GAP SERPL CALCULATED.3IONS-SCNC: 6 MMOL/L (ref 3–14)
BASOPHILS # BLD MANUAL: 0 10E3/UL (ref 0–0.2)
BASOPHILS NFR BLD MANUAL: 0 %
BUN SERPL-MCNC: 8 MG/DL (ref 7–30)
CA-I BLD-MCNC: 6 MG/DL (ref 4.4–5.2)
CALCIUM SERPL-MCNC: 11.5 MG/DL (ref 8.5–10.1)
CHLORIDE BLD-SCNC: 110 MMOL/L (ref 94–109)
CO2 SERPL-SCNC: 24 MMOL/L (ref 20–32)
CREAT SERPL-MCNC: 0.62 MG/DL (ref 0.66–1.25)
EOSINOPHIL # BLD MANUAL: 0.2 10E3/UL (ref 0–0.7)
EOSINOPHIL NFR BLD MANUAL: 3 %
ERYTHROCYTE [DISTWIDTH] IN BLOOD BY AUTOMATED COUNT: 15 % (ref 10–15)
GFR SERPL CREATININE-BSD FRML MDRD: >90 ML/MIN/1.73M2
GLUCOSE BLD-MCNC: 94 MG/DL (ref 70–99)
HCT VFR BLD AUTO: 23.9 % (ref 40–53)
HGB BLD-MCNC: 7.6 G/DL (ref 13.3–17.7)
LYMPHOCYTES # BLD MANUAL: 1.2 10E3/UL (ref 0.8–5.3)
LYMPHOCYTES NFR BLD MANUAL: 20 %
MCH RBC QN AUTO: 33 PG (ref 26.5–33)
MCHC RBC AUTO-ENTMCNC: 31.8 G/DL (ref 31.5–36.5)
MCV RBC AUTO: 104 FL (ref 78–100)
MONOCYTES # BLD MANUAL: 0.4 10E3/UL (ref 0–1.3)
MONOCYTES NFR BLD MANUAL: 6 %
MYELOCYTES # BLD MANUAL: 0.1 10E3/UL
MYELOCYTES NFR BLD MANUAL: 1 %
NEUTROPHILS # BLD MANUAL: 4.2 10E3/UL (ref 1.6–8.3)
NEUTROPHILS NFR BLD MANUAL: 70 %
PLAT MORPH BLD: ABNORMAL
PLATELET # BLD AUTO: 189 10E3/UL (ref 150–450)
POTASSIUM BLD-SCNC: 3.5 MMOL/L (ref 3.4–5.3)
RBC # BLD AUTO: 2.3 10E6/UL (ref 4.4–5.9)
RBC MORPH BLD: ABNORMAL
SODIUM SERPL-SCNC: 140 MMOL/L (ref 133–144)
WBC # BLD AUTO: 6 10E3/UL (ref 4–11)

## 2022-01-05 PROCEDURE — 250N000013 HC RX MED GY IP 250 OP 250 PS 637: Performed by: NURSE PRACTITIONER

## 2022-01-05 PROCEDURE — 36415 COLL VENOUS BLD VENIPUNCTURE: CPT | Performed by: INTERNAL MEDICINE

## 2022-01-05 PROCEDURE — 99225 PR SUBSEQUENT OBSERVATION CARE,LEVEL II: CPT | Performed by: INTERNAL MEDICINE

## 2022-01-05 PROCEDURE — 99207 PR CDG-CODE CATEGORY CHANGED: CPT | Performed by: INTERNAL MEDICINE

## 2022-01-05 PROCEDURE — G0378 HOSPITAL OBSERVATION PER HR: HCPCS

## 2022-01-05 PROCEDURE — 120N000001 HC R&B MED SURG/OB

## 2022-01-05 PROCEDURE — 85027 COMPLETE CBC AUTOMATED: CPT | Performed by: INTERNAL MEDICINE

## 2022-01-05 PROCEDURE — 250N000013 HC RX MED GY IP 250 OP 250 PS 637: Performed by: INTERNAL MEDICINE

## 2022-01-05 PROCEDURE — 99222 1ST HOSP IP/OBS MODERATE 55: CPT | Performed by: INTERNAL MEDICINE

## 2022-01-05 PROCEDURE — 250N000013 HC RX MED GY IP 250 OP 250 PS 637: Performed by: PHYSICIAN ASSISTANT

## 2022-01-05 PROCEDURE — 82330 ASSAY OF CALCIUM: CPT | Performed by: INTERNAL MEDICINE

## 2022-01-05 PROCEDURE — 82310 ASSAY OF CALCIUM: CPT | Performed by: INTERNAL MEDICINE

## 2022-01-05 PROCEDURE — 99225 PR SUBSEQUENT OBSERVATION CARE,LEVEL II: CPT | Performed by: NURSE PRACTITIONER

## 2022-01-05 RX ORDER — MORPHINE SULFATE 15 MG/1
60 TABLET ORAL
Status: DISCONTINUED | OUTPATIENT
Start: 2022-01-05 | End: 2022-01-13 | Stop reason: HOSPADM

## 2022-01-05 RX ORDER — MORPHINE SULFATE 30 MG/1
60 TABLET, FILM COATED, EXTENDED RELEASE ORAL EVERY 6 HOURS
Status: DISCONTINUED | OUTPATIENT
Start: 2022-01-05 | End: 2022-01-12

## 2022-01-05 RX ADMIN — NICOTINE 1 PATCH: 14 PATCH, EXTENDED RELEASE TRANSDERMAL at 05:50

## 2022-01-05 RX ADMIN — ACETAMINOPHEN 1000 MG: 500 TABLET, FILM COATED ORAL at 04:20

## 2022-01-05 RX ADMIN — MORPHINE SULFATE 30 MG: 30 TABLET, FILM COATED, EXTENDED RELEASE ORAL at 04:22

## 2022-01-05 RX ADMIN — LORAZEPAM 4 MG: 2 LIQUID ORAL at 09:48

## 2022-01-05 RX ADMIN — PREGABALIN 150 MG: 75 CAPSULE ORAL at 09:49

## 2022-01-05 RX ADMIN — METHOCARBAMOL 750 MG: 750 TABLET ORAL at 22:36

## 2022-01-05 RX ADMIN — MORPHINE SULFATE 180 MG: 30 TABLET, FILM COATED, EXTENDED RELEASE ORAL at 16:31

## 2022-01-05 RX ADMIN — PREGABALIN 150 MG: 75 CAPSULE ORAL at 16:32

## 2022-01-05 RX ADMIN — MORPHINE SULFATE 180 MG: 30 TABLET, FILM COATED, EXTENDED RELEASE ORAL at 09:50

## 2022-01-05 RX ADMIN — MULTIPLE VITAMINS W/ MINERALS TAB 1 TABLET: TAB at 09:51

## 2022-01-05 RX ADMIN — MORPHINE SULFATE 60 MG: 15 TABLET ORAL at 19:31

## 2022-01-05 RX ADMIN — MORPHINE SULFATE 30 MG: 30 TABLET, FILM COATED, EXTENDED RELEASE ORAL at 09:51

## 2022-01-05 RX ADMIN — HYDROXYZINE HYDROCHLORIDE 25 MG: 25 TABLET ORAL at 16:33

## 2022-01-05 RX ADMIN — MORPHINE SULFATE 60 MG: 30 TABLET, FILM COATED, EXTENDED RELEASE ORAL at 22:35

## 2022-01-05 RX ADMIN — LORAZEPAM 4 MG: 2 LIQUID ORAL at 05:32

## 2022-01-05 RX ADMIN — ACETAMINOPHEN 1000 MG: 500 TABLET, FILM COATED ORAL at 19:30

## 2022-01-05 RX ADMIN — ACETAMINOPHEN 1000 MG: 500 TABLET, FILM COATED ORAL at 11:44

## 2022-01-05 RX ADMIN — LORAZEPAM 4 MG: 2 LIQUID ORAL at 16:33

## 2022-01-05 RX ADMIN — MORPHINE SULFATE 180 MG: 30 TABLET, FILM COATED, EXTENDED RELEASE ORAL at 04:21

## 2022-01-05 RX ADMIN — HYDROXYZINE HYDROCHLORIDE 25 MG: 25 TABLET ORAL at 05:32

## 2022-01-05 RX ADMIN — PREGABALIN 150 MG: 75 CAPSULE ORAL at 22:36

## 2022-01-05 RX ADMIN — HYDROXYZINE HYDROCHLORIDE 25 MG: 25 TABLET ORAL at 22:36

## 2022-01-05 RX ADMIN — HYDROXYZINE HYDROCHLORIDE 25 MG: 25 TABLET ORAL at 11:44

## 2022-01-05 RX ADMIN — MORPHINE SULFATE 180 MG: 30 TABLET, FILM COATED, EXTENDED RELEASE ORAL at 22:36

## 2022-01-05 RX ADMIN — MORPHINE SULFATE 60 MG: 30 TABLET, FILM COATED, EXTENDED RELEASE ORAL at 16:32

## 2022-01-05 RX ADMIN — LORAZEPAM 4 MG: 2 LIQUID ORAL at 22:36

## 2022-01-05 NOTE — PLAN OF CARE
VSS. A&O x4. LS clear. Pt denied CP or SOB. Pt rated pain at 7/10. Scheduled medications given with relief per patient. Pt slept most morning. Oncology and Palliative visit with patient today with changes to medication result. Pt uses urinal at bedside as needed.   Calcium elevated- MD aware. Will continue to monitor patient and follow POC

## 2022-01-05 NOTE — PLAN OF CARE
"PRIMARY DIAGNOSIS: \"GENERIC\" NURSING  OUTPATIENT/OBSERVATION GOALS TO BE MET BEFORE DISCHARGE:  ADLs back to baseline: No    Activity and level of assistance: Up with standby assistance.    Pain status: Improved-controlled with oral pain medications.    Return to near baseline physical activity: No     Discharge Planner Nurse   Safe discharge environment identified: No  Barriers to discharge: Yes         Entered by: Melanie Green 01/05/2022 12:25 PM     Please review provider order for any additional goals.   Nurse to notify provider when observation goals have been met and patient is ready for discharge.  "

## 2022-01-05 NOTE — CONSULTS
Consult Date: 01/01/2022    HISTORY:  Beto Tran is a 46-year-old patient we have been asked to see for an Oncology consultation by Dr. Andrade.      CHIEF COMPLAINT:    1.  Advanced multiple myeloma.  2.  Chronic pain syndrome.  3.  Hypercalcemia.     HISTORY OF PRESENTING COMPLAINT:  Beto Tran is a 46-year-old gentleman that has a history of advanced multiple myeloma and previously has been seen for treatment at the [facility].  He has had treatment in the past, dating from 2014, which have included VRD.  He then underwent treatment with carfilzomib, pomalidomide, and dexamethasone.  Neither one of those treatments did he have a response to. He then underwent an autologous peripheral stem cell transplant in 2015.  By day 60, he had not had a response and then he went on hospice.  He has been on hospice now for over 6 years for refractory multiple myeloma.  His last visit at the [facility] was in 08/2021 and he is not a candidate for any further treatments.  He was admitted on this occasion because of intractable chronic pain and hypercalcemia.  His calcium when he came in was 14.  For that, he had Zometa 4 mg IV.  He also was treated with some IV fluids.  His calcium is now trending downwards and his calcium today came back at 11.5.  He still has an elevated ionized calcium of 6.0.  Apart from that, he does suffer from chronic pain.  He also suffers from anemia and has chronic failure to thrive.  His creatinine is normal.  We have been asked to help with his hypercalcemia management.  He is now off the IV fluids per his choice, but he does say he is drinking fluid.  I have discussed the hypercalcemia with him today.  I have encouraged him to continue to drink oral fluids and we will follow his calcium on a daily basis.  If it starts to trend up again, he will need to have his IV replaced.    With regard to his chronic pain, he has met with our Pain and Palliative Care Team and he is on high doses  of narcotics.  He is not in significant pain today.  He does complain of some right hand tingling and numbness, which he attributes to carpal tunnel syndrome.  He also has a history of thoracic outlet syndrome and has had resection of his first rib and a scalenectomy. For that, he is on gabapentin.    PAST MEDICAL HISTORY:    1.  Remarkable for advanced multiple myeloma as outlined above.  2.  Hypercalcemia.  3.  Thoracic outlet syndrome.  4.  Gastroesophageal reflux disease.  5.  Osteoporosis.  6.  Chronic pain syndrome.    PAST SURGICAL HISTORY:  History of resection of his first rib and scalenectomy, history of right thumb surgery, history of eye surgery.    MEDICATIONS AND ALLERGIES:  Outlined in the nursing records.    SOCIAL HISTORY:  The patient has a significant other.  He has a smoking history as well as alcohol use, but he does not have any illegal drug use.    FAMILY HISTORY:  Unremarkable for any multiple myeloma.    COMPREHENSIVE REVIEW OF SYSTEMS:  His main symptomatology is that of chronic pain syndrome, which seems to be controlled at present.  He also has some tingling in his right hand, which is chronic for him.  He is frustrated at his overall situation, which is understandable.  He has also expressed his frustration today at his outpatient hospice care team.  He did have a social work consult 2 days ago and the  will reach out to the county to see if it is an option to increase his home PCA services in foster care.  There is also an application made to [facility] and other long-term care facilities.    PHYSICAL EXAMINATION:    GENERAL:  He is somewhat lethargic.  He is oriented to time, place and person.  His affect is flat.  NECK:  No masses or goiter.  CHEST:  Clear to auscultation and percussion bilaterally.  Heart Sounds 1 2, normal, no added sounds, no murmurs.  ABDOMEN:  Soft and nontender.  No hepatosplenomegaly.  EXTREMITIES:  Legs without tenderness or  edema.  NEUROLOGICAL:  Has nothing focal.    LABORATORY DATA:  Calcium today 11.5, down from 12.6 yesterday and 14.0 the day before.  Ionized calcium 6.0.  White cell count 6.0, hemoglobin 7.6, platelets 189.    IMPRESSION:  This is a 46-year-old gentleman with end-stage multiple myeloma.  Unfortunately, he has no other treatment options at this time.  He does suffer from chronic pain and we appreciate the Pain and Palliative Care input on his pain medications.  He has been treated for hypercalcemia this admission. His calcium is trending downwards.  I have encouraged him to continue on oral hydration and we will follow his calcium over the next day or so.  He is working with the  for inpatient hospice placement or an increase in his home PCA.  I have answered all of his questions today to the best of my ability.    Thank you for allowing us to participate in his care.    Magoelginkaleigh Yang MD        D: 2022   T: 2022   MT: [id number]    Name:     ARACELIDANIELLETATISHARYNREGINALDO  MRN:      [id number]        Account:      [id number]   :      1975           Consult Date: 2022     Document: [id number]    cc:  Demetrio Andrade MD

## 2022-01-05 NOTE — PROGRESS NOTES
[facility]  Palliative Care Progress Note  Text Page     Assessment & Plan    Beto Tran is a 46 year old male who was admitted on 1/1/2022.   Consulted by Beverley Lance PA-c  to assist with symptom management, goals of care and development of plan of care      Recommendations:  1. Goals of Care- No CPR- Do NOT Intubate  Hospitalization goals discussed  No change in goals of care, continues on comfort plan.   Decisional Capacity- Intact. Patient does not have an advance directive. Per  informed consent policy next of kin should be involved if patient becomes unable. Next of Kin is Matty Tran, father   POLST  None on file      2. Pain wide spread pain in the setting of end stage multiple myeloma. Pain more pronounce low back, hands( right >left) and right shoulder   -likely opioid withdrawal -mild to moderate    Patient's opioid use in past 24 hours: 840mg Morphine  = Daily Morphine Equivalent  [place] Board [employer] Pharmacy Data Base Reviewed:    YES; As expected, no concern for misuse/abuse of controlled medications based on this report.  ORT   ( add dot phrase .FRHORT if warranted)  Chronic opioid use Morphine Equivalent= 1980 mg  Increase Ms Contin to 240 mg every 6 hrs, reduce Morphine IR 60 mg every 3 hrs prn   methocarbamol ( Robaxin) 750 mg qid  Continue Lyrica 150 mg tid \  Hydroxyzine 25 mg every 6 hrs      3. Dyspnea  None      4. Anxiety   Ativan solution  4 mg every 6 hrs     5. Spiritual Care  Oriented to Spiritual Health as part of Palliative Care team.  Spiritual Background: undesignated      6. Care Planning  Appreciate Care of Pamela LARA and Meena Sykes RN in d/c planning .  Medications for discharge to be determined     Medical Decision Making and Goals of Care:  Discussed on January 5, 2022 with Kasia Luis, RN-C,APRN,CNP,ACHPN:  Asleep this morning. Met with patient this afternoon.  States pain no better 8/10. Always being disturbed and  "poor sleep results. Calmer today with staff and allowed visits with oncology and this writer. Reflected on this writers observation of sleeping, less anxious. Informed again of med change to lyrica from gabapentin and is ok with this.  Discussed use of suboxone and asked patient to strongly consider cross over from Mu opioids. Explained mechanism of action and how it may help his pain better and improve QoL.  He raised concerns about being seen as an addict, wonder where he would get the medication and if it was at a [facility] center. This write acknowledged his concern but stated it is different now.  Suboxone is widely used in patient's with chronic pain and high opioid tolerance as well as the hospice patient.  Stated would need to partner with social work to find right fit at discharge to continue prescribing.  TCU may be challenging as many do not accept.  Asked patient to think about it and consider Suboxone induction.  It is a good opportunity now as he is IP.  Denies SOB, no constipation,     Discussed on January 3, 2022 with Kasia JONES, CNP:  Familiar with patient from previous recent admission.  Feeling miserable today with widespread pain, achyness and \"flu like pain\".  He is not sure of next steps. Feels hopeless with options, let down that dad did not come to stay. He is coming to terms with alternate plans for discharge.  Discussed group home with people of potential of similar age.     Patient aware I discussed with social work.    Discusssed pain management changes done today and plan to streamline medication for ease of administration.  He is not open to use of cross over to Suboxone as then he has another layer of being looked at as a drug abuser.    Thank you for involving us in the patient's care.        Kasia Luis RN, PGMT-BC, APRN, CNP,ACHPN  Pain Management and Palliative Care  [facility]  Pgr: [phone]      Time Spent on this " Encounter   Total unit/floor time 25 minutes, time consisted of the following, examination of the patient, reviewing the record and completing documentation. >50% of time spent in counseling and coordination of care, Bedside Nurse Melanie Green RN  and Hospitalist Eric Yang MD.,socail work Carlee Marquez,   Time spend counseling with patient consisted of the following topics, care planning for discharge and symptom management.    Review of Systems    CONSTITUTIONAL: NEGATIVE for fever, chills, change in weight  ENT/MOUTH: NEGATIVE for ear, mouth and throat problems  RESP: NEGATIVE for significant cough or SOB  CV: NEGATIVE for chest pain, palpitations or peripheral edema    Palliative Symptom Review (0=no symptom/no concern, 1=mild, 2=moderate, 3=severe):      Pain: 2-moderate      Fatigue: 1-mild      Nausea: 0-none      Constipation: 0-none      Diarrhea: 0-none      Depressive Symptoms: 1-mild      Anxiety: 1-mild      Drowsiness: 0-none      Poor Appetite: 0-none      Shortness of Breath: 0-none      Insomnia: 1-mild      Other:        Overall (0 good/no concerns, 3 very poor):  2    Physical Exam   Temp:  [98.3  F (36.8  C)-100  F (37.8  C)] 98.4  F (36.9  C)  Pulse:  [77-88] 81  Resp:  [16-18] 16  BP: (111-131)/(58-66) 118/66  SpO2:  [91 %-95 %] 93 %  161 lbs 11.2 oz  Exam:  GEN:  Alert, oriented x 3, appears comfortable, NAD.  HEENT:  Normocephalic/atraumatic, no scleral icterus, no nasal discharge, mouth moist.  CV:  RRR, S1, S2; no murmurs or other irregularities noted.  +3 DP/PT pulses bilatererally; no edema BLE.  RESP:  Clear to auscultation bilaterally without rales/rhonchi/wheezing/retractions.  Symmetric chest rise on inhalation noted.  Normal respiratory effort.  ABD:  Rounded, soft, non-tender/non-distended.  +BS  EXT:  Edema & pulses as noted above.  CMS intact x 4.     M/S:   Nontender to palpation throughout.    SKIN:  Dry to touch, no exanthems noted in the visualized areas.    NEURO: Symmetric  strength +5/5.  Sensation to touch intact all extremities.   There is no area of allodynia or hyperesthesia.  PAIN BEHAVIOR: Cooperative  Psych:  Normal affect.  Calm, cooperative, conversant appropriately.    Medications     sodium chloride Stopped (01/04/22 0231)       acetaminophen  1,000 mg Oral Q8H     hydrOXYzine  25 mg Oral Q6H     LORazepam  4 mg Oral Q6H     methocarbamol  750 mg Oral At Bedtime     morphine  180 mg Oral Q6H     morphine  30 mg Oral Q6H     multivitamin w/minerals  1 tablet Oral Daily     nicotine  1 patch Transdermal Daily     nicotine   Transdermal Q8H     polyethylene glycol  17 g Oral Daily     pregabalin  150 mg Oral TID       Data   Results for orders placed or performed during the hospital encounter of 01/01/22 (from the past 24 hour(s))   CBC with Platelets & Differential    Narrative    The following orders were created for panel order CBC with Platelets & Differential.  Procedure                               Abnormality         Status                     ---------                               -----------         ------                     CBC with platelets and d...[[id number]]  Abnormal            Final result               Manual Differential[[id number]]          Abnormal            Final result                 Please view results for these tests on the individual orders.   Basic metabolic panel   Result Value Ref Range    Sodium 140 133 - 144 mmol/L    Potassium 3.5 3.4 - 5.3 mmol/L    Chloride 110 (H) 94 - 109 mmol/L    Carbon Dioxide (CO2) 24 20 - 32 mmol/L    Anion Gap 6 3 - 14 mmol/L    Urea Nitrogen 8 7 - 30 mg/dL    Creatinine 0.62 (L) 0.66 - 1.25 mg/dL    Calcium 11.5 (H) 8.5 - 10.1 mg/dL    Glucose 94 70 - 99 mg/dL    GFR Estimate >90 >60 mL/min/1.73m2   Ionized Calcium   Result Value Ref Range    Calcium Ionized 6.0 (H) 4.4 - 5.2 mg/dL   CBC with platelets and differential   Result Value Ref Range    WBC Count 6.0 4.0 - 11.0 10e3/uL    RBC Count 2.30 (L) 4.40 - 5.90  10e6/uL    Hemoglobin 7.6 (L) 13.3 - 17.7 g/dL    Hematocrit 23.9 (L) 40.0 - 53.0 %     (H) 78 - 100 fL    MCH 33.0 26.5 - 33.0 pg    MCHC 31.8 31.5 - 36.5 g/dL    RDW 15.0 10.0 - 15.0 %    Platelet Count 189 150 - 450 10e3/uL   Manual Differential   Result Value Ref Range    % Neutrophils 70 %    % Lymphocytes 20 %    % Monocytes 6 %    % Eosinophils 3 %    % Basophils 0 %    % Myelocytes 1 %    Absolute Neutrophils 4.2 1.6 - 8.3 10e3/uL    Absolute Lymphocytes 1.2 0.8 - 5.3 10e3/uL    Absolute Monocytes 0.4 0.0 - 1.3 10e3/uL    Absolute Eosinophils 0.2 0.0 - 0.7 10e3/uL    Absolute Basophils 0.0 0.0 - 0.2 10e3/uL    Absolute Myelocytes 0.1 (H) <=0.0 10e3/uL    RBC Morphology Confirmed RBC Indices     Platelet Assessment  Automated Count Confirmed. Platelet morphology is normal.     Automated Count Confirmed. Platelet morphology is normal.

## 2022-01-05 NOTE — PLAN OF CARE
"PRIMARY DIAGNOSIS: \"GENERIC\" NURSING  OUTPATIENT/OBSERVATION GOALS TO BE MET BEFORE DISCHARGE:  ADLs back to baseline: no    Activity and level of assistance: assist of one    Pain status: scheduled pain medications    Return to near baseline physical activity: no     Discharge Planner Nurse   Safe discharge environment identified: no  Barriers to discharge: yes       Entered by: Keisha Juarez 01/04/2022 10:12 PM   SW working on discharge - plan to discharge LTC with hospice. Patient confused at times. Continue to monitor.  Please review provider order for any additional goals.   Nurse to notify provider when observation goals have been met and patient is ready for discharge.  "

## 2022-01-05 NOTE — PROGRESS NOTES
[facility]  Hospitalist Progress Note  Patient Name: Beto Tran    MRN: [id number]  Provider: Ariel Yang MD  01/05/22             Assessment and Plan:      Summary of Stay: Beto Tran is a 46 year old  man with history of advanced multiple myeloma who has failed multiple treatments, chronic pain, thoracic outlet syndrome who was just discharged from [facility] (12/21-12/28) after being seen by Palliative care for pain med optimization and transition to a new hospice service.    He was briefly evaluated by [facility] as sounds like there was some misunderstanding as to whether he was going to have an additional person helping him at home (father backed out) so he was not officially enrolled in [facility] and had not been able to receive the rest of his pain medications.  He ran of his medication and therefore presented to the emergency room for assistance.     In reviewing the patient's medical hx, he and sig other Farhad ([phone]) report that Beto was cared for at [facility]. He underwent multiple chemotherapeutics and received stem cell transplant.  Despite aggressive cares treatment was felt to have failed and he was placed on hospice care.  He has been on hospice since 2015 and has received no treatment for his myeloma since that time.  He had a follow up with [facility] this past summer to see if any new therapy had evolved and was told that there was nothing available to treat him.  He has remained on hospice care.  Beto and his significant other requested a second opinion while hospitalized to determine if he would be a candidate for any therapy. He was seen by [facility] and it was thought that there were no other treatment options. Given his hypercalcemia of malignancy, a dose of Zometa was given on 1/3/22.  IVF was also recommended but Beto refused placement of peripheral IV.  Social work is involved and working on placement in long-term care  "with hospice.     Problem list:     Advanced multiple myeloma.    -There appears to be no other treatment options  - SW is working on long-term care placement with hospice.     Chronic pain secondary to multiple myeloma  - continue pain meds - appreciate assistance from Kasia of pain/palliative service     Right hand paraesthesia     History of thoracic outlet syndrome  -s/p 1st rib resection and sclenectomy     History of anemia and hypercalcemia from multiple myeloma   - zoledronic acid 4 mg IV given  - NS offered but patient refused placement of peripheral IV        DVT Prophylaxis: none needed as patient on hospice care  Code Status: DNR / DNI  Dispo:  When disposition issues arranged.          Interval History:      No new problems. Still awaiting dispo plans.                   Physical Exam:      Last Vital Signs:  /64   Pulse 84   Temp 99  F (37.2  C) (Axillary)   Resp 18   Ht 1.727 m (5' 8\")   Wt 73.3 kg (161 lb 11.2 oz)   SpO2 95%   BMI 24.59 kg/m      Intake/Output Summary (Last 24 hours) at 1/5/2022 1751  Last data filed at 1/5/2022 1643  Gross per 24 hour   Intake --   Output 1500 ml   Net -1500 ml       GENERAL:  Comfortable at rest. Cooperative.  PSYCH: pleasant, oriented, No acute distress.  EYES: PERRLA, Normal conjunctiva.  HEART:  Regular rate and rhythm. No JVD. Pulses normal. No edema.  LUNGS:  Clear to auscultation, normal Respiratory effort.  ABDOMEN:  Soft, no hepatosplenomegaly, normal bowel sounds.  EXTREMETIES: No clubbing, cyanosis or ischemia  SKIN:  Dry to touch, No rash.           Medications:      All current medications were reviewed.         Data:      All new lab and imaging data was reviewed.   Labs:       Lab Results   Component Value Date     01/05/2022     01/04/2022     01/01/2022     01/30/2015     01/07/2015     12/23/2014    Lab Results   Component Value Date    CHLORIDE 110 01/05/2022    CHLORIDE 112 01/04/2022    CHLORIDE " 107 01/01/2022    CHLORIDE 104 01/30/2015    CHLORIDE 104 01/07/2015    CHLORIDE 106 12/23/2014    Lab Results   Component Value Date    BUN 8 01/05/2022    BUN 10 01/04/2022    BUN 11 01/01/2022    BUN 12 01/30/2015    BUN 8 01/07/2015    BUN 10 12/23/2014      Lab Results   Component Value Date    POTASSIUM 3.5 01/05/2022    POTASSIUM 3.5 01/04/2022    POTASSIUM 3.4 01/02/2022    POTASSIUM 3.2 06/18/2015    POTASSIUM 3.6 06/12/2015    POTASSIUM 3.9 06/08/2015    Lab Results   Component Value Date    CO2 24 01/05/2022    CO2 24 01/04/2022    CO2 26 01/01/2022    CO2 28 01/30/2015    CO2 32 01/07/2015    CO2 27 12/23/2014    Lab Results   Component Value Date    CR 0.62 01/05/2022    CR 0.59 01/04/2022    CR 0.63 01/01/2022    CR 0.60 02/20/2015    CR 0.61 01/30/2015    CR 0.52 01/07/2015        Recent Labs   Lab 01/05/22  0735 01/04/22  0708 01/01/22  2304   WBC 6.0 5.7 6.1   HGB 7.6* 7.4* 8.7*   HCT 23.9* 22.7* 25.2*   * 102* 96    180 224

## 2022-01-05 NOTE — PLAN OF CARE
"PRIMARY DIAGNOSIS: \"GENERIC\" NURSING  OUTPATIENT/OBSERVATION GOALS TO BE MET BEFORE DISCHARGE:  ADLs back to baseline: No    Activity and level of assistance: Up with standby assistance.    Pain status: Scheduled Pain medications    Return to near baseline physical activity: No     Discharge Planner Nurse   Safe discharge environment identified: No  Barriers to discharge: Yes       Entered by: Silvino Encinas 01/05/2022 4:42 AM   SW working on discharge. Plan is to look for LTC for Pt's hospice care plan. Pt was A&O during shift and vss. Continue to monitor.   Please review provider order for any additional goals.   Nurse to notify provider when observation goals have been met and patient is ready for discharge.  "

## 2022-01-06 PROCEDURE — 120N000001 HC R&B MED SURG/OB

## 2022-01-06 PROCEDURE — 99225 PR SUBSEQUENT OBSERVATION CARE,LEVEL II: CPT | Performed by: NURSE PRACTITIONER

## 2022-01-06 PROCEDURE — 250N000013 HC RX MED GY IP 250 OP 250 PS 637: Performed by: INTERNAL MEDICINE

## 2022-01-06 PROCEDURE — G0378 HOSPITAL OBSERVATION PER HR: HCPCS

## 2022-01-06 PROCEDURE — 99225 PR SUBSEQUENT OBSERVATION CARE,LEVEL II: CPT | Performed by: INTERNAL MEDICINE

## 2022-01-06 PROCEDURE — 250N000013 HC RX MED GY IP 250 OP 250 PS 637: Performed by: NURSE PRACTITIONER

## 2022-01-06 PROCEDURE — 250N000013 HC RX MED GY IP 250 OP 250 PS 637: Performed by: PHYSICIAN ASSISTANT

## 2022-01-06 PROCEDURE — 99207 PR CDG-CODE CATEGORY CHANGED: CPT | Performed by: INTERNAL MEDICINE

## 2022-01-06 RX ADMIN — PREGABALIN 150 MG: 75 CAPSULE ORAL at 08:33

## 2022-01-06 RX ADMIN — MORPHINE SULFATE 180 MG: 30 TABLET, FILM COATED, EXTENDED RELEASE ORAL at 05:09

## 2022-01-06 RX ADMIN — IBUPROFEN 600 MG: 600 TABLET, FILM COATED ORAL at 18:56

## 2022-01-06 RX ADMIN — PREGABALIN 150 MG: 75 CAPSULE ORAL at 17:33

## 2022-01-06 RX ADMIN — ACETAMINOPHEN 1000 MG: 500 TABLET, FILM COATED ORAL at 05:07

## 2022-01-06 RX ADMIN — MORPHINE SULFATE 60 MG: 30 TABLET, FILM COATED, EXTENDED RELEASE ORAL at 17:33

## 2022-01-06 RX ADMIN — MORPHINE SULFATE 180 MG: 30 TABLET, FILM COATED, EXTENDED RELEASE ORAL at 17:33

## 2022-01-06 RX ADMIN — MORPHINE SULFATE 60 MG: 15 TABLET ORAL at 18:52

## 2022-01-06 RX ADMIN — Medication 1 MG: at 23:49

## 2022-01-06 RX ADMIN — LORAZEPAM 4 MG: 2 LIQUID ORAL at 23:49

## 2022-01-06 RX ADMIN — MORPHINE SULFATE 180 MG: 30 TABLET, FILM COATED, EXTENDED RELEASE ORAL at 23:47

## 2022-01-06 RX ADMIN — LORAZEPAM 4 MG: 2 LIQUID ORAL at 11:44

## 2022-01-06 RX ADMIN — HYDROXYZINE HYDROCHLORIDE 25 MG: 25 TABLET ORAL at 17:33

## 2022-01-06 RX ADMIN — Medication 1 MG: at 00:44

## 2022-01-06 RX ADMIN — MORPHINE SULFATE 60 MG: 15 TABLET ORAL at 08:33

## 2022-01-06 RX ADMIN — MORPHINE SULFATE 180 MG: 30 TABLET, FILM COATED, EXTENDED RELEASE ORAL at 11:45

## 2022-01-06 RX ADMIN — PREGABALIN 150 MG: 75 CAPSULE ORAL at 21:59

## 2022-01-06 RX ADMIN — MORPHINE SULFATE 45 MG: 15 TABLET ORAL at 00:44

## 2022-01-06 RX ADMIN — MULTIPLE VITAMINS W/ MINERALS TAB 1 TABLET: TAB at 08:33

## 2022-01-06 RX ADMIN — MORPHINE SULFATE 60 MG: 30 TABLET, FILM COATED, EXTENDED RELEASE ORAL at 11:45

## 2022-01-06 RX ADMIN — HYDROXYZINE HYDROCHLORIDE 25 MG: 25 TABLET ORAL at 11:45

## 2022-01-06 RX ADMIN — ACETAMINOPHEN 1000 MG: 500 TABLET, FILM COATED ORAL at 13:32

## 2022-01-06 RX ADMIN — HYDROXYZINE HYDROCHLORIDE 25 MG: 25 TABLET ORAL at 05:09

## 2022-01-06 RX ADMIN — MORPHINE SULFATE 60 MG: 30 TABLET, FILM COATED, EXTENDED RELEASE ORAL at 05:08

## 2022-01-06 RX ADMIN — METHOCARBAMOL 750 MG: 750 TABLET ORAL at 21:59

## 2022-01-06 RX ADMIN — MORPHINE SULFATE 60 MG: 30 TABLET, FILM COATED, EXTENDED RELEASE ORAL at 23:48

## 2022-01-06 RX ADMIN — LORAZEPAM 4 MG: 2 LIQUID ORAL at 05:09

## 2022-01-06 RX ADMIN — ACETAMINOPHEN 1000 MG: 500 TABLET, FILM COATED ORAL at 21:59

## 2022-01-06 RX ADMIN — MORPHINE SULFATE 15 MG: 15 TABLET ORAL at 00:59

## 2022-01-06 RX ADMIN — LORAZEPAM 4 MG: 2 LIQUID ORAL at 17:34

## 2022-01-06 RX ADMIN — MORPHINE SULFATE 60 MG: 15 TABLET ORAL at 21:59

## 2022-01-06 RX ADMIN — HYDROXYZINE HYDROCHLORIDE 25 MG: 25 TABLET ORAL at 23:49

## 2022-01-06 RX ADMIN — NICOTINE 1 PATCH: 14 PATCH, EXTENDED RELEASE TRANSDERMAL at 08:33

## 2022-01-06 NOTE — PLAN OF CARE
PRIMARY DIAGNOSIS: ACUTE PAIN  OUTPATIENT/OBSERVATION GOALS TO BE MET BEFORE DISCHARGE:  1. Pain Status: Improved-controlled with oral pain medications.    2. Return to near baseline physical activity: Yes    3. Cleared for discharge by consultants (if involved): No - psych to eval for suboxone.     Discharge Planner Nurse   Safe discharge environment identified: No - placement required.   Barriers to discharge: Yes       Entered by: Marissa Pearl 01/06/2022 5:06 PM     Please review provider order for any additional goals.   Nurse to notify provider when observation goals have been met and patient is ready for discharge.    VSS. A/Ox4, but forgetful. LS clear. Baseline neuropathy. New LUE numbness/tingling this hospitalization. Voiding spontaneously. 6-9/10 generalized pain, often sleeping between cares. Scheduled morphine. PRN morphine x1. Scheduled Tylenol. Scheduled ativan. Pain team following. Hem/Onc evaluated. SW following. discharge to LTC facility with hospice. Awaiting psych eval for suboxone evaluation. SO at bedside this evening.

## 2022-01-06 NOTE — PROGRESS NOTES
[facility]  Palliative Care Progress Note  Text Page     Assessment & Plan    Beto Tran is a 46 year old male who was admitted on 1/1/2022.   Consulted by Beverley Blair. Erinn  to assist with symptom management, goals of care and development of plan of care      Recommendations:  1. Goals of Care- No CPR- Do NOT Intubate  Hospitalization goals discussed  No change in goals of care, continues on comfort plan.   Decisional Capacity- Intact. Patient does not have an advance directive. Per  informed consent policy next of kin should be involved if patient becomes unable. Next of Kin is Matty Tran, father   POLST  None on file      2. Pain wide spread pain in the setting of end stage multiple myeloma. Pain more pronounce low back, hands( right >left) and right shoulder   -likely opioid withdrawal -mild to moderate    Patient's opioid use in past 24 hours: 1050 mg Morphine  = Daily Morphine Equivalent  [place] Board of Pharmacy Data Base Reviewed:    YES; As expected, no concern for misuse/abuse of controlled medications based on this report.  ORT   ( add dot phrase .FRHORT if warranted)  Chronic opioid use Morphine Equivalent= 1980 mg  Increase Ms Contin to 240 mg every 6 hrs, reduce Morphine IR 60 mg every 3 hrs prn   methocarbamol ( Robaxin) 750 mg qid  Continue Lyrica 150 mg tid   Hydroxyzine 25 mg every 6 hrs      3. Dyspnea  None      4. Anxiety   Ativan solution  4 mg every 6 hrs     5. Spiritual Care  Oriented to Spiritual Health as part of Palliative Care team.  Spiritual Background: undesignated      6. Care Planning  Appreciate Care of Carlee LARA in d/c planning . LTC with suboxone   Medications for discharge to be determined    Interval visit:  Resting, in a little more pain today. Left hand pain with numbness, tingling reduced grasp.  Right hand also painful , but no nerve type pain. Discussed Suboxone and micro-induction, can be dispensed through hospice  "provider once induction done and discharge on hospice.  Patient agreeable to micro-induction, but still concerned about stigma. . Pain 8/10      Medical Decision Making and Goals of Care:    Discussed on January 5, 2022 with Kasia Luis, RN-C,APRN,CNP,ACHPN:  Asleep this morning. Met with patient this afternoon.  States pain no better 8/10. Always being disturbed and poor sleep results. Calmer today with staff and allowed visits with oncology and this writer. Reflected on this writers observation of sleeping, less anxious. Informed again of med change to lyrica from gabapentin and is ok with this.  Discussed use of suboxone and asked patient to strongly consider cross over from Mu opioids. Explained mechanism of action and how it may help his pain better and improve QoL.  He raised concerns about being seen as an addict, wonder where he would get the medication and if it was at a MAT center. This write acknowledged his concern but stated it is different now.  Suboxone is widely used in patient's with chronic pain and high opioid tolerance as well as the hospice patient.  Stated would need to partner with social work to find right fit at discharge to continue prescribing.  TCU may be challenging as many do not accept.  Asked patient to think about it and consider Suboxone induction.  It is a good opportunity now as he is IP.  Denies SOB, no constipation,     Discussed on January 3, 2022 with Kasia JONES, CNP:  Familiar with patient from previous recent admission.  Feeling miserable today with widespread pain, achyness and \"flu like pain\".  He is not sure of next steps. Feels hopeless with options, let down that dad did not come to stay. He is coming to terms with alternate plans for discharge.  Discussed group home with people of potential of similar age.     Patient aware I discussed with social work.    Discusssed pain management changes done today and plan to streamline " medication for ease of administration.  He is not open to use of cross over to Suboxone as then he has another layer of being looked at as a drug abuser.    Thank you for involving us in the patient's care.        Kasia Luis RN, PGMT-BC, APRN, CNP,ACHPN  Pain Management and Palliative Care  [facility]  Pgr: [phone]      Time Spent on this Encounter   Total unit/floor time 20 minutes, time consisted of the following, examination of the patient, reviewing the record and completing documentation. >50% of time spent in counseling and coordination of care, Bedside Nurse Marissa Pearl, RN and Hospitalist Eric Yang MD.,socail work Carlee Jimmy,   Time spend counseling with patient consisted of the following topics, care planning for discharge and symptom management.    Review of Systems    CONSTITUTIONAL: NEGATIVE for fever, chills, change in weight  ENT/MOUTH: NEGATIVE for ear, mouth and throat problems  RESP: NEGATIVE for significant cough or SOB  CV: NEGATIVE for chest pain, palpitations or peripheral edema    Palliative Symptom Review (0=no symptom/no concern, 1=mild, 2=moderate, 3=severe):      Pain: 2-moderate      Fatigue: 1-mild      Nausea: 0-none      Constipation: 0-none      Diarrhea: 0-none      Depressive Symptoms: 1-mild      Anxiety: 1-mild      Drowsiness: 0-none      Poor Appetite: 0-none      Shortness of Breath: 0-none      Insomnia: 1-mild      Other:        Overall (0 good/no concerns, 3 very poor):  2    Physical Exam   Temp:  [98.2  F (36.8  C)-99.1  F (37.3  C)] 98.5  F (36.9  C)  Pulse:  [82-89] 83  Resp:  [16-18] 16  BP: (114-132)/(39-64) 122/51  SpO2:  [91 %-95 %] 93 %  161 lbs 11.2 oz  Exam:  GEN:  Alert, oriented x 3, appears comfortable, NAD.  HEENT:  Normocephalic/atraumatic, no scleral icterus, no nasal discharge, mouth moist.  CV:  RRR, S1, S2; no murmurs or other irregularities noted.  +3 DP/PT pulses bilatererally; no edema  BLE.  RESP:  Clear to auscultation bilaterally without rales/rhonchi/wheezing/retractions.  Symmetric chest rise on inhalation noted.  Normal respiratory effort.  ABD:  Rounded, soft, non-tender/non-distended.  +BS  EXT:  Edema & pulses as noted above.  CMS intact x 4.     M/S:   Nontender to palpation throughout.    SKIN:  Dry to touch, no exanthems noted in the visualized areas.    NEURO: Symmetric strength +5/5.  Sensation to touch intact all extremities.   There is no area of allodynia or hyperesthesia.  PAIN BEHAVIOR: Cooperative  Psych:  Normal affect.  Calm, cooperative, conversant appropriately.    Medications     sodium chloride Stopped (01/04/22 0231)       acetaminophen  1,000 mg Oral Q8H     hydrOXYzine  25 mg Oral Q6H     LORazepam  4 mg Oral Q6H     methocarbamol  750 mg Oral At Bedtime     morphine  180 mg Oral Q6H     morphine  60 mg Oral Q6H     multivitamin w/minerals  1 tablet Oral Daily     nicotine  1 patch Transdermal Daily     nicotine   Transdermal Q8H     polyethylene glycol  17 g Oral Daily     pregabalin  150 mg Oral TID       Data   No results found for this or any previous visit (from the past 24 hour(s)).

## 2022-01-06 NOTE — PLAN OF CARE
"PRIMARY DIAGNOSIS: \"GENERIC\" NURSING  OUTPATIENT/OBSERVATION GOALS TO BE MET BEFORE DISCHARGE:  ADLs back to baseline: No    Activity and level of assistance: Assist of 1 w/ walker. Refuses belt    Pain status: 8/10 pain on scheduled pain meds and PRNs    Return to near baseline physical activity: No     Discharge Planner Nurse   Safe discharge environment identified: No  Barriers to discharge: Yes       Entered by: Silvino Encinas 01/06/2022 7:51 AM   Pt was sleeping after scheduled pain meds given. Still vss. Continue POC.   Please review provider order for any additional goals.   Nurse to notify provider when observation goals have been met and patient is ready for discharge.  "

## 2022-01-06 NOTE — PLAN OF CARE
"PRIMARY DIAGNOSIS: \"GENERIC\" NURSING  OUTPATIENT/OBSERVATION GOALS TO BE MET BEFORE DISCHARGE:  ADLs back to baseline: No    Activity and level of assistance: Up with standby assistance.    Pain status: Continuing with scheduled pain meds and PRNs.     Return to near baseline physical activity: No     Discharge Planner Nurse   Safe discharge environment identified: No  Barriers to discharge: Yes       Entered by: Silvino Encinas 01/06/2022 4:21 AM   Pt slept most of night. After PRN morphine and melatonin given at 0000. Pt stable on RA. Continue POC.  Please review provider order for any additional goals.   Nurse to notify provider when observation goals have been met and patient is ready for discharge.      "

## 2022-01-06 NOTE — PROGRESS NOTES
Care Management Follow Up    Length of Stay (days): 1    Expected Discharge Date: 01/08/2022     Concerns to be Addressed:   Disposition needs. Patient is no longer able to care for himself independently alone in his home, it is unsafe. Looking for LTC facility in [facility].    Patient plan of care discussed at interdisciplinary rounds: Yes    Anticipated Discharge Disposition:  LTC with hospice.     Anticipated Discharge Services:  LTC and hospice  Anticipated Discharge DME: Hospice and LTC facility.     Patient/family educated on Medicare website which has current facility and service quality ratings:  Yes    Education Provided on the Discharge Plan: yes     Patient/Family in Agreement with the Plan:  yes    Referrals Placed by CM/SW:  Yes    Private pay costs discussed: private room/amenity fees and transportation costs    Additional Information:  Met with Beto today to discuss disposition. Prior to hospitalization patient was living independently at his mother's condo in [place]. The plan had been for him to sign on a hospice program. Hospice didn't sigh him on the program as he wasn't safe at home alone. Patient's father was suppose to be there but according to Beto his father never showed up.    Beto's significant other usually helps him out but she is no longer physically able to meet patient's needs.    Beto has agreed to LTC care placement but he would like to be closer to his home and his SO, Bharathi in [place]. Referrals have been made to facilities in [facility] which would be close to [place].    He doesn't want to stay in a facility forever. He has a [facility] CADI worker, Franny [phone]. I left a message for h er today because Beto would like to be re-evaluated for PCA hours at home. His goal would be to get enough PCA at home so his SO could get enough help to support her to care for him.    [facility] in [place], are reviewing home. Oksana  is contact [phone].  [facility] in [place] is also reviewing. Phuong is contact [phone]    More referrals made.    Patient will need a hospice program to follow as they would manage the suboxone.    MIKEY Russo   Inpatient Care Coordination   Supervisor  [facility]  [phone]      JANE Martini

## 2022-01-06 NOTE — PLAN OF CARE
Significant other requested PRN dose of morphine from support staff with vitals taken at 1705. RN was notified of this by support staff at 1715. RN was unavailable to attend to patient need immediately, but at 1725 reviewed meds and noted scheduled morphine 180mg + 60mg, ativan 4mg, atarax 25mg and lyrica 150mg were due.    RN brought these medications into patient room for administration and the SO began expressing frustration with RN that he needed the PRN dose the 20 minutes earlier and that staff should not be waiting until the scheduled times to give pain meds. RN validated her concerns and provided explanation for bringing scheduled pain meds versus the PRN dose at that time.    The SO continued to express frustration with the pain regimen and that q 3 hour morphine is PRN rather than scheduled and that his pain should be in the 4-6 range rather than at the 7.5/10 the patient currently rated it. SO expressed she feels his pain was better controlled at home.     During this encounter the patient did not verbalize any other concerns. The patient is A/Ox4 and aware of PRN medication availability, but had not requested any PRN medication since 0830 this AM.     The patient has been noted all day as sleeping between cares and expressed that if he was sleeping he did not wish to be awoken.    Sticky note left in chart for Pain team to review this regimen with the SO tomorrow if able to be sure patient pain is controlled.

## 2022-01-06 NOTE — PLAN OF CARE
PRIMARY DIAGNOSIS: ACUTE PAIN  OUTPATIENT/OBSERVATION GOALS TO BE MET BEFORE DISCHARGE:  1. Pain Status: Improved-controlled with oral pain medications.    2. Return to near baseline physical activity: Yes    3. Cleared for discharge by consultants (if involved): No - ?Potential for Behavioral health to see to eval suboxone    Discharge Planner Nurse   Safe discharge environment identified: No - seeking placement.   Barriers to discharge: Yes       Entered by: Marissa Pearl 01/06/2022 1:26 PM     Please review provider order for any additional goals.   Nurse to notify provider when observation goals have been met and patient is ready for discharge.

## 2022-01-06 NOTE — PLAN OF CARE
PRIMARY DIAGNOSIS: ACUTE PAIN  OUTPATIENT/OBSERVATION GOALS TO BE MET BEFORE DISCHARGE:  1. Pain Status: Unchanged with oral pain medications.    2. Return to near baseline physical activity: Yes    3. Cleared for discharge by consultants (if involved): N/A    Discharge Planner Nurse   Safe discharge environment identified: No - looking for placement  Barriers to discharge: Yes       Entered by: Marissa Pearl 01/06/2022 8:41 AM     Please review provider order for any additional goals.   Nurse to notify provider when observation goals have been met and patient is ready for discharge.

## 2022-01-06 NOTE — PROGRESS NOTES
[facility]  Hospitalist Progress Note  Patient Name: Beto Tran    MRN: [id number]  Provider: Ariel Yang MD  01/06/22             Assessment and Plan:      Summary of Stay: Beto Tran is a 46 year old  man with history of advanced multiple myeloma who has failed multiple treatments, chronic pain, thoracic outlet syndrome who was just discharged from [facility] (12/21-12/28) after being seen by Palliative care for pain med optimization and transition to a new hospice service.    He was briefly evaluated by [facility] as sounds like there was some misunderstanding as to whether he was going to have an additional person helping him at home (father backed out) so he was not officially enrolled in [facility] and had not been able to receive the rest of his pain medications.  He ran of his medication and therefore presented to the emergency room for assistance.     In reviewing the patient's medical hx, he and sig other Farhad ([phone]) report that Beto was cared for at [facility]. He underwent multiple chemotherapeutics and received stem cell transplant.  Despite aggressive cares treatment was felt to have failed and he was placed on hospice care.  He has been on hospice since 2015 and has received no treatment for his myeloma since that time.  He had a follow up with [facility] this past summer to see if any new therapy had evolved and was told that there was nothing available to treat him.  He has remained on hospice care.  Beto and his significant other requested a second opinion while hospitalized to determine if he would be a candidate for any therapy. He was seen by [facility] and it was thought that there were no other treatment options. Given his hypercalcemia of malignancy, a dose of Zometa was given on 1/3/22.  IVF was also recommended but Beto refused placement of peripheral IV.  Social work is involved and working on placement in long-term care  "with hospice.     Problem list:     Advanced multiple myeloma.    -There appears to be no other treatment options  - SW is working on long-term care placement with hospice.     Chronic pain secondary to multiple myeloma  - continue pain meds - appreciate assistance from Kasia of pain/palliative service. Patient is open to possibly changing to suboxone. Pain management has requested Psych consult as part of that process.  Pain meds per pain management.      Right hand paraesthesia  -OT consult per pain management     History of thoracic outlet syndrome  -s/p 1st rib resection and sclenectomy     History of anemia and hypercalcemia from multiple myeloma   - zoledronic acid 4 mg IV given  - NS offered but patient refused placement of peripheral IV        DVT Prophylaxis: none needed as patient on hospice care  Code Status: DNR / DNI  Dispo:  When disposition issues arranged and after suboxone induction? (if patient agrees)        Interval History:      No new problems. Open to possibly changing from MS contin to suboxone.                   Physical Exam:      Last Vital Signs:  /51 (BP Location: Left arm, Patient Position: Supine)   Pulse 83   Temp 98.5  F (36.9  C) (Oral)   Resp 16   Ht 1.727 m (5' 8\")   Wt 73.3 kg (161 lb 11.2 oz)   SpO2 93%   BMI 24.59 kg/m      Intake/Output Summary (Last 24 hours) at 1/6/2022 1615  Last data filed at 1/6/2022 1147  Gross per 24 hour   Intake --   Output 2525 ml   Net -2525 ml       GENERAL:  Comfortable appearing at rest. Cooperative.  PSYCH: pleasant, oriented, No acute distress.  EYES: PERRLA, Normal conjunctiva.  HEART:  Regular rate and rhythm. No JVD. Pulses normal. No edema.  LUNGS:  Clear to auscultation, normal Respiratory effort.  ABDOMEN:  Soft, no hepatosplenomegaly, normal bowel sounds.  EXTREMETIES: No clubbing, cyanosis or ischemia  SKIN:  Dry to touch, No rash.           Medications:      All current medications were reviewed.         Data:      All " new lab and imaging data was reviewed.   Labs:       Lab Results   Component Value Date     01/05/2022     01/04/2022     01/01/2022     01/30/2015     01/07/2015     12/23/2014    Lab Results   Component Value Date    CHLORIDE 110 01/05/2022    CHLORIDE 112 01/04/2022    CHLORIDE 107 01/01/2022    CHLORIDE 104 01/30/2015    CHLORIDE 104 01/07/2015    CHLORIDE 106 12/23/2014    Lab Results   Component Value Date    BUN 8 01/05/2022    BUN 10 01/04/2022    BUN 11 01/01/2022    BUN 12 01/30/2015    BUN 8 01/07/2015    BUN 10 12/23/2014      Lab Results   Component Value Date    POTASSIUM 3.5 01/05/2022    POTASSIUM 3.5 01/04/2022    POTASSIUM 3.4 01/02/2022    POTASSIUM 3.2 06/18/2015    POTASSIUM 3.6 06/12/2015    POTASSIUM 3.9 06/08/2015    Lab Results   Component Value Date    CO2 24 01/05/2022    CO2 24 01/04/2022    CO2 26 01/01/2022    CO2 28 01/30/2015    CO2 32 01/07/2015    CO2 27 12/23/2014    Lab Results   Component Value Date    CR 0.62 01/05/2022    CR 0.59 01/04/2022    CR 0.63 01/01/2022    CR 0.60 02/20/2015    CR 0.61 01/30/2015    CR 0.52 01/07/2015        Recent Labs   Lab 01/05/22  0735 01/04/22  0708 01/01/22  2304   WBC 6.0 5.7 6.1   HGB 7.6* 7.4* 8.7*   HCT 23.9* 22.7* 25.2*   * 102* 96    180 224

## 2022-01-07 PROCEDURE — 99207 PR CDG-CODE CATEGORY CHANGED: CPT | Performed by: INTERNAL MEDICINE

## 2022-01-07 PROCEDURE — 120N000001 HC R&B MED SURG/OB

## 2022-01-07 PROCEDURE — 99225 PR SUBSEQUENT OBSERVATION CARE,LEVEL II: CPT | Performed by: INTERNAL MEDICINE

## 2022-01-07 PROCEDURE — 99233 SBSQ HOSP IP/OBS HIGH 50: CPT | Performed by: NURSE PRACTITIONER

## 2022-01-07 PROCEDURE — 250N000013 HC RX MED GY IP 250 OP 250 PS 637: Performed by: NURSE PRACTITIONER

## 2022-01-07 PROCEDURE — 250N000013 HC RX MED GY IP 250 OP 250 PS 637: Performed by: PHYSICIAN ASSISTANT

## 2022-01-07 PROCEDURE — G0378 HOSPITAL OBSERVATION PER HR: HCPCS

## 2022-01-07 PROCEDURE — 250N000013 HC RX MED GY IP 250 OP 250 PS 637: Performed by: INTERNAL MEDICINE

## 2022-01-07 RX ORDER — MORPHINE SULFATE 15 MG/1
60 TABLET ORAL ONCE
Status: COMPLETED | OUTPATIENT
Start: 2022-01-07 | End: 2022-01-07

## 2022-01-07 RX ORDER — MORPHINE SULFATE 15 MG/1
15 TABLET, FILM COATED, EXTENDED RELEASE ORAL EVERY 6 HOURS
Status: DISCONTINUED | OUTPATIENT
Start: 2022-01-07 | End: 2022-01-12

## 2022-01-07 RX ORDER — PREGABALIN 75 MG/1
150 CAPSULE ORAL ONCE
Status: COMPLETED | OUTPATIENT
Start: 2022-01-07 | End: 2022-01-07

## 2022-01-07 RX ORDER — PREGABALIN 100 MG/1
300 CAPSULE ORAL 2 TIMES DAILY
Status: DISCONTINUED | OUTPATIENT
Start: 2022-01-07 | End: 2022-01-13 | Stop reason: HOSPADM

## 2022-01-07 RX ADMIN — ACETAMINOPHEN 1000 MG: 500 TABLET, FILM COATED ORAL at 13:27

## 2022-01-07 RX ADMIN — MORPHINE SULFATE 60 MG: 15 TABLET ORAL at 19:36

## 2022-01-07 RX ADMIN — MORPHINE SULFATE 180 MG: 30 TABLET, FILM COATED, EXTENDED RELEASE ORAL at 05:30

## 2022-01-07 RX ADMIN — MORPHINE SULFATE 60 MG: 30 TABLET, FILM COATED, EXTENDED RELEASE ORAL at 17:07

## 2022-01-07 RX ADMIN — MORPHINE SULFATE 60 MG: 15 TABLET ORAL at 13:27

## 2022-01-07 RX ADMIN — LORAZEPAM 4 MG: 2 LIQUID ORAL at 05:31

## 2022-01-07 RX ADMIN — PREGABALIN 300 MG: 100 CAPSULE ORAL at 21:05

## 2022-01-07 RX ADMIN — NICOTINE 1 PATCH: 14 PATCH, EXTENDED RELEASE TRANSDERMAL at 09:12

## 2022-01-07 RX ADMIN — LORAZEPAM 4 MG: 2 LIQUID ORAL at 11:24

## 2022-01-07 RX ADMIN — HYDROXYZINE HYDROCHLORIDE 25 MG: 25 TABLET ORAL at 05:31

## 2022-01-07 RX ADMIN — LORAZEPAM 4 MG: 2 LIQUID ORAL at 17:07

## 2022-01-07 RX ADMIN — MORPHINE SULFATE 15 MG: 15 TABLET, EXTENDED RELEASE ORAL at 22:58

## 2022-01-07 RX ADMIN — MORPHINE SULFATE 60 MG: 30 TABLET, FILM COATED, EXTENDED RELEASE ORAL at 22:57

## 2022-01-07 RX ADMIN — HYDROXYZINE HYDROCHLORIDE 25 MG: 25 TABLET ORAL at 11:24

## 2022-01-07 RX ADMIN — IBUPROFEN 600 MG: 600 TABLET, FILM COATED ORAL at 18:36

## 2022-01-07 RX ADMIN — MORPHINE SULFATE 180 MG: 30 TABLET, FILM COATED, EXTENDED RELEASE ORAL at 11:23

## 2022-01-07 RX ADMIN — MORPHINE SULFATE 60 MG: 15 TABLET ORAL at 09:22

## 2022-01-07 RX ADMIN — MORPHINE SULFATE 60 MG: 15 TABLET ORAL at 22:53

## 2022-01-07 RX ADMIN — HYDROXYZINE HYDROCHLORIDE 25 MG: 25 TABLET ORAL at 17:07

## 2022-01-07 RX ADMIN — MULTIPLE VITAMINS W/ MINERALS TAB 1 TABLET: TAB at 09:12

## 2022-01-07 RX ADMIN — PREGABALIN 150 MG: 75 CAPSULE ORAL at 09:12

## 2022-01-07 RX ADMIN — MORPHINE SULFATE 60 MG: 30 TABLET, FILM COATED, EXTENDED RELEASE ORAL at 11:24

## 2022-01-07 RX ADMIN — MORPHINE SULFATE 60 MG: 15 TABLET ORAL at 16:32

## 2022-01-07 RX ADMIN — MORPHINE SULFATE 180 MG: 30 TABLET, FILM COATED, EXTENDED RELEASE ORAL at 22:54

## 2022-01-07 RX ADMIN — ACETAMINOPHEN 1000 MG: 500 TABLET, FILM COATED ORAL at 05:31

## 2022-01-07 RX ADMIN — MORPHINE SULFATE 15 MG: 15 TABLET, EXTENDED RELEASE ORAL at 17:29

## 2022-01-07 RX ADMIN — LORAZEPAM 4 MG: 2 LIQUID ORAL at 22:53

## 2022-01-07 RX ADMIN — MORPHINE SULFATE 180 MG: 30 TABLET, FILM COATED, EXTENDED RELEASE ORAL at 17:06

## 2022-01-07 RX ADMIN — ACETAMINOPHEN 650 MG: 325 TABLET, FILM COATED ORAL at 18:36

## 2022-01-07 RX ADMIN — HYDROXYZINE HYDROCHLORIDE 25 MG: 25 TABLET ORAL at 22:55

## 2022-01-07 RX ADMIN — METHOCARBAMOL 750 MG: 750 TABLET ORAL at 22:56

## 2022-01-07 RX ADMIN — PREGABALIN 150 MG: 75 CAPSULE ORAL at 16:39

## 2022-01-07 RX ADMIN — MORPHINE SULFATE 60 MG: 30 TABLET, FILM COATED, EXTENDED RELEASE ORAL at 05:32

## 2022-01-07 NOTE — CONSULTS
"Consult Date: 01/07/2022    IDENTIFICATION:  The patient is a 46-year-old partnered  male with terminal multiple myeloma and chronic pain, who is seen for psychiatric consultation at request of Ms. Lazaro Waterman of pain service for psychiatric input and care.    HISTORY OF PRESENT ILLNESS:  The patient had been residing at an apartment in [place], which he shares with his partner, Bharathi Mejia.  He had been on essentially comfort care and not able to receive any further intervention for his multiple myeloma.  He had undergone a bone marrow transplant and aggressive treatment in 2015 and has been on longer term palliative care.  He is currently receiving MS Contin, morphine IR, scheduled Ativan.  He had been seen by the HEM/ONC service for reevaluation, which has affirmed his current diagnosis, prognosis, and recommendation.   is following and looking for increased level of care as 5-hours of PCA daily is still inadequate for his care needs with his partner.  Referrals have been made.  The patient states he does not wish to see a psychiatrist.  I did attempt to discuss possible psychotropic intervention that may also augment his pain care.  He states that \"my father and mother were administrators in  and I do not need anyone to alter the chemistry in my brain.\"  He did ask appropriately some examples of psychotropics.  He does state he sleeps unless \"the nurses wake me up.\"  Currently under adequate pain control.  He states he is eating, \"as much as I need to with my level of activity.\"  He notes some weight loss.  He did not wish any improvement in appetite, \"I don't need to gain a lot of weight.\"  He denies any confusion.  He has been somewhat intermittently refusing some assistance from nursing care, does walk with assist of 1 and walker, refuses belt.  Today had told nurse he was 8/10 in pain.  He had noted, with palliative care, some concern about being labeled chemically " "dependent.  He had been treated for hyperglycemia with Zometa.  No new infectious disease process.  He states he wishes to get out of the hospital as soon as possible.  Validated that he needs adequate services.  He did, however, call \"one  just okay\" and the other, \"is an idiot.\"  He does not know the names of any of his doctors or care providers.  Focus appears quite limited to his pain management and ability to stay out of hospital.  Apparently, his father was to stay with him in hospice, but had decided not to come and he had expressed some sadness over this with staff.  He engaged in some anger/blaming.  I did allow him to vent, encouraged him to work with staff and continue to express his needs.    PAST PSYCHIATRIC HISTORY:  No hospitalizations, suicide attempts, manic or psychotic episodes or dementia.  He has had alcohol use, denies dependency or illegal drug use.    PAST MEDICAL HISTORY:  Advanced multiple myeloma with history of transplant, thoracic outlet syndrome, status post rib resection, scalenectomy, right thumb surgery, optic surgery.    ALLERGIES:  NO KNOWN DRUG ALLERGIES.    FAMILY HISTORY:  No known mental illness, chemical dependency or suicides.    SOCIAL HISTORY:  The patient resides with his long-term partner, Bharathi in [place].  He is on Disability and utilizes palliative care/hospice.  No legal issues or domestic abuse.    PHYSICAL EXAMINATION:    VITAL SIGNS:  Temperature 98.2, /59, pulse 76, respiratory rate 16, weight 73.3 kilograms.  MENTAL STATUS EXAMINATION:  The patient is a 46-year-old thin  who removed his headphones but avoided eye contact.  He was lying quietly.  No obvious neurological neglect.  He has been ambulatory.  He is fully oriented.  Energy level low.  He is irritable, although he does not recognize/admit depressed mood.  No hallucinations, suicidal or homicidal ideation.  He struggles with coping skills.  He is clearly frustrated with the " situation.  He does have insight into his situation and prognosis.  He is agreeing to work with staff, has not threatened to leave.  He appeared sedated/tired but did maintain attention during interview.    DIAGNOSES:  Adjustment disorder with depressed mood, chronic; chronic pain disorder.    RECOMMENDATION:  The patient may benefit from antidepressant therapy such as Remeron, which has tricyclic effects, but reports he is sleeping and wishes no mood agent.  He is cognitively able to refuse care.  No acute safety concerns.  Placement is pending, appears to be a frustrating situation for him.  He has been on long-term palliative care and hospice, but has increased nursing needs.  He declined referral to counseling, which may be available through hospitalist service.  I can be reached if any further questions, and thank you for consultation for this unfortunate gentleman.    Amanda Estrada MD        D: 2022   T: 2022   MT: CHERRI    Name:     REGINALDO CHÁVEZLetty  MRN:      [id number]        Account:      [id number]   :      1975           Consult Date: 2022     Document: [id number]

## 2022-01-07 NOTE — PROGRESS NOTES
[facility]  Hospitalist Progress Note  Patient Name: Beto Tran    MRN: [id number]  Provider: Ariel Yang MD  01/07/22             Assessment and Plan:      Summary of Stay: Beto Tran is a 46 year old  man with history of advanced multiple myeloma who has failed multiple treatments, chronic pain, thoracic outlet syndrome who was just discharged from [facility] (12/21-12/28) after being seen by Palliative care for pain med optimization and transition to a new hospice service.    He was briefly evaluated by [facility] as sounds like there was some misunderstanding as to whether he was going to have an additional person helping him at home (father backed out) so he was not officially enrolled in [facility] and had not been able to receive the rest of his pain medications.  He ran of his medication and therefore presented to the emergency room for assistance.     In reviewing the patient's medical hx, he and sig other Farhad ([phone]) report that Beto was cared for at [facility]. He underwent multiple chemotherapeutics and received stem cell transplant.  Despite aggressive cares treatment was felt to have failed and he was placed on hospice care.  He has been on hospice since 2015 and has received no treatment for his myeloma since that time.  He had a follow up with [facility] this past summer to see if any new therapy had evolved and was told that there was nothing available to treat him.  He has remained on hospice care.  Beto and his significant other requested a second opinion while hospitalized to determine if he would be a candidate for any therapy. He was seen by [facility] and it was thought that there were no other treatment options. Given his hypercalcemia of malignancy, a dose of Zometa was given on 1/3/22.  IVF was also recommended but Beto refused placement of peripheral IV.  Social work is involved and working on placement in long-term care  "with hospice.     Problem list:     Advanced multiple myeloma.    -There appears to be no other treatment options  - SW is working on long-term care placement with hospice.     Chronic pain secondary to multiple myeloma  - continue pain meds - appreciate assistance from Kasia of pain/palliative service. Patient is open to possibly changing to suboxone. Pain management has requested Psych consult as part of that process.  Pain meds per pain management.      Right hand paraesthesia  -OT consult per pain management     History of thoracic outlet syndrome  -s/p 1st rib resection and sclenectomy     History of anemia and hypercalcemia from multiple myeloma   - zoledronic acid 4 mg IV given  - NS offered but patient refused placement of peripheral IV        DVT Prophylaxis: none needed as patient on hospice care  Code Status: DNR / DNI  Dispo:  When disposition issues arranged and after suboxone induction? (if patient agrees)          Interval History:      No changes.                   Physical Exam:      Last Vital Signs:  /60 (BP Location: Left arm, Patient Position: Semi-Aguilar's)   Pulse 50   Temp 98.1  F (36.7  C) (Oral)   Resp 14   Ht 1.727 m (5' 8\")   Wt 73.3 kg (161 lb 11.2 oz)   SpO2 95%   BMI 24.59 kg/m      Intake/Output Summary (Last 24 hours) at 1/7/2022 1618  Last data filed at 1/7/2022 1322  Gross per 24 hour   Intake 760 ml   Output 2770 ml   Net -2010 ml       GENERAL:  Uncomfortable. Contrary and uncooperative.  PSYCH: pleasant, oriented, No acute distress.  EYES: PERRLA, Normal conjunctiva.  HEART:  Regular rate and rhythm. No JVD. Pulses normal. No edema.  LUNGS:  Clear to auscultation, normal Respiratory effort.  ABDOMEN:  Soft, no hepatosplenomegaly, normal bowel sounds.  EXTREMETIES: No clubbing, cyanosis or ischemia  SKIN:  Dry to touch, No rash.           Medications:      All current medications were reviewed.         Data:      All new lab and imaging data was reviewed. "   Labs:       Lab Results   Component Value Date     01/05/2022     01/04/2022     01/01/2022     01/30/2015     01/07/2015     12/23/2014    Lab Results   Component Value Date    CHLORIDE 110 01/05/2022    CHLORIDE 112 01/04/2022    CHLORIDE 107 01/01/2022    CHLORIDE 104 01/30/2015    CHLORIDE 104 01/07/2015    CHLORIDE 106 12/23/2014    Lab Results   Component Value Date    BUN 8 01/05/2022    BUN 10 01/04/2022    BUN 11 01/01/2022    BUN 12 01/30/2015    BUN 8 01/07/2015    BUN 10 12/23/2014      Lab Results   Component Value Date    POTASSIUM 3.5 01/05/2022    POTASSIUM 3.5 01/04/2022    POTASSIUM 3.4 01/02/2022    POTASSIUM 3.2 06/18/2015    POTASSIUM 3.6 06/12/2015    POTASSIUM 3.9 06/08/2015    Lab Results   Component Value Date    CO2 24 01/05/2022    CO2 24 01/04/2022    CO2 26 01/01/2022    CO2 28 01/30/2015    CO2 32 01/07/2015    CO2 27 12/23/2014    Lab Results   Component Value Date    CR 0.62 01/05/2022    CR 0.59 01/04/2022    CR 0.63 01/01/2022    CR 0.60 02/20/2015    CR 0.61 01/30/2015    CR 0.52 01/07/2015        Recent Labs   Lab 01/05/22  0735 01/04/22  0708 01/01/22  2304   WBC 6.0 5.7 6.1   HGB 7.6* 7.4* 8.7*   HCT 23.9* 22.7* 25.2*   * 102* 96    180 224

## 2022-01-07 NOTE — PLAN OF CARE
Patient alert and oriented, up with assist of 1, but did not get up this shift, used urinal at bedside. PRN morphine, scheduled tylenol, robaxin, and lyrica given tonight. IV in left hand saline locked. Pain team, hem/onc, SW following. Discharge plan pending at this time.

## 2022-01-07 NOTE — PROGRESS NOTES
Care Management Follow Up    Length of Stay (days): 1    Expected Discharge Date: 01/10/2022 - Pt will be ready prior to this, if LTC placement is found     Concerns to be Addressed: discharge planning     Patient plan of care discussed at interdisciplinary rounds: Yes    Anticipated Discharge Disposition: Long Term Care, Skilled Nursing Facilty, Hospice     Anticipated Discharge Services: None  Anticipated Discharge DME: None    Education Provided on the Discharge Plan:  yes  Patient/Family in Agreement with the Plan: yes    Referrals Placed by CM/SW: Post Acute Facilities,Hospice  Private pay costs discussed: transportation costs    Additional Information:  Brant received a vm from the pt's [facility] CADI worker, Franny [phone], requesting an update on the pt's discharge status.  Franny is looking for an update because he will need to be reassessed through the [facility] in order to get increased PCA services at home.  The plan is for the pt to discharge to LTC until he is stable and able to discharge home with increased PCA services and hospice.  Franny said that once the discharge date is determined and placement is found, they can reassess and request and urgent assessment.    Brant left Franny a vm, updating her that placement has still not been found for the pt.  Brant told Franny that she will be updated as soon as placement is found and hospice arranged.    Brant will continue with discharge planning and will be available as needed until discharge.      ROLAND Vila, [facility]  Inpatient Care Coordination  [facility]  [phone]    ADDENDUM 1510:  Brant received a call from Oksana at [facility], [phone], who said that they are unable to admit the pt due to his high needs and their limited staff.  She said that they are unable to meet the pt's needs.

## 2022-01-07 NOTE — CONSULTS
See dictation. #[id number]  Psychiatry   Initial Consultation  Patient seen, notes reviewed. Spoke with nursing.  and palliative care following and placement pending. He refuses any psychiatric intervention. Call prn until release.   Amanda Estrada MD  1/7/2022

## 2022-01-07 NOTE — PLAN OF CARE
"PRIMARY DIAGNOSIS: \"GENERIC\" NURSING  OUTPATIENT/OBSERVATION GOALS TO BE MET BEFORE DISCHARGE:  ADLs back to baseline: No    Activity and level of assistance: Pt assist of 1 with walker rufuses belt    Pain status: Still on scheduled pain meds and PRN morphine.     Return to near baseline physical activity: No     Discharge Planner Nurse   Safe discharge environment identified: No  Barriers to discharge: Yes       Entered by: Silvino Encinas 01/07/2022 7:59 AM   After pt recived scheduled pain meds and prn melatonin, pt slept most of night. Continue POC.   Please review provider order for any additional goals.   Nurse to notify provider when observation goals have been met and patient is ready for discharge.  "

## 2022-01-07 NOTE — PROGRESS NOTES
[facility]  Palliative Care Progress Note  Text Page     Assessment & Plan    Beto Tran is a 46 year old male who was admitted on 1/1/2022.   Consulted by Beverley Lance PA-c  to assist with symptom management, goals of care and development of plan of care      Recommendations:  1. Goals of Care- No CPR- Do NOT Intubate  Hospitalization goals discussed  No change in goals of care, continues on comfort plan.   Decisional Capacity- Intact. Patient does not have an advance directive. Per  informed consent policy next of kin should be involved if patient becomes unable. Next of Kin is Matty Tran, father   POLST  None on file      2. Pain wide spread pain in the setting of end stage multiple myeloma. Pain more pronounce low back, hands and right shoulder   Patient's opioid use in past 24 hours: 1140 mg Morphine  = Daily Morphine Equivalent  [place] Board of Pharmacy Data Base Reviewed:    YES; As expected, no concern for misuse/abuse of controlled medications based on this report.  ORT   ( add dot phrase .FRHORT if warranted)  Chronic opioid use Morphine Equivalent= 1980 mg  Increase Ms Contin to 255 mg every 6 hrs, Morphine IR 60 mg every 3 hrs prn. Morphine IR x 1 60 mg now   methocarbamol ( Robaxin) 750 mg qid  Continue Lyrica increase to max dose 150 mg now and start 300 mg bid first dose tonight   Hydroxyzine 25 mg every 6 hrs     Psych reconsulted  for suboxone microinduction      3. Dyspnea  None      4. Anxiety   Ativan solution  4 mg every 6 hrs     5. Spiritual Care  Oriented to Spiritual Health as part of Palliative Care team.  Spiritual Background: undesignated      6. Care Planning  Appreciate Care of Carlee LARA in d/c planning . LTC with suboxone   Medications for discharge to be determined    Interval visit:  Intractable pain increase today. Continues with c/o widespread pain and increasing left hand pain/weakness and reduced dexterity of hand.  Mood  "depressed,not anxious,feels deflated.   Significant other in room,upset he is not receiving meds on time and should not have to ask for prns (prns frequent.  See by psyche. Mostly  In bed would like to be up more.      Medical Decision Making and Goals of Care:    Discussed on January 5, 2022 with ALEJANDRO PutnamC,APRN,CNP,ACHPN:  Asleep this morning. Met with patient this afternoon.  States pain no better 8/10. Always being disturbed and poor sleep results. Calmer today with staff and allowed visits with oncology and this writer. Reflected on this writers observation of sleeping, less anxious. Informed again of med change to lyrica from gabapentin and is ok with this.  Discussed use of suboxone and asked patient to strongly consider cross over from Mu opioids. Explained mechanism of action and how it may help his pain better and improve QoL.  He raised concerns about being seen as an addict, wonder where he would get the medication and if it was at a MAT center. This write acknowledged his concern but stated it is different now.  Suboxone is widely used in patient's with chronic pain and high opioid tolerance as well as the hospice patient.  Stated would need to partner with social work to find right fit at discharge to continue prescribing.  TCU may be challenging as many do not accept.  Asked patient to think about it and consider Suboxone induction.  It is a good opportunity now as he is IP.  Denies SOB, no constipation,     Discussed on January 3, 2022 with Kasia Luis APRN, CNP:  Familiar with patient from previous recent admission.  Feeling miserable today with widespread pain, achyness and \"flu like pain\".  He is not sure of next steps. Feels hopeless with options, let down that dad did not come to stay. He is coming to terms with alternate plans for discharge.  Discussed group home with people of potential of similar age.     Patient aware I discussed with social work.  "   Discusssed pain management changes done today and plan to streamline medication for ease of administration.  He is not open to use of cross over to Suboxone as then he has another layer of being looked at as a drug abuser.    Thank you for involving us in the patient's care.        Kasia Luis RN, PGMT-BC, APRN, CNP,ACHPN  Pain Management and Palliative Care  [facility]  Pgr: [phone]      Time Spent on this Encounter   Total unit/floor time 25 minutes, time consisted of the following, examination of the patient, reviewing the record and completing documentation. >50% of time spent in counseling and coordination of care, Bedside Nurse Bob Perez RN  and Hospitalist Eric Yang MD.   Time spend counseling with patient consisted of the following topics, symptom management.    Review of Systems    CONSTITUTIONAL: NEGATIVE for fever, chills, change in weight  ENT/MOUTH: NEGATIVE for ear, mouth and throat problems  RESP: NEGATIVE for significant cough or SOB  CV: NEGATIVE for chest pain, palpitations or peripheral edema    Palliative Symptom Review (0=no symptom/no concern, 1=mild, 2=moderate, 3=severe):      Pain: 3- severe       Fatigue: 1-mild      Nausea: 0-none      Constipation: 0-none      Diarrhea: 0-none      Depressive Symptoms: 1-mild      Anxiety: 1-mild      Drowsiness: 0-none      Poor Appetite: 0-none      Shortness of Breath: 0-none      Insomnia: 1-mild      Other:        Overall (0 good/no concerns, 3 very poor):  2    Physical Exam   Temp:  [97.8  F (36.6  C)-98.6  F (37  C)] 98.1  F (36.7  C)  Pulse:  [50-85] 50  Resp:  [14-18] 14  BP: (105-133)/(47-60) 133/60  SpO2:  [95 %-97 %] 95 %  161 lbs 11.2 oz  Exam:  GEN:  Alert, oriented x 3, appears comfortable, NAD.   HEENT:  Normocephalic/atraumatic, no scleral icterus, no nasal discharge, mouth moist.  CV:  VVS NRR,  no edema BLE.  RESP:.  Symmetric chest rise on inhalation noted.  Normal respiratory  effort.  ABD:  Rounded, soft, non-tender/non-distended.  +BS  EXT:  Edema & pulses as noted above.  CMS intact x 4.     M/S:   Nontender to palpation throughout.    SKIN:  Dry to touch, no exanthems noted in the visualized areas.    NEURO: Symmetric strength +5/5.  Sensation to touch intact all extremities.   There is no area of allodynia or hyperesthesia.  PAIN BEHAVIOR: Cooperative, poor eye contact   Psych:  Depressed affect.  Calm, cooperative, conversant appropriately.    Medications     sodium chloride Stopped (01/04/22 0231)       acetaminophen  1,000 mg Oral Q8H     hydrOXYzine  25 mg Oral Q6H     LORazepam  4 mg Oral Q6H     methocarbamol  750 mg Oral At Bedtime     morphine  15 mg Oral Q6H     morphine  180 mg Oral Q6H     morphine  60 mg Oral Q6H     morphine  60 mg Oral Once     multivitamin w/minerals  1 tablet Oral Daily     nicotine  1 patch Transdermal Daily     nicotine   Transdermal Q8H     polyethylene glycol  17 g Oral Daily     pregabalin  150 mg Oral Once     pregabalin  300 mg Oral BID     sodium chloride (PF)  3 mL Intracatheter Q8H       Data   No results found for this or any previous visit (from the past 24 hour(s)).

## 2022-01-07 NOTE — PLAN OF CARE
"PRIMARY DIAGNOSIS: \"GENERIC\" NURSING  OUTPATIENT/OBSERVATION GOALS TO BE MET BEFORE DISCHARGE:  ADLs back to baseline: No    Activity and level of assistance: Assist of 1 with walker, refuses belt    Pain status: Continues to be in 8/10 pain while still on scheduled pain medications + PRN morphine.     Return to near baseline physical activity: No     Discharge Planner Nurse   Safe discharge environment identified: No  Barriers to discharge: Yes       Entered by: Silvino Encinas 01/07/2022 8:02 AM     Please review provider order for any additional goals.   Nurse to notify provider when observation goals have been met and patient is ready for discharge.  "

## 2022-01-08 PROCEDURE — 999N000111 HC STATISTIC OT IP EVAL DEFER

## 2022-01-08 PROCEDURE — 120N000001 HC R&B MED SURG/OB

## 2022-01-08 PROCEDURE — G0378 HOSPITAL OBSERVATION PER HR: HCPCS

## 2022-01-08 PROCEDURE — 250N000013 HC RX MED GY IP 250 OP 250 PS 637: Performed by: NURSE PRACTITIONER

## 2022-01-08 PROCEDURE — 250N000013 HC RX MED GY IP 250 OP 250 PS 637: Performed by: PHYSICIAN ASSISTANT

## 2022-01-08 PROCEDURE — 99225 PR SUBSEQUENT OBSERVATION CARE,LEVEL II: CPT | Performed by: INTERNAL MEDICINE

## 2022-01-08 PROCEDURE — 99207 PR CDG-CODE CATEGORY CHANGED: CPT | Performed by: INTERNAL MEDICINE

## 2022-01-08 PROCEDURE — 250N000013 HC RX MED GY IP 250 OP 250 PS 637: Performed by: INTERNAL MEDICINE

## 2022-01-08 RX ORDER — NAPROXEN 250 MG/1
500 TABLET ORAL 2 TIMES DAILY WITH MEALS
Status: DISCONTINUED | OUTPATIENT
Start: 2022-01-08 | End: 2022-01-13 | Stop reason: HOSPADM

## 2022-01-08 RX ORDER — NAPROXEN 500 MG/1
500 TABLET ORAL 2 TIMES DAILY WITH MEALS
Status: DISCONTINUED | OUTPATIENT
Start: 2022-01-08 | End: 2022-01-08

## 2022-01-08 RX ADMIN — MORPHINE SULFATE 60 MG: 30 TABLET, FILM COATED, EXTENDED RELEASE ORAL at 23:06

## 2022-01-08 RX ADMIN — MORPHINE SULFATE 60 MG: 15 TABLET ORAL at 23:41

## 2022-01-08 RX ADMIN — ACETAMINOPHEN 1000 MG: 500 TABLET, FILM COATED ORAL at 13:34

## 2022-01-08 RX ADMIN — MORPHINE SULFATE 15 MG: 15 TABLET, EXTENDED RELEASE ORAL at 05:28

## 2022-01-08 RX ADMIN — LORAZEPAM 4 MG: 2 LIQUID ORAL at 05:27

## 2022-01-08 RX ADMIN — MORPHINE SULFATE 60 MG: 15 TABLET ORAL at 08:22

## 2022-01-08 RX ADMIN — POLYETHYLENE GLYCOL 3350 17 G: 17 POWDER, FOR SOLUTION ORAL at 08:23

## 2022-01-08 RX ADMIN — HYDROXYZINE HYDROCHLORIDE 25 MG: 25 TABLET ORAL at 05:28

## 2022-01-08 RX ADMIN — MORPHINE SULFATE 60 MG: 30 TABLET, FILM COATED, EXTENDED RELEASE ORAL at 11:06

## 2022-01-08 RX ADMIN — MORPHINE SULFATE 60 MG: 15 TABLET ORAL at 02:06

## 2022-01-08 RX ADMIN — PREGABALIN 300 MG: 100 CAPSULE ORAL at 08:22

## 2022-01-08 RX ADMIN — HYDROXYZINE HYDROCHLORIDE 25 MG: 25 TABLET ORAL at 11:06

## 2022-01-08 RX ADMIN — NAPROXEN 500 MG: 250 TABLET ORAL at 08:39

## 2022-01-08 RX ADMIN — MORPHINE SULFATE 15 MG: 15 TABLET, EXTENDED RELEASE ORAL at 23:06

## 2022-01-08 RX ADMIN — METHOCARBAMOL 750 MG: 750 TABLET ORAL at 21:15

## 2022-01-08 RX ADMIN — MORPHINE SULFATE 60 MG: 15 TABLET ORAL at 13:34

## 2022-01-08 RX ADMIN — MULTIPLE VITAMINS W/ MINERALS TAB 1 TABLET: TAB at 08:22

## 2022-01-08 RX ADMIN — MORPHINE SULFATE 60 MG: 30 TABLET, FILM COATED, EXTENDED RELEASE ORAL at 17:23

## 2022-01-08 RX ADMIN — MORPHINE SULFATE 15 MG: 15 TABLET, EXTENDED RELEASE ORAL at 17:25

## 2022-01-08 RX ADMIN — ACETAMINOPHEN 1000 MG: 500 TABLET, FILM COATED ORAL at 21:15

## 2022-01-08 RX ADMIN — NICOTINE 1 PATCH: 14 PATCH, EXTENDED RELEASE TRANSDERMAL at 08:24

## 2022-01-08 RX ADMIN — HYDROXYZINE HYDROCHLORIDE 25 MG: 25 TABLET ORAL at 17:26

## 2022-01-08 RX ADMIN — LORAZEPAM 4 MG: 2 LIQUID ORAL at 17:22

## 2022-01-08 RX ADMIN — NAPROXEN 500 MG: 250 TABLET ORAL at 17:26

## 2022-01-08 RX ADMIN — HYDROXYZINE HYDROCHLORIDE 25 MG: 25 TABLET ORAL at 23:07

## 2022-01-08 RX ADMIN — MORPHINE SULFATE 180 MG: 30 TABLET, FILM COATED, EXTENDED RELEASE ORAL at 23:05

## 2022-01-08 RX ADMIN — ACETAMINOPHEN 650 MG: 325 TABLET, FILM COATED ORAL at 19:35

## 2022-01-08 RX ADMIN — LORAZEPAM 4 MG: 2 LIQUID ORAL at 23:07

## 2022-01-08 RX ADMIN — ACETAMINOPHEN 1000 MG: 500 TABLET, FILM COATED ORAL at 05:28

## 2022-01-08 RX ADMIN — MORPHINE SULFATE 15 MG: 15 TABLET, EXTENDED RELEASE ORAL at 11:07

## 2022-01-08 RX ADMIN — MORPHINE SULFATE 60 MG: 30 TABLET, FILM COATED, EXTENDED RELEASE ORAL at 05:28

## 2022-01-08 RX ADMIN — MORPHINE SULFATE 60 MG: 15 TABLET ORAL at 19:34

## 2022-01-08 RX ADMIN — MORPHINE SULFATE 180 MG: 30 TABLET, FILM COATED, EXTENDED RELEASE ORAL at 11:06

## 2022-01-08 RX ADMIN — LORAZEPAM 4 MG: 2 LIQUID ORAL at 11:06

## 2022-01-08 RX ADMIN — MORPHINE SULFATE 60 MG: 15 TABLET ORAL at 16:26

## 2022-01-08 RX ADMIN — MORPHINE SULFATE 180 MG: 30 TABLET, FILM COATED, EXTENDED RELEASE ORAL at 17:24

## 2022-01-08 RX ADMIN — MORPHINE SULFATE 180 MG: 30 TABLET, FILM COATED, EXTENDED RELEASE ORAL at 05:27

## 2022-01-08 RX ADMIN — PREGABALIN 300 MG: 100 CAPSULE ORAL at 21:15

## 2022-01-08 NOTE — CONSULTS
"Request for consult sent to this provider, in spite of Psych Assmt completed by Dr. Estrada on 1/7, with report and recommendations.  Unclear as to why follow up consult requested.  It appears a request was made for assistance with suboxone micro induction.  But note from Palliative Nurse Janelle on 1/7 stated patient was \"not open to use of cross over to Suboxone as then he has another layer of being looked at as a drug abuser.\"  Consult was not completed  "

## 2022-01-08 NOTE — PLAN OF CARE
Pain persists and worst tonight after up to [facility] for BM. Increasing MS Contin and Lyrica. Taking Morphine IR prn. Better spirits in afternoon.

## 2022-01-08 NOTE — PROGRESS NOTES
"OT: Order received for \"Patient on hospice, awaiting new agency. Would build up utensils help or hand splinting help for symptom management... hand pain left numbness weakness/grasp and right >leftpain\". Upon arrival pt reporting he has had RUE carpal tunnel in the past and has a resting splint at home. Pt reports new sensory impairments and pain in all digits of RUE and LUE. Believes it is originating from his cervical region. Pt participated in brief education regarding cervical stretches and positioning for optimal body mechanics. Encouraged pt to try recliner chair. Pt reports he is interested in more ergonomic wheelchair and participated in education regarding wheelchair company. Pt issued built up foam handle to reduce fatigue with self-feeding and reported he is happy to try. Discussed compensatory strategies such as voice to text to reduce hand strain. Pt under observation status. Less than 8 minutes of treatment provided. OT order will be completed.   "

## 2022-01-08 NOTE — PLAN OF CARE
VSS. Pt c/o of chronic pain and received scheduled pain meds along with PRN doses of pain meds. Pt is a SBA w/ walker. Psych consult today. Pt makes needs known, will cont plan of care.

## 2022-01-08 NOTE — PROGRESS NOTES
[facility]  Hospitalist Progress Note  Patient Name: Beto Tran    MRN: [id number]  Provider: Ariel Yang MD  01/08/22           Assessment and Plan:      Summary of Stay: Beto Tran is a 46 year old  man with history of advanced multiple myeloma who has failed multiple treatments, chronic pain, thoracic outlet syndrome who was just discharged from [facility] (12/21-12/28) after being seen by Palliative care for pain med optimization and transition to a new hospice service.    He was briefly evaluated by [facility] as sounds like there was some misunderstanding as to whether he was going to have an additional person helping him at home (father backed out) so he was not officially enrolled in [facility] and had not been able to receive the rest of his pain medications.  He ran of his medication and therefore presented to the emergency room for assistance.     In reviewing the patient's medical hx, he and sig other Farhad ([phone]) report that Beto was cared for at [facility]. He underwent multiple chemotherapeutics and received stem cell transplant.  Despite aggressive cares treatment was felt to have failed and he was placed on hospice care.  He has been on hospice since 2015 and has received no treatment for his myeloma since that time.  He had a follow up with [facility] this past summer to see if any new therapy had evolved and was told that there was nothing available to treat him.  He has remained on hospice care.  Beto and his significant other requested a second opinion while hospitalized to determine if he would be a candidate for any therapy. He was seen by [facility] and it was thought that there were no other treatment options. Given his hypercalcemia of malignancy, a dose of Zometa was given on 1/3/22.  IVF was also recommended but Beto refused placement of peripheral IV.  Social work is involved and working on placement in long-term care  "with hospice.     Problem list:     Advanced multiple myeloma.    -There appears to be no other treatment options for his multiple myeloma.  Health Oncology consulted for second opinion and agreed.  - SW is working on long-term care or group home placement possibly with hospice.     Chronic pain secondary to multiple myeloma  - continue pain meds - appreciate assistance from Kasia of pain/palliative service. Patient is open to possibly changing to suboxone. Pain management has requested Psych consult as part of that process.  Pain meds per pain management.      Right hand paraesthesia  -OT consult per pain management     History of thoracic outlet syndrome  -s/p 1st rib resection and sclenectomy     History of anemia and hypercalcemia from multiple myeloma   - zoledronic acid 4 mg IV given  - NS offered but patient refused placement of peripheral IV        DVT Prophylaxis: none needed as patient on hospice care  Code Status: DNR / DNI  Dispo:  When disposition issues arranged and after suboxone induction? (if patient agrees)        Interval History:      Pain is better today. No new problems.                   Physical Exam:      Last Vital Signs:  /55 (BP Location: Left arm, Patient Position: Supine)   Pulse 81   Temp 98.1  F (36.7  C) (Oral)   Resp 16   Ht 1.727 m (5' 8\")   Wt 73.3 kg (161 lb 11.2 oz)   SpO2 96%   BMI 24.59 kg/m      Intake/Output Summary (Last 24 hours) at 1/8/2022 1700  Last data filed at 1/8/2022 1338  Gross per 24 hour   Intake 1000 ml   Output 650 ml   Net 350 ml       GENERAL:  Comfortable. Cooperative.  PSYCH: pleasant, oriented, No acute distress.  EYES: PERRLA, Normal conjunctiva.  HEART:  Regular rate and rhythm. No JVD. Pulses normal. No edema.  LUNGS:  Clear to auscultation, normal Respiratory effort.  ABDOMEN:  Soft, no hepatosplenomegaly, normal bowel sounds.  EXTREMETIES: No clubbing, cyanosis or ischemia  SKIN:  Dry to touch, No rash.           Medications:    "   All current medications were reviewed.         Data:      All new lab and imaging data was reviewed.   Labs:       Lab Results   Component Value Date     01/05/2022     01/04/2022     01/01/2022     01/30/2015     01/07/2015     12/23/2014    Lab Results   Component Value Date    CHLORIDE 110 01/05/2022    CHLORIDE 112 01/04/2022    CHLORIDE 107 01/01/2022    CHLORIDE 104 01/30/2015    CHLORIDE 104 01/07/2015    CHLORIDE 106 12/23/2014    Lab Results   Component Value Date    BUN 8 01/05/2022    BUN 10 01/04/2022    BUN 11 01/01/2022    BUN 12 01/30/2015    BUN 8 01/07/2015    BUN 10 12/23/2014      Lab Results   Component Value Date    POTASSIUM 3.5 01/05/2022    POTASSIUM 3.5 01/04/2022    POTASSIUM 3.4 01/02/2022    POTASSIUM 3.2 06/18/2015    POTASSIUM 3.6 06/12/2015    POTASSIUM 3.9 06/08/2015    Lab Results   Component Value Date    CO2 24 01/05/2022    CO2 24 01/04/2022    CO2 26 01/01/2022    CO2 28 01/30/2015    CO2 32 01/07/2015    CO2 27 12/23/2014    Lab Results   Component Value Date    CR 0.62 01/05/2022    CR 0.59 01/04/2022    CR 0.63 01/01/2022    CR 0.60 02/20/2015    CR 0.61 01/30/2015    CR 0.52 01/07/2015        Recent Labs   Lab 01/05/22  0735 01/04/22  0708 01/01/22  2304   WBC 6.0 5.7 6.1   HGB 7.6* 7.4* 8.7*   HCT 23.9* 22.7* 25.2*   * 102* 96    180 224

## 2022-01-09 LAB — SARS-COV-2 RNA RESP QL NAA+PROBE: NEGATIVE

## 2022-01-09 PROCEDURE — 87635 SARS-COV-2 COVID-19 AMP PRB: CPT | Performed by: INTERNAL MEDICINE

## 2022-01-09 PROCEDURE — 250N000013 HC RX MED GY IP 250 OP 250 PS 637: Performed by: INTERNAL MEDICINE

## 2022-01-09 PROCEDURE — 250N000013 HC RX MED GY IP 250 OP 250 PS 637: Performed by: NURSE PRACTITIONER

## 2022-01-09 PROCEDURE — G0378 HOSPITAL OBSERVATION PER HR: HCPCS

## 2022-01-09 PROCEDURE — 250N000013 HC RX MED GY IP 250 OP 250 PS 637: Performed by: PHYSICIAN ASSISTANT

## 2022-01-09 PROCEDURE — 99225 PR SUBSEQUENT OBSERVATION CARE,LEVEL II: CPT | Performed by: INTERNAL MEDICINE

## 2022-01-09 PROCEDURE — 120N000001 HC R&B MED SURG/OB

## 2022-01-09 PROCEDURE — 99207 PR CDG-CODE CATEGORY CHANGED: CPT | Performed by: INTERNAL MEDICINE

## 2022-01-09 RX ORDER — DIAPER,BRIEF,INFANT-TODD,DISP
EACH MISCELLANEOUS 2 TIMES DAILY
Status: COMPLETED | OUTPATIENT
Start: 2022-01-09 | End: 2022-01-12

## 2022-01-09 RX ADMIN — NICOTINE 1 PATCH: 14 PATCH, EXTENDED RELEASE TRANSDERMAL at 10:11

## 2022-01-09 RX ADMIN — LORAZEPAM 4 MG: 2 LIQUID ORAL at 17:22

## 2022-01-09 RX ADMIN — MORPHINE SULFATE 180 MG: 30 TABLET, FILM COATED, EXTENDED RELEASE ORAL at 11:40

## 2022-01-09 RX ADMIN — MORPHINE SULFATE 60 MG: 15 TABLET ORAL at 10:12

## 2022-01-09 RX ADMIN — MORPHINE SULFATE 60 MG: 15 TABLET ORAL at 19:32

## 2022-01-09 RX ADMIN — ACETAMINOPHEN 1000 MG: 500 TABLET, FILM COATED ORAL at 21:49

## 2022-01-09 RX ADMIN — MORPHINE SULFATE 180 MG: 30 TABLET, FILM COATED, EXTENDED RELEASE ORAL at 17:21

## 2022-01-09 RX ADMIN — ACETAMINOPHEN 1000 MG: 500 TABLET, FILM COATED ORAL at 12:58

## 2022-01-09 RX ADMIN — NAPROXEN 500 MG: 250 TABLET ORAL at 17:22

## 2022-01-09 RX ADMIN — HYDROCORTISONE: 0.5 CREAM TOPICAL at 21:51

## 2022-01-09 RX ADMIN — HYDROXYZINE HYDROCHLORIDE 25 MG: 25 TABLET ORAL at 17:22

## 2022-01-09 RX ADMIN — MORPHINE SULFATE 15 MG: 15 TABLET, EXTENDED RELEASE ORAL at 06:02

## 2022-01-09 RX ADMIN — ACETAMINOPHEN 1000 MG: 500 TABLET, FILM COATED ORAL at 06:07

## 2022-01-09 RX ADMIN — LORAZEPAM 4 MG: 2 LIQUID ORAL at 11:39

## 2022-01-09 RX ADMIN — LORAZEPAM 4 MG: 2 LIQUID ORAL at 06:01

## 2022-01-09 RX ADMIN — POLYETHYLENE GLYCOL 3350 17 G: 17 POWDER, FOR SOLUTION ORAL at 10:12

## 2022-01-09 RX ADMIN — MORPHINE SULFATE 15 MG: 15 TABLET, EXTENDED RELEASE ORAL at 11:39

## 2022-01-09 RX ADMIN — NAPROXEN 500 MG: 250 TABLET ORAL at 10:13

## 2022-01-09 RX ADMIN — MORPHINE SULFATE 60 MG: 15 TABLET ORAL at 03:08

## 2022-01-09 RX ADMIN — MORPHINE SULFATE 60 MG: 30 TABLET, FILM COATED, EXTENDED RELEASE ORAL at 11:41

## 2022-01-09 RX ADMIN — MORPHINE SULFATE 60 MG: 30 TABLET, FILM COATED, EXTENDED RELEASE ORAL at 06:01

## 2022-01-09 RX ADMIN — MORPHINE SULFATE 15 MG: 15 TABLET, EXTENDED RELEASE ORAL at 17:22

## 2022-01-09 RX ADMIN — METHOCARBAMOL 750 MG: 750 TABLET ORAL at 21:49

## 2022-01-09 RX ADMIN — MORPHINE SULFATE 180 MG: 30 TABLET, FILM COATED, EXTENDED RELEASE ORAL at 23:03

## 2022-01-09 RX ADMIN — HYDROXYZINE HYDROCHLORIDE 25 MG: 25 TABLET ORAL at 11:39

## 2022-01-09 RX ADMIN — HYDROXYZINE HYDROCHLORIDE 25 MG: 25 TABLET ORAL at 23:02

## 2022-01-09 RX ADMIN — MULTIPLE VITAMINS W/ MINERALS TAB 1 TABLET: TAB at 10:13

## 2022-01-09 RX ADMIN — MORPHINE SULFATE 180 MG: 30 TABLET, FILM COATED, EXTENDED RELEASE ORAL at 06:01

## 2022-01-09 RX ADMIN — LORAZEPAM 4 MG: 2 LIQUID ORAL at 23:03

## 2022-01-09 RX ADMIN — HYDROXYZINE HYDROCHLORIDE 25 MG: 25 TABLET ORAL at 06:01

## 2022-01-09 RX ADMIN — MORPHINE SULFATE 60 MG: 30 TABLET, FILM COATED, EXTENDED RELEASE ORAL at 17:21

## 2022-01-09 RX ADMIN — MORPHINE SULFATE 60 MG: 30 TABLET, FILM COATED, EXTENDED RELEASE ORAL at 22:59

## 2022-01-09 RX ADMIN — HYDROCORTISONE: 0.5 CREAM TOPICAL at 12:52

## 2022-01-09 RX ADMIN — PREGABALIN 300 MG: 100 CAPSULE ORAL at 21:49

## 2022-01-09 RX ADMIN — MORPHINE SULFATE 60 MG: 15 TABLET ORAL at 14:11

## 2022-01-09 RX ADMIN — PREGABALIN 300 MG: 100 CAPSULE ORAL at 10:12

## 2022-01-09 RX ADMIN — MORPHINE SULFATE 15 MG: 15 TABLET, EXTENDED RELEASE ORAL at 23:02

## 2022-01-09 NOTE — PROGRESS NOTES
[facility]  Hospitalist Progress Note  Patient Name: Beto Tran    MRN: [id number]  Provider: Ariel Yang MD  01/09/22             Assessment and Plan:      Summary of Stay: Beto Tran is a 46 year old  man with history of advanced multiple myeloma who has failed multiple treatments, chronic pain, thoracic outlet syndrome who was just discharged from [facility] (12/21-12/28) after being seen by Palliative care for pain med optimization and transition to a new hospice service.    He was briefly evaluated by [facility] as sounds like there was some misunderstanding as to whether he was going to have an additional person helping him at home (father backed out) so he was not officially enrolled in [facility] and had not been able to receive the rest of his pain medications.  He ran of his medication and therefore presented to the emergency room for assistance.     In reviewing the patient's medical hx, he and sig other Farhad ([phone]) report that Beto was cared for at [facility]. He underwent multiple chemotherapeutics and received stem cell transplant.  Despite aggressive cares treatment was felt to have failed and he was placed on hospice care.  He has been on hospice since 2015 and has received no treatment for his myeloma since that time.  He had a follow up with [facility] this past summer to see if any new therapy had evolved and was told that there was nothing available to treat him.  He has remained on hospice care.  Beto and his significant other requested a second opinion while hospitalized to determine if he would be a candidate for any therapy. He was seen by [facility] and it was thought that there were no other treatment options. Given his hypercalcemia of malignancy, a dose of Zometa was given on 1/3/22.  IVF was also recommended but Beto refused placement of peripheral IV.  Social work is involved and working on placement in long-term care  "with hospice.     Problem list:     Advanced multiple myeloma.    -There appears to be no other treatment options for his multiple myeloma.  Health Oncology consulted for second opinion and agreed.  - SW is working on long-term care or group home placement possibly with hospice.     Chronic pain secondary to multiple myeloma  - continue pain meds - appreciate assistance from Kasia of pain/palliative service. Patient is open to possibly changing to suboxone. Pain management has requested Psych consult as part of that process.  Pain meds per pain management.      Right hand paraesthesia  -OT consulted per pain management     History of thoracic outlet syndrome  -s/p 1st rib resection and sclenectomy     History of anemia and hypercalcemia from multiple myeloma   - zoledronic acid 4 mg IV given  - NS offered but patient refused placement of peripheral IV        DVT Prophylaxis: none needed as patient on hospice care  Code Status: DNR / DNI  Dispo:  When disposition issues arranged and after suboxone induction (if patient agrees)        Interval History:      No new problems.  Was sleeping comfortably when I saw him.                   Physical Exam:      Last Vital Signs:  /48 (BP Location: Left arm)   Pulse 75   Temp 98.1  F (36.7  C) (Oral)   Resp 16   Ht 1.727 m (5' 8\")   Wt 73.3 kg (161 lb 11.2 oz)   SpO2 94%   BMI 24.59 kg/m      Intake/Output Summary (Last 24 hours) at 1/9/2022 1717  Last data filed at 1/8/2022 1900  Gross per 24 hour   Intake 970 ml   Output 375 ml   Net 595 ml       GENERAL:  Comfortable. Cooperative.  PSYCH: pleasant, oriented, No acute distress.  EYES: PERRLA, Normal conjunctiva.  HEART:  Regular rate and rhythm. No JVD. Pulses normal. No edema.  LUNGS:  Clear to auscultation, normal Respiratory effort.  ABDOMEN:  Soft, no hepatosplenomegaly, normal bowel sounds.  EXTREMETIES: No clubbing, cyanosis or ischemia  SKIN:  Dry to touch, No rash.           Medications:      All " current medications were reviewed.         Data:      All new lab and imaging data was reviewed.     No new

## 2022-01-09 NOTE — PLAN OF CARE
Patient alert and oriented, endorses pain in which PRN and scheduled analgesics given.  Patient appeared to have rested through the NOC without any acute need seen will cont to monitor.

## 2022-01-09 NOTE — PLAN OF CARE
Slowly getting better pain control. MS Contin 255 mg q 6 hours with Morphine IR 60 mg q 3 hours prn. Added Naproxen. Helped him shower .

## 2022-01-10 PROCEDURE — 99232 SBSQ HOSP IP/OBS MODERATE 35: CPT | Performed by: INTERNAL MEDICINE

## 2022-01-10 PROCEDURE — 250N000013 HC RX MED GY IP 250 OP 250 PS 637: Performed by: PHYSICIAN ASSISTANT

## 2022-01-10 PROCEDURE — 250N000013 HC RX MED GY IP 250 OP 250 PS 637: Performed by: INTERNAL MEDICINE

## 2022-01-10 PROCEDURE — 120N000001 HC R&B MED SURG/OB

## 2022-01-10 PROCEDURE — 250N000013 HC RX MED GY IP 250 OP 250 PS 637: Performed by: NURSE PRACTITIONER

## 2022-01-10 RX ADMIN — ACETAMINOPHEN 1000 MG: 500 TABLET, FILM COATED ORAL at 21:33

## 2022-01-10 RX ADMIN — LORAZEPAM 4 MG: 2 LIQUID ORAL at 05:20

## 2022-01-10 RX ADMIN — MORPHINE SULFATE 60 MG: 15 TABLET ORAL at 13:43

## 2022-01-10 RX ADMIN — HYDROXYZINE HYDROCHLORIDE 25 MG: 25 TABLET ORAL at 11:04

## 2022-01-10 RX ADMIN — HYDROCORTISONE: 0.5 CREAM TOPICAL at 09:49

## 2022-01-10 RX ADMIN — MORPHINE SULFATE 60 MG: 30 TABLET, FILM COATED, EXTENDED RELEASE ORAL at 17:10

## 2022-01-10 RX ADMIN — PREGABALIN 300 MG: 100 CAPSULE ORAL at 21:33

## 2022-01-10 RX ADMIN — MORPHINE SULFATE 60 MG: 15 TABLET ORAL at 19:21

## 2022-01-10 RX ADMIN — ACETAMINOPHEN 1000 MG: 500 TABLET, FILM COATED ORAL at 13:40

## 2022-01-10 RX ADMIN — HYDROCORTISONE: 0.5 CREAM TOPICAL at 21:33

## 2022-01-10 RX ADMIN — MORPHINE SULFATE 15 MG: 15 TABLET, EXTENDED RELEASE ORAL at 11:03

## 2022-01-10 RX ADMIN — MORPHINE SULFATE 15 MG: 15 TABLET, EXTENDED RELEASE ORAL at 05:19

## 2022-01-10 RX ADMIN — MORPHINE SULFATE 60 MG: 15 TABLET ORAL at 02:09

## 2022-01-10 RX ADMIN — LORAZEPAM 4 MG: 2 LIQUID ORAL at 22:26

## 2022-01-10 RX ADMIN — MORPHINE SULFATE 15 MG: 15 TABLET, EXTENDED RELEASE ORAL at 22:26

## 2022-01-10 RX ADMIN — LORAZEPAM 4 MG: 2 LIQUID ORAL at 11:04

## 2022-01-10 RX ADMIN — NICOTINE 1 PATCH: 14 PATCH, EXTENDED RELEASE TRANSDERMAL at 09:49

## 2022-01-10 RX ADMIN — HYDROXYZINE HYDROCHLORIDE 25 MG: 25 TABLET ORAL at 22:26

## 2022-01-10 RX ADMIN — MORPHINE SULFATE 180 MG: 30 TABLET, FILM COATED, EXTENDED RELEASE ORAL at 17:10

## 2022-01-10 RX ADMIN — MULTIPLE VITAMINS W/ MINERALS TAB 1 TABLET: TAB at 09:39

## 2022-01-10 RX ADMIN — MORPHINE SULFATE 180 MG: 30 TABLET, FILM COATED, EXTENDED RELEASE ORAL at 05:23

## 2022-01-10 RX ADMIN — MORPHINE SULFATE 180 MG: 30 TABLET, FILM COATED, EXTENDED RELEASE ORAL at 11:03

## 2022-01-10 RX ADMIN — MORPHINE SULFATE 180 MG: 30 TABLET, FILM COATED, EXTENDED RELEASE ORAL at 22:27

## 2022-01-10 RX ADMIN — METHOCARBAMOL 750 MG: 750 TABLET ORAL at 21:33

## 2022-01-10 RX ADMIN — ACETAMINOPHEN 1000 MG: 500 TABLET, FILM COATED ORAL at 05:19

## 2022-01-10 RX ADMIN — NAPROXEN 500 MG: 250 TABLET ORAL at 17:10

## 2022-01-10 RX ADMIN — POLYETHYLENE GLYCOL 3350 17 G: 17 POWDER, FOR SOLUTION ORAL at 09:43

## 2022-01-10 RX ADMIN — HYDROXYZINE HYDROCHLORIDE 25 MG: 25 TABLET ORAL at 17:10

## 2022-01-10 RX ADMIN — MORPHINE SULFATE 60 MG: 15 TABLET ORAL at 09:40

## 2022-01-10 RX ADMIN — MORPHINE SULFATE 60 MG: 30 TABLET, FILM COATED, EXTENDED RELEASE ORAL at 11:04

## 2022-01-10 RX ADMIN — HYDROXYZINE HYDROCHLORIDE 25 MG: 25 TABLET ORAL at 05:20

## 2022-01-10 RX ADMIN — LORAZEPAM 4 MG: 2 LIQUID ORAL at 17:09

## 2022-01-10 RX ADMIN — PREGABALIN 300 MG: 100 CAPSULE ORAL at 09:39

## 2022-01-10 RX ADMIN — MORPHINE SULFATE 60 MG: 30 TABLET, FILM COATED, EXTENDED RELEASE ORAL at 05:36

## 2022-01-10 RX ADMIN — MORPHINE SULFATE 60 MG: 30 TABLET, FILM COATED, EXTENDED RELEASE ORAL at 22:27

## 2022-01-10 RX ADMIN — NAPROXEN 500 MG: 250 TABLET ORAL at 09:39

## 2022-01-10 RX ADMIN — MORPHINE SULFATE 15 MG: 15 TABLET, EXTENDED RELEASE ORAL at 17:11

## 2022-01-10 ASSESSMENT — ACTIVITIES OF DAILY LIVING (ADL)
ADLS_ACUITY_SCORE: 25

## 2022-01-10 NOTE — PROGRESS NOTES
Care Management Follow Up    Length of Stay (days): 9    Expected Discharge Date: 01/11/2022 - once placement is found     Concerns to be Addressed: discharge planning     Patient plan of care discussed at interdisciplinary rounds: Yes    Anticipated Discharge Disposition: Long Term Care,Skilled Nursing Facilty,Hospice     Anticipated Discharge Services: None  Anticipated Discharge DME: None    Patient/family educated on Medicare website which has current facility and service quality ratings: yes  Education Provided on the Discharge Plan:  yes  Patient/Family in Agreement with the Plan: yes    Referrals Placed by CM/SW: Post Acute Facilities,Hospice  Private pay costs discussed: transportation costs    Additional Information:  Sw has previously sent LTC referrals.  Sw left vms for [facility] in [facility], [facility], and [facility], requesting a call back to discuss the LTC referral that was sent to their facility.    Sw will continue with discharge planning and will be available as needed until discharge.       ROLAND Vila, [facility]  Inpatient Care Coordination  [facility]  [phone]

## 2022-01-10 NOTE — UTILIZATION REVIEW
[facility] Utilization Review  Admission Status; Secondary Review Determination     Admission Date: 1/1/2022  8:49 PM      Under the authority of the Utilization Management Committee, the utilization review process indicated a secondary review on the above patient.  The review outcome is based on review of the medical records, discussions with staff, and applying clinical experience noted on the date of the review.        (X)      Inpatient Status Appropriate - This patient's medical care is consistent with medical management for inpatient care and reasonable inpatient medical practice.          RATIONALE FOR DETERMINATION   46-year-old male with history of advanced multiple myeloma who has failed multiple treatments, chronic pain, thoracic outlet syndrome, now transition to hospice care.  Patient was unclear about hospice services, and was unable to receive his pain medications, ran out of them earlier than expected.  He is registered under observation status, but continues to require multiple doses of long and short acting pain medications, antianxiety meds.  Patient refused IV placement,  looking into long-term care with hospice.  Pain management has requested psychiatry evaluation and looking into changing to Suboxone.  Patient also had hypercalcemia and received zoledronic acid earlier.  Complex patient needs hospice set up and ongoing adjustment of pain medications with close monitoring in the hospital prior to transitioning to long-term care with hospice, anticipate more than 2 midnight hospital stay, recommend change to inpatient status, communicated to Eric Yang MD      The severity of illness, intensity of service provided, expected LOS and risk for adverse outcome make the care complex, high risk and appropriate for hospital admission.The patient requires hospital based medical care which is anticipated to require a stay of 2 or more midnights; according to CMS guidelines the  patient should be admitted as inpatient        The information on this document is developed by the utilization review team in order for the business office to ensure compliance.  This only denotes the appropriateness of proper admission status and does not reflect the quality of care rendered.         The definitions of Inpatient Status and Observation Status used in making the determination above are those provided in the CMS Coverage Manual, Chapter 1 and Chapter 6, section 70.4.      Sincerely,       Barbara Fajardo MD  Physician Advisor  Utilization Review-[place]    Phone: [phone]

## 2022-01-10 NOTE — PLAN OF CARE
Admitting Diagnosis: Neck pain/ back pain.  Pertinent History: Advanced multiple myeloma, chronic pain.  For vitals and assessment please see flow sheet.   Living Situation: Home   Pain plan: C/o Abdominal pain schedule medication given.   Mobility: assistance: / SBA/ Ind.   Baseline activity: Independent.   Alarms/Safety: able to melinda for help.   LDA's: NO PIV.   Pertinent test results: K+ 3.5, Mg+ 1.4.  Consults: Pain mgt.   Abnormals/Pending: none.   Other Cares/Comments:A & O x4, VSS, LS diminished. O2 94% RA.   Discharge Disposition:TBD.   Discharge Time: TBD.

## 2022-01-10 NOTE — PLAN OF CARE
Pain management continues. New abd muscle cramping today. Last BM on Friday late day. On Miralax. Voiding good amounts. See by hospitalist and will add heating pad. Await consult from psych re: suboxone micro-induction.

## 2022-01-10 NOTE — PROGRESS NOTES
Care Management Follow Up    Length of Stay (days): 9    Expected Discharge Date: 01/11/2022     Concerns to be Addressed: discharge planning     Patient plan of care discussed at interdisciplinary rounds: Yes    Anticipated Discharge Disposition: Long Term Care,Skilled Nursing Facilty,Hospice     Anticipated Discharge Services: None  Anticipated Discharge DME: None    Patient/family educated on Medicare website which has current facility and service quality ratings: yes  Education Provided on the Discharge Plan: patient   Patient/Family in Agreement with the Plan: yes    Referrals Placed by CM/SW: Post Acute Facilities,Hospice  Private pay costs discussed: transportation costs    Additional Information:  Spoke with Sarah Beth ([phone]) from admissions at the [facility] she reviewing patient. She talked with charge nurse today and asked nurse to call her but that did not happen. Please have patient's nurse call Sarah Beth for a nurse to nurse screen.    MIKEY Russo   Inpatient Care Coordination   Supervisor  [facility]  [phone]      JANE Martini

## 2022-01-10 NOTE — PLAN OF CARE
"/78 (BP Location: Right arm)   Pulse 90   Temp 98  F (36.7  C) (Axillary)   Resp 12   Ht 1.727 m (5' 8\")   Wt 73.3 kg (161 lb 11.2 oz)   SpO2 99%   BMI 24.59 kg/m      A&O. SBA w/ walker. Pt c/o chronic back pain, scheduled and PRN meds given with slight relief of pain. Nicotine patch to left arm. Pain team consulted. Plan is pain management and finding placement.     PRIMARY DIAGNOSIS: ACUTE PAIN  OUTPATIENT/OBSERVATION GOALS TO BE MET BEFORE DISCHARGE:  1. Pain Status: Improved-controlled with oral pain medications.    2. Return to near baseline physical activity: Yes    3. Cleared for discharge by consultants (if involved): No    Discharge Planner Nurse   Safe discharge environment identified: Yes  Barriers to discharge: No       Entered by: Shital Mcnulty 01/10/2022 12:46 PM     Please review provider order for any additional goals.   Nurse to notify provider when observation goals have been met and patient is ready for discharge.  "

## 2022-01-10 NOTE — PROGRESS NOTES
[facility]  Hospitalist Progress Note  Patient Name: Beto Tran    MRN: [id number]  Provider: Ariel Yang MD  01/10/22             Assessment and Plan:      Summary of Stay: Beto Tran is a 46 year old  man with history of advanced multiple myeloma who has failed multiple treatments, chronic pain, thoracic outlet syndrome who was just discharged from [facility] (12/21-12/28) after being seen by Palliative care for pain med optimization and transition to a new hospice service.    He was briefly evaluated by [facility] as sounds like there was some misunderstanding as to whether he was going to have an additional person helping him at home (father backed out) so he was not officially enrolled in [facility] and had not been able to receive the rest of his pain medications.  He ran of his medication and therefore presented to the emergency room for assistance.     In reviewing the patient's medical hx, he and sig other Farhad ([phone]) report that Beto was cared for at [place]. He underwent multiple chemotherapeutics and received stem cell transplant.  Despite aggressive cares treatment was felt to have failed and he was placed on hospice care.  He has been on hospice since 2015 and has received no treatment for his myeloma since that time.  He had a follow up with [place] this past summer to see if any new therapy had evolved and was told that there was nothing available to treat him.  He has remained on hospice care.  Beto and his significant other requested a second opinion while hospitalized to determine if he would be a candidate for any therapy. He was seen by [facility] and it was thought that there were no other treatment options. Given his hypercalcemia of malignancy, a dose of Zometa was given on 1/3/22.  IVF was also recommended but Beto refused placement of peripheral IV.  Social work is involved and working on placement in long-term care  "with hospice. Pain management was consulted and there has been some discussion about possibly transitioning Beto from high dosed MS Contin to suboxone.      Problem list:     Advanced multiple myeloma.    -There appears to be no other treatment options for his multiple myeloma. [facility] consulted for second opinion and agreed.  - SW is working on long-term care or group home placement possibly with hospice.  -AM CBC with platelets     Chronic pain secondary to multiple myeloma  - continue pain meds - appreciate assistance from Kasia of pain/palliative service. Patient is open to possibly changing to suboxone. Pain management has requested Psych consult as part of that process but there has been some confusion about the nature of that consult. Defer to pain management whether to re-consult Psychiatry.   Pain meds per pain management.      Right hand paraesthesia  -OT consulted per pain management     History of thoracic outlet syndrome  -s/p 1st rib resection and sclenectomy     History of anemia and hypercalcemia from multiple myeloma   - zoledronic acid 4 mg IV given  - NS offered but patient refused placement of peripheral IV  -AM BMP        DVT Prophylaxis: none needed as patient on hospice care  Code Status: DNR / DNI  Dispo:  UR recommended changing to inpatient. To LTC or group home once disposition issues arranged and after suboxone induction (if this is going to be done)        Interval History:      No changes. A lot of back pain overnight.                   Physical Exam:      Last Vital Signs:  /78 (BP Location: Right arm)   Pulse 90   Temp 98  F (36.7  C) (Axillary)   Resp 12   Ht 1.727 m (5' 8\")   Wt 73.3 kg (161 lb 11.2 oz)   SpO2 99%   BMI 24.59 kg/m      Intake/Output Summary (Last 24 hours) at 1/10/2022 1344  Last data filed at 1/10/2022 1100  Gross per 24 hour   Intake 2275 ml   Output 2075 ml   Net 200 ml       GENERAL:  Comfortable. appearing Cooperative.  PSYCH: " pleasant, oriented, No acute distress.  EYES: PERRLA, Normal conjunctiva.  HEART:  Regular rate and rhythm. No JVD. Pulses normal. No edema.  LUNGS:  Clear to auscultation, normal Respiratory effort.  ABDOMEN:  Soft, no hepatosplenomegaly, normal bowel sounds.  EXTREMETIES: No clubbing, cyanosis or ischemia  SKIN:  Dry to touch, No rash.           Medications:      All current medications were reviewed.         Data:      All new lab and imaging data was reviewed.   Labs:       Lab Results   Component Value Date     01/05/2022     01/04/2022     01/01/2022     01/30/2015     01/07/2015     12/23/2014    Lab Results   Component Value Date    CHLORIDE 110 01/05/2022    CHLORIDE 112 01/04/2022    CHLORIDE 107 01/01/2022    CHLORIDE 104 01/30/2015    CHLORIDE 104 01/07/2015    CHLORIDE 106 12/23/2014    Lab Results   Component Value Date    BUN 8 01/05/2022    BUN 10 01/04/2022    BUN 11 01/01/2022    BUN 12 01/30/2015    BUN 8 01/07/2015    BUN 10 12/23/2014      Lab Results   Component Value Date    POTASSIUM 3.5 01/05/2022    POTASSIUM 3.5 01/04/2022    POTASSIUM 3.4 01/02/2022    POTASSIUM 3.2 06/18/2015    POTASSIUM 3.6 06/12/2015    POTASSIUM 3.9 06/08/2015    Lab Results   Component Value Date    CO2 24 01/05/2022    CO2 24 01/04/2022    CO2 26 01/01/2022    CO2 28 01/30/2015    CO2 32 01/07/2015    CO2 27 12/23/2014    Lab Results   Component Value Date    CR 0.62 01/05/2022    CR 0.59 01/04/2022    CR 0.63 01/01/2022    CR 0.60 02/20/2015    CR 0.61 01/30/2015    CR 0.52 01/07/2015        Recent Labs   Lab 01/05/22  0735 01/04/22  0708   WBC 6.0 5.7   HGB 7.6* 7.4*   HCT 23.9* 22.7*   * 102*    180

## 2022-01-10 NOTE — PLAN OF CARE
VSS. A/O x4. Slow to respond with flat affect. Pt reports severe pain in back PRN Morphine and scheduled pain medications given with some effect. Pt is independent/SBA. No PIV. Continue with pain management.

## 2022-01-11 LAB
ANION GAP SERPL CALCULATED.3IONS-SCNC: 1 MMOL/L (ref 3–14)
BUN SERPL-MCNC: 10 MG/DL (ref 7–30)
CALCIUM SERPL-MCNC: 8.4 MG/DL (ref 8.5–10.1)
CHLORIDE BLD-SCNC: 113 MMOL/L (ref 94–109)
CO2 SERPL-SCNC: 25 MMOL/L (ref 20–32)
CREAT SERPL-MCNC: 0.62 MG/DL (ref 0.66–1.25)
ERYTHROCYTE [DISTWIDTH] IN BLOOD BY AUTOMATED COUNT: 14.8 % (ref 10–15)
GFR SERPL CREATININE-BSD FRML MDRD: >90 ML/MIN/1.73M2
GLUCOSE BLD-MCNC: 97 MG/DL (ref 70–99)
HCT VFR BLD AUTO: 23.9 % (ref 40–53)
HGB BLD-MCNC: 7.5 G/DL (ref 13.3–17.7)
MCH RBC QN AUTO: 33.3 PG (ref 26.5–33)
MCHC RBC AUTO-ENTMCNC: 31.4 G/DL (ref 31.5–36.5)
MCV RBC AUTO: 106 FL (ref 78–100)
PLATELET # BLD AUTO: 216 10E3/UL (ref 150–450)
POTASSIUM BLD-SCNC: 4.5 MMOL/L (ref 3.4–5.3)
RBC # BLD AUTO: 2.25 10E6/UL (ref 4.4–5.9)
SODIUM SERPL-SCNC: 139 MMOL/L (ref 133–144)
WBC # BLD AUTO: 4.6 10E3/UL (ref 4–11)

## 2022-01-11 PROCEDURE — 250N000013 HC RX MED GY IP 250 OP 250 PS 637: Performed by: INTERNAL MEDICINE

## 2022-01-11 PROCEDURE — 82310 ASSAY OF CALCIUM: CPT | Performed by: INTERNAL MEDICINE

## 2022-01-11 PROCEDURE — 250N000013 HC RX MED GY IP 250 OP 250 PS 637: Performed by: NURSE PRACTITIONER

## 2022-01-11 PROCEDURE — 120N000001 HC R&B MED SURG/OB

## 2022-01-11 PROCEDURE — 99232 SBSQ HOSP IP/OBS MODERATE 35: CPT | Performed by: INTERNAL MEDICINE

## 2022-01-11 PROCEDURE — 99232 SBSQ HOSP IP/OBS MODERATE 35: CPT | Performed by: NURSE PRACTITIONER

## 2022-01-11 PROCEDURE — 85027 COMPLETE CBC AUTOMATED: CPT | Performed by: INTERNAL MEDICINE

## 2022-01-11 PROCEDURE — 250N000013 HC RX MED GY IP 250 OP 250 PS 637: Performed by: PHYSICIAN ASSISTANT

## 2022-01-11 PROCEDURE — 36415 COLL VENOUS BLD VENIPUNCTURE: CPT | Performed by: INTERNAL MEDICINE

## 2022-01-11 RX ADMIN — MORPHINE SULFATE 60 MG: 15 TABLET ORAL at 16:07

## 2022-01-11 RX ADMIN — ACETAMINOPHEN 1000 MG: 500 TABLET, FILM COATED ORAL at 05:25

## 2022-01-11 RX ADMIN — LORAZEPAM 4 MG: 2 LIQUID ORAL at 11:20

## 2022-01-11 RX ADMIN — MORPHINE SULFATE 180 MG: 30 TABLET, FILM COATED, EXTENDED RELEASE ORAL at 22:05

## 2022-01-11 RX ADMIN — PREGABALIN 300 MG: 100 CAPSULE ORAL at 21:16

## 2022-01-11 RX ADMIN — MORPHINE SULFATE 60 MG: 15 TABLET ORAL at 19:41

## 2022-01-11 RX ADMIN — HYDROCORTISONE: 0.5 CREAM TOPICAL at 08:35

## 2022-01-11 RX ADMIN — MORPHINE SULFATE 60 MG: 15 TABLET ORAL at 08:32

## 2022-01-11 RX ADMIN — NAPROXEN 500 MG: 250 TABLET ORAL at 17:14

## 2022-01-11 RX ADMIN — MORPHINE SULFATE 15 MG: 15 TABLET, EXTENDED RELEASE ORAL at 22:04

## 2022-01-11 RX ADMIN — MORPHINE SULFATE 60 MG: 30 TABLET, FILM COATED, EXTENDED RELEASE ORAL at 22:05

## 2022-01-11 RX ADMIN — NICOTINE 1 PATCH: 14 PATCH, EXTENDED RELEASE TRANSDERMAL at 08:32

## 2022-01-11 RX ADMIN — LORAZEPAM 4 MG: 2 LIQUID ORAL at 22:04

## 2022-01-11 RX ADMIN — ACETAMINOPHEN 1000 MG: 500 TABLET, FILM COATED ORAL at 12:40

## 2022-01-11 RX ADMIN — MORPHINE SULFATE 60 MG: 30 TABLET, FILM COATED, EXTENDED RELEASE ORAL at 11:21

## 2022-01-11 RX ADMIN — MORPHINE SULFATE 60 MG: 15 TABLET ORAL at 12:40

## 2022-01-11 RX ADMIN — HYDROXYZINE HYDROCHLORIDE 25 MG: 25 TABLET ORAL at 11:22

## 2022-01-11 RX ADMIN — MORPHINE SULFATE 180 MG: 30 TABLET, FILM COATED, EXTENDED RELEASE ORAL at 05:27

## 2022-01-11 RX ADMIN — NAPROXEN 500 MG: 250 TABLET ORAL at 08:33

## 2022-01-11 RX ADMIN — MORPHINE SULFATE 60 MG: 15 TABLET ORAL at 05:36

## 2022-01-11 RX ADMIN — MORPHINE SULFATE 15 MG: 15 TABLET, EXTENDED RELEASE ORAL at 11:22

## 2022-01-11 RX ADMIN — HYDROCORTISONE: 0.5 CREAM TOPICAL at 21:16

## 2022-01-11 RX ADMIN — MORPHINE SULFATE 15 MG: 15 TABLET, EXTENDED RELEASE ORAL at 17:14

## 2022-01-11 RX ADMIN — MORPHINE SULFATE 15 MG: 15 TABLET, EXTENDED RELEASE ORAL at 05:26

## 2022-01-11 RX ADMIN — MORPHINE SULFATE 180 MG: 30 TABLET, FILM COATED, EXTENDED RELEASE ORAL at 11:21

## 2022-01-11 RX ADMIN — PREGABALIN 300 MG: 100 CAPSULE ORAL at 08:33

## 2022-01-11 RX ADMIN — HYDROXYZINE HYDROCHLORIDE 25 MG: 25 TABLET ORAL at 22:05

## 2022-01-11 RX ADMIN — MORPHINE SULFATE 60 MG: 15 TABLET ORAL at 01:11

## 2022-01-11 RX ADMIN — MORPHINE SULFATE 180 MG: 30 TABLET, FILM COATED, EXTENDED RELEASE ORAL at 17:15

## 2022-01-11 RX ADMIN — HYDROXYZINE HYDROCHLORIDE 25 MG: 25 TABLET ORAL at 05:26

## 2022-01-11 RX ADMIN — METHOCARBAMOL 750 MG: 750 TABLET ORAL at 21:16

## 2022-01-11 RX ADMIN — MULTIPLE VITAMINS W/ MINERALS TAB 1 TABLET: TAB at 08:33

## 2022-01-11 RX ADMIN — LORAZEPAM 4 MG: 2 LIQUID ORAL at 05:30

## 2022-01-11 RX ADMIN — ACETAMINOPHEN 650 MG: 325 TABLET, FILM COATED ORAL at 01:10

## 2022-01-11 RX ADMIN — MORPHINE SULFATE 60 MG: 30 TABLET, FILM COATED, EXTENDED RELEASE ORAL at 17:14

## 2022-01-11 RX ADMIN — MORPHINE SULFATE 60 MG: 30 TABLET, FILM COATED, EXTENDED RELEASE ORAL at 05:28

## 2022-01-11 RX ADMIN — HYDROXYZINE HYDROCHLORIDE 25 MG: 25 TABLET ORAL at 17:14

## 2022-01-11 RX ADMIN — ACETAMINOPHEN 1000 MG: 500 TABLET, FILM COATED ORAL at 21:16

## 2022-01-11 RX ADMIN — LORAZEPAM 4 MG: 2 LIQUID ORAL at 17:14

## 2022-01-11 ASSESSMENT — ACTIVITIES OF DAILY LIVING (ADL)
ADLS_ACUITY_SCORE: 25

## 2022-01-11 NOTE — PROGRESS NOTES
"SPIRITUAL HEALTH SERVICES Progress Note    Met with patient and significant other in patient's room at palliative team's request for emotional support and advance directive conversation.    I entered the nearly dark room to find significant other at the foot of patient's bed with her coat on; ready to depart.    I introduced myself and explained why I was there.  Patient replied, \"I don't need any support from you because I'm getting out of here tomorrow.  And... I already filled out one of those advance directive things and I'm not doing that again.\"      Significant other stated that she was running a few errands and going home.  She would get the advance directive document and bring it to the hospital.    I thanked both parties for the visit and wished them well.        Rev. Jocelyne Cool M.Div.  Staff   Phone  [phone]    "

## 2022-01-11 NOTE — PLAN OF CARE
PRIMARY DIAGNOSIS: ACUTE PAIN  OUTPATIENT/OBSERVATION GOALS TO BE MET BEFORE DISCHARGE:  1. Pain Status: No improvement noted. Consider adjustment in pain regimen.    2. Return to near baseline physical activity: Yes    3. Cleared for discharge by consultants (if involved): No    Discharge Planner Nurse   Safe discharge environment identified: No  Barriers to discharge: Yes       Entered by: Melia Pineda 01/10/2022 6:40 PM     Please review provider order for any additional goals.   Nurse to notify provider when observation goals have been met and patient is ready for discharge.

## 2022-01-11 NOTE — PROGRESS NOTES
[facility]  Hospitalist Progress Note  Patient Name: Beto Tran    MRN: [id number]  Provider: Ariel Yang MD  01/11/22             Assessment and Plan:      Summary of Stay: Beto Tran is a 46 year old  man with history of advanced multiple myeloma who has failed multiple treatments, chronic pain, thoracic outlet syndrome who was just discharged from [facility] (12/21-12/28) after being seen by Palliative care for pain med optimization and transition to a new hospice service.    He was briefly evaluated by [facility] as sounds like there was some misunderstanding as to whether he was going to have an additional person helping him at home (father backed out) so he was not officially enrolled in [facility] and had not been able to receive the rest of his pain medications.  He ran of his medication and therefore presented to the emergency room for assistance.     In reviewing the patient's medical hx, he and sig other Farhad ([phone]) report that Beto was cared for at [place]. He underwent multiple chemotherapeutics and received stem cell transplant.  Despite aggressive cares treatment was felt to have failed and he was placed on hospice care.  He has been on hospice since 2015 and has received no treatment for his myeloma since that time.  He had a follow up with [place] this past summer to see if any new therapy had evolved and was told that there was nothing available to treat him.  He has remained on hospice care.  Beto and his significant other requested a second opinion while hospitalized to determine if he would be a candidate for any therapy. He was seen by [facility] and it was thought that there were no other treatment options. Given his hypercalcemia of malignancy, a dose of Zometa was given on 1/3/22.  IVF was also recommended but Beto refused placement of peripheral IV.  Social work is involved and working on placement in long-term care  with hospice. Pain management was consulted and there has been some discussion about possibly transitioning Beto from high dosed MS Contin to suboxone- but it seems that after some research Beto is less interested in that transition.      Problem list:     Advanced multiple myeloma.    -There appears to be no other treatment options for his multiple myeloma. [facility] consulted for second opinion and agreed.  - SW is working on long-term care or group home placement possibly with hospice.  -CBC with platelets this am with stable anemia (7.5)     Chronic pain secondary to multiple myeloma  - continue pain meds - appreciate assistance from Kasia of pain/palliative service. Patient was open to possibly changing to suboxone. Pain management has requested Psych consult as part of that process but there has been some confusion about the nature of that consult. Psych was reconsulted over the weekend by did not complete consult because documentation said Beto was not interested. Today, after doing some independent research, Beto is less interested in transitioning. Defer to pain management whether to pursue transition and re-consult Psychiatry.   Pain meds per pain management.      Right hand paraesthesia  -OT consulted per pain management     History of thoracic outlet syndrome  -s/p 1st rib resection and sclenectomy     History of anemia and hypercalcemia from multiple myeloma   - zoledronic acid 4 mg IV given  - NS offered but patient refused placement of peripheral IV  -BMP this am showed calcium of 11.5 (down from 14 and 12.6)        DVT Prophylaxis: patient is chronically bed ridden   Code Status: DNR / DNI  Dispo:  Unclear. I will ask SW to see him again regarding the placement they are working on.     ADDENDUM: Patient met with social work.  He and significant other have decided to try to get him home with hospice care and PCA care, and outpatient services.  Social work is working  "towards that goal.        Interval History:      Patient upset today- not understanding why he is here and why he can't go outside. He said he feels like a prisoner. He said he has places to go and does not want to go to another facility. He also stated he has been doing research on Suboxone and doesn't seem interested in transitioning. No new problems, however.                   Physical Exam:      Last Vital Signs:  /54 (BP Location: Left arm, Patient Position: Supine)   Pulse 71   Temp 99.1  F (37.3  C) (Oral)   Resp 18   Ht 1.727 m (5' 8\")   Wt 73.3 kg (161 lb 11.2 oz)   SpO2 96%   BMI 24.59 kg/m      Intake/Output Summary (Last 24 hours) at 1/11/2022 1029  Last data filed at 1/11/2022 0600  Gross per 24 hour   Intake --   Output 1750 ml   Net -1750 ml       GENERAL:  Comfortable appearing. Uncooperative.  PSYCH: unpleasant, oriented, mild acute distress due to being upset.  EYES: PERRLA, Normal conjunctiva.    Deferred further exam due to patient being upset and not wanting me to examine him.              Medications:      All current medications were reviewed.         Data:      All new lab and imaging data was reviewed.   Labs:       Lab Results   Component Value Date     01/11/2022     01/05/2022     01/04/2022     01/30/2015     01/07/2015     12/23/2014    Lab Results   Component Value Date    CHLORIDE 113 01/11/2022    CHLORIDE 110 01/05/2022    CHLORIDE 112 01/04/2022    CHLORIDE 104 01/30/2015    CHLORIDE 104 01/07/2015    CHLORIDE 106 12/23/2014    Lab Results   Component Value Date    BUN 10 01/11/2022    BUN 8 01/05/2022    BUN 10 01/04/2022    BUN 12 01/30/2015    BUN 8 01/07/2015    BUN 10 12/23/2014      Lab Results   Component Value Date    POTASSIUM 4.5 01/11/2022    POTASSIUM 3.5 01/05/2022    POTASSIUM 3.5 01/04/2022    POTASSIUM 3.2 06/18/2015    POTASSIUM 3.6 06/12/2015    POTASSIUM 3.9 06/08/2015    Lab Results   Component Value Date    CO2 " 25 01/11/2022    CO2 24 01/05/2022    CO2 24 01/04/2022    CO2 28 01/30/2015    CO2 32 01/07/2015    CO2 27 12/23/2014    Lab Results   Component Value Date    CR 0.62 01/11/2022    CR 0.62 01/05/2022    CR 0.59 01/04/2022    CR 0.60 02/20/2015    CR 0.61 01/30/2015    CR 0.52 01/07/2015        Recent Labs   Lab 01/11/22  0715 01/05/22  0735   WBC 4.6 6.0   HGB 7.5* 7.6*   HCT 23.9* 23.9*   * 104*    189

## 2022-01-11 NOTE — PLAN OF CARE
PRIMARY DIAGNOSIS: ACUTE PAIN  OUTPATIENT/OBSERVATION GOALS TO BE MET BEFORE DISCHARGE:  1. Pain Status: No improvement noted. Consider adjustment in pain regimen.    2. Return to near baseline physical activity: Yes    3. Cleared for discharge by consultants (if involved): No    Discharge Planner Nurse   Safe discharge environment identified: No  Barriers to discharge: Yes       Entered by: Melia Pineda 01/11/2022 12:17 AM     Please review provider order for any additional goals.   Nurse to notify provider when observation goals have been met and patient is ready for discharge.

## 2022-01-11 NOTE — PROGRESS NOTES
CLINICAL NUTRITION SERVICES - BRIEF NOTE    Reviewed chart d/t LOS and briefly visited with patient. Plan to discharge with hospice. RD remains available if nutrition concerns arise, but will not sign on at this time.     Beverley Montoya RD, LD  Pager - 3rd floor/ICU: [phone]  Pager - All other floors: [phone]  Pager - Weekend/holiday: [phone]  Office: [phone]

## 2022-01-11 NOTE — PLAN OF CARE
PRIMARY DIAGNOSIS: ACUTE PAIN  OUTPATIENT/OBSERVATION GOALS TO BE MET BEFORE DISCHARGE:  1. Pain Status: No improvement noted. Consider adjustment in pain regimen.    2. Return to near baseline physical activity: Yes    3. Cleared for discharge by consultants (if involved): No    Discharge Planner Nurse   Safe discharge environment identified: No  Barriers to discharge: Yes       Entered by: Brooke Guerrier 01/11/2022 4:09 AM     Please review provider order for any additional goals.   Nurse to notify provider when observation goals have been met and patient is ready for discharge.

## 2022-01-11 NOTE — PLAN OF CARE
Pt A&Ox4, VSS, PRN morphine given x2 for pain as well as schedule dose. Voiding well utilizes urinal. Refused most of assessment and ignored questions. Pt is SBA w/ walker and GB. No PIV. discharge plan on hospice pending.

## 2022-01-11 NOTE — PROGRESS NOTES
[facility]  Palliative Care Progress Note  Text Page     Assessment & Plan    Beto Tran is a 46 year old male who was admitted on 1/1/2022.   Consulted by Beverley Lance PA-c  to assist with symptom management, goals of care and development of plan of care      Recommendations:  1. Goals of Care- No CPR- Do NOT Intubate  Hospitalization goals discussed  No change in goals of care, continues on comfort plan.   Decisional Capacity- Intact. Patient does not have an advance directive. Per  informed consent policy next of kin should be involved if patient becomes unable. Next of Kin is Matty Tran, father   POLST  None on file      2. Pain wide spread pain in the setting of end stage multiple myeloma. Pain more pronounce low back, hands and right shoulder   Patient's opioid use in past 24 hours: 1260 mg Morphine  = Daily Morphine Equivalent  [place] Board of Pharmacy Data Base Reviewed:    YES; As expected, no concern for misuse/abuse of controlled medications based on this report.  ORT   ( add dot phrase .FRHORT if warranted)  Chronic opioid use Morphine Equivalent= 1980 mg  Keep Ms Contin to 255 mg every 6 hrs, Morphine IR 60 mg every 3 hrs prn. Morphine IR x 1 60 mg now   methocarbamol ( Robaxin) 750 mg qid  Continue Lyrica increase to max dose 150 mg now and start 300 mg bid first dose tonight   Hydroxyzine 25 mg every 6 hrs  No microdose induction with suboxone, would consider once signed onto hospice with new agency     3. Dyspnea  None      4. Anxiety   Ativan solution  4 mg every 6 hrs     5. Spiritual Care  Oriented to Spiritual Health as part of Palliative Care team.  Spiritual Background: undesignated      6. Care Planning  Appreciate Care of JANE Vila in d/c planning . LTC  Transition to suboxone microdosing once in LTC with hospice staff.   Medications for discharge to be determined    Interval visit:  Intractable pain more controlled than prior days.5-7/10  "Continues with c/o widespread pain and increasing left hand pain/weakness and reduced dexterity of hand.  Mood depressed,angry \" prisoner in here\" Refusing LTC placement, threatening to leave AMA.  Feels he has care givers lined up for a month with Aunt and G-friend Bharathi.    He voiced concern about suboxone and using it \"off label\"  Stated FDA indication is for opioid dependence.      Medical Decision Making and Goals of Care:    Discussed on January 5, 2022 with Kasia Luis RN-C,APRN,CNP,ACHPN:  Asleep this morning. Met with patient this afternoon.  States pain no better 8/10. Always being disturbed and poor sleep results. Calmer today with staff and allowed visits with oncology and this writer. Reflected on this writers observation of sleeping, less anxious. Informed again of med change to lyrica from gabapentin and is ok with this.  Discussed use of suboxone and asked patient to strongly consider cross over from Mu opioids. Explained mechanism of action and how it may help his pain better and improve QoL.  He raised concerns about being seen as an addict, wonder where he would get the medication and if it was at a MAT center. This write acknowledged his concern but stated it is different now.  Suboxone is widely used in patient's with chronic pain and high opioid tolerance as well as the hospice patient.  Stated would need to partner with social work to find right fit at discharge to continue prescribing.  TCU may be challenging as many do not accept.  Asked patient to think about it and consider Suboxone induction.  It is a good opportunity now as he is IP.  Denies SOB, no constipation,     Discussed on January 3, 2022 with Kasia JONES, CNP:  Familiar with patient from previous recent admission.  Feeling miserable today with widespread pain, achyness and \"flu like pain\".  He is not sure of next steps. Feels hopeless with options, let down that dad did not come to stay. He is " coming to terms with alternate plans for discharge.  Discussed group home with people of potential of similar age.     Patient aware I discussed with social work.    Discusssed pain management changes done today and plan to streamline medication for ease of administration.  He is not open to use of cross over to Suboxone as then he has another layer of being looked at as a drug abuser.    Thank you for involving us in the patient's care.        Kasia Luis RN, PGMT-BC, APRN, CNP,ACHPN  Pain Management and Palliative Care  [facility]  Pgr: [phone]      Time Spent on this Encounter   Total unit/floor time 20 minutes, time consisted of the following, examination of the patient, reviewing the record and completing documentation. >50% of time spent in counseling and coordination of care, Bedside Nurse Ale Briceño RN , Hospitalist Eric Yang MD and  JANE Vila.   Time spend counseling with patient consisted of the following topics, symptom management.    Review of Systems    CONSTITUTIONAL: NEGATIVE for fever, chills, change in weight  ENT/MOUTH: NEGATIVE for ear, mouth and throat problems  RESP: NEGATIVE for significant cough or SOB  CV: NEGATIVE for chest pain, palpitations or peripheral edema    Palliative Symptom Review (0=no symptom/no concern, 1=mild, 2=moderate, 3=severe):      Pain: 2- moderate to 3- severe       Fatigue: 1-mild      Nausea: 0-none      Constipation: 0-none      Diarrhea: 0-none      Depressive Symptoms: 1-mild      Anxiety: 1-mild      Drowsiness: 0-none      Poor Appetite: 0-none      Shortness of Breath: 0-none      Insomnia: 1-mild      Other:        Overall (0 good/no concerns, 3 very poor):  2    Physical Exam   Temp:  [98.6  F (37  C)-99.1  F (37.3  C)] 99.1  F (37.3  C)  Pulse:  [] 71  Resp:  [12-18] 18  BP: (107-132)/(54-80) 111/54  SpO2:  [94 %-96 %] 96 %  161 lbs 11.2 oz  Exam:  GEN:  Alert, oriented x 3,  appears comfortable, NAD.   HEENT:  Normocephalic/atraumatic, no scleral icterus, no nasal discharge, mouth moist.  CV:  VVS NRR,  no edema BLE.  RESP:.  Symmetric chest rise on inhalation noted.  Normal respiratory effort.  ABD:  Rounded, soft, non-tender/non-distended.  +BS  EXT:  Edema & pulses as noted above.  CMS intact x 4.     M/S:   Nontender to palpation throughout.    SKIN:  Dry to touch, no exanthems noted in the visualized areas.    NEURO: Symmetric strength +5/5.  Sensation to touch intact all extremities.   There is no area of allodynia or hyperesthesia.  PAIN BEHAVIOR: Cooperative, poor eye contact   Psych:  Depressed affect. Angry,cooperative, conversant appropriately.    Medications     sodium chloride Stopped (01/04/22 0231)       acetaminophen  1,000 mg Oral Q8H     hydrocortisone   Topical BID     hydrOXYzine  25 mg Oral Q6H     LORazepam  4 mg Oral Q6H     methocarbamol  750 mg Oral At Bedtime     morphine  15 mg Oral Q6H     morphine  180 mg Oral Q6H     morphine  60 mg Oral Q6H     multivitamin w/minerals  1 tablet Oral Daily     naproxen  500 mg Oral BID w/meals     nicotine  1 patch Transdermal Daily     nicotine   Transdermal Q8H     polyethylene glycol  17 g Oral Daily     pregabalin  300 mg Oral BID       Data   Results for orders placed or performed during the hospital encounter of 01/01/22 (from the past 24 hour(s))   Basic metabolic panel   Result Value Ref Range    Sodium 139 133 - 144 mmol/L    Potassium 4.5 3.4 - 5.3 mmol/L    Chloride 113 (H) 94 - 109 mmol/L    Carbon Dioxide (CO2) 25 20 - 32 mmol/L    Anion Gap 1 (L) 3 - 14 mmol/L    Urea Nitrogen 10 7 - 30 mg/dL    Creatinine 0.62 (L) 0.66 - 1.25 mg/dL    Calcium 8.4 (L) 8.5 - 10.1 mg/dL    Glucose 97 70 - 99 mg/dL    GFR Estimate >90 >60 mL/min/1.73m2   CBC with platelets   Result Value Ref Range    WBC Count 4.6 4.0 - 11.0 10e3/uL    RBC Count 2.25 (L) 4.40 - 5.90 10e6/uL    Hemoglobin 7.5 (L) 13.3 - 17.7 g/dL    Hematocrit  23.9 (L) 40.0 - 53.0 %     (H) 78 - 100 fL    MCH 33.3 (H) 26.5 - 33.0 pg    MCHC 31.4 (L) 31.5 - 36.5 g/dL    RDW 14.8 10.0 - 15.0 %    Platelet Count 216 150 - 450 10e3/uL

## 2022-01-11 NOTE — PLAN OF CARE
Admission: Admitted on 1/1 for pain management   History: Advanced multiple myeloma  Labs/Protocols: Hgb-7.5-stable   Vitals: Temp: 99.1  F (37.3  C) Temp src: Oral BP: 111/54 Pulse: 71   Resp: 18 SpO2: 96 % O2 Device: None (Room air)    Pain: Constant pain 8/10-See chart for schedule and PRN doses given   Cardiac: Regular rate and rhythm   Respiratory: Diminished   Neuro: A/Ox4  GI/: Continent-Uses urinal   Skin: Refused to let this nurse look at bottom   LDA: No [id number] aware   Diet: Reg   Activity: SBA with walker   Pt educated on current POC: Pain management, Left voicemail for malka at [facility] for placement assessment left vm  Discharge Plan: TBD

## 2022-01-11 NOTE — PLAN OF CARE
PRIMARY DIAGNOSIS: ACUTE PAIN  OUTPATIENT/OBSERVATION GOALS TO BE MET BEFORE DISCHARGE:  1. Pain Status: No improvement noted. Consider adjustment in pain regimen.    2. Return to near baseline physical activity: Yes    3. Cleared for discharge by consultants (if involved): No    Discharge Planner Nurse   Safe discharge environment identified: No  Barriers to discharge: Yes       Entered by: Brooke Guerrier 01/11/2022 7:20 AM     Please review provider order for any additional goals.   Nurse to notify provider when observation goals have been met and patient is ready for discharge.

## 2022-01-11 NOTE — PLAN OF CARE
PRIMARY DIAGNOSIS: ACUTE PAIN  OUTPATIENT/OBSERVATION GOALS TO BE MET BEFORE DISCHARGE:  1. Pain Status: No improvement noted. Consider adjustment in pain regimen.    2. Return to near baseline physical activity: Yes    3. Cleared for discharge by consultants (if involved): No    Discharge Planner Nurse   Safe discharge environment identified: No  Barriers to discharge: Yes       Entered by: Melia Pineda 01/10/2022 7:14 PM     Please review provider order for any additional goals.   Nurse to notify provider when observation goals have been met and patient is ready for discharge.

## 2022-01-11 NOTE — PLAN OF CARE
Temp: 99.1  F (37.3  C) Temp src: Oral BP: 132/60 Pulse: 89   Resp: 12 SpO2: 94 % O2 Device: None (Room air)      Patient A&Ox4, complaints of severe pain (neck and back are the worst). Following scheduled pain management orders and gave PRN PO morphine x1. Patient would greatly appreciate being able to go outside, however per charge RN and , not a feasible request at this time Patient prefers to have water with no ice in it. Patient resting in bed. Uses urinal appropriately. Will continue to monitor.     Melia TORO Will on 1/10/2022 at 10:36 PM

## 2022-01-11 NOTE — PROGRESS NOTES
Care Management Follow Up    Length of Stay (days): 10    Expected Discharge Date: 01/12/2022 - if discharge disposition is in place     Concerns to be Addressed: discharge planning     Patient plan of care discussed at interdisciplinary rounds: Yes    Anticipated Discharge Disposition: Home with Hospice     Anticipated Discharge Services: None  Anticipated Discharge DME: None    Patient/family educated on Medicare website which has current facility and service quality ratings: yes  Education Provided on the Discharge Plan:  yes  Patient/Family in Agreement with the Plan: yes    Referrals Placed by CM/SW: Post Acute Facilities,Hospice  Private pay costs discussed: transportation costs    Additional Information:  Sw met with the pt regarding his discharge plan and his significant other was at the bedside.  The pt told Sw that he is going to discharge home with hospice.  Sw questioned the change in plan as a LTC placement discharge was being worked on.  The pt said that if he is going to die, he wants to die at home, not at a facility.  He said that his significant other lives with him and she will provide the care and support he needs in addition to his family.  Sw discussed that this has not worked in the past and he said that he has more helped lined up this time and will have PCA hours. Sw explained that going to LTC was to aid in him getting home safely by having cares while his PCA was arranged.  The pt said that he has enough help and support to get through until the PCA is lined up.  While in the room, the pt's CADI Franny OSEGUERA [phone], called and Brant was able to talk with them on speaker phone.  Franny said that they will need to do a new assessment for the pt to get his services again.  They can do the assessment relatively soon, but it will likely take about 2 weeks for it to be processed and then additional time to find a PCA for the pt.  The pt and his significant other both said that there will  be enough support to get through that time.    The pt requested [facility] and as a second choice, he would like to use [facility].  The pt currently has a hospital bed, walker, wheelchair, bedside commode, and an over the bed table at home from the previous hospice agency.  He would need that equipment again from the new hospice agency prior to discharge home.    Brant updated the physician.  Brant made the referral to [facility].  Brant received a call from Sarah Beth at [facility] [place] P: [phone].  Sarah Beth said that they have reviewed the pt's referral, but before proceeding, they would want the pt to have a health care directive and POA in place prior to admission.  They want to make sure that the pt has a decision maker in case he needs one as he declines.  Brant told Sarah Beth that the pt wants to discharge home instead of going to LTC.    Sw will continue with discharge planning and will be available as needed until discharge.      ROLAND Vila, [facility]  Inpatient Care Coordination  [facility]  [phone]

## 2022-01-11 NOTE — PLAN OF CARE
BP: 111/54 Pulse: 71   Resp: 18 SpO2: 96 % O2 Device: None (Room air)      Patient A&Ox4, constant pain ranging from 7-9/10. Following pain regimen as ordered and PRN PO morphine given x2. Patient resistant to starting suboxone at this time. He states that it's been hard to keep his thoracic pain under control today but he thinks that part of this is from overexerting himself and sitting up for too long today. Patient resting in bed. Will continue to monitor.     Melia Pineda on 1/11/2022 at 10:15 PM

## 2022-01-12 PROCEDURE — 99232 SBSQ HOSP IP/OBS MODERATE 35: CPT | Performed by: INTERNAL MEDICINE

## 2022-01-12 PROCEDURE — 250N000013 HC RX MED GY IP 250 OP 250 PS 637: Performed by: NURSE PRACTITIONER

## 2022-01-12 PROCEDURE — 99233 SBSQ HOSP IP/OBS HIGH 50: CPT | Performed by: NURSE PRACTITIONER

## 2022-01-12 PROCEDURE — 120N000001 HC R&B MED SURG/OB

## 2022-01-12 PROCEDURE — 250N000013 HC RX MED GY IP 250 OP 250 PS 637: Performed by: INTERNAL MEDICINE

## 2022-01-12 PROCEDURE — 250N000013 HC RX MED GY IP 250 OP 250 PS 637: Performed by: PHYSICIAN ASSISTANT

## 2022-01-12 RX ORDER — MORPHINE SULFATE 30 MG/1
120 TABLET, FILM COATED, EXTENDED RELEASE ORAL EVERY 6 HOURS
Status: DISCONTINUED | OUTPATIENT
Start: 2022-01-12 | End: 2022-01-13 | Stop reason: HOSPADM

## 2022-01-12 RX ORDER — PANTOPRAZOLE SODIUM 40 MG/1
40 TABLET, DELAYED RELEASE ORAL
Status: DISCONTINUED | OUTPATIENT
Start: 2022-01-13 | End: 2022-01-13 | Stop reason: HOSPADM

## 2022-01-12 RX ADMIN — MORPHINE SULFATE 60 MG: 30 TABLET, FILM COATED, EXTENDED RELEASE ORAL at 06:21

## 2022-01-12 RX ADMIN — MORPHINE SULFATE 15 MG: 15 TABLET, EXTENDED RELEASE ORAL at 11:40

## 2022-01-12 RX ADMIN — NICOTINE 1 PATCH: 14 PATCH, EXTENDED RELEASE TRANSDERMAL at 08:17

## 2022-01-12 RX ADMIN — PREGABALIN 300 MG: 100 CAPSULE ORAL at 20:35

## 2022-01-12 RX ADMIN — HYDROXYZINE HYDROCHLORIDE 25 MG: 25 TABLET ORAL at 06:18

## 2022-01-12 RX ADMIN — HYDROXYZINE HYDROCHLORIDE 25 MG: 25 TABLET ORAL at 11:39

## 2022-01-12 RX ADMIN — HYDROXYZINE HYDROCHLORIDE 25 MG: 25 TABLET ORAL at 16:48

## 2022-01-12 RX ADMIN — MORPHINE SULFATE 180 MG: 30 TABLET, FILM COATED, EXTENDED RELEASE ORAL at 06:20

## 2022-01-12 RX ADMIN — LORAZEPAM 4 MG: 2 LIQUID ORAL at 06:15

## 2022-01-12 RX ADMIN — NAPROXEN 500 MG: 250 TABLET ORAL at 17:00

## 2022-01-12 RX ADMIN — ACETAMINOPHEN 1000 MG: 500 TABLET, FILM COATED ORAL at 06:17

## 2022-01-12 RX ADMIN — METHOCARBAMOL 750 MG: 750 TABLET ORAL at 22:39

## 2022-01-12 RX ADMIN — MORPHINE SULFATE 180 MG: 30 TABLET, FILM COATED, EXTENDED RELEASE ORAL at 22:49

## 2022-01-12 RX ADMIN — ACETAMINOPHEN 1000 MG: 500 TABLET, FILM COATED ORAL at 13:17

## 2022-01-12 RX ADMIN — MORPHINE SULFATE 15 MG: 15 TABLET, EXTENDED RELEASE ORAL at 06:19

## 2022-01-12 RX ADMIN — MORPHINE SULFATE 60 MG: 15 TABLET ORAL at 20:42

## 2022-01-12 RX ADMIN — NAPROXEN 500 MG: 250 TABLET ORAL at 08:12

## 2022-01-12 RX ADMIN — MORPHINE SULFATE 180 MG: 30 TABLET, FILM COATED, EXTENDED RELEASE ORAL at 16:48

## 2022-01-12 RX ADMIN — MORPHINE SULFATE 120 MG: 30 TABLET, FILM COATED, EXTENDED RELEASE ORAL at 16:49

## 2022-01-12 RX ADMIN — LORAZEPAM 4 MG: 2 LIQUID ORAL at 11:39

## 2022-01-12 RX ADMIN — MORPHINE SULFATE 120 MG: 30 TABLET, FILM COATED, EXTENDED RELEASE ORAL at 22:50

## 2022-01-12 RX ADMIN — ACETAMINOPHEN 1000 MG: 500 TABLET, FILM COATED ORAL at 20:35

## 2022-01-12 RX ADMIN — PREGABALIN 300 MG: 100 CAPSULE ORAL at 08:12

## 2022-01-12 RX ADMIN — MULTIPLE VITAMINS W/ MINERALS TAB 1 TABLET: TAB at 08:12

## 2022-01-12 RX ADMIN — LORAZEPAM 4 MG: 2 LIQUID ORAL at 22:39

## 2022-01-12 RX ADMIN — MORPHINE SULFATE 60 MG: 15 TABLET ORAL at 00:49

## 2022-01-12 RX ADMIN — HYDROXYZINE HYDROCHLORIDE 25 MG: 25 TABLET ORAL at 22:39

## 2022-01-12 RX ADMIN — HYDROCORTISONE: 0.5 CREAM TOPICAL at 20:44

## 2022-01-12 RX ADMIN — MORPHINE SULFATE 60 MG: 15 TABLET ORAL at 13:17

## 2022-01-12 RX ADMIN — HYDROCORTISONE: 0.5 CREAM TOPICAL at 08:13

## 2022-01-12 RX ADMIN — MORPHINE SULFATE 180 MG: 30 TABLET, FILM COATED, EXTENDED RELEASE ORAL at 11:40

## 2022-01-12 RX ADMIN — LORAZEPAM 4 MG: 2 LIQUID ORAL at 16:47

## 2022-01-12 RX ADMIN — MORPHINE SULFATE 60 MG: 15 TABLET ORAL at 08:12

## 2022-01-12 RX ADMIN — MORPHINE SULFATE 60 MG: 30 TABLET, FILM COATED, EXTENDED RELEASE ORAL at 11:40

## 2022-01-12 ASSESSMENT — ACTIVITIES OF DAILY LIVING (ADL)
ADLS_ACUITY_SCORE: 25
ADLS_ACUITY_SCORE: 26
ADLS_ACUITY_SCORE: 25
ADLS_ACUITY_SCORE: 26
ADLS_ACUITY_SCORE: 25
ADLS_ACUITY_SCORE: 26
ADLS_ACUITY_SCORE: 25
ADLS_ACUITY_SCORE: 26
ADLS_ACUITY_SCORE: 25
ADLS_ACUITY_SCORE: 26

## 2022-01-12 NOTE — PLAN OF CARE
Temp: 97.9  F (36.6  C) Temp src: Oral BP: 113/55 Pulse: 77   Resp: 21 SpO2: 94 % O2 Device: None (Room air)     A&O. Slept between cares. Patient 6/10 constant pain throughout body. PRN morphine given. VSS.

## 2022-01-12 NOTE — PLAN OF CARE
Admission: Admitted on 1/1 for pain management   History: Advanced multiple myeloma  Labs/Protocols: Hgb-7.5-stable   Vitals: Temp: 98.6  F (37  C) Temp src: Oral BP: 115/86 Pulse: 84   Resp: 18 SpO2: 94 % O2 Device: None (Room air)     Pain: Constant pain 8/10-See chart for schedule and PRN doses given   Cardiac: Regular rate and rhythm   Respiratory: Diminished   Neuro: A/Ox4-Agitated that hospice just showed up without informing him they were coming-Lots of venting from Pt this shift this writer actively listened   GI/: Continent-Uses urinal   Skin: Refused to let this nurse look at bottom   LDA: No AN-Lbcyrgiu-IY aware   Diet: Reg   Activity: SBA with walker   Pt educated on current POC: Pain management, Left voicemail for malka at [facility] for placement assessment left vm  Discharge Plan: TBD

## 2022-01-12 NOTE — PROGRESS NOTES
[facility]  Hospitalist Progress Note  Evaristo Dobson MD 01/12/22    Reason for Stay (Diagnosis): Advanced MM, acute on chronic pain         Assessment and Plan:      Summary of Stay:  Beto Tran is a 46 year old  man with history of advanced multiple myeloma who has failed multiple treatments, chronic pain, thoracic outlet syndrome who was just discharged from [facility] (12/21-12/28) after being seen by Palliative care for pain med optimization and transition to a new hospice service.    He was briefly evaluated by [facility] as sounds like there was some misunderstanding as to whether he was going to have an additional person helping him at home (father backed out) so he was not officially enrolled in [facility] and had not been able to receive the rest of his pain medications.  He ran of his medication and therefore presented to the emergency room for assistance.     In reviewing the patient's medical hx, he and sig other Farhad ([phone]) report that Beto was cared for at [facility]. He underwent multiple chemotherapeutics and received stem cell transplant.  Despite aggressive cares treatment was felt to have failed and he was placed on hospice care.  He has been on hospice since 2015 and has received no treatment for his myeloma since that time.  He had a follow up with [facility] this past summer to see if any new therapy had evolved and was told that there was nothing available to treat him.  He has remained on hospice care.  Beto and his significant other requested a second opinion while hospitalized to determine if he would be a candidate for any therapy. He was seen by [facility] and it was thought that there were no other treatment options. Given his hypercalcemia of malignancy, a dose of Zometa was given on 1/3/22.  IVF was also recommended but Beto refused placement of peripheral IV.  Social work is involved and working on placement in long-term care  with hospice. Pain management was consulted and there was discussion with him about changing to Suboxone, he is not interested in this change.  His chronic opiate medications have been increased and pain is better controlled.  Currently being evaluated for enrollment in a new hospice agency to continue his medications after discharge to home.    Problem List/Assessment and Plan:   Advanced multiple myeloma, chronic anemia:  -There appears to be no other treatment options for his multiple myeloma. [facility] consulted for second opinion and agreed.  -CBC with platelets this am with stable anemia (7.5)     Chronic pain secondary to multiple myeloma  -Chronically on very high doses of opiates.  Appreciate assistance from Kasia of pain/palliative service and adjusting medications.  He is not open to change to Suboxone.    -Currently endorsing adequate pain control on MS Contin 300 mg every 6 hours, morphine IR 60 mg every 3 hours as needed, Robaxin-750 milligram at bedtime, hydroxyzine 25 mg every 6 hours, naproxen 500 mg twice daily  -He will need to be enrolled in hospice in order to continue such high doses of opiates for his advanced multiple myeloma related pain    Anxiety: Resumed PTA lorazepam 4 mg every 6 hours.     Right hand paraesthesia  -OT consulted per pain management     History of thoracic outlet syndrome  -s/p 1st rib resection and sclenectomy     History of anemia and hypercalcemia from multiple myeloma   - zoledronic acid 4 mg IV was given while here  - NS offered but patient refused placement of peripheral IV  -Calcium down to 8.41/11    DVT Prophylaxis: Pneumatic Compression Devices  Code Status: DNR / DNI - previously discussed  FEN: regular diet  Discharge Dispo: Currently being evaluated for new hospice agency.  He plans to discharge home and will have assistance from family.  Additionally they are planning on setting up PCA services.  He has no interest in going to a long-term care  "facility.  Social work assisting.  Estimated Disch Date / # of Days until Disch: Awaiting to hear back from hospice agency regarding possible enrollment.  He is on very large doses of opiates chronically with increased here and will need simply to continue these after discharge.  This needs to be figured out before discharge.  He asked for AMA paperwork so that he can read through it.  I did explain that he could leave at any time if that is his preference, however without having a hospice agency to follow-up for his medication refills he would almost certainly end up in an ER within a couple of days due to running out of medications.        Interval History (Subjective):      Assumed care today.  Patient met with hospice agency who is evaluating whether to enroll him or not.  I evaluated him at the bedside in the afternoon.  He reports adequate pain control on current regimen.  Denies any shortness of breath, fevers, constipation.  He was very frustrated by this morning's encounter with hospice agency as he said he was not told they were coming.  Additionally said he was woken up and was not allowed to use the bathroom before starting conversation.  He felt like it was very one-sided his concerns were not being heard.  He expressed concerns that everyone that he is talking with his and ulterior motive.  It was very clear in saying that my only goal is to provide him the care that he needs while here and to ensure that he has someone to continue with his medications after discharge so that he can meet his goals of going home.                  Physical Exam:      Last Vital Signs:  /86 (BP Location: Left arm)   Pulse 84   Temp 98.6  F (37  C) (Oral)   Resp 18   Ht 1.727 m (5' 8\")   Wt 73.3 kg (161 lb 11.2 oz)   SpO2 94%   BMI 24.59 kg/m        Intake/Output Summary (Last 24 hours) at 1/12/2022 1534  Last data filed at 1/12/2022 1355  Gross per 24 hour   Intake 800 ml   Output 2975 ml   Net -2175 ml "       Constitutional: Awake, NAD   Eyes: sclera white   HEENT:  MMM  Respiratory: Breathing comfortably on room air, no tachypnea  Skin: no rash  Musculoskeletal/extremities:  No edema  Neurologic: A&O, speech clear  Psychiatric: Frustrated appearing, confrontational, limited eye contact         Medications:      All current medications were reviewed with changes reflected in problem list.         Data:      All new lab and imaging data was reviewed.   Labs:  No new labs or imaging today      Evaristo Dobson MD

## 2022-01-12 NOTE — PROGRESS NOTES
Care Management Follow Up    Length of Stay (days): 11     Concerns to be Addressed: discharge planning     Patient plan of care discussed at interdisciplinary rounds: Yes    Anticipated Discharge Disposition: Home with Hospice     Anticipated Discharge Services: None  Anticipated Discharge DME: None    Patient/family educated on Medicare website which has current facility and service quality ratings: yes  Education Provided on the Discharge Plan:  yes  Patient/Family in Agreement with the Plan: yes    Referrals Placed by CM/SW: Hospice  Private pay costs discussed: transportation costs    Additional Information:  [facility] met with the pt earlier today to assess the pt for possible admission.    [facility] updated Sw that the hospice physician's were reviewing the case and would update Sw once they have determined if they can meet the pt's needs.  Brant met with the pt discuss his discharge plan.  Brant told him that they talked with [facility] and the soonest that they might be able to admit the pt would be Friday.  Brant acknowledged with the pt that it may mean he has to stay in the hospital a bit longer.  The pt said he understands and will do what he needs to do in order to get what he needs to discharge home.    Brant received an update from  with hospice who said that the physicians said that they will sign the pt onto their services, but they will not have availability until Monday.   said that they can admit him sooner if something becomes available.  Brant told  that they are not sure when the pt will discharge.   said that the pt will need enough meds at discharge to get him through until hospice does their official sign on, likely on Monday.  The pt still has hospice equipment at his home from the previous hospice agency, so they will eventually work on getting the equipment switched out.    Brant met with the pt and updated him on the conversation with .  He  said that he also had spoken with .  Sw told him that they will need to talk with the medical team to see when he will be safe for discharge and medications can be arranged, since hospice cannot sign him on until Monday.  He said he understood and again said that he will do whatever he has to do.    Sw will continue with discharge planning and will be available as needed until discharge.      ROLAND Vila, [facility]  Inpatient Care Coordination  [facility]  [phone]

## 2022-01-12 NOTE — PROGRESS NOTES
SPIRITUAL HEALTH SERVICES Progress Note  [facility] MS 3    Spiritual Health Services responding to Palliative Care consultation order for assistance with healthcare directive.  Cleared consult.  Pt stated yesterday he has a directive, does not wish assistance.  Significant other will bring a copy from home today.      Ady Gale MA  Staff   Pager: [phone]  Phone: [phone]  *off on Mondays

## 2022-01-12 NOTE — CONSULTS
RN writer and [facility] MDs Dr Esteves met with pt this morning for discharge planning and to determine if pt needs can be met in the home.  [facility] team did not have a scheduled appt with pt and woke him up upon arrival.  Apologies extended and care team stepped out of room while pt woke up and collected thoughts and invited us back into room when he was ready.  Reviewed pain, med management and living situation, extended active listening and validated pt feelings while determining if pt can successfully meet his goal of returning to his previous living situation. It is determined, that [facility] can accept this pt into his in his previous living environment, pt does understand he needs to be safe and caregiving in place.  Hospice F2F completed today.  [facility] can accept this pt Monday Jan 17) at 1 pm in his home in [place]-address verified.  Pt does have DME in the home from previous hospice agency and if provided enough medication to get him thru Monday, it is the hope this will be a safe discharge plan while meeting his goal to return home ASAP.  Writer spoke again with pt this evening who is agreeable to plan and update to Pamela DURAN and Kasia Rojas CNP.      Thank you for the opportunity to provide care to this gentleman.    Kerry Khoury RN [facility]  Referral Specialist  [phone]

## 2022-01-12 NOTE — PROGRESS NOTES
[facility]  Palliative Care Progress Note  Text Page     Assessment & Plan    Beto Tran is a 46 year old male who was admitted on 1/1/2022.   Consulted by Beverley Lance PA-c  to assist with symptom management, goals of care and development of plan of care      Recommendations:  1. Goals of Care- No CPR- Do NOT Intubate  Hospitalization goals discussed  No change in goals of care, continues on comfort plan.   Decisional Capacity- Intact. Patient does not have an advance directive. Per  informed consent policy next of kin should be involved if patient becomes unable. Next of Kin is Matty Tran, father per spiritual health note Bharathi will bring in document  POLST  None on file      2. Pain wide spread pain in the setting of end stage multiple myeloma. Pain more pronounce low back, hands and right shoulder   Patient's opioid use in past 24 hours: 1320 mg Morphine  = Daily Morphine Equivalent  [place] Board of Pharmacy Data Base Reviewed:    YES; As expected, no concern for misuse/abuse of controlled medications based on this report.  ORT   ( add dot phrase .FRHORT if warranted)  Chronic opioid use Morphine Equivalent= 1980 mg  Ms Contin to 300 mg every 6 hrs, Morphine IR 60 mg every 3 hrs prn.   methocarbamol ( Robaxin) 750 mg qid  Continue Lyrica  start 300 mg bid  Hydroxyzine 25 mg every 6 hrs  No microdose induction with suboxone, would consider once signed onto hospice with new agency     3. Dyspnea  None      4. Anxiety   Ativan solution  4 mg every 6 hrs     5. Spiritual Care  Oriented to Spiritual Health as part of Palliative Care team.  Spiritual Background: undesignated      6. Care Planning  Appreciate Care of JANE Vila in d/c planning. Home may be an option with hospice starting Monday 1/17 per Kerry Khoury RN . Hold onTransition to suboxone microdosing for now    Medications for discharge to be determined    Interval visit:  Intractable pain more controlled  "than prior days.5-7/10 Continues with c/o widespread pain and increasing left hand pain/weakness and reduced dexterity of hand.  Mood depressed,angry \" prisoner in here.\" Refusing LTC placement, threatening to leave AMA.  Feels he has care givers lined up for a month with Aunt and G-friend Bharathi.    He voiced concern about suboxone and using it \"off label\"  Stated FDA indication is for opioid dependence. Met with hospice [facility] today with medical providers Lilly RN intake Kerry Khoury for a lengthy time.  Checked in several time and appears all was going well. Stopped back and patient felt it was the worse experience, did not give him input or say and felt like a pawn.  Still refusing home LTC and threatening to leave AMA. Desires greater mobility, but not making much independent effort.        Medical Decision Making and Goals of Care:    Discussed on January 12, 2022 with ANGELO Putnam,APRN,CNP,ACHPN:    Discussed on January 5, 2022 with ALEJANDRO PutnamC,APRN,CNP,ACHPN:  Asleep this morning. Met with patient this afternoon.  States pain no better 8/10. Always being disturbed and poor sleep results. Calmer today with staff and allowed visits with oncology and this writer. Reflected on this writers observation of sleeping, less anxious. Informed again of med change to lyrica from gabapentin and is ok with this.  Discussed use of suboxone and asked patient to strongly consider cross over from Mu opioids. Explained mechanism of action and how it may help his pain better and improve QoL.  He raised concerns about being seen as an addict, wonder where he would get the medication and if it was at a MAT center. This write acknowledged his concern but stated it is different now.  Suboxone is widely used in patient's with chronic pain and high opioid tolerance as well as the hospice patient.  Stated would need to partner with social work to find right fit at discharge to continue " "prescribing.  TCU may be challenging as many do not accept.  Asked patient to think about it and consider Suboxone induction.  It is a good opportunity now as he is IP.  Denies SOB, no constipation,     Discussed on January 3, 2022 with Kasia JONES, CNP:  Familiar with patient from previous recent admission.  Feeling miserable today with widespread pain, achyness and \"flu like pain\".  He is not sure of next steps. Feels hopeless with options, let down that dad did not come to stay. He is coming to terms with alternate plans for discharge.  Discussed group home with people of potential of similar age.     Patient aware I discussed with social work.    Discusssed pain management changes done today and plan to streamline medication for ease of administration.  He is not open to use of cross over to Suboxone as then he has another layer of being looked at as a drug abuser.    Thank you for involving us in the patient's care.        Kasia Luis RN, PGMT-BC, APRN, CNP,ACHPN  Pain Management and Palliative Care  [facility]  Pgr: [phone]      Time Spent on this Encounter   Total unit/floor time 35 minutes, time consisted of the following, examination of the patient, reviewing the record and completing documentation. >50% of time spent in counseling and coordination of care, Bedside Nurse Ale Briceño RN , Hospitalist Eric Yang MD and  JANE Vila.   Time spend counseling with patient consisted of the following topics, symptom management.    Review of Systems    CONSTITUTIONAL: NEGATIVE for fever, chills, change in weight  ENT/MOUTH: NEGATIVE for ear, mouth and throat problems  RESP: NEGATIVE for significant cough or SOB  CV: NEGATIVE for chest pain, palpitations or peripheral edema    Palliative Symptom Review (0=no symptom/no concern, 1=mild, 2=moderate, 3=severe):      Pain: 2- moderate to 3- severe       Fatigue: 1-mild      " Nausea: 0-none      Constipation: 0-none      Diarrhea: 0-none      Depressive Symptoms: 1-mild      Anxiety: 1-mild      Drowsiness: 0-none      Poor Appetite: 0-none      Shortness of Breath: 0-none      Insomnia: 1-mild      Other:        Overall (0 good/no concerns, 3 very poor):  2    Physical Exam   Temp:  [97.9  F (36.6  C)-98.6  F (37  C)] 98.6  F (37  C)  Pulse:  [77-84] 84  Resp:  [18-24] 18  BP: (104-115)/(51-86) 115/86  SpO2:  [94 %-95 %] 94 %  161 lbs 11.2 oz  Exam:  GEN:  Alert, oriented x 3, appears comfortable, NAD.   HEENT:  Normocephalic/atraumatic, no scleral icterus, no nasal discharge, mouth moist.  CV:  VVS NRR,  no edema BLE.  RESP:.  Symmetric chest rise on inhalation noted.  Normal respiratory effort.  ABD:  Rounded, soft, non-tender/non-distended.  +BS  EXT:  Edema & pulses as noted above.  CMS intact x 4.     M/S:   Nontender to palpation throughout.    SKIN:  Dry to touch, no exanthems noted in the visualized areas.    NEURO: Symmetric strength +5/5.  Sensation to touch intact all extremities.   There is no area of allodynia or hyperesthesia.  PAIN BEHAVIOR: Cooperative, poor eye contact   Psych:  Depressed affect. Angry,cooperative, conversant appropriately.    Medications     sodium chloride Stopped (01/04/22 0231)       acetaminophen  1,000 mg Oral Q8H     hydrocortisone   Topical BID     hydrOXYzine  25 mg Oral Q6H     LORazepam  4 mg Oral Q6H     methocarbamol  750 mg Oral At Bedtime     morphine  120 mg Oral Q6H     morphine  180 mg Oral Q6H     multivitamin w/minerals  1 tablet Oral Daily     naproxen  500 mg Oral BID w/meals     nicotine  1 patch Transdermal Daily     nicotine   Transdermal Q8H     [START ON 1/13/2022] pantoprazole  40 mg Oral QAM AC     polyethylene glycol  17 g Oral Daily     pregabalin  300 mg Oral BID       Data   No results found for this or any previous visit (from the past 24 hour(s)).

## 2022-01-13 ENCOUNTER — DOCUMENTATION ONLY (OUTPATIENT)
Dept: OTHER | Facility: CLINIC | Age: 47
End: 2022-01-13
Payer: MEDICARE

## 2022-01-13 VITALS
OXYGEN SATURATION: 95 % | RESPIRATION RATE: 16 BRPM | HEART RATE: 82 BPM | SYSTOLIC BLOOD PRESSURE: 120 MMHG | HEIGHT: 68 IN | WEIGHT: 161.7 LBS | BODY MASS INDEX: 24.51 KG/M2 | DIASTOLIC BLOOD PRESSURE: 51 MMHG | TEMPERATURE: 98 F

## 2022-01-13 PROCEDURE — 250N000013 HC RX MED GY IP 250 OP 250 PS 637: Performed by: INTERNAL MEDICINE

## 2022-01-13 PROCEDURE — 250N000013 HC RX MED GY IP 250 OP 250 PS 637: Performed by: PHYSICIAN ASSISTANT

## 2022-01-13 PROCEDURE — 250N000013 HC RX MED GY IP 250 OP 250 PS 637: Performed by: NURSE PRACTITIONER

## 2022-01-13 PROCEDURE — 99233 SBSQ HOSP IP/OBS HIGH 50: CPT | Performed by: NURSE PRACTITIONER

## 2022-01-13 PROCEDURE — 99238 HOSP IP/OBS DSCHRG MGMT 30/<: CPT | Performed by: INTERNAL MEDICINE

## 2022-01-13 RX ORDER — NAPROXEN 500 MG/1
500 TABLET ORAL 2 TIMES DAILY WITH MEALS
Qty: 10 TABLET | Refills: 0 | Status: SHIPPED | OUTPATIENT
Start: 2022-01-13

## 2022-01-13 RX ORDER — HYDROXYZINE HYDROCHLORIDE 25 MG/1
25 TABLET, FILM COATED ORAL EVERY 6 HOURS
Qty: 20 TABLET | Refills: 0 | Status: ON HOLD | OUTPATIENT
Start: 2022-01-13 | End: 2022-04-05

## 2022-01-13 RX ORDER — SENNA AND DOCUSATE SODIUM 50; 8.6 MG/1; MG/1
1 TABLET, FILM COATED ORAL 2 TIMES DAILY PRN
Qty: 10 TABLET | Refills: 0 | Status: SHIPPED | OUTPATIENT
Start: 2022-01-13

## 2022-01-13 RX ORDER — PREGABALIN 300 MG/1
300 CAPSULE ORAL 2 TIMES DAILY
Qty: 15 CAPSULE | Refills: 0 | Status: ON HOLD | OUTPATIENT
Start: 2022-01-13 | End: 2022-01-01

## 2022-01-13 RX ORDER — ACETAMINOPHEN 500 MG
1000 TABLET ORAL EVERY 8 HOURS PRN
Qty: 20 TABLET | Refills: 0 | Status: SHIPPED | OUTPATIENT
Start: 2022-01-13

## 2022-01-13 RX ORDER — ONDANSETRON 8 MG/1
8 TABLET, ORALLY DISINTEGRATING ORAL EVERY 8 HOURS PRN
Qty: 8 TABLET | Refills: 0 | Status: SHIPPED | OUTPATIENT
Start: 2022-01-13

## 2022-01-13 RX ORDER — POLYETHYLENE GLYCOL 3350 17 G/17G
17 POWDER, FOR SOLUTION ORAL DAILY
Qty: 510 G | Refills: 0 | Status: SHIPPED | OUTPATIENT
Start: 2022-01-13

## 2022-01-13 RX ORDER — LORAZEPAM 2 MG/ML
4 CONCENTRATE ORAL EVERY 6 HOURS
Qty: 60 ML | Refills: 0 | Status: SHIPPED | OUTPATIENT
Start: 2022-01-13 | End: 2022-01-18

## 2022-01-13 RX ORDER — MORPHINE SULFATE 15 MG/1
60 TABLET ORAL
Qty: 90 TABLET | Refills: 0 | Status: SHIPPED | OUTPATIENT
Start: 2022-01-13

## 2022-01-13 RX ORDER — HALOPERIDOL 2 MG/1
2 TABLET ORAL 4 TIMES DAILY
Qty: 6 TABLET | Refills: 0 | Status: SHIPPED | OUTPATIENT
Start: 2022-01-13

## 2022-01-13 RX ORDER — METHOCARBAMOL 750 MG/1
750 TABLET, FILM COATED ORAL AT BEDTIME
Qty: 20 TABLET | Refills: 0 | Status: SHIPPED | OUTPATIENT
Start: 2022-01-13

## 2022-01-13 RX ORDER — MORPHINE SULFATE 60 MG/1
300 TABLET, FILM COATED, EXTENDED RELEASE ORAL EVERY 6 HOURS
Qty: 100 TABLET | Refills: 0 | Status: SHIPPED | OUTPATIENT
Start: 2022-01-13 | End: 2022-01-18

## 2022-01-13 RX ORDER — MORPHINE SULFATE 30 MG/1
60 TABLET ORAL
Qty: 36 TABLET | Refills: 0 | Status: CANCELLED | OUTPATIENT
Start: 2022-01-13

## 2022-01-13 RX ORDER — OMEPRAZOLE 20 MG/1
20 TABLET, DELAYED RELEASE ORAL DAILY
Qty: 5 TABLET | Refills: 0 | Status: SHIPPED | OUTPATIENT
Start: 2022-01-13

## 2022-01-13 RX ADMIN — NAPROXEN 500 MG: 250 TABLET ORAL at 08:38

## 2022-01-13 RX ADMIN — LORAZEPAM 4 MG: 2 LIQUID ORAL at 11:21

## 2022-01-13 RX ADMIN — POLYETHYLENE GLYCOL 3350 17 G: 17 POWDER, FOR SOLUTION ORAL at 08:38

## 2022-01-13 RX ADMIN — LORAZEPAM 4 MG: 2 LIQUID ORAL at 05:14

## 2022-01-13 RX ADMIN — MORPHINE SULFATE 120 MG: 30 TABLET, FILM COATED, EXTENDED RELEASE ORAL at 11:21

## 2022-01-13 RX ADMIN — PREGABALIN 300 MG: 100 CAPSULE ORAL at 08:38

## 2022-01-13 RX ADMIN — PANTOPRAZOLE SODIUM 40 MG: 40 TABLET, DELAYED RELEASE ORAL at 08:38

## 2022-01-13 RX ADMIN — MORPHINE SULFATE 60 MG: 15 TABLET ORAL at 13:48

## 2022-01-13 RX ADMIN — HYDROXYZINE HYDROCHLORIDE 25 MG: 25 TABLET ORAL at 11:21

## 2022-01-13 RX ADMIN — MORPHINE SULFATE 180 MG: 30 TABLET, FILM COATED, EXTENDED RELEASE ORAL at 05:13

## 2022-01-13 RX ADMIN — HYDROXYZINE HYDROCHLORIDE 25 MG: 25 TABLET ORAL at 05:14

## 2022-01-13 RX ADMIN — MULTIPLE VITAMINS W/ MINERALS TAB 1 TABLET: TAB at 08:38

## 2022-01-13 RX ADMIN — MORPHINE SULFATE 60 MG: 15 TABLET ORAL at 08:44

## 2022-01-13 RX ADMIN — ACETAMINOPHEN 1000 MG: 500 TABLET, FILM COATED ORAL at 05:13

## 2022-01-13 RX ADMIN — MORPHINE SULFATE 180 MG: 30 TABLET, FILM COATED, EXTENDED RELEASE ORAL at 11:21

## 2022-01-13 RX ADMIN — MORPHINE SULFATE 120 MG: 30 TABLET, FILM COATED, EXTENDED RELEASE ORAL at 05:12

## 2022-01-13 RX ADMIN — NICOTINE 1 PATCH: 14 PATCH, EXTENDED RELEASE TRANSDERMAL at 08:42

## 2022-01-13 ASSESSMENT — ACTIVITIES OF DAILY LIVING (ADL)
ADLS_ACUITY_SCORE: 26

## 2022-01-13 NOTE — DISCHARGE INSTRUCTIONS
[facility] to evaluate and treat.  [facility] will sign you onto their services on Monday, 1/17/22.

## 2022-01-13 NOTE — PROGRESS NOTES
[facility]  Palliative Care Progress Note  Text Page     Assessment & Plan    Beto Tran is a 46 year old male who was admitted on 1/1/2022.   Consulted by Beverley Lance PA-c  to assist with symptom management, goals of care and development of plan of care      Recommendations:  1. Goals of Care- No CPR- Do NOT Intubate  Hospitalization goals discussed  No change in goals of care, continues on comfort plan.   Decisional Capacity- Intact. Patient does not have an advance directive in EMR. Per  informed consent policy next of kin should be involved if patient becomes unable. Next of Kin is Matty Tran, father per spiritual health note Bharathi will bring in document  POLST  completed today and sent to honoring [facility] for urgent validation. Original with patient.       2. Pain wide spread pain in the setting of end stage multiple myeloma. Pain more pronounce low back, hands and right shoulder   Patient's opioid use in past 24 hours: 1335mg Morphine  = Daily Morphine Equivalent  [place] Board of Pharmacy Data Base Reviewed:    YES; As expected, no concern for misuse/abuse of controlled medications based on this report.  ORT   ( add dot phrase .FRHORT if warranted)  Chronic opioid use Morphine Equivalent= 1980 mg  Ms Contin to 300 mg every 6 hrs, Morphine IR 60 mg every 3 hrs prn.   methocarbamol ( Robaxin) 750 mg qid  Continue Lyrica  start 300 mg bid  Hydroxyzine 25 mg every 6 hrs  No microdose induction with suboxone, would consider once signed onto hospice with new agency     3. Dyspnea  None      4. Anxiety   Ativan solution  4 mg every 6 hrs     5. Spiritual Care  Oriented to Spiritual Health as part of Palliative Care team.  Spiritual Background: undesignated      6. Care Planning  Appreciate Care of JANE Vila in d/c planning. Home later today with hospice starting Monday 1/17 [facility].   Medications for discharge:   Ms Contin to 300 mg every 6 hrs (60 mg tablet(s),  Morphine IR 60 mg every 3 hrs prn with 15 mg tablet(s)    methocarbamol ( Robaxin) 750 mg qid  Continue Lyrica  start 300 mg bid  Hydroxyzine 25 mg every 6 hrs  Ativan solu 2 mg/ml take 4 mg every 6 hrs  zofran 4 mg qid prn   miralax every day prn  Dulcolax supp every day prn   naprozyn 500 mg bid  Omeprazole 20 mg every day     Interval visit:  Intractable pain more controlled than prior days.5-7/10 Continues with c/o widespread pain and increasing left hand pain/weakness and reduced dexterity of hand. Looking forward to going home. Feels he will be doing better there.  Pleasant cooperative      Medical Decision Making and Goals of Care:    Discussed on January 12, 2022 with ALEJANDRO PutnamC,APRN,CNP,ACHPN:    Discussed on January 5, 2022 with ALEJANDRO PutnamC,APRN,CNP,ACHPN:  Asleep this morning. Met with patient this afternoon.  States pain no better 8/10. Always being disturbed and poor sleep results. Calmer today with staff and allowed visits with oncology and this writer. Reflected on this writers observation of sleeping, less anxious. Informed again of med change to lyrica from gabapentin and is ok with this.  Discussed use of suboxone and asked patient to strongly consider cross over from Mu opioids. Explained mechanism of action and how it may help his pain better and improve QoL.  He raised concerns about being seen as an addict, wonder where he would get the medication and if it was at a MAT center. This write acknowledged his concern but stated it is different now.  Suboxone is widely used in patient's with chronic pain and high opioid tolerance as well as the hospice patient.  Stated would need to partner with social work to find right fit at discharge to continue prescribing.  TCU may be challenging as many do not accept.  Asked patient to think about it and consider Suboxone induction.  It is a good opportunity now as he is IP.  Denies SOB, no constipation,     Discussed on  "January 3, 2022 with Kasia JONES, CNP:  Familiar with patient from previous recent admission.  Feeling miserable today with widespread pain, achyness and \"flu like pain\".  He is not sure of next steps. Feels hopeless with options, let down that dad did not come to stay. He is coming to terms with alternate plans for discharge.  Discussed group home with people of potential of similar age.     Patient aware I discussed with social work.    Discusssed pain management changes done today and plan to streamline medication for ease of administration.  He is not open to use of cross over to Suboxone as then he has another layer of being looked at as a drug abuser.    Thank you for involving us in the patient's care.        Kasia Luis RN, PGMT-BC, APRN, CNP,ACHPN  Pain Management and Palliative Care  [facility]  Pgr: [phone]      Time Spent on this Encounter   Total unit/floor time 35 minutes, time consisted of the following, examination of the patient, reviewing the record and completing documentation. >50% of time spent in counseling and coordination of care, Bedside Nurse Cheri Tapia RN , Hospitalist Abner Dobson MD  and  JANE Vila.   Time spend counseling with patient consisted of the following topics, symptom management.    Review of Systems    CONSTITUTIONAL: NEGATIVE for fever, chills, change in weight  ENT/MOUTH: NEGATIVE for ear, mouth and throat problems  RESP: NEGATIVE for significant cough or SOB  CV: NEGATIVE for chest pain, palpitations or peripheral edema    Palliative Symptom Review (0=no symptom/no concern, 1=mild, 2=moderate, 3=severe):      Pain: 2- moderate        Fatigue: 1-mild      Nausea: 0-none      Constipation: 0-none last BM 1/13      Diarrhea: 0-none      Depressive Symptoms: 1-mild      Anxiety: 1-mild      Drowsiness: 0-none      Poor Appetite: 0-none      Shortness of Breath: 0-none      " Insomnia: 0 none       Other:        Overall (0 good/no concerns, 3 very poor):  2    Physical Exam   Temp:  [98  F (36.7  C)-98.5  F (36.9  C)] 98  F (36.7  C)  Pulse:  [79-84] 82  Resp:  [16-18] 16  BP: (104-131)/(41-86) 120/51  SpO2:  [94 %-96 %] 95 %  161 lbs 11.2 oz  Exam:  GEN:  Alert, oriented x 3, appears comfortable, NAD.   HEENT:  Normocephalic/atraumatic, no scleral icterus, no nasal discharge, mouth moist.  CV:  VVS NRR,  no edema BLE.  RESP:.  Symmetric chest rise on inhalation noted.  Normal respiratory effort.  ABD:  Rounded, soft, non-tender/non-distended.  EXT:  Edema & pulses as noted above.  CMS intact x 4.     M/S:   Nontender to palpation throughout.    SKIN:  Dry to touch, no exanthems noted in the visualized areas.    NEURO: Symmetric strength +5/5.  Sensation to touch intact all extremities.   There is no area of allodynia or hyperesthesia.  PAIN BEHAVIOR: Cooperative, god eye contact   Psych:  Normal affect , cooperative, conversant appropriately.    Medications       acetaminophen  1,000 mg Oral Q8H     hydrOXYzine  25 mg Oral Q6H     LORazepam  4 mg Oral Q6H     methocarbamol  750 mg Oral At Bedtime     morphine  120 mg Oral Q6H     morphine  180 mg Oral Q6H     multivitamin w/minerals  1 tablet Oral Daily     naproxen  500 mg Oral BID w/meals     nicotine  1 patch Transdermal Daily     nicotine   Transdermal Q8H     pantoprazole  40 mg Oral QAM AC     polyethylene glycol  17 g Oral Daily     pregabalin  300 mg Oral BID       Data   No results found for this or any previous visit (from the past 24 hour(s)).

## 2022-01-13 NOTE — PLAN OF CARE
VSS. A/O x4. Slow to respond with flat affect pt was agitated about care this hs. Pt reports severe pain in back scheduled pain medications given with some effect. Pt is independent/SBA. No PIV. Continue with pain management and POC.

## 2022-01-13 NOTE — PLAN OF CARE
"VSS. Complains of generalized pain, PRN morphine given x2. Scheduled pain meds given. Medically appropriate for discharge, education completed with patient. Hospice care set up for Monday.     /51 (BP Location: Left arm)   Pulse 82   Temp 98  F (36.7  C) (Oral)   Resp 16   Ht 1.727 m (5' 8\")   Wt 73.3 kg (161 lb 11.2 oz)   SpO2 95%   BMI 24.59 kg/m      "

## 2022-01-13 NOTE — DISCHARGE SUMMARY
[facility]  Discharge Summary  Name: Beto Tran    MRN: [id number]  YOB: 1975    Age: 46 year old  Date of Discharge:  1/13/2022  Date of Admission: 1/1/2022  Primary Care Provider: Carolynn Ref-Primary, Physician  Discharge Physician:  Evaristo Dobson MD  Discharging Service:  Hospitalist      Discharge Diagnoses:  Advanced multiple myeloma  Chronic anemia  Hypercalcemia  Chronic pain secondary to multiple myeloma syndrome anxiety  Right hand paresthesia  History of thoracic outlet syndrome     Hospital Course:  Summary of Stay:  Beto Tran is a 46 year old  man with history of advanced multiple myeloma who has failed multiple treatments, chronic pain, thoracic outlet syndrome who was just discharged from [facility] (12/21-12/28) after being seen by Palliative care for pain med optimization and transition to a new hospice service.    He was briefly evaluated by [facility] as sounds like there was some misunderstanding as to whether he was going to have an additional person helping him at home (father backed out) so he was not officially enrolled in [facility] and had not been able to receive the rest of his pain medications.  He ran of his medication and therefore presented to the emergency room for assistance.     In reviewing the patient's medical hx, he and sig other Farhad ([phone]) report that Beto was cared for at [facility]. He underwent multiple chemotherapeutics and received stem cell transplant.  Despite aggressive cares treatment was felt to have failed and he was placed on hospice care.  He has been on hospice since 2015 and has received no treatment for his myeloma since that time.  He had a follow up with [facility] this past summer to see if any new therapy had evolved and was told that there was nothing available to treat him.  He has remained on hospice care.  Beto and his significant other requested a second opinion while hospitalized to  determine if he would be a candidate for any therapy. He was seen by [facility] and it was thought that there were no other treatment options. Given his hypercalcemia of malignancy, a dose of Zometa was given on 1/3/22.  IVF was also recommended but Beto refused placement of peripheral IV.  Social work is involved and working on placement in long-term care with hospice. Pain management was consulted and there was discussion with him about changing to Suboxone, he is not interested in this change.  His chronic opiate medications have been increased and pain is better controlled.  He will enroll in hospice on 1/17/2022, medications provided by pain management team to get him until enrollment.     Problem List/Assessment and Plan:   Advanced multiple myeloma, chronic anemia:  -There appears to be no other treatment options for his multiple myeloma. [facility] consulted for second opinion and agreed.  -CBC with platelets this am with stable anemia (7.5)     Chronic pain secondary to multiple myeloma  -Chronically on very high doses of opiates.  Appreciate assistance from Kasia of pain/palliative service and adjusting medications.  He is not open to change to Suboxone.    -Currently endorsing adequate pain control on MS Contin 300 mg every 6 hours, morphine IR 60 mg every 3 hours as needed, Robaxin 750 mg at bedtime, hydroxyzine 25 mg every 6 hours, naproxen 500 mg twice daily  -He will enroll in hospice on 1/17/2022, medications provided by pain management team to get him until enrollment.     Anxiety: Resumed PTA lorazepam 4 mg every 6 hours.     Right hand paraesthesia  -OT consulted per pain management     History of thoracic outlet syndrome  -s/p 1st rib resection and sclenectomy     Hypercalcemia from multiple myeloma   -zoledronic acid 4 mg IV was given while here   -NS offered but patient refused placement of peripheral IV  -Calcium down to 8.4 on 1/11     Code Status: DNR / DNI - previously  discussed.  FRANCI filled out  Discharge Dispo:  Enrolling into hospice agency on 1/17/2022.  He plans to discharge home and will have assistance from family.  Additionally they are planning on setting up PCA services.  He has no interest in going to a long-term care facility.       Discharge Disposition:  Discharged to home     Allergies:  Allergies   Allergen Reactions     Nka [No Known Allergies]         Discharge Medications:   Current Discharge Medication List      START taking these medications    Details   hydrOXYzine (ATARAX) 25 MG tablet Take 1 tablet (25 mg) by mouth every 6 hours  Qty: 20 tablet, Refills: 0    Associated Diagnoses: Hospice care patient      naproxen (NAPROSYN) 500 MG tablet Take 1 tablet (500 mg) by mouth 2 times daily (with meals)  Qty: 10 tablet, Refills: 0    Associated Diagnoses: Hospice care patient      omeprazole (PRILOSEC OTC) 20 MG EC tablet Take 1 tablet (20 mg) by mouth daily  Qty: 5 tablet, Refills: 0    Associated Diagnoses: Hospice care patient      pregabalin (LYRICA) 300 MG capsule Take 1 capsule (300 mg) by mouth 2 times daily  Qty: 15 capsule, Refills: 0    Associated Diagnoses: Hospice care patient         CONTINUE these medications which have CHANGED    Details   acetaminophen (TYLENOL) 500 MG tablet Take 2 tablets (1,000 mg) by mouth every 8 hours as needed for mild pain  Qty: 20 tablet, Refills: 0    Associated Diagnoses: Hospice care patient      haloperidol (HALDOL) 2 MG tablet Take 1 tablet (2 mg) by mouth 4 times daily  Qty: 6 tablet, Refills: 0    Associated Diagnoses: Multiple myeloma not having achieved remission (H); Hospice care patient      LORazepam (ATIVAN) 2 MG/ML (HIGH CONC) oral solution Take 2 mLs (4 mg) by mouth every 6 hours for 5 days  Qty: 60 mL, Refills: 0    Associated Diagnoses: Multiple myeloma not having achieved remission (H); Hospice care patient      methocarbamol (ROBAXIN) 750 MG tablet Take 1 tablet (750 mg) by mouth At Bedtime  Qty: 20  tablet, Refills: 0    Associated Diagnoses: Hospice care patient      morphine (MS CONTIN) 60 MG 12 hr tablet Take 5 tablets (300 mg) by mouth every 6 hours for 5 days  Qty: 100 tablet, Refills: 0    Associated Diagnoses: Hospice care patient      morphine (MSIR) 15 MG IR tablet Take 4 tablets (60 mg) by mouth every 3 hours as needed for severe pain  Qty: 90 tablet, Refills: 0    Associated Diagnoses: Hospice care patient      ondansetron (ZOFRAN-ODT) 8 MG ODT tab Take 1 tablet (8 mg) by mouth every 8 hours as needed for nausea  Qty: 8 tablet, Refills: 0    Associated Diagnoses: Multiple myeloma not having achieved remission (H); Hospice care patient      polyethylene glycol (MIRALAX) 17 GM/Dose powder Take 17 g by mouth daily  Qty: 510 g, Refills: 0    Associated Diagnoses: Hospice care patient      SENNA-docusate sodium (SENNA S) 8.6-50 MG tablet Take 1 tablet by mouth 2 times daily as needed (constipation)  Qty: 10 tablet, Refills: 0    Associated Diagnoses: Hospice care patient         CONTINUE these medications which have NOT CHANGED    Details   loratadine (CLARITIN) 10 MG tablet Take 10 mg by mouth daily as needed for allergies      multivitamin w/minerals (THERA-VIT-M) tablet Take 1 tablet by mouth daily      methylnaltrexone (RELISTOR) 12 MG/0.6ML SOLN injection Inject 12 mg Subcutaneous Every other day as needed         STOP taking these medications       bisacodyl (DULCOLAX) 10 MG suppository Comments:   Reason for Stopping:         bisacodyl (DULCOLAX) 10 MG suppository Comments:   Reason for Stopping:         gabapentin (NEURONTIN) 600 MG tablet Comments:   Reason for Stopping:         ibuprofen (ADVIL/MOTRIN) 600 MG tablet Comments:   Reason for Stopping:         prochlorperazine (COMPAZINE) 10 MG tablet Comments:   Reason for Stopping:                Condition on Discharge:  Discharge condition: Terminal   Discharge vitals: Blood pressure 120/51, pulse 82, temperature 98  F (36.7  C), temperature  "source Oral, resp. rate 16, height 1.727 m (5' 8\"), weight 73.3 kg (161 lb 11.2 oz), SpO2 95 %.   Code status on discharge: DNR / DNI     History of Illness:  See detailed admission note for full details.    Physical Exam:  Blood pressure 120/51, pulse 82, temperature 98  F (36.7  C), temperature source Oral, resp. rate 16, height 1.727 m (5' 8\"), weight 73.3 kg (161 lb 11.2 oz), SpO2 95 %.  Wt Readings from Last 1 Encounters:   01/02/22 73.3 kg (161 lb 11.2 oz)     Constitutional: Awake, NAD  Eyes: sclera white   HEENT:  MMM  Respiratory: Breathing comfortably on room air  Skin: no rash    Musculoskeletal/extremities: Trace bilateral lower extremity edema edema  Neurologic: A&O, speech clear, answering all questions appropriately  Psychiatric: Calm and cooperative    Procedures other than Imaging:  None     Imaging:  Results for orders placed or performed during the hospital encounter of 01/01/22   Cervical spine CT w/o contrast    Narrative    EXAM: CT CERVICAL SPINE W/O CONTRAST  LOCATION: [facility]  DATE/TIME: 1/1/2022 10:05 PM    INDICATION: Acute neck pain with multiple myeloma.  COMPARISON: None.  TECHNIQUE: CT cervical spine without contrast. Dose reduction techniques were performed.    FINDINGS: There is good anatomic alignment of the C1 and C2 vertebral bodies and also with the occipital condyles. The prevertebral soft tissues and the predental space are maintained. There is good anatomic alignment. The vertebral body heights are   maintained. There are diffuse lytic lesions seen throughout the bony structures visualized on this study consistent with the patient's known multiple myeloma. The vertebral body heights are well-maintained throughout with no evidence of a pathologic   compression fracture. There is though an expansile lytic lesion involving the left first rib with significant bony destruction also associated with expansion of the left T1 transverse process. Recommend " clinical correlation for pain in this region.    There is no significant canal compromise or neural foraminal narrowing noted throughout the cervical spine. The lung apices are clear. The paraspinal soft tissues are unremarkable.      Impression    IMPRESSION:  1. Diffuse lytic lesions throughout bony structures visualized on this study consistent with multiple myeloma.  2. Expansile destructive lytic lesion of left first rib and left T1 transverse process. Recommend clinical correlation for pain in this region.  3. No significant canal compromise or neural foraminal narrowing noted throughout cervical spine.        Consultations:  Consultation during this admission received from palliative care and pain management team and oncology.       Recent Lab Results:  Recent Labs   Lab 01/11/22  0715   WBC 4.6   HGB 7.5*   HCT 23.9*   *        Recent Labs   Lab 01/11/22  0715      POTASSIUM 4.5   CHLORIDE 113*   CO2 25   ANIONGAP 1*   GLC 97   BUN 10   CR 0.62*   GFRESTIMATED >90   JOSE 8.4*          Pending Results:    Unresulted Labs Ordered in the Past 30 Days of this Admission     No orders found from 12/2/2021 to 1/2/2022.         These results will be followed up by patient's primary care provider.    Discharge Instructions and Follow-Up:   Discharge Procedure Orders   Reason for your hospital stay   Order Comments: You were hospitalized for pain control which is improved with change in your medications by our pain management team.  You will be enrolling with hospice on Monday and you have been prescribed medications to get you through the weekend if taken as prescribed.     Follow-up and recommended labs and tests    Order Comments: Follow-up with hospice team with enrollment on 1/17/2022     Activity   Order Comments: Your activity upon discharge: activity as tolerated     Order Specific Question Answer Comments   Is discharge order? Yes      No CPR- Do NOT Intubate     Order Specific Question  Answer Comments   Code status determined by: Discussion with patient/ legal decision maker      Diet   Order Comments: Follow this diet upon discharge:   Regular Diet Adult     Order Specific Question Answer Comments   Is discharge order? Yes      I, Evaristo Dobson MD, personally saw the patient today and spent less than or equal to 30 minutes discharging this patient.    Evaristo Dobson MD

## 2022-01-13 NOTE — PROGRESS NOTES
Care Management Discharge Note    Discharge Date: 01/13/2022       Discharge Disposition: Home,Hospice    Discharge Services: None    Discharge DME: Bed,Commode,Wheelchair,Walker - supplied by hospice    Discharge Transportation: agency -  [facility] w/c    Private pay costs discussed: transportation costs   Reviewed out of pocket cost for [facility] transport, $78.65 for base rate and $5.06 per mile to the destination. Spoke with the pt, they expressed understanding and are agreeable to this.     PAS Confirmation Code:  (N/A)  Patient/family educated on Medicare website which has current facility and service quality ratings: yes    Education Provided on the Discharge Plan:  yes  Persons Notified of Discharge Plans: Pt, [facility], physician, palliative, bedside nurse  Patient/Family in Agreement with the Plan: yes    Handoff Referral Completed: No    Additional Information:  Brant was updated that once the pt's comfort meds are filled today, the pt can discharge home.  Brant spoke with the pt to update him that he can discharge home today once his medications are filled.  The pt requested [facility] w/c transport.  Brant arranged the transport for today at 1400.  The pt will discharge to [address] [place], [place] [address] in [place].  Brant updated Geronimo with [facility], [phone], on the pt's discharge plan.  Hospice will contact the pt with the sign-on time for Monday.    The pt is aware of his discharge time and is agreeable with the current discharge plan.  Pt stated that his significant other will be at the residence prior to his arrival home.    Sw will continue to be available as needed until discharge.      ROLAND Vila, [facility]  Inpatient Care Coordination  [facility]  [phone]

## 2022-01-13 NOTE — PROGRESS NOTES
AOx4. VSS. Regular diet. RA. Ax1 w walker. Chronic back pain and pain r/t cancer. Follow pain regimen as ordered. Pain regimen was adjusted today. Pain has been better controlled today. PRN morphine given in evening. Denies SOB. Continent of B&B. Pt is resting in bed. Reported having trouble sleeping. Prefers cares to be clustered to help w sleep tonight.

## 2022-01-16 ENCOUNTER — HEALTH MAINTENANCE LETTER (OUTPATIENT)
Age: 47
End: 2022-01-16

## 2022-03-01 ENCOUNTER — TELEPHONE (OUTPATIENT)
Dept: PALLIATIVE CARE | Facility: CLINIC | Age: 47
End: 2022-03-01
Payer: MEDICARE

## 2022-03-02 NOTE — TELEPHONE ENCOUNTER
Received phone call from ROGER Fang with [facility] ([phone]), where pt is currently enrolled. She reports that pt has been in hospice for many years. He qualifies for continued hospice care, however pt recently sharing his desire for restorative care, including physical therapy. Discussed his case at the Outpatient Palliative Care IDT to ensure a smooth transition. Dr. Lee outlined what would be required, all of which was reviewed with Sharmila who will speak with the pt.  1. Pt must reestablish with a hematologist  2. Pt must establish a PCP  3. Pt must understand that high dose opiates will not be continued and plan for weaning will be outlined during a visit with Dr. Lee.  Sharmila will call me back if pt agrees to the above.    SABINA ChappellN, RN  Palliative Care Nurse Clinician    [phone] (Direct)  [phone] (Refills)  [phone] (Appointment Scheduling)

## 2022-04-03 ENCOUNTER — APPOINTMENT (OUTPATIENT)
Dept: GENERAL RADIOLOGY | Facility: CLINIC | Age: 47
DRG: 480 | End: 2022-04-03
Attending: EMERGENCY MEDICINE
Payer: MEDICARE

## 2022-04-03 ENCOUNTER — ANESTHESIA EVENT (OUTPATIENT)
Dept: SURGERY | Facility: CLINIC | Age: 47
DRG: 480 | End: 2022-04-03
Payer: MEDICARE

## 2022-04-03 ENCOUNTER — ANESTHESIA (OUTPATIENT)
Dept: SURGERY | Facility: CLINIC | Age: 47
DRG: 480 | End: 2022-04-03
Payer: MEDICARE

## 2022-04-03 ENCOUNTER — HOSPITAL ENCOUNTER (INPATIENT)
Facility: CLINIC | Age: 47
LOS: 15 days | Discharge: HOSPICE | DRG: 480 | End: 2022-04-18
Attending: EMERGENCY MEDICINE | Admitting: SURGERY
Payer: MEDICARE

## 2022-04-03 DIAGNOSIS — S72.002A HIP FRACTURE, LEFT, CLOSED, INITIAL ENCOUNTER (H): Primary | ICD-10-CM

## 2022-04-03 DIAGNOSIS — J67.9 HYPERSENSITIVITY PNEUMONITIS (H): ICD-10-CM

## 2022-04-03 DIAGNOSIS — S72.92XA CLOSED FRACTURE OF LEFT FEMUR, UNSPECIFIED FRACTURE MORPHOLOGY, UNSPECIFIED PORTION OF FEMUR, INITIAL ENCOUNTER (H): ICD-10-CM

## 2022-04-03 DIAGNOSIS — S22.41XA CLOSED FRACTURE OF MULTIPLE RIBS OF RIGHT SIDE, INITIAL ENCOUNTER: ICD-10-CM

## 2022-04-03 DIAGNOSIS — Z51.5 HOSPICE CARE PATIENT: ICD-10-CM

## 2022-04-03 DIAGNOSIS — M80.00XG OSTEOPOROSIS WITH CURRENT PATHOLOGICAL FRACTURE WITH DELAYED HEALING, UNSPECIFIED OSTEOPOROSIS TYPE, SUBSEQUENT ENCOUNTER: ICD-10-CM

## 2022-04-03 LAB
ABO/RH(D): NORMAL
ALBUMIN SERPL-MCNC: 3.4 G/DL (ref 3.4–5)
ALP SERPL-CCNC: 215 U/L (ref 40–150)
ALT SERPL W P-5'-P-CCNC: 38 U/L (ref 0–70)
ANION GAP SERPL CALCULATED.3IONS-SCNC: 2 MMOL/L (ref 3–14)
ANTIBODY SCREEN: NEGATIVE
AST SERPL W P-5'-P-CCNC: 40 U/L (ref 0–45)
BASOPHILS # BLD AUTO: 0 10E3/UL (ref 0–0.2)
BASOPHILS NFR BLD AUTO: 1 %
BILIRUB SERPL-MCNC: 0.2 MG/DL (ref 0.2–1.3)
BUN SERPL-MCNC: 6 MG/DL (ref 7–30)
CALCIUM SERPL-MCNC: 10 MG/DL (ref 8.5–10.1)
CHLORIDE BLD-SCNC: 106 MMOL/L (ref 94–109)
CO2 SERPL-SCNC: 29 MMOL/L (ref 20–32)
CREAT SERPL-MCNC: 0.4 MG/DL (ref 0.66–1.25)
CREAT SERPL-MCNC: 0.41 MG/DL (ref 0.66–1.25)
EOSINOPHIL # BLD AUTO: 0.1 10E3/UL (ref 0–0.7)
EOSINOPHIL NFR BLD AUTO: 3 %
ERYTHROCYTE [DISTWIDTH] IN BLOOD BY AUTOMATED COUNT: 13.2 % (ref 10–15)
GFR SERPL CREATININE-BSD FRML MDRD: >90 ML/MIN/1.73M2
GFR SERPL CREATININE-BSD FRML MDRD: >90 ML/MIN/1.73M2
GLUCOSE BLD-MCNC: 106 MG/DL (ref 70–99)
HCT VFR BLD AUTO: 35.9 % (ref 40–53)
HGB BLD-MCNC: 11.7 G/DL (ref 13.3–17.7)
IMM GRANULOCYTES # BLD: 0 10E3/UL
IMM GRANULOCYTES NFR BLD: 0 %
LYMPHOCYTES # BLD AUTO: 2.1 10E3/UL (ref 0.8–5.3)
LYMPHOCYTES NFR BLD AUTO: 39 %
MCH RBC QN AUTO: 33.4 PG (ref 26.5–33)
MCHC RBC AUTO-ENTMCNC: 32.6 G/DL (ref 31.5–36.5)
MCV RBC AUTO: 103 FL (ref 78–100)
MONOCYTES # BLD AUTO: 0.6 10E3/UL (ref 0–1.3)
MONOCYTES NFR BLD AUTO: 11 %
NEUTROPHILS # BLD AUTO: 2.6 10E3/UL (ref 1.6–8.3)
NEUTROPHILS NFR BLD AUTO: 46 %
NRBC # BLD AUTO: 0 10E3/UL
NRBC BLD AUTO-RTO: 0 /100
PLATELET # BLD AUTO: 237 10E3/UL (ref 150–450)
POTASSIUM BLD-SCNC: 3.1 MMOL/L (ref 3.4–5.3)
PROT SERPL-MCNC: 6 G/DL (ref 6.8–8.8)
RBC # BLD AUTO: 3.5 10E6/UL (ref 4.4–5.9)
SARS-COV-2 RNA RESP QL NAA+PROBE: NEGATIVE
SODIUM SERPL-SCNC: 137 MMOL/L (ref 133–144)
SPECIMEN EXPIRATION DATE: NORMAL
WBC # BLD AUTO: 5.6 10E3/UL (ref 4–11)

## 2022-04-03 PROCEDURE — 72170 X-RAY EXAM OF PELVIS: CPT

## 2022-04-03 PROCEDURE — 73552 X-RAY EXAM OF FEMUR 2/>: CPT | Mod: LT

## 2022-04-03 PROCEDURE — 99222 1ST HOSP IP/OBS MODERATE 55: CPT | Performed by: SURGERY

## 2022-04-03 PROCEDURE — 0QS736Z REPOSITION LEFT UPPER FEMUR WITH INTRAMEDULLARY INTERNAL FIXATION DEVICE, PERCUTANEOUS APPROACH: ICD-10-PCS | Performed by: ORTHOPAEDIC SURGERY

## 2022-04-03 PROCEDURE — 272N000001 HC OR GENERAL SUPPLY STERILE: Performed by: ORTHOPAEDIC SURGERY

## 2022-04-03 PROCEDURE — 360N000084 HC SURGERY LEVEL 4 W/ FLUORO, PER MIN: Performed by: ORTHOPAEDIC SURGERY

## 2022-04-03 PROCEDURE — 96374 THER/PROPH/DIAG INJ IV PUSH: CPT | Mod: 59

## 2022-04-03 PROCEDURE — 250N000009 HC RX 250: Performed by: ORTHOPAEDIC SURGERY

## 2022-04-03 PROCEDURE — 96361 HYDRATE IV INFUSION ADD-ON: CPT

## 2022-04-03 PROCEDURE — 99222 1ST HOSP IP/OBS MODERATE 55: CPT | Performed by: INTERNAL MEDICINE

## 2022-04-03 PROCEDURE — 250N000013 HC RX MED GY IP 250 OP 250 PS 637: Performed by: SURGERY

## 2022-04-03 PROCEDURE — 71101 X-RAY EXAM UNILAT RIBS/CHEST: CPT | Mod: RT

## 2022-04-03 PROCEDURE — 250N000013 HC RX MED GY IP 250 OP 250 PS 637: Performed by: INTERNAL MEDICINE

## 2022-04-03 PROCEDURE — 250N000009 HC RX 250: Performed by: NURSE ANESTHETIST, CERTIFIED REGISTERED

## 2022-04-03 PROCEDURE — 250N000011 HC RX IP 250 OP 636: Performed by: EMERGENCY MEDICINE

## 2022-04-03 PROCEDURE — 96375 TX/PRO/DX INJ NEW DRUG ADDON: CPT

## 2022-04-03 PROCEDURE — 250N000013 HC RX MED GY IP 250 OP 250 PS 637: Performed by: ANESTHESIOLOGY

## 2022-04-03 PROCEDURE — 80053 COMPREHEN METABOLIC PANEL: CPT | Performed by: EMERGENCY MEDICINE

## 2022-04-03 PROCEDURE — 370N000017 HC ANESTHESIA TECHNICAL FEE, PER MIN: Performed by: ORTHOPAEDIC SURGERY

## 2022-04-03 PROCEDURE — C1769 GUIDE WIRE: HCPCS | Performed by: ORTHOPAEDIC SURGERY

## 2022-04-03 PROCEDURE — 86923 COMPATIBILITY TEST ELECTRIC: CPT | Performed by: INTERNAL MEDICINE

## 2022-04-03 PROCEDURE — 258N000003 HC RX IP 258 OP 636: Performed by: ANESTHESIOLOGY

## 2022-04-03 PROCEDURE — 250N000013 HC RX MED GY IP 250 OP 250 PS 637: Performed by: ORTHOPAEDIC SURGERY

## 2022-04-03 PROCEDURE — 82565 ASSAY OF CREATININE: CPT | Performed by: ORTHOPAEDIC SURGERY

## 2022-04-03 PROCEDURE — 99285 EMERGENCY DEPT VISIT HI MDM: CPT | Mod: 25

## 2022-04-03 PROCEDURE — 250N000011 HC RX IP 250 OP 636: Performed by: ANESTHESIOLOGY

## 2022-04-03 PROCEDURE — 84100 ASSAY OF PHOSPHORUS: CPT | Performed by: INTERNAL MEDICINE

## 2022-04-03 PROCEDURE — 250N000011 HC RX IP 250 OP 636: Performed by: NURSE ANESTHETIST, CERTIFIED REGISTERED

## 2022-04-03 PROCEDURE — 36415 COLL VENOUS BLD VENIPUNCTURE: CPT | Performed by: ORTHOPAEDIC SURGERY

## 2022-04-03 PROCEDURE — 258N000003 HC RX IP 258 OP 636: Performed by: ORTHOPAEDIC SURGERY

## 2022-04-03 PROCEDURE — 87635 SARS-COV-2 COVID-19 AMP PRB: CPT | Performed by: EMERGENCY MEDICINE

## 2022-04-03 PROCEDURE — 250N000011 HC RX IP 250 OP 636: Performed by: INTERNAL MEDICINE

## 2022-04-03 PROCEDURE — 96376 TX/PRO/DX INJ SAME DRUG ADON: CPT

## 2022-04-03 PROCEDURE — C9803 HOPD COVID-19 SPEC COLLECT: HCPCS

## 2022-04-03 PROCEDURE — 258N000003 HC RX IP 258 OP 636: Performed by: EMERGENCY MEDICINE

## 2022-04-03 PROCEDURE — 93005 ELECTROCARDIOGRAM TRACING: CPT

## 2022-04-03 PROCEDURE — 710N000009 HC RECOVERY PHASE 1, LEVEL 1, PER MIN: Performed by: ORTHOPAEDIC SURGERY

## 2022-04-03 PROCEDURE — 250N000011 HC RX IP 250 OP 636: Performed by: ORTHOPAEDIC SURGERY

## 2022-04-03 PROCEDURE — 999N000141 HC STATISTIC PRE-PROCEDURE NURSING ASSESSMENT: Performed by: ORTHOPAEDIC SURGERY

## 2022-04-03 PROCEDURE — 120N000001 HC R&B MED SURG/OB

## 2022-04-03 PROCEDURE — 258N000003 HC RX IP 258 OP 636: Performed by: NURSE ANESTHETIST, CERTIFIED REGISTERED

## 2022-04-03 PROCEDURE — 85025 COMPLETE CBC W/AUTO DIFF WBC: CPT | Performed by: EMERGENCY MEDICINE

## 2022-04-03 PROCEDURE — 88307 TISSUE EXAM BY PATHOLOGIST: CPT | Mod: TC | Performed by: ORTHOPAEDIC SURGERY

## 2022-04-03 PROCEDURE — 999N000179 XR SURGERY CARM FLUORO LESS THAN 5 MIN W STILLS: Mod: TC

## 2022-04-03 PROCEDURE — 83735 ASSAY OF MAGNESIUM: CPT | Performed by: INTERNAL MEDICINE

## 2022-04-03 PROCEDURE — 250N000013 HC RX MED GY IP 250 OP 250 PS 637: Performed by: EMERGENCY MEDICINE

## 2022-04-03 PROCEDURE — 36415 COLL VENOUS BLD VENIPUNCTURE: CPT | Performed by: EMERGENCY MEDICINE

## 2022-04-03 PROCEDURE — 86901 BLOOD TYPING SEROLOGIC RH(D): CPT | Performed by: EMERGENCY MEDICINE

## 2022-04-03 PROCEDURE — 250N000009 HC RX 250: Performed by: ANESTHESIOLOGY

## 2022-04-03 PROCEDURE — C1713 ANCHOR/SCREW BN/BN,TIS/BN: HCPCS | Performed by: ORTHOPAEDIC SURGERY

## 2022-04-03 DEVICE — IMPLANTABLE DEVICE: Type: IMPLANTABLE DEVICE | Site: LEG | Status: FUNCTIONAL

## 2022-04-03 DEVICE — IMP SCR SYN 5.0 TI LOCK T25 STARDRIVE 40MM 04.005.530S: Type: IMPLANTABLE DEVICE | Site: LEG | Status: FUNCTIONAL

## 2022-04-03 DEVICE — IMP SCR SYN TFNA FENESTRATED LAG 95MM 04.038.195S: Type: IMPLANTABLE DEVICE | Site: LEG | Status: FUNCTIONAL

## 2022-04-03 DEVICE — IMP SCR SYN 5.0 TI LOCK T25 STARDRIVE 38MM 04.005.528S: Type: IMPLANTABLE DEVICE | Site: LEG | Status: FUNCTIONAL

## 2022-04-03 RX ORDER — PREGABALIN 300 MG/1
300 CAPSULE ORAL 2 TIMES DAILY
Status: DISCONTINUED | OUTPATIENT
Start: 2022-04-03 | End: 2022-01-01 | Stop reason: HOSPADM

## 2022-04-03 RX ORDER — CEFAZOLIN SODIUM 1 G/3ML
1 INJECTION, POWDER, FOR SOLUTION INTRAMUSCULAR; INTRAVENOUS EVERY 8 HOURS
Status: COMPLETED | OUTPATIENT
Start: 2022-04-03 | End: 2022-04-04

## 2022-04-03 RX ORDER — MORPHINE SULFATE 15 MG/1
60 TABLET ORAL
Status: DISCONTINUED | OUTPATIENT
Start: 2022-04-03 | End: 2022-01-01 | Stop reason: HOSPADM

## 2022-04-03 RX ORDER — POLYETHYLENE GLYCOL 3350 17 G/17G
17 POWDER, FOR SOLUTION ORAL DAILY
Status: DISCONTINUED | OUTPATIENT
Start: 2022-04-03 | End: 2022-04-03

## 2022-04-03 RX ORDER — HYDROMORPHONE HYDROCHLORIDE 4 MG/1
4 TABLET ORAL EVERY 4 HOURS PRN
Status: CANCELLED | OUTPATIENT
Start: 2022-04-03

## 2022-04-03 RX ORDER — PHENYLEPHRINE HYDROCHLORIDE 10 MG/ML
INJECTION INTRAVENOUS PRN
Status: DISCONTINUED | OUTPATIENT
Start: 2022-04-03 | End: 2022-04-03

## 2022-04-03 RX ORDER — LORAZEPAM 2 MG/ML
1 CONCENTRATE ORAL AT BEDTIME
Status: DISCONTINUED | OUTPATIENT
Start: 2022-04-03 | End: 2022-04-06

## 2022-04-03 RX ORDER — NALOXONE HYDROCHLORIDE 0.4 MG/ML
0.2 INJECTION, SOLUTION INTRAMUSCULAR; INTRAVENOUS; SUBCUTANEOUS
Status: DISCONTINUED | OUTPATIENT
Start: 2022-04-03 | End: 2022-01-01 | Stop reason: HOSPADM

## 2022-04-03 RX ORDER — MORPHINE SULFATE 4 MG/ML
4 INJECTION, SOLUTION INTRAMUSCULAR; INTRAVENOUS ONCE
Status: COMPLETED | OUTPATIENT
Start: 2022-04-03 | End: 2022-04-03

## 2022-04-03 RX ORDER — LABETALOL HYDROCHLORIDE 5 MG/ML
10 INJECTION, SOLUTION INTRAVENOUS
Status: DISCONTINUED | OUTPATIENT
Start: 2022-04-03 | End: 2022-04-03 | Stop reason: HOSPADM

## 2022-04-03 RX ORDER — BISACODYL 10 MG
10 SUPPOSITORY, RECTAL RECTAL DAILY PRN
Status: CANCELLED | OUTPATIENT
Start: 2022-04-03

## 2022-04-03 RX ORDER — NICOTINE 21 MG/24HR
1 PATCH, TRANSDERMAL 24 HOURS TRANSDERMAL DAILY
Status: DISCONTINUED | OUTPATIENT
Start: 2022-04-03 | End: 2022-01-01 | Stop reason: HOSPADM

## 2022-04-03 RX ORDER — DIPHENHYDRAMINE HCL 25 MG
25 CAPSULE ORAL EVERY 6 HOURS PRN
Status: CANCELLED | OUTPATIENT
Start: 2022-04-03

## 2022-04-03 RX ORDER — LIDOCAINE 40 MG/G
CREAM TOPICAL
Status: DISCONTINUED | OUTPATIENT
Start: 2022-04-03 | End: 2022-04-03

## 2022-04-03 RX ORDER — MORPHINE SULFATE 15 MG/1
60 TABLET ORAL ONCE
Status: COMPLETED | OUTPATIENT
Start: 2022-04-03 | End: 2022-04-03

## 2022-04-03 RX ORDER — SODIUM CHLORIDE, SODIUM LACTATE, POTASSIUM CHLORIDE, CALCIUM CHLORIDE 600; 310; 30; 20 MG/100ML; MG/100ML; MG/100ML; MG/100ML
INJECTION, SOLUTION INTRAVENOUS CONTINUOUS
Status: DISCONTINUED | OUTPATIENT
Start: 2022-04-03 | End: 2022-04-05

## 2022-04-03 RX ORDER — GABAPENTIN 600 MG/1
1200 TABLET ORAL 3 TIMES DAILY
Status: DISCONTINUED | OUTPATIENT
Start: 2022-04-03 | End: 2022-01-01 | Stop reason: HOSPADM

## 2022-04-03 RX ORDER — PROCHLORPERAZINE MALEATE 10 MG
10 TABLET ORAL EVERY 6 HOURS PRN
Status: CANCELLED | OUTPATIENT
Start: 2022-04-03

## 2022-04-03 RX ORDER — ACETAMINOPHEN 325 MG/1
650 TABLET ORAL EVERY 4 HOURS PRN
Status: DISCONTINUED | OUTPATIENT
Start: 2022-04-06 | End: 2022-04-03

## 2022-04-03 RX ORDER — HYDROMORPHONE HYDROCHLORIDE 1 MG/ML
1 INJECTION, SOLUTION INTRAMUSCULAR; INTRAVENOUS; SUBCUTANEOUS ONCE
Status: DISCONTINUED | OUTPATIENT
Start: 2022-04-03 | End: 2022-04-03

## 2022-04-03 RX ORDER — AMOXICILLIN 250 MG
1 CAPSULE ORAL 2 TIMES DAILY
Status: DISCONTINUED | OUTPATIENT
Start: 2022-04-03 | End: 2022-04-06

## 2022-04-03 RX ORDER — LORAZEPAM 2 MG/ML
1 CONCENTRATE ORAL AT BEDTIME
COMMUNITY

## 2022-04-03 RX ORDER — LORAZEPAM 2 MG/ML
.25-.5 CONCENTRATE ORAL 2 TIMES DAILY PRN
Status: DISCONTINUED | OUTPATIENT
Start: 2022-04-03 | End: 2022-04-06

## 2022-04-03 RX ORDER — HYDROXYZINE HYDROCHLORIDE 25 MG/1
25 TABLET, FILM COATED ORAL EVERY 6 HOURS
Status: DISCONTINUED | OUTPATIENT
Start: 2022-04-03 | End: 2022-04-04

## 2022-04-03 RX ORDER — LIDOCAINE 4 G/G
1 PATCH TOPICAL EVERY 24 HOURS
Status: DISCONTINUED | OUTPATIENT
Start: 2022-04-03 | End: 2022-04-07 | Stop reason: ALTCHOICE

## 2022-04-03 RX ORDER — GABAPENTIN 300 MG/1
300 CAPSULE ORAL 3 TIMES DAILY
Status: DISCONTINUED | OUTPATIENT
Start: 2022-04-03 | End: 2022-04-03

## 2022-04-03 RX ORDER — MORPHINE SULFATE 15 MG/1
60 TABLET, FILM COATED, EXTENDED RELEASE ORAL EVERY 12 HOURS SCHEDULED
Status: DISCONTINUED | OUTPATIENT
Start: 2022-04-03 | End: 2022-04-03

## 2022-04-03 RX ORDER — HYDROMORPHONE HYDROCHLORIDE 1 MG/ML
0.5 INJECTION, SOLUTION INTRAMUSCULAR; INTRAVENOUS; SUBCUTANEOUS
Status: DISCONTINUED | OUTPATIENT
Start: 2022-04-03 | End: 2022-04-03

## 2022-04-03 RX ORDER — MULTIPLE VITAMINS W/ MINERALS TAB 9MG-400MCG
1 TAB ORAL DAILY
Status: DISCONTINUED | OUTPATIENT
Start: 2022-04-04 | End: 2022-01-01 | Stop reason: HOSPADM

## 2022-04-03 RX ORDER — METHOCARBAMOL 750 MG/1
750 TABLET, FILM COATED ORAL 4 TIMES DAILY PRN
Status: DISCONTINUED | OUTPATIENT
Start: 2022-04-03 | End: 2022-04-03

## 2022-04-03 RX ORDER — AMOXICILLIN 250 MG
1 CAPSULE ORAL 2 TIMES DAILY
Status: CANCELLED | OUTPATIENT
Start: 2022-04-03

## 2022-04-03 RX ORDER — METHOCARBAMOL 750 MG/1
750 TABLET, FILM COATED ORAL AT BEDTIME
Status: DISCONTINUED | OUTPATIENT
Start: 2022-04-03 | End: 2022-04-04

## 2022-04-03 RX ORDER — NAPROXEN 250 MG/1
500 TABLET ORAL 2 TIMES DAILY WITH MEALS
Status: DISCONTINUED | OUTPATIENT
Start: 2022-04-03 | End: 2022-04-08

## 2022-04-03 RX ORDER — CEFAZOLIN SODIUM 2 G/100ML
2 INJECTION, SOLUTION INTRAVENOUS
Status: DISCONTINUED | OUTPATIENT
Start: 2022-04-03 | End: 2022-04-03 | Stop reason: CLARIF

## 2022-04-03 RX ORDER — PROCHLORPERAZINE MALEATE 5 MG
10 TABLET ORAL EVERY 6 HOURS PRN
Status: DISCONTINUED | OUTPATIENT
Start: 2022-04-03 | End: 2022-04-06

## 2022-04-03 RX ORDER — AMOXICILLIN 250 MG
1 CAPSULE ORAL 2 TIMES DAILY PRN
Status: DISCONTINUED | OUTPATIENT
Start: 2022-04-03 | End: 2022-01-01 | Stop reason: HOSPADM

## 2022-04-03 RX ORDER — BUPIVACAINE HYDROCHLORIDE AND EPINEPHRINE 5; 5 MG/ML; UG/ML
INJECTION, SOLUTION PERINEURAL PRN
Status: DISCONTINUED | OUTPATIENT
Start: 2022-04-03 | End: 2022-04-03 | Stop reason: HOSPADM

## 2022-04-03 RX ORDER — SODIUM CHLORIDE, SODIUM LACTATE, POTASSIUM CHLORIDE, CALCIUM CHLORIDE 600; 310; 30; 20 MG/100ML; MG/100ML; MG/100ML; MG/100ML
INJECTION, SOLUTION INTRAVENOUS CONTINUOUS
Status: DISCONTINUED | OUTPATIENT
Start: 2022-04-03 | End: 2022-04-03

## 2022-04-03 RX ORDER — MORPHINE SULFATE 15 MG/1
60 TABLET ORAL
Status: DISCONTINUED | OUTPATIENT
Start: 2022-04-03 | End: 2022-04-03

## 2022-04-03 RX ORDER — ASPIRIN 81 MG/1
81 TABLET ORAL 2 TIMES DAILY
Status: CANCELLED | OUTPATIENT
Start: 2022-04-03

## 2022-04-03 RX ORDER — ONDANSETRON 4 MG/1
4 TABLET, ORALLY DISINTEGRATING ORAL EVERY 30 MIN PRN
Status: DISCONTINUED | OUTPATIENT
Start: 2022-04-03 | End: 2022-04-03 | Stop reason: HOSPADM

## 2022-04-03 RX ORDER — NALOXONE HYDROCHLORIDE 0.4 MG/ML
0.4 INJECTION, SOLUTION INTRAMUSCULAR; INTRAVENOUS; SUBCUTANEOUS
Status: DISCONTINUED | OUTPATIENT
Start: 2022-04-03 | End: 2022-01-01 | Stop reason: HOSPADM

## 2022-04-03 RX ORDER — KETOROLAC TROMETHAMINE 15 MG/ML
15 INJECTION, SOLUTION INTRAMUSCULAR; INTRAVENOUS ONCE
Status: COMPLETED | OUTPATIENT
Start: 2022-04-03 | End: 2022-04-03

## 2022-04-03 RX ORDER — LIDOCAINE 40 MG/G
CREAM TOPICAL
Status: CANCELLED | OUTPATIENT
Start: 2022-04-03

## 2022-04-03 RX ORDER — ONDANSETRON 2 MG/ML
4 INJECTION INTRAMUSCULAR; INTRAVENOUS EVERY 30 MIN PRN
Status: DISCONTINUED | OUTPATIENT
Start: 2022-04-03 | End: 2022-04-03 | Stop reason: HOSPADM

## 2022-04-03 RX ORDER — METHOCARBAMOL 500 MG/1
500 TABLET, FILM COATED ORAL EVERY 6 HOURS PRN
Status: DISCONTINUED | OUTPATIENT
Start: 2022-04-03 | End: 2022-04-03

## 2022-04-03 RX ORDER — LIDOCAINE 40 MG/G
CREAM TOPICAL
Status: DISCONTINUED | OUTPATIENT
Start: 2022-04-03 | End: 2022-04-06

## 2022-04-03 RX ORDER — ONDANSETRON 4 MG/1
4 TABLET, ORALLY DISINTEGRATING ORAL EVERY 6 HOURS PRN
Status: CANCELLED | OUTPATIENT
Start: 2022-04-03

## 2022-04-03 RX ORDER — CELECOXIB 100 MG/1
400 CAPSULE ORAL ONCE
Status: COMPLETED | OUTPATIENT
Start: 2022-04-03 | End: 2022-04-03

## 2022-04-03 RX ORDER — SODIUM CHLORIDE, SODIUM LACTATE, POTASSIUM CHLORIDE, CALCIUM CHLORIDE 600; 310; 30; 20 MG/100ML; MG/100ML; MG/100ML; MG/100ML
INJECTION, SOLUTION INTRAVENOUS CONTINUOUS
Status: CANCELLED | OUTPATIENT
Start: 2022-04-03

## 2022-04-03 RX ORDER — PROPOFOL 10 MG/ML
INJECTION, EMULSION INTRAVENOUS PRN
Status: DISCONTINUED | OUTPATIENT
Start: 2022-04-03 | End: 2022-04-03

## 2022-04-03 RX ORDER — HYDROXYZINE HYDROCHLORIDE 25 MG/1
25-50 TABLET, FILM COATED ORAL EVERY 4 HOURS PRN
Status: DISCONTINUED | OUTPATIENT
Start: 2022-04-03 | End: 2022-04-06

## 2022-04-03 RX ORDER — GABAPENTIN 600 MG/1
1200 TABLET ORAL 3 TIMES DAILY
COMMUNITY

## 2022-04-03 RX ORDER — ACETAMINOPHEN 325 MG/1
975 TABLET ORAL ONCE
Status: COMPLETED | OUTPATIENT
Start: 2022-04-03 | End: 2022-04-03

## 2022-04-03 RX ORDER — LORAZEPAM 2 MG/ML
.25-.5 CONCENTRATE ORAL 2 TIMES DAILY PRN
Status: ON HOLD | COMMUNITY
End: 2022-01-01

## 2022-04-03 RX ORDER — MORPHINE SULFATE 60 MG/1
300 TABLET, FILM COATED, EXTENDED RELEASE ORAL 4 TIMES DAILY
COMMUNITY

## 2022-04-03 RX ORDER — ACETAMINOPHEN 325 MG/1
975 TABLET ORAL EVERY 8 HOURS SCHEDULED
Status: DISCONTINUED | OUTPATIENT
Start: 2022-04-03 | End: 2022-01-01 | Stop reason: HOSPADM

## 2022-04-03 RX ORDER — ONDANSETRON 4 MG/1
8 TABLET, ORALLY DISINTEGRATING ORAL EVERY 8 HOURS PRN
Status: DISCONTINUED | OUTPATIENT
Start: 2022-04-03 | End: 2022-04-03

## 2022-04-03 RX ORDER — CEFAZOLIN SODIUM 1 G/3ML
1 INJECTION, POWDER, FOR SOLUTION INTRAMUSCULAR; INTRAVENOUS EVERY 8 HOURS
Status: CANCELLED | OUTPATIENT
Start: 2022-04-03 | End: 2022-04-04

## 2022-04-03 RX ORDER — SODIUM CHLORIDE, SODIUM LACTATE, POTASSIUM CHLORIDE, CALCIUM CHLORIDE 600; 310; 30; 20 MG/100ML; MG/100ML; MG/100ML; MG/100ML
INJECTION, SOLUTION INTRAVENOUS CONTINUOUS
Status: DISCONTINUED | OUTPATIENT
Start: 2022-04-03 | End: 2022-04-03 | Stop reason: HOSPADM

## 2022-04-03 RX ORDER — CEFAZOLIN SODIUM/WATER 2 G/20 ML
2 SYRINGE (ML) INTRAVENOUS SEE ADMIN INSTRUCTIONS
Status: DISCONTINUED | OUTPATIENT
Start: 2022-04-03 | End: 2022-04-03 | Stop reason: CLARIF

## 2022-04-03 RX ORDER — METHOCARBAMOL 750 MG/1
750 TABLET, FILM COATED ORAL 2 TIMES DAILY PRN
Status: DISCONTINUED | OUTPATIENT
Start: 2022-04-03 | End: 2022-04-06

## 2022-04-03 RX ORDER — DEXAMETHASONE SODIUM PHOSPHATE 4 MG/ML
INJECTION, SOLUTION INTRA-ARTICULAR; INTRALESIONAL; INTRAMUSCULAR; INTRAVENOUS; SOFT TISSUE PRN
Status: DISCONTINUED | OUTPATIENT
Start: 2022-04-03 | End: 2022-04-03

## 2022-04-03 RX ORDER — TRANEXAMIC ACID 10 MG/ML
1 INJECTION, SOLUTION INTRAVENOUS ONCE
Status: COMPLETED | OUTPATIENT
Start: 2022-04-03 | End: 2022-04-03

## 2022-04-03 RX ORDER — METHADONE HYDROCHLORIDE 10 MG/ML
4 INJECTION, SOLUTION INTRAMUSCULAR; INTRAVENOUS; SUBCUTANEOUS ONCE
Status: COMPLETED | OUTPATIENT
Start: 2022-04-03 | End: 2022-04-03

## 2022-04-03 RX ORDER — POLYETHYLENE GLYCOL 3350 17 G/17G
17 POWDER, FOR SOLUTION ORAL DAILY
Status: DISCONTINUED | OUTPATIENT
Start: 2022-04-04 | End: 2022-04-06

## 2022-04-03 RX ORDER — CEFAZOLIN SODIUM/WATER 2 G/20 ML
2 SYRINGE (ML) INTRAVENOUS
Status: COMPLETED | OUTPATIENT
Start: 2022-04-03 | End: 2022-04-03

## 2022-04-03 RX ORDER — ONDANSETRON 2 MG/ML
4 INJECTION INTRAMUSCULAR; INTRAVENOUS ONCE
Status: COMPLETED | OUTPATIENT
Start: 2022-04-03 | End: 2022-04-03

## 2022-04-03 RX ORDER — KETAMINE HYDROCHLORIDE 10 MG/ML
INJECTION INTRAMUSCULAR; INTRAVENOUS PRN
Status: DISCONTINUED | OUTPATIENT
Start: 2022-04-03 | End: 2022-04-03

## 2022-04-03 RX ORDER — ONDANSETRON 4 MG/1
4 TABLET, ORALLY DISINTEGRATING ORAL EVERY 6 HOURS PRN
Status: DISCONTINUED | OUTPATIENT
Start: 2022-04-03 | End: 2022-04-06

## 2022-04-03 RX ORDER — MORPHINE SULFATE 100 MG/1
300 TABLET ORAL 4 TIMES DAILY
Status: DISCONTINUED | OUTPATIENT
Start: 2022-04-03 | End: 2022-01-01

## 2022-04-03 RX ORDER — FENTANYL CITRATE 50 UG/ML
100 INJECTION, SOLUTION INTRAMUSCULAR; INTRAVENOUS ONCE
Status: COMPLETED | OUTPATIENT
Start: 2022-04-03 | End: 2022-04-03

## 2022-04-03 RX ORDER — CEFAZOLIN SODIUM/WATER 2 G/20 ML
2 SYRINGE (ML) INTRAVENOUS SEE ADMIN INSTRUCTIONS
Status: DISCONTINUED | OUTPATIENT
Start: 2022-04-03 | End: 2022-04-03

## 2022-04-03 RX ORDER — NEOSTIGMINE METHYLSULFATE 1 MG/ML
VIAL (ML) INJECTION PRN
Status: DISCONTINUED | OUTPATIENT
Start: 2022-04-03 | End: 2022-04-03

## 2022-04-03 RX ORDER — BISACODYL 10 MG
10 SUPPOSITORY, RECTAL RECTAL DAILY PRN
Status: DISCONTINUED | OUTPATIENT
Start: 2022-04-03 | End: 2022-04-06

## 2022-04-03 RX ORDER — POLYETHYLENE GLYCOL 3350 17 G/17G
17 POWDER, FOR SOLUTION ORAL DAILY
Status: CANCELLED | OUTPATIENT
Start: 2022-04-04

## 2022-04-03 RX ORDER — ONDANSETRON 2 MG/ML
4 INJECTION INTRAMUSCULAR; INTRAVENOUS EVERY 6 HOURS PRN
Status: DISCONTINUED | OUTPATIENT
Start: 2022-04-03 | End: 2022-04-06

## 2022-04-03 RX ORDER — METHADONE HYDROCHLORIDE 10 MG/ML
20 INJECTION, SOLUTION INTRAMUSCULAR; INTRAVENOUS; SUBCUTANEOUS ONCE
Status: COMPLETED | OUTPATIENT
Start: 2022-04-03 | End: 2022-04-03

## 2022-04-03 RX ORDER — GLYCOPYRROLATE 0.2 MG/ML
INJECTION, SOLUTION INTRAMUSCULAR; INTRAVENOUS PRN
Status: DISCONTINUED | OUTPATIENT
Start: 2022-04-03 | End: 2022-04-03

## 2022-04-03 RX ORDER — MORPHINE SULFATE 4 MG/ML
4 INJECTION, SOLUTION INTRAMUSCULAR; INTRAVENOUS
Status: DISCONTINUED | OUTPATIENT
Start: 2022-04-03 | End: 2022-04-04

## 2022-04-03 RX ORDER — ACETAMINOPHEN 325 MG/1
975 TABLET ORAL EVERY 8 HOURS
Status: DISCONTINUED | OUTPATIENT
Start: 2022-04-03 | End: 2022-04-03

## 2022-04-03 RX ORDER — CEFAZOLIN SODIUM 2 G/100ML
2 INJECTION, SOLUTION INTRAVENOUS SEE ADMIN INSTRUCTIONS
Status: DISCONTINUED | OUTPATIENT
Start: 2022-04-03 | End: 2022-04-03 | Stop reason: CLARIF

## 2022-04-03 RX ORDER — KETOROLAC TROMETHAMINE 30 MG/ML
15 INJECTION, SOLUTION INTRAMUSCULAR; INTRAVENOUS EVERY 6 HOURS
Status: CANCELLED | OUTPATIENT
Start: 2022-04-03 | End: 2022-04-04

## 2022-04-03 RX ORDER — KETOROLAC TROMETHAMINE 15 MG/ML
15 INJECTION, SOLUTION INTRAMUSCULAR; INTRAVENOUS
Status: COMPLETED | OUTPATIENT
Start: 2022-04-03 | End: 2022-04-03

## 2022-04-03 RX ORDER — ONDANSETRON 2 MG/ML
4 INJECTION INTRAMUSCULAR; INTRAVENOUS EVERY 6 HOURS PRN
Status: CANCELLED | OUTPATIENT
Start: 2022-04-03

## 2022-04-03 RX ORDER — ONDANSETRON 2 MG/ML
4 INJECTION INTRAMUSCULAR; INTRAVENOUS EVERY 6 HOURS PRN
Status: DISCONTINUED | OUTPATIENT
Start: 2022-04-03 | End: 2022-04-03

## 2022-04-03 RX ADMIN — KETOROLAC TROMETHAMINE 15 MG: 15 INJECTION, SOLUTION INTRAMUSCULAR; INTRAVENOUS at 18:52

## 2022-04-03 RX ADMIN — ACETAMINOPHEN 975 MG: 325 TABLET, FILM COATED ORAL at 11:20

## 2022-04-03 RX ADMIN — NICOTINE 1 PATCH: 21 PATCH, EXTENDED RELEASE TRANSDERMAL at 11:20

## 2022-04-03 RX ADMIN — NEOSTIGMINE METHYLSULFATE 3 MG: 1 INJECTION, SOLUTION INTRAVENOUS at 18:16

## 2022-04-03 RX ADMIN — LIDOCAINE HYDROCHLORIDE 30 MG: 10 INJECTION, SOLUTION INFILTRATION; PERINEURAL at 16:53

## 2022-04-03 RX ADMIN — Medication 5 MG: at 17:35

## 2022-04-03 RX ADMIN — FENTANYL CITRATE 100 MCG: 50 INJECTION, SOLUTION INTRAMUSCULAR; INTRAVENOUS at 20:03

## 2022-04-03 RX ADMIN — ONDANSETRON 4 MG: 2 INJECTION INTRAMUSCULAR; INTRAVENOUS at 01:52

## 2022-04-03 RX ADMIN — METHADONE HYDROCHLORIDE 4 MG: 10 INJECTION, SOLUTION INTRAMUSCULAR; INTRAVENOUS; SUBCUTANEOUS at 18:52

## 2022-04-03 RX ADMIN — METHOCARBAMOL 750 MG: 750 TABLET ORAL at 22:59

## 2022-04-03 RX ADMIN — SODIUM CHLORIDE, POTASSIUM CHLORIDE, SODIUM LACTATE AND CALCIUM CHLORIDE: 600; 310; 30; 20 INJECTION, SOLUTION INTRAVENOUS at 16:47

## 2022-04-03 RX ADMIN — MIDAZOLAM 2 MG: 1 INJECTION INTRAMUSCULAR; INTRAVENOUS at 16:47

## 2022-04-03 RX ADMIN — SODIUM CHLORIDE, POTASSIUM CHLORIDE, SODIUM LACTATE AND CALCIUM CHLORIDE: 600; 310; 30; 20 INJECTION, SOLUTION INTRAVENOUS at 17:52

## 2022-04-03 RX ADMIN — LORAZEPAM 1 MG: 2 LIQUID ORAL at 23:00

## 2022-04-03 RX ADMIN — MORPHINE SULFATE 4 MG: 4 INJECTION INTRAVENOUS at 03:09

## 2022-04-03 RX ADMIN — SODIUM CHLORIDE, POTASSIUM CHLORIDE, SODIUM LACTATE AND CALCIUM CHLORIDE: 600; 310; 30; 20 INJECTION, SOLUTION INTRAVENOUS at 23:57

## 2022-04-03 RX ADMIN — DEXMEDETOMIDINE HYDROCHLORIDE 0.5 MCG/KG/HR: 100 INJECTION, SOLUTION INTRAVENOUS at 17:05

## 2022-04-03 RX ADMIN — MORPHINE SULFATE 60 MG: 15 TABLET ORAL at 20:29

## 2022-04-03 RX ADMIN — ROCURONIUM BROMIDE 15 MG: 50 INJECTION, SOLUTION INTRAVENOUS at 17:11

## 2022-04-03 RX ADMIN — TRANEXAMIC ACID 1 G: 10 INJECTION, SOLUTION INTRAVENOUS at 17:05

## 2022-04-03 RX ADMIN — MORPHINE SULFATE 4 MG: 4 INJECTION INTRAVENOUS at 10:01

## 2022-04-03 RX ADMIN — MORPHINE SULFATE 60 MG: 15 TABLET ORAL at 14:39

## 2022-04-03 RX ADMIN — GLYCOPYRROLATE 0.4 MG: 0.2 INJECTION, SOLUTION INTRAMUSCULAR; INTRAVENOUS at 18:16

## 2022-04-03 RX ADMIN — ACETAMINOPHEN 975 MG: 325 TABLET, FILM COATED ORAL at 16:30

## 2022-04-03 RX ADMIN — ACETAMINOPHEN 975 MG: 325 TABLET, FILM COATED ORAL at 22:58

## 2022-04-03 RX ADMIN — DEXAMETHASONE SODIUM PHOSPHATE 8 MG: 4 INJECTION, SOLUTION INTRA-ARTICULAR; INTRALESIONAL; INTRAMUSCULAR; INTRAVENOUS; SOFT TISSUE at 16:53

## 2022-04-03 RX ADMIN — MORPHINE SULFATE 300 MG: 30 TABLET, FILM COATED, EXTENDED RELEASE ORAL at 23:03

## 2022-04-03 RX ADMIN — METHADONE HYDROCHLORIDE 4 MG: 10 INJECTION, SOLUTION INTRAMUSCULAR; INTRAVENOUS; SUBCUTANEOUS at 19:31

## 2022-04-03 RX ADMIN — MORPHINE SULFATE 300 MG: 30 TABLET, FILM COATED, EXTENDED RELEASE ORAL at 11:43

## 2022-04-03 RX ADMIN — MORPHINE SULFATE 300 MG: 30 TABLET, FILM COATED, EXTENDED RELEASE ORAL at 19:18

## 2022-04-03 RX ADMIN — Medication 30 MG: at 17:11

## 2022-04-03 RX ADMIN — KETOROLAC TROMETHAMINE 15 MG: 15 INJECTION, SOLUTION INTRAMUSCULAR; INTRAVENOUS at 01:52

## 2022-04-03 RX ADMIN — ONDANSETRON 4 MG: 2 INJECTION INTRAMUSCULAR; INTRAVENOUS at 18:10

## 2022-04-03 RX ADMIN — PHENYLEPHRINE HYDROCHLORIDE 200 MCG: 10 INJECTION INTRAVENOUS at 16:53

## 2022-04-03 RX ADMIN — SODIUM CHLORIDE 1000 ML: 9 INJECTION, SOLUTION INTRAVENOUS at 03:07

## 2022-04-03 RX ADMIN — PHENYLEPHRINE HYDROCHLORIDE 100 MCG: 10 INJECTION INTRAVENOUS at 16:57

## 2022-04-03 RX ADMIN — CELECOXIB 400 MG: 200 CAPSULE ORAL at 16:30

## 2022-04-03 RX ADMIN — MORPHINE SULFATE 60 MG: 15 TABLET, EXTENDED RELEASE ORAL at 05:21

## 2022-04-03 RX ADMIN — Medication 2 G: at 16:47

## 2022-04-03 RX ADMIN — Medication 15 MG: at 16:53

## 2022-04-03 RX ADMIN — MORPHINE SULFATE 60 MG: 15 TABLET ORAL at 04:36

## 2022-04-03 RX ADMIN — Medication 10 MG: at 17:36

## 2022-04-03 RX ADMIN — CEFAZOLIN 1 G: 1 INJECTION, POWDER, FOR SOLUTION INTRAMUSCULAR; INTRAVENOUS at 23:00

## 2022-04-03 RX ADMIN — PROPOFOL 150 MG: 10 INJECTION, EMULSION INTRAVENOUS at 16:53

## 2022-04-03 RX ADMIN — NAPROXEN 500 MG: 250 TABLET ORAL at 11:21

## 2022-04-03 RX ADMIN — PROPOFOL 50 MG: 10 INJECTION, EMULSION INTRAVENOUS at 17:11

## 2022-04-03 RX ADMIN — MORPHINE SULFATE 4 MG: 4 INJECTION INTRAVENOUS at 07:52

## 2022-04-03 RX ADMIN — HYDROXYZINE HYDROCHLORIDE 25 MG: 25 TABLET, FILM COATED ORAL at 11:21

## 2022-04-03 RX ADMIN — Medication 10 MG: at 17:35

## 2022-04-03 RX ADMIN — PREGABALIN 300 MG: 300 CAPSULE ORAL at 22:59

## 2022-04-03 RX ADMIN — ROCURONIUM BROMIDE 35 MG: 50 INJECTION, SOLUTION INTRAVENOUS at 16:53

## 2022-04-03 RX ADMIN — MORPHINE SULFATE 60 MG: 15 TABLET ORAL at 08:34

## 2022-04-03 RX ADMIN — GABAPENTIN 1200 MG: 600 TABLET, FILM COATED ORAL at 14:04

## 2022-04-03 ASSESSMENT — ACTIVITIES OF DAILY LIVING (ADL)
ADLS_ACUITY_SCORE: 14
ADLS_ACUITY_SCORE: 17
ADLS_ACUITY_SCORE: 14
ADLS_ACUITY_SCORE: 17
ADLS_ACUITY_SCORE: 14
ADLS_ACUITY_SCORE: 17
ADLS_ACUITY_SCORE: 13
ADLS_ACUITY_SCORE: 14
ADLS_ACUITY_SCORE: 17
ADLS_ACUITY_SCORE: 14
ADLS_ACUITY_SCORE: 17
ADLS_ACUITY_SCORE: 17
ADLS_ACUITY_SCORE: 14
ADLS_ACUITY_SCORE: 13

## 2022-04-03 ASSESSMENT — ENCOUNTER SYMPTOMS
ARTHRALGIAS: 1
NUMBNESS: 0

## 2022-04-03 NOTE — CONSULTS
[facility]  Hospitalist Consult Note    Name: Beto Tran      MRN: [id number]  YOB: 1975    Age: 46 year old  Date of admission: 4/3/2022  Primary care provider: No Ref-Primary, Physician     Requesting Physician:  Phil Fishman MD  Reason for consultation:  Post-operative medical management         Assessment and Plan:   Beto Tran is a 46 year old male with a history of advanced multiple myeloma previously on hospice, active nicotine dependence with cigarettes, chronic pain with opiate dependence, anxiety, history of thoracic outlet syndrome, anemia of chronic disease who was admitted with a new left femur fracture, right lateral iliac wing fracture, multiple rib fractures after twisting while transferring from a wheelchair.    Acute Left Femur Fracture  Right Iliac Wing Fracture  Multiple Right Sided Rib Fractures (1st-3rd)  - Pain Control  - Appreciate Ortho Surgery and Trauma Team recommendations  - Consult Palliative Care  - Await Pharmacy confirmation of home medications  - Likely secondary to multiple myeloma  - Incentive Spirometry    Advanced Multiple Myeloma s/p Autologous Stem Cell Transplant  Chronic Pain with Opiate Dependence  Hx of Vertebral Compression Fractures s/p Kyphoplastyx2  - New fractures complicating cares as well as pt's refusal of tests  - Follows with [facility].  Seen most recently in 8/17/2021.  Pt asking if he was in remission at that time.  No evidence of anemia or elevated cr or hypercalcemia.  Pt was offered a BM biopsy to assess the state of his myeloma but refused.  - VRD chemotherapy; no response September 2014 to December 2014.  - Carfilzomib, pomalidomide, dexamethasone; no response December 2014 through January 2015.  - s/p Cytoxan pulse and current stem cell mobilization and collection  - Melphalan conditioning chemotherapy  - Autologous peripheral blood stem cell transplant on 4/20/15  - Would consider resumption of  home narcotic dosing, however appears to be on substantial dosing of these.  Possibly upwardsof 500 or 600 mg of oral morphine daily.  - Per chart review, pt recently requested discharge from hospice.  Had been on for ?5 years.  Plan was for gradual taper of narcotics as an outpatient  - Appreciate Palliative Care recommendations  - Confirmed DNR/DNI with pt    Active Nicotine Dependence with Cigarettes  - Nicotine patch  - Smokes 3/4 pck per day    Mild Hypokalemia  - Plan to recheck in AM    Chronic Medical Problems:  Depression  Hx of Katarina Escobedo Tear  Hypoparathyroidism  Thoracic Outlet Syndrome s/p 1st Rib Resection and Scalenectomy in 2012    Code status: DNR/DNI  Prophylaxis: Currently on PCDs, ultimately deferred to the primary service.  Disposition: Deferred to the primary service    Thank you for the consultation, we will continue to follow along during the hospitalization. Please page with any questions or concerns.         History of Present Illness:   Beto Tran is a 46 year old male who is being hospitalized for new left femur fracture, right lateral iliac wing fracture, multiple rib fractures after twisting while transferring from a wheelchair.  The patient was transferring from his wheelchair to his bed when he twisted and felt a pop in his hip.  He then slowly lowered himself to the ground and hit his right ribs on the ground.  Pt was in significant pain afterwards so came to the ED.  Per pt, he recently signed out of hospice in order to get physical therapy as an attempt to improve his functionality.                  Past Medical History:     Past Medical History:   Diagnosis Date     Anesthesia complication     states woke up during procedure (endoscopic ultrasound) in June of 2014     Broken rib      Cancer (H)      Gastro-oesophageal reflux disease      Multiple myeloma (H)      Osteoporosis              Past Surgical History:     Past Surgical History:   Procedure Laterality Date      OPTICAL TRACKING SYSTEM KYPHOPLASTY N/A 12/16/2014    Procedure: OPTICAL TRACKING SYSTEM KYPHOPLASTY;  Surgeon: Abner Vasquez MD;  Location: [facility] OR     ORTHOPEDIC SURGERY      right thumb surgery     RESECT FIRST RIB  8/14/2012    Procedure: RESECT FIRST RIB;  Left First Rib Resection & Scalentectomy;  Surgeon: Keith Tucker MD;  Location: [facility]               Social History:     Social History     Tobacco Use     Smoking status: Current Some Day Smoker     Packs/day: 0.25     Years: 25.00     Pack years: 6.25     Types: Cigarettes     Last attempt to quit: 2/1/2012     Years since quitting: 10.1     Smokeless tobacco: Never Used     Tobacco comment: 5-7 cigarettes/day    Substance Use Topics     Alcohol use: Yes     Comment: social             Family History:   Family history was fully reviewed and non-contributory in this case.          Allergies:     Allergies   Allergen Reactions     Nka [No Known Allergies]              Medications:     Prior to Admission medications    Medication Sig Last Dose Taking? Auth Provider   acetaminophen (TYLENOL) 500 MG tablet Take 2 tablets (1,000 mg) by mouth every 8 hours as needed for mild pain   Kasia Luis APRN CNP   haloperidol (HALDOL) 2 MG tablet Take 1 tablet (2 mg) by mouth 4 times daily   Kasia Luis APRN CNP   hydrOXYzine (ATARAX) 25 MG tablet Take 1 tablet (25 mg) by mouth every 6 hours   Kasia Luis APRN CNP   loratadine (CLARITIN) 10 MG tablet Take 10 mg by mouth daily as needed for allergies   Unknown, Entered By History   methocarbamol (ROBAXIN) 750 MG tablet Take 1 tablet (750 mg) by mouth At Bedtime   Kasia Luis APRN CNP   methylnaltrexone (RELISTOR) 12 MG/0.6ML SOLN injection Inject 12 mg Subcutaneous Every other day as needed   Unknown, Entered By History   morphine (MSIR) 15 MG IR tablet Take 4 tablets (60 mg) by mouth every 3 hours as needed for severe pain    "Kasia Luis APRN CNP   multivitamin w/minerals (THERA-VIT-M) tablet Take 1 tablet by mouth daily   Unknown, Entered By History   naproxen (NAPROSYN) 500 MG tablet Take 1 tablet (500 mg) by mouth 2 times daily (with meals)   Kasia Luis APRN CNP   omeprazole (PRILOSEC OTC) 20 MG EC tablet Take 1 tablet (20 mg) by mouth daily   Kasia Luis APRN CNP   ondansetron (ZOFRAN-ODT) 8 MG ODT tab Take 1 tablet (8 mg) by mouth every 8 hours as needed for nausea   Kasia Luis APRN CNP   polyethylene glycol (MIRALAX) 17 GM/Dose powder Take 17 g by mouth daily   Kasia Luis APRN CNP   pregabalin (LYRICA) 300 MG capsule Take 1 capsule (300 mg) by mouth 2 times daily   Kasia Luis APRN CNP   SENNA-docusate sodium (SENNA S) 8.6-50 MG tablet Take 1 tablet by mouth 2 times daily as needed (constipation)   Kasia Luis APRN CNP       Current hospital administered medication list (MAR) also reviewed.          Review of Systems:     A comprehensive greater than 10 system review of systems was carried out.  Pertinent positives and negatives are noted above.  Otherwise negative for contributory info.            Physical Exam:   Blood pressure (!) 159/90, pulse 79, temperature 98.4  F (36.9  C), temperature source Temporal, resp. rate 18, height 1.753 m (5' 9\"), weight 69.5 kg (153 lb 3.2 oz), SpO2 95 %.  Exam:  GENERAL: No apparent distress. Awake, alert, and fully oriented.  HEENT: Normocephalic, atraumatic. Extraocular movements intact.  CARDIOVASCULAR: Regular rate and rhythm without murmurs or rubs. No S3.  PULMONARY: Clear bilaterally.  ABDOMINAL: Soft, non-tender, non-distended. Bowel sounds normoactive. No hepatosplenomegaly.  EXTREMITIES: No cyanosis or clubbing. No edema.  NEUROLOGICAL: CN 2-12 grossly intact, no focal neurological deficits.  DERMATOLOGICAL: No rash, ulcer, " ecchymoses, jaundice.         Data:   EKG/Telemetry:  Personally reviewed available data.    Laboratory: Personally reviewed available data.  Recent Labs   Lab 04/03/22  0147   WBC 5.6   HGB 11.7*   HCT 35.9*   *        Recent Labs   Lab 04/03/22  0147      POTASSIUM 3.1*   CHLORIDE 106   CO2 29   ANIONGAP 2*   *   BUN 6*   CR 0.41*   GFRESTIMATED >90   JOSE 10.0     No results for input(s): CULT in the last 168 hours.    Imaging: Personally reviewed available data.  Recent Results (from the past 24 hour(s))   Ribs XR, unilat 3 views + PA chest, right    Narrative    EXAM: XR RIBS and CHEST RT 3VW  LOCATION: [facility]  DATE/TIME: 4/3/2022 2:00 AM    INDICATION: Rib pain.    COMPARISON: None.      Impression    IMPRESSION: The visualized heart and lungs are negative. There are a few mildly displaced acute right-sided rib fractures present including the right 1st through 3rd ribs, as well as a few mid right posterior lateral ribs.   XR Femur Left 2 Views    Narrative    EXAM: XR PELVIS 1/2 VW, XR FEMUR LEFT 2 VIEW  LOCATION: [facility]  DATE/TIME: 4/3/2022 2:00 AM    INDICATION: Left hip pain.    COMPARISON: 12/21/2021.      Impression    IMPRESSION: Multiple lucent lesions throughout the visualized skeleton compatible with known multiple myelomata. There is a new fracture in the proximal subtrochanteric left femur with mild displacement. Suggestion of a fracture of the right lateral   iliac wing just above the anterior-superior iliac spine. Previous vertebroplasty lower lumbar spine. Bones are demineralized.   XR Pelvis 1/2 Views    Narrative    EXAM: XR PELVIS 1/2 VW, XR FEMUR LEFT 2 VIEW  LOCATION: [facility]  DATE/TIME: 4/3/2022 2:00 AM    INDICATION: Left hip pain.    COMPARISON: 12/21/2021.      Impression    IMPRESSION: Multiple lucent lesions throughout the visualized skeleton compatible with known multiple  myelomata. There is a new fracture in the proximal subtrochanteric left femur with mild displacement. Suggestion of a fracture of the right lateral   iliac wing just above the anterior-superior iliac spine. Previous vertebroplasty lower lumbar spine. Bones are demineralized.         Federico Quiroz DO  [facility] hospital Hospitalist  [address] [place] [address]  04/03/2022

## 2022-04-03 NOTE — ED NOTES
Bed: ED13  Expected date:   Expected time:   Means of arrival:   Comments:  EMS - 46 M hip dislocation

## 2022-04-03 NOTE — H&P
"Surgery trauma history and physical    Chief complaint:  Left hip pain and right chest pain after fall    HPI:  This patient is a 46 year old male with a complex history including advanced multiple myeloma who presents with left hip pain and right chest pain after falling while transferring at home.  The patient has been on hospice for approximately 8 years.  He was working on coming off hospice and beginning a restorative course including therapy prior to this event.  The patient takes high-dose narcotics as part of his pain protocol at home.  The patient reports that his usual prescribing doctor is Dr. May Hodges from [facility].  The patient reports that he was working to come off hospice care \"until this happened\".  He was hospitalized in January.  At that time, his daily morphine use was 1980 mg.  The patient is not able to clearly delineate his medication regimen.  He complains predominantly of left hip pain and to a much lesser degree to right rib pain.  He refused a CT scan of the chest abdomen and pelvis in the emergency room.  He is known to have multiple lytic lesions related to his multiple myeloma.  There is not felt to be any effective treatment for this.    Past Medical History:   has a past medical history of Anesthesia complication, Broken rib, Cancer (H), Gastro-oesophageal reflux disease, Multiple myeloma (H), and Osteoporosis.    Past Surgical History:  Past Surgical History:   Procedure Laterality Date     OPTICAL TRACKING SYSTEM KYPHOPLASTY N/A 12/16/2014    Procedure: OPTICAL TRACKING SYSTEM KYPHOPLASTY;  Surgeon: Abner Vasquez MD;  Location:  OR     ORTHOPEDIC SURGERY      right thumb surgery     RESECT FIRST RIB  8/14/2012    Procedure: RESECT FIRST RIB;  Left First Rib Resection & Scalentectomy;  Surgeon: Keith Tucker MD;  Location:  OR        Social History:  Social History     Socioeconomic History     Marital status: Single     Spouse name: Not on file     " Number of children: Not on file     Years of education: Not on file     Highest education level: Not on file   Occupational History     Not on file   Tobacco Use     Smoking status: Current Some Day Smoker     Packs/day: 0.25     Years: 25.00     Pack years: 6.25     Types: Cigarettes     Last attempt to quit: 2/1/2012     Years since quitting: 10.1     Smokeless tobacco: Never Used     Tobacco comment: 5-7 cigarettes/day    Substance and Sexual Activity     Alcohol use: Yes     Comment: social     Drug use: No     Sexual activity: Yes     Partners: Female   Other Topics Concern     Parent/sibling w/ CABG, MI or angioplasty before 65F 55M? Not Asked   Social History Narrative     Not on file     Social Determinants of Health     Financial Resource Strain: Not on file   Food Insecurity: Not on file   Transportation Needs: Not on file   Physical Activity: Not on file   Stress: Not on file   Social Connections: Not on file   Intimate Partner Violence: Not on file   Housing Stability: Not on file        Family History:  Family History   Problem Relation Age of Onset     Unknown/Adopted No family hx of      Family History Negative No family hx of      Asthma No family hx of      C.A.D. No family hx of      Diabetes No family hx of      Hypertension No family hx of      Cerebrovascular Disease No family hx of      Breast Cancer No family hx of      Cancer - colorectal No family hx of      Prostate Cancer No family hx of      Alcohol/Drug No family hx of      Allergies No family hx of      Alzheimer Disease No family hx of      Anesthesia Reaction No family hx of      Arthritis No family hx of      Blood Disease No family hx of      Cancer No family hx of      Cardiovascular No family hx of      Circulatory No family hx of      Congenital Anomalies No family hx of      Connective Tissue Disorder No family hx of      Depression No family hx of      Endocrine Disease No family hx of      Eye Disorder No family hx of       Genetic Disorder No family hx of      Gastrointestinal Disease No family hx of      Genitourinary Problems No family hx of      Gynecology No family hx of      Heart Disease No family hx of      Lipids No family hx of      Musculoskeletal Disorder No family hx of      Neurologic Disorder No family hx of      Obesity No family hx of      Osteoporosis No family hx of      Psychotic Disorder No family hx of      Respiratory No family hx of      Thyroid Disease No family hx of      Hearing Loss No family hx of        Review of Systems:  The 10 point Review of Systems is negative other than noted in the HPI and above.    Physical Exam:  General - This is a somewhat cachectic appearing patient in obvious discomfort.  HEENT - Normocephalic, atraumatic.  No scleral icterus.  Neck - supple without masses  Lungs - clear to ascultation.  Breath sounds are equal bilaterally.  Chest-there is minimal right chest tenderness.  Heart - Regular rate & rhythm without murmur  Abdomen:   soft, non-distended and nontender.    Extremities - warm without edema.  There appears to be a left hip deformity.  The patient refuses any detailed exam in the area.  Neurologic - nonfocal    Relevant labs:  White blood cell count is 5600.  Hemoglobin is 11.7.  Alkaline phosphatase is 215.  Total bilirubin is 0.2.    Imaging:  X-ray of the ribs and chest reveal a few mildly displaced acute right-sided rib fractures, including the first through third ribs as well as some posterior lateral ribs.  The patient declined CT scan.  Pelvic and left femur x-ray reveals multiple lucent lesions throughout the visualized skeleton compatible with his known multiple myeloma.  There is a new fracture in the proximal subtrochanteric left femur with mild displacement.  There is suggestion of a fracture in the right lateral iliac wing.    Assessment and Plan:  This is a patient with a very complex history of advanced multiple myeloma on very high-dose narcotics.  We are  working with pharmacy to determine his baseline dose at the moment.  Orthopedic consultation has been placed regarding his left femur fracture.  Pain and palliative care consultation has been placed.  It is likely that they will see him tomorrow.  Hospitalist consultation was placed.  I discussed the case at length with Dr. Federico Quiroz.  We discussed transfer of care to the hospitalist service tomorrow, as there are no acute surgical issues.      Phil Fishman MD  [facility]

## 2022-04-03 NOTE — ED NOTES
[facility]  ED Nurse Handoff Report    Beto Tran is a 46 year old male   ED Chief complaint: Fall and Hip Pain  . ED Diagnosis:   Final diagnoses:   Closed fracture of multiple ribs of right side, initial encounter   Closed fracture of left femur, unspecified fracture morphology, unspecified portion of femur, initial encounter (H)     Allergies:   Allergies   Allergen Reactions     Nka [No Known Allergies]        Code Status: DNR / DNI  Activity level - Baseline/Home:  Assist X 1. Activity Level - Current:   Assist X 1. Lift room needed: No. Bariatric: No   Needed: No   Isolation: No. Infection: Not Applicable.     Vital Signs:   Vitals:    04/03/22 0230 04/03/22 0300 04/03/22 0315 04/03/22 0330   BP: 114/69 125/77  124/80   Pulse: 93 88  91   Resp:       Temp:       TempSrc:       SpO2: 96% 99% 96% 94%       Cardiac Rhythm:  ,      Pain level:    Patient confused: No. Patient Falls Risk: Yes.   Elimination Status: Has voided   Patient Report - Initial Complaint: Fall. Focused Assessment: Beto Tran is a 46 year old male with history of multiple myeloma and peripheral neuropathy who presents via ambulance with right rib pain and left hip pain after he fell while transferring from his bed to his wheelchair. States put weight on his left hip and felt a pop. He tried to catch himself with his right elbow and ribs and slid to the floor. Denies head trauma. States he did not lay on the ground for very long. Denies worsened numbness to the left leg though denies ability to stand.  No headache, chest pain, dyspnea, abdominal pain or other symptoms.  Patient took 60mg morphine PTA. No recent active chemotherapy. Patient is currently enrolled in hospice.      Review of Systems   Musculoskeletal: Positive for arthralgias.   Neurological: Negative for numbness.   All other systems reviewed and are negative.    A&Ox4, VSS, 20ga IV in R AC.     Tests Performed: Labs, X-Ray, CT.  Abnormal Results:   Abnormal Labs Resulted from Time of ED Arrival to Time of ED Departure   COMPREHENSIVE METABOLIC PANEL - Abnormal       Result Value    Sodium 137      Potassium 3.1 (*)     Chloride 106      Carbon Dioxide (CO2) 29      Anion Gap 2 (*)     Urea Nitrogen 6 (*)     Creatinine 0.41 (*)     Calcium 10.0      Glucose 106 (*)     Alkaline Phosphatase 215 (*)     AST 40      ALT 38      Protein Total 6.0 (*)     Albumin 3.4      Bilirubin Total 0.2      GFR Estimate >90     CBC WITH PLATELETS AND DIFFERENTIAL - Abnormal    WBC Count 5.6      RBC Count 3.50 (*)     Hemoglobin 11.7 (*)     Hematocrit 35.9 (*)      (*)     MCH 33.4 (*)     MCHC 32.6      RDW 13.2      Platelet Count 237      % Neutrophils 46      % Lymphocytes 39      % Monocytes 11      % Eosinophils 3      % Basophils 1      % Immature Granulocytes 0      NRBCs per 100 WBC 0      Absolute Neutrophils 2.6      Absolute Lymphocytes 2.1      Absolute Monocytes 0.6      Absolute Eosinophils 0.1      Absolute Basophils 0.0      Absolute Immature Granulocytes 0.0      Absolute NRBCs 0.0     .   Treatments provided: IVF, Pain medications  Family Comments: No family present at bedside.  Pt has cell phone and has been contact members of hospice/care time during visit.  OBS brochure/video discussed/provided to patient:  No  ED Medications:   Medications   0.9% sodium chloride BOLUS (1,000 mLs Intravenous New Bag 4/3/22 0307)   morphine (PF) injection 4 mg (has no administration in time range)   ondansetron (ZOFRAN) injection 4 mg (has no administration in time range)   ketorolac (TORADOL) injection 15 mg (15 mg Intravenous Given 4/3/22 0152)   ondansetron (ZOFRAN) injection 4 mg (4 mg Intravenous Given 4/3/22 0152)   morphine (PF) injection 4 mg (4 mg Intravenous Given 4/3/22 0309)     Drips infusing:  No  For the majority of the shift, the patient's behavior Green. Interventions performed were N/A.    Sepsis treatment initiated: No      Patient tested for COVID 19 prior to admission: YES    ED Nurse Name/Phone Number: Federico Patel RN,   3:45 AM  RECEIVING UNIT ED HANDOFF REVIEW    Above ED Nurse Handoff Report was reviewed: yes  Reviewed by: Yudi Muñoz RN on April 3, 2022 at 8:27 AM

## 2022-04-03 NOTE — ANESTHESIA PROCEDURE NOTES
Airway       Patient location during procedure: OR       Procedure Start/Stop Times: 4/3/2022 4:55 PM  Staff -        Anesthesiologist:  Matt Lerma MD       CRNA: Severson, Dean Dennis, APRN CRNA       Performed By: CRNAIndications and Patient Condition       Indications for airway management: tiago-procedural and airway protection       Induction type:intravenous       Mask difficulty assessment: 1 - vent by mask    Final Airway Details       Final airway type: endotracheal airway       Successful airway: ETT - single  Endotracheal Airway Details        ETT size (mm): 8.0       Cuffed: yes       Successful intubation technique: video laryngoscopy       VL Blade Size: Glidescope 4       Grade View of Cords: 1       Adjucts: stylet       Position: Center       Measured from: lips       Secured at (cm): 24       Bite block used: Soft    Post intubation assessment        Placement verified by: capnometry, equal breath sounds and chest rise        Number of attempts at approach: 1       Number of other approaches attempted: 0       Secured with: plastic tape       Ease of procedure: easy       Dentition: Intact and Unchanged

## 2022-04-03 NOTE — ED TRIAGE NOTES
"Patient arrives with [facility]- patient was transferring from his bed to his w/c and fell to the ground. Patient states his left hip dislocated and may have \"relocated\". Patient also states he may have broken some ribs, c/o right rib pain. Patient took 60mg of Morphine PTA. Denies hitting head or thinners. No other obvious trauma. Tender to palpation to left hip, states pain from left knee to left hip. Patient refuses to remove shirt or necklace.   "

## 2022-04-03 NOTE — CONSULTS
[facility]    Orthopedics Consultation    Date of Admission:  4/3/2022    Assessment & Plan     1. Pathologic left subtrochanteric femur fracture    Plan:    I recommended operative treatment of the patient's pathologic left subtrochanteric femur fracture.  We discussed the natural history of this diagnosis, and the plan for surgery.  We discussed the risks and benefits of the procedure, as well as the expected post-operative course.  Postoperatively he will be weightbearing as tolerated in the left lower extremity.  If he is having other areas of severe skeletal pain I would recommend these be image during this admission and consideration given to prophylactic treatment.  DVT prophylaxis will be with SCDs and Lovenox or per medicine given his malignancy.    Active Problems:    Hip fracture, left, closed, initial encounter (H)    Closed fracture of multiple ribs of right side, initial encounter    Tuan Alcantara MD     Code Status    No CPR- Do NOT Intubate    Reason for Consult   Reason for consult: I was asked by Dr. Casas to evaluate this patient for a left femur fracture.    Primary Care Physician   Physician No Ref-Primary    Chief Complaint   Left femur fracture    History is obtained from the patient    History of Present Illness   Beto Tran is a 46 year old male who presents with a left pathologic femur fracture.  He states he was transferring from the bed to the chair with he noticed the onset of severe pain and inability to bear weight secondary to pain in the left leg.  He does endorse a history of severe antecedent pain on that side.  He also indicates other sources of skeletal pain although does not indicate where they are.  He has a history of myeloma and has been on hospice.  There are some indication in the chart that he has elected to leave hospice and seek all treatment however he indicates he is not certain of this yet.    Past Medical History   I have  reviewed this patient's medical history and updated it with pertinent information if needed.   Past Medical History:   Diagnosis Date     Anesthesia complication     states woke up during procedure (endoscopic ultrasound) in June of 2014     Broken rib      Cancer (H)      Gastro-oesophageal reflux disease      Multiple myeloma (H)      Osteoporosis        Past Surgical History   I have reviewed this patient's surgical history and updated it with pertinent information if needed.  Past Surgical History:   Procedure Laterality Date     OPTICAL TRACKING SYSTEM KYPHOPLASTY N/A 12/16/2014    Procedure: OPTICAL TRACKING SYSTEM KYPHOPLASTY;  Surgeon: Abner Vasquez MD;  Location: [facility] OR     ORTHOPEDIC SURGERY      right thumb surgery     RESECT FIRST RIB  8/14/2012    Procedure: RESECT FIRST RIB;  Left First Rib Resection & Scalentectomy;  Surgeon: Keith Tucker MD;  Location: [facility] OR       Prior to Admission Medications   Prior to Admission Medications   Prescriptions Last Dose Informant Patient Reported? Taking?   LORazepam (LORAZEPAM INTENSOL) 2 MG/ML (HIGH CONC) oral solution   Yes Yes   Sig: Take 1 mg by mouth At Bedtime   LORazepam (LORAZEPAM INTENSOL) 2 MG/ML (HIGH CONC) oral solution   Yes Yes   Sig: Take 0.25-0.5 mg by mouth 2 times daily as needed for anxiety   SENNA-docusate sodium (SENNA S) 8.6-50 MG tablet Not Taking at Unknown time  No No   Sig: Take 1 tablet by mouth 2 times daily as needed (constipation)   Patient not taking: Reported on 4/3/2022   acetaminophen (TYLENOL) 500 MG tablet 4/2/2022 at Unknown time  No Yes   Sig: Take 2 tablets (1,000 mg) by mouth every 8 hours as needed for mild pain   gabapentin (NEURONTIN) 600 MG tablet   Yes Yes   Sig: Take 1,200 mg by mouth 3 times daily   haloperidol (HALDOL) 2 MG tablet Not Taking at Unknown time  No No   Sig: Take 1 tablet (2 mg) by mouth 4 times daily   Patient not taking: Reported on 4/3/2022   hydrOXYzine (ATARAX) 25 MG tablet Past Week at  Unknown time  No Yes   Sig: Take 1 tablet (25 mg) by mouth every 6 hours   loratadine (CLARITIN) 10 MG tablet More than a month at Unknown time  Yes Yes   Sig: Take 10 mg by mouth daily as needed for allergies   methocarbamol (ROBAXIN) 750 MG tablet Past Month at Unknown time  No Yes   Sig: Take 1 tablet (750 mg) by mouth At Bedtime   methylnaltrexone (RELISTOR) 12 MG/0.6ML SOLN injection More than a month at Unknown time  Yes Yes   Sig: Inject 12 mg Subcutaneous Every other day as needed   morphine (MS CONTIN) 60 MG 12 hr tablet   Yes Yes   Sig: Take 300 mg by mouth 4 times daily   morphine (MSIR) 15 MG IR tablet   No Yes   Sig: Take 4 tablets (60 mg) by mouth every 3 hours as needed for severe pain   multivitamin w/minerals (THERA-VIT-M) tablet   Yes Yes   Sig: Take 1 tablet by mouth daily   naproxen (NAPROSYN) 500 MG tablet   No Yes   Sig: Take 1 tablet (500 mg) by mouth 2 times daily (with meals)   omeprazole (PRILOSEC OTC) 20 MG EC tablet Not Taking at Unknown time  No No   Sig: Take 1 tablet (20 mg) by mouth daily   Patient not taking: Reported on 4/3/2022   ondansetron (ZOFRAN-ODT) 8 MG ODT tab More than a month at Unknown time  No Yes   Sig: Take 1 tablet (8 mg) by mouth every 8 hours as needed for nausea   polyethylene glycol (MIRALAX) 17 GM/Dose powder 4/2/2022 at Unknown time  No Yes   Sig: Take 17 g by mouth daily   pregabalin (LYRICA) 300 MG capsule 4/2/2022 at Unknown time  No Yes   Sig: Take 1 capsule (300 mg) by mouth 2 times daily      Facility-Administered Medications: None     Allergies   Allergies   Allergen Reactions     Nka [No Known Allergies]        Social History   I have reviewed this patient's social history and updated it with pertinent information if needed. Beto Tran  reports that he has been smoking cigarettes. He has a 6.25 pack-year smoking history. He has never used smokeless tobacco. He reports current alcohol use. He reports that he does not use drugs.    Family History    I have reviewed this patient's family history and updated it with pertinent information if needed.   Family History   Problem Relation Age of Onset     Unknown/Adopted No family hx of      Family History Negative No family hx of      Asthma No family hx of      C.A.D. No family hx of      Diabetes No family hx of      Hypertension No family hx of      Cerebrovascular Disease No family hx of      Breast Cancer No family hx of      Cancer - colorectal No family hx of      Prostate Cancer No family hx of      Alcohol/Drug No family hx of      Allergies No family hx of      Alzheimer Disease No family hx of      Anesthesia Reaction No family hx of      Arthritis No family hx of      Blood Disease No family hx of      Cancer No family hx of      Cardiovascular No family hx of      Circulatory No family hx of      Congenital Anomalies No family hx of      Connective Tissue Disorder No family hx of      Depression No family hx of      Endocrine Disease No family hx of      Eye Disorder No family hx of      Genetic Disorder No family hx of      Gastrointestinal Disease No family hx of      Genitourinary Problems No family hx of      Gynecology No family hx of      Heart Disease No family hx of      Lipids No family hx of      Musculoskeletal Disorder No family hx of      Neurologic Disorder No family hx of      Obesity No family hx of      Osteoporosis No family hx of      Psychotic Disorder No family hx of      Respiratory No family hx of      Thyroid Disease No family hx of      Hearing Loss No family hx of        Review of Systems   The 10 point Review of Systems is negative other than noted in the HPI or here.     Physical Exam   Temp: 97.6  F (36.4  C) Temp src: Temporal BP: 114/62 Pulse: 93   Resp: 16 SpO2: 98 % O2 Device: None (Room air)    Vital Signs with Ranges  Temp:  [97.6  F (36.4  C)-98.4  F (36.9  C)] 97.6  F (36.4  C)  Pulse:  [79-93] 93  Resp:  [14-18] 16  BP: (110-159)/(62-90) 114/62  SpO2:  [92 %-99 %]  98 %  153 lbs 3.2 oz    Constitutional: awake, cooperative, mild distress, appears older than stated age and pale  Eyes: extra-ocular muscles intact, no scleral icterus  ENT: normocepalic, atraumatic without obvious abnormality  Hematologic / Lymphatic: No lymphedema   Respiratory: no increased work of breathing, symmetric chest wall expansion  Skin: normal skin color, texture, turgor  Musculoskeletal: Severe pain with any attempted movement of the left lower extremity.  He holds left lower extremity a slightly flexed position.  Neurologic: Mental Status Exam:  Level of Alertness:   awake  Orientation:   person, place, time  Cranial Nerves:  cranial nerves II-XII are grossly intact  Motor Exam:  moves all extremities well and symmetrically with the exception of the left lower extremity where his motion is limited secondary to pain.  On the left lower extremity he has intact EHL peroneals tibialis anterior gastrocsoleus complex.  Sensory:  Sensory intact  Neuropsychiatric: General: normal, calm and normal eye contact  Level of consciousness: alert / normal  Affect: flat  Orientation: oriented to self, place, time and situation    Data   Results for orders placed or performed during the hospital encounter of 04/03/22 (from the past 24 hour(s))   CBC with platelets differential    Narrative    The following orders were created for panel order CBC with platelets differential.  Procedure                               Abnormality         Status                     ---------                               -----------         ------                     CBC with platelets and d...[[id number]]  Abnormal            Final result                 Please view results for these tests on the individual orders.   Comprehensive metabolic panel   Result Value Ref Range    Sodium 137 133 - 144 mmol/L    Potassium 3.1 (L) 3.4 - 5.3 mmol/L    Chloride 106 94 - 109 mmol/L    Carbon Dioxide (CO2) 29 20 - 32 mmol/L    Anion Gap 2 (L) 3 - 14  mmol/L    Urea Nitrogen 6 (L) 7 - 30 mg/dL    Creatinine 0.41 (L) 0.66 - 1.25 mg/dL    Calcium 10.0 8.5 - 10.1 mg/dL    Glucose 106 (H) 70 - 99 mg/dL    Alkaline Phosphatase 215 (H) 40 - 150 U/L    AST 40 0 - 45 U/L    ALT 38 0 - 70 U/L    Protein Total 6.0 (L) 6.8 - 8.8 g/dL    Albumin 3.4 3.4 - 5.0 g/dL    Bilirubin Total 0.2 0.2 - 1.3 mg/dL    GFR Estimate >90 >60 mL/min/1.73m2   CBC with platelets and differential   Result Value Ref Range    WBC Count 5.6 4.0 - 11.0 10e3/uL    RBC Count 3.50 (L) 4.40 - 5.90 10e6/uL    Hemoglobin 11.7 (L) 13.3 - 17.7 g/dL    Hematocrit 35.9 (L) 40.0 - 53.0 %     (H) 78 - 100 fL    MCH 33.4 (H) 26.5 - 33.0 pg    MCHC 32.6 31.5 - 36.5 g/dL    RDW 13.2 10.0 - 15.0 %    Platelet Count 237 150 - 450 10e3/uL    % Neutrophils 46 %    % Lymphocytes 39 %    % Monocytes 11 %    % Eosinophils 3 %    % Basophils 1 %    % Immature Granulocytes 0 %    NRBCs per 100 WBC 0 <1 /100    Absolute Neutrophils 2.6 1.6 - 8.3 10e3/uL    Absolute Lymphocytes 2.1 0.8 - 5.3 10e3/uL    Absolute Monocytes 0.6 0.0 - 1.3 10e3/uL    Absolute Eosinophils 0.1 0.0 - 0.7 10e3/uL    Absolute Basophils 0.0 0.0 - 0.2 10e3/uL    Absolute Immature Granulocytes 0.0 <=0.4 10e3/uL    Absolute NRBCs 0.0 10e3/uL   ABO/Rh type and screen    Narrative    The following orders were created for panel order ABO/Rh type and screen.  Procedure                               Abnormality         Status                     ---------                               -----------         ------                     Adult Type and Screen[[id number]]                            Edited Result - FINAL        Please view results for these tests on the individual orders.   Adult Type and Screen   Result Value Ref Range    ABO/RH(D) O POS     Antibody Screen Negative Negative    SPECIMEN EXPIRATION DATE [id number]    Ribs XR, unilat 3 views + PA chest, right    Narrative    EXAM: XR RIBS and CHEST RT 3VW  LOCATION: [facility]  HOSPITAL  DATE/TIME: 4/3/2022 2:00 AM    INDICATION: Rib pain.    COMPARISON: None.      Impression    IMPRESSION: The visualized heart and lungs are negative. There are a few mildly displaced acute right-sided rib fractures present including the right 1st through 3rd ribs, as well as a few mid right posterior lateral ribs.   XR Femur Left 2 Views    Narrative    EXAM: XR PELVIS 1/2 VW, XR FEMUR LEFT 2 VIEW  LOCATION: [facility]  DATE/TIME: 4/3/2022 2:00 AM    INDICATION: Left hip pain.    COMPARISON: 12/21/2021.      Impression    IMPRESSION: Multiple lucent lesions throughout the visualized skeleton compatible with known multiple myelomata. There is a new fracture in the proximal subtrochanteric left femur with mild displacement. Suggestion of a fracture of the right lateral   iliac wing just above the anterior-superior iliac spine. Previous vertebroplasty lower lumbar spine. Bones are demineralized.   XR Pelvis 1/2 Views    Narrative    EXAM: XR PELVIS 1/2 VW, XR FEMUR LEFT 2 VIEW  LOCATION: [facility]  DATE/TIME: 4/3/2022 2:00 AM    INDICATION: Left hip pain.    COMPARISON: 12/21/2021.      Impression    IMPRESSION: Multiple lucent lesions throughout the visualized skeleton compatible with known multiple myelomata. There is a new fracture in the proximal subtrochanteric left femur with mild displacement. Suggestion of a fracture of the right lateral   iliac wing just above the anterior-superior iliac spine. Previous vertebroplasty lower lumbar spine. Bones are demineralized.   EKG 12 lead   Result Value Ref Range    Systolic Blood Pressure  mmHg    Diastolic Blood Pressure  mmHg    Ventricular Rate 86 BPM    Atrial Rate 86 BPM    IL Interval 136 ms    QRS Duration 98 ms     ms    QTc 473 ms    P Axis 52 degrees    R AXIS -23 degrees    T Axis 49 degrees    Interpretation ECG       Sinus rhythm  Nonspecific ST abnormality  Abnormal ECG  No previous ECGs  available     Asymptomatic COVID-19 Virus (Coronavirus) by PCR Nasopharyngeal    Specimen: Nasopharyngeal; Swab   Result Value Ref Range    SARS CoV2 PCR Negative Negative    Narrative    Testing was performed using the [employer]  SARS-CoV-2 & Influenza A/B Assay on the [employer]  Peri  System.  This test should be ordered for the detection of SARS-COV-2 in individuals who meet SARS-CoV-2 clinical and/or epidemiological criteria. Test performance is unknown in asymptomatic patients.  This test is for in vitro diagnostic use under the FDA EUA for laboratories certified under CLIA to perform moderate and/or high complexity testing. This test has not been FDA cleared or approved.  A negative test does not rule out the presence of PCR inhibitors in the specimen or target RNA in concentration below the limit of detection for the assay. The possibility of a false negative should be considered if the patient's recent exposure or clinical presentation suggests COVID-19.  [facility] [employer] are certified under the Clinical Laboratory Improvement Amendments of 1988 (CLIA-88) as qualified to perform moderate and/or high complexity laboratory testing.

## 2022-04-03 NOTE — ED PROVIDER NOTES
History   Chief Complaint:  Fall and Hip Pain     The history is provided by the patient.      Beto Tran is a 46 year old male with history of multiple myeloma and peripheral neuropathy who presents via ambulance with right rib pain and left hip pain after he fell while transferring from his bed to his wheelchair. States put weight on his left hip and felt a pop. He tried to catch himself with his right elbow and ribs and slid to the floor. Denies head trauma. States he did not lay on the ground for very long. Denies worsened numbness to the left leg though denies ability to stand.  No headache, chest pain, dyspnea, abdominal pain or other symptoms.  Patient took 60mg morphine PTA. No recent active chemotherapy. Patient is currently enrolled in hospice.     Review of Systems   Musculoskeletal: Positive for arthralgias.   Neurological: Negative for numbness.   All other systems reviewed and are negative.    Allergies:  The patient has no known allergies.     Medications:  Haldol  Atarax  Robaxin  Msir  Naprosyn  Prilosec OTC  Zofran-ODT  Miralax  Lyrica  Senna S  Claritin  Relistor  Thera-vit-m     Past Medical History:     Anesthesia complication  Broken rib  Cancer  GERD  Multiple myeloma  Osteoporosis  Thoracic outlet syndrome  Lesion of ulnar nerve  DISHA    Adjustment disorder with mixed anxiety and depressed mood  Medical marijuana use  Peripheral neuropathy  Tobacco abuse  Secondary malignant neoplasm of bone  Unspecified cord compression  Extramedullary plasmacytoma  Polyneuropathy  Reyes's cyst, right knee  Sinusitis     Past Surgical History:    Optical tracking system kyphoplasty  Right thumb surgery  Resect first rib, left    Colonoscopy  EGD    Family History:    Mother: fibromyalgia     Social History:  The patient presents to the ED via ambulance.   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     Physical Exam     Patient Vitals for the past 24 hrs:   BP Temp  Temp src Pulse Resp SpO2   04/03/22 0230 114/69 -- -- 93 -- 96 %   04/03/22 0227 119/72 -- -- 92 14 95 %   04/03/22 0154 -- 98.3  F (36.8  C) Oral -- -- --   04/03/22 0120 138/84 -- -- 90 -- --       Physical Exam  General: Alert. Appears uncomfortable  Head:  The scalp, face, and head appear normal without trauma  Eyes:  Sclera white; Pupils are equal and round  ENT:    External ears normal.  No hemotympanum.      External nares normal.  No septal hematoma.    Neck:  No midline tenderness or pain with full ROM.  CV:  Rate as above with regular rhythm   No murmur   2/2 radial and dorsal pedal pulses  R. Lateral rib tenderness, no ecchymosis/crepitance  Resp:  Breath sounds clear and equal bilaterally    Non-labored, no retractions or accessory muscle use  GI:  Abdomen soft, non-tender, non-distended    No rebound tenderness or guarding  MSK:  No midline tenderness or bony step-off    No deformity    Moves all extremities equally and symmetrically other than LLE, pain to L. Hip, decreased ROM, no knee/ankle pain, full active ROM  Skin:  No rash or lesions noted.  Neuro:   No apparent deficit.    Strength 5/5 in all extremities other than LLE  Sensation intact x4.     GCS: 15  Psych:  Agitated      Emergency Department Course   ECG (03:04:50):  Rate 86 bpm. MA interval 136. QRS duration 98. QT/QTc 396/473. P-R-T axes 52 -23 49. Normal sinus rhythm. Nonspecific CT abnormality.  Interpreted by Marilyn Casas, DO    Imaging:  XR Pelvis 1/2 Views   Final Result   IMPRESSION: Multiple lucent lesions throughout the visualized skeleton compatible with known multiple myelomata. There is a new fracture in the proximal subtrochanteric left femur with mild displacement. Suggestion of a fracture of the right lateral    iliac wing just above the anterior-superior iliac spine. Previous vertebroplasty lower lumbar spine. Bones are demineralized.      XR Femur Left 2 Views   Final Result   IMPRESSION: Multiple lucent lesions  throughout the visualized skeleton compatible with known multiple myelomata. There is a new fracture in the proximal subtrochanteric left femur with mild displacement. Suggestion of a fracture of the right lateral    iliac wing just above the anterior-superior iliac spine. Previous vertebroplasty lower lumbar spine. Bones are demineralized.      Ribs XR, unilat 3 views + PA chest, right   Final Result   IMPRESSION: The visualized heart and lungs are negative. There are a few mildly displaced acute right-sided rib fractures present including the right 1st through 3rd ribs, as well as a few mid right posterior lateral ribs.        Report per radiology    Laboratory:  Labs Ordered and Resulted from Time of ED Arrival to Time of ED Departure   COMPREHENSIVE METABOLIC PANEL - Abnormal       Result Value    Sodium 137      Potassium 3.1 (*)     Chloride 106      Carbon Dioxide (CO2) 29      Anion Gap 2 (*)     Urea Nitrogen 6 (*)     Creatinine 0.41 (*)     Calcium 10.0      Glucose 106 (*)     Alkaline Phosphatase 215 (*)     AST 40      ALT 38      Protein Total 6.0 (*)     Albumin 3.4      Bilirubin Total 0.2      GFR Estimate >90     CBC WITH PLATELETS AND DIFFERENTIAL - Abnormal    WBC Count 5.6      RBC Count 3.50 (*)     Hemoglobin 11.7 (*)     Hematocrit 35.9 (*)      (*)     MCH 33.4 (*)     MCHC 32.6      RDW 13.2      Platelet Count 237      % Neutrophils 46      % Lymphocytes 39      % Monocytes 11      % Eosinophils 3      % Basophils 1      % Immature Granulocytes 0      NRBCs per 100 WBC 0      Absolute Neutrophils 2.6      Absolute Lymphocytes 2.1      Absolute Monocytes 0.6      Absolute Eosinophils 0.1      Absolute Basophils 0.0      Absolute Immature Granulocytes 0.0      Absolute NRBCs 0.0     COVID-19 VIRUS (CORONAVIRUS) BY PCR   TYPE AND SCREEN, ADULT   ABO/RH TYPE AND SCREEN        Emergency Department Course:    Reviewed:  I reviewed nursing notes, vitals, past medical history and Care  "Everywhere    Assessments:  0120 I obtained history and examined the patient as noted above.   0257 I rechecked the patient and explained findings. He is amenable to admission.     Consults:  0147 I spoke with Catherine, [facility], who recommended calling her or Dr. May Hodges with updates.    Catherine: [phone]   Dr. Hodges: [phone] 0229 I spoke with Dr. Hodges, hospice, about the patient's presentation and plan. She wishes to dis-enroll from hospice and admit the patient to the hospitalist.   0250 I spoke with Dr. Fishman, trauma surgery, about the patient's presentation and plan for admission. He recommends CT imaging.   0313    I spoke to Dr. Fishman again as patient refusing CT imaging    Interventions:  0152 Toradol 15mg IV  0152 Zofran 4mg IV    Disposition:  The patient was admitted to the hospital under the care of Dr. Fishman    Impression & Plan     Medical Decision Making:  Patient is a 46-year-old male with known history of multiple myeloma presenting status post motor fall.  He was quite verbally hostile to me in my initial introduction and was refusing to remove his closing for initial exam. \"Don't you dare touch my shirt.\" I expressed to patient my only intention was to try to help him and I would recommend a proper examination given his reported pain.  Patient was eventually aggreeable to exam.  Patient appeared to have greatest tenderness in the right lateral ribs as well as left hip.  No evidence of head trauma and the remainder of his head to toe exam is negative.  He is noted to have a left subtrochanteric femur fracture with mild displacement though neurovascularly intact on exam.  There is a possible fracture to the right iliac wing.  Patient is noted to have right first through third rib fractures.  Given patient's enrollment in hospice I did speak to his hospice physician who recommended just enrolling him from hospice at this point in time with plans for admission to the hospital.  " I did speak to trauma surgery Dr. Fishman given multiple areas of injury and he is in agreement to admission.  I did in conjunction with trauma surgeon recommend formal CT imaging to the patient to ensure no further injury though he adamantly declined.  He has capacity to make his own medical decisions and expressed understanding of missed or delayed diagnoses.  He is not hypoxic.  His pain was difficult to control during his time in the ED as he is on a number of narcotic medications outpatient.  He otherwise remained hemodynamically stable, accepted to trauma surgery team with plans for orthopedic consultation.  Patient is NPO.    Diagnosis:    ICD-10-CM    1. Closed fracture of multiple ribs of right side, initial encounter  S22.41XA    2. Closed fracture of left femur, unspecified fracture morphology, unspecified portion of femur, initial encounter (H)  S72.92XA        Discharge Medications:  New Prescriptions    No medications on file       Scribe Disclosure:  Mary CARD, am serving as a scribe at 1:19 AM on 4/3/2022 to document services personally performed by Marilyn Casas DO based on my observations and the provider's statements to me.             Marilyn Casas DO  04/03/22 0407

## 2022-04-03 NOTE — ANESTHESIA CARE TRANSFER NOTE
Patient: Beto Tran    Procedure: Procedure(s):  OPEN REDUCTION INTERNAL FIXATION, FRACTURE, FEMUR, USING INTRAMEDULLARY JAIME AND FRACTURE TABLE       Diagnosis: Closed fracture of left femur, unspecified fracture morphology, unspecified portion of femur, initial encounter (H) [S72.92XA]  Diagnosis Additional Information: No value filed.    Anesthesia Type:   No value filed.     Note:    Oropharynx: oropharynx clear of all foreign objects  Level of Consciousness: awake  Oxygen Supplementation: face mask  Level of Supplemental Oxygen (L/min / FiO2): 4  Independent Airway: airway patency satisfactory and stable  Dentition: dentition unchanged  Vital Signs Stable: post-procedure vital signs reviewed and stable  Report to RN Given: handoff report given  Patient transferred to: PACU    Handoff Report: Identifed the Patient, Identified the Reponsible Provider, Reviewed the pertinent medical history, Discussed the surgical course, Reviewed Intra-OP anesthesia mangement and issues during anesthesia, Set expectations for post-procedure period and Allowed opportunity for questions and acknowledgement of understanding      Vitals:  Vitals Value Taken Time   /71 04/03/22 1831   Temp     Pulse 85 04/03/22 1834   Resp 10 04/03/22 1834   SpO2 100 % 04/03/22 1834   Vitals shown include unvalidated device data.    Electronically Signed By: Dean Dennis Severson, APRN CRNA  April 3, 2022  6:35 PM

## 2022-04-03 NOTE — PROGRESS NOTES
Arrived to room 648 0915. Alert, orientated x4. Vitals taken.   On arrival patient voicing what cares/interventions he wants completed or not and how to do them. Pain 10/10. Crying out in pain. Tears.   Patient has declined physical assessment, has denied me moving any linins from underneath him, Denied to complete the IS, and has declined to complete the admission profile with me.   Patient voiced very clear instructions to me when he arrived to the unit on how he wanted to be transferred into the bed, pain medications that he needs on a daily basis for his chronic daily medications. He voiced he is not sure if he wants a surgery for his left hip. On visual exam of left hip it is very swollen and very painful with any movement at all.   Will provide cares as patient allows me to complete.   Resting in bed at this time with a cont. Pulse ox on with eyes closed. Has been NPO except meds with a sip of water.   1430 resting now pain 7/10. Still declining any interventions only wanting to sleep. Cont. Pulse ox on. RR reg.   1610 taken down to the pre op area- talked with Devan montemayor and Dr. Alcantara. Patient in agreement of surgery now. Patient locked up his electronic tablet and 2 earrings in his cloth bag with a lock on it.

## 2022-04-03 NOTE — PHARMACY-ADMISSION MEDICATION HISTORY
Admission medication history interview status for this patient is complete. See [facility] admission navigator for allergy information, prior to admission medications and immunization status.     Medication history interview done, indicate source(s): Patient  Medication history resources (including written lists, pill bottles, clinic record): , care everywhere, [facility] ([facility] meds)  Pharmacy:  [facility],  Specialty, [facility]    Changes made to PTA medication list:  Added: MS ER, lorazepam, gabapentin  Changed: nothing  Reported as Not Taking: ondansetron, haldol, omeprazole  Removed: nothing    Actions taken by pharmacist (provider contacted, etc): reviewed      Additional medication history information: Confirmed MS ER, and gabapentin dose via [facility] (dispensing from [facility]), and reviewed . Patient takes 5 tabs of 60 mg MS ER 4 times daily.     Medication reconciliation/reorder completed by provider prior to medication history?  Y   (Y/N)       Prior to Admission medications    Medication Sig Last Dose Taking? Auth Provider   acetaminophen (TYLENOL) 500 MG tablet Take 2 tablets (1,000 mg) by mouth every 8 hours as needed for mild pain 4/2/2022 at Unknown time Yes Kasia Luis APRN CNP   gabapentin (NEURONTIN) 600 MG tablet Take 1,200 mg by mouth 3 times daily 4/2/2022 at Unknown time Yes Unknown, Entered By History   hydrOXYzine (ATARAX) 25 MG tablet Take 1 tablet (25 mg) by mouth every 6 hours Past Week at Unknown time Yes Kasia Luis APRN CNP   loratadine (CLARITIN) 10 MG tablet Take 10 mg by mouth daily as needed for allergies More than a month at Unknown time Yes Unknown, Entered By History   LORazepam (LORAZEPAM INTENSOL) 2 MG/ML (HIGH CONC) oral solution Take 1 mg by mouth At Bedtime  Yes Unknown, Entered By History   LORazepam (LORAZEPAM INTENSOL) 2 MG/ML (HIGH CONC) oral solution Take 0.25-0.5 mg by mouth 2 times daily as needed for anxiety  Yes  Unknown, Entered By History   methocarbamol (ROBAXIN) 750 MG tablet Take 1 tablet (750 mg) by mouth At Bedtime Past Month at Unknown time Yes Kasia Luis APRN CNP   methylnaltrexone (RELISTOR) 12 MG/0.6ML SOLN injection Inject 12 mg Subcutaneous Every other day as needed More than a month at Unknown time Yes Unknown, Entered By History   morphine (MS CONTIN) 60 MG 12 hr tablet Take 300 mg by mouth 4 times daily  Yes Unknown, Entered By History   morphine (MSIR) 15 MG IR tablet Take 4 tablets (60 mg) by mouth every 3 hours as needed for severe pain  Yes Kasia Luis APRN CNP   multivitamin w/minerals (THERA-VIT-M) tablet Take 1 tablet by mouth daily  Yes Unknown, Entered By History   naproxen (NAPROSYN) 500 MG tablet Take 1 tablet (500 mg) by mouth 2 times daily (with meals)  Yes Kasia Luis APRN CNP   ondansetron (ZOFRAN-ODT) 8 MG ODT tab Take 1 tablet (8 mg) by mouth every 8 hours as needed for nausea More than a month at Unknown time  Kasia Luis APRN CNP   polyethylene glycol (MIRALAX) 17 GM/Dose powder Take 17 g by mouth daily 4/2/2022 at Unknown time Yes Kasia Luis APRN CNP   pregabalin (LYRICA) 300 MG capsule Take 1 capsule (300 mg) by mouth 2 times daily 4/2/2022 at Unknown time Yes Kasia Luis APRN CNP   haloperidol (HALDOL) 2 MG tablet Take 1 tablet (2 mg) by mouth 4 times daily  Patient not taking: Reported on 4/3/2022 Not Taking at Unknown time  Kasia Luis APRN CNP   omeprazole (PRILOSEC OTC) 20 MG EC tablet Take 1 tablet (20 mg) by mouth daily  Patient not taking: Reported on 4/3/2022 Not Taking at Unknown time  Kasia Luis APRN CNP   SENNA-docusate sodium (SENNA S) 8.6-50 MG tablet Take 1 tablet by mouth 2 times daily as needed (constipation)  Patient not taking: Reported on 4/3/2022 Not Taking at Unknown time  Janelle  KAREN Altman CNP

## 2022-04-03 NOTE — OP NOTE
[facility]  Orthopedic Operative Note    Long Cephallomedullary Nailing    Beto Tran MRN# [id number]   YOB: 1975  Procedure Date:  4/3/2022  Age: 46 year old     PREOPERATIVE DIAGNOSIS: Pathologic left subtrochanteric femur fracture    POSTOPERATIVE DIAGNOSIS:  Same    PROCEDURE PERFORMED:  Surgical treatment of left pathologic subtrochanteric femur fracture with cephalomedullary device    SURGEON:  Tuan Alcantara MD    FIRST ASSISTANT: Demetrio Brink PA-C    ANESTHESIA:  General     EBL: 150 mL    COMPLICATIONS: None     DISPOSITION: Post Anesthesia Care Unit     CONDITION: Stable     INDICATIONS:  Beto Tran  is a 46 year old male presentting with a left pathologic subtrochanteric femur fracture.  Discussed both operative and nonoperative management. Risks of surgery discussed included but not limited to bleeding, infection, damage to surrounding neurovascular structures, leg length inequality, nonunion, malunion, need for revision surgery, blood clots, pulmonary embolus, and the medical risks of anesthesia.  Benefits of surgery discussed included improved chance of union in acceptable alignment, improved function, and reduction in medical risks of hip fractures.  Alternatives discussed included nonoperative management which I did not recommend.  The patient acknowledges risks and wishes to proceed. The informed consent was signed and documented.  I met with the patient preoperatively and marked the operative extremity.      IMPLANTS:  Implant Name Type Inv. Item Serial No.  Lot No. LRB No. Used Action   IMP NAIL SYN CAN FEM PROX TFNA [id number] 125D LT 04.037.231S - [id number] Metallic Hardware/[place] IMP NAIL SYN CAN FEM PROX TFNA [id number] 125D LT 04.037.231S  [facility] [id number] Left 1 Implanted   IMP SCR SYN TFNA FENESTRATED LAG 95MM 04.038.195S - [id number] Metallic Hardware/[place] IMP SCR SYN TFNA FENESTRATED LAG 95MM 04.038.195S   [employer] [id number] Left 1 Implanted   IMP SCR SYN 5.0 TI LOCK T25 STARDRIVE 40MM 04.005.530S - [id number] Metallic Hardware/[place] IMP SCR SYN 5.0 TI LOCK T25 STARDRIVE 40MM 04.005.530S  [employer] [id number] Left 1 Implanted   IMP SCR SYN 5.0 TI LOCK T25 STARDRIVE 38MM 04.005.528S - [id number] Metallic Hardware/[place] IMP SCR SYN 5.0 TI LOCK T25 STARDRIVE 38MM 04.005.528S  [employer] 04.005.528S Left 1 Implanted       PROCEDURE: The patient was brought to the OR and underwent general anesthesia.  The patient was subsequently transferred in a supine position to the fracture table.  The peroneal post was placed.  The perineum was engaged gently into the perineal post.  Both feet were placed in well-padded traction boots.  Patient placed in a scissored position with the fractured side and in-line traction and the well leg placed in approximately 30 degrees of hip extension.  Fluoroscopic unit was brought into the field and provisional reduction was achieved.  The left hip was prepped and draped in normal sterile fashion.  Antibiotic administration was confirmed and timeout was completed.  Following generalized agreement, the starting guidepin was advanced down to the start point on the greater trochanter.  We confirmed the start point on fluoroscopy and then advanced the guidewire into the femur.  The position of the guidewire was confirmed with fluoroscopy.  We then made an incision around the pin and gained access to the intramedullary nail using the entry reamer.  All instruments were removed.  The long ball-tipped guidewire was advanced down to the knee.  We measured for and selected the appropriate length nail.  We then sequentially reamed up to a size 13.5 mm reamer.    During reaming unexpected resistance was encountered distally near the cortex.  We backed the wire up and drilled for and placed a blocking drill bit under fluoroscopic guidance.  We then readvanced the wire and downsized to the  end-cutting reamer.  After 1 pass with the end-cutting reamer we were able to pass further reamers without difficulty.  We attempted to send reamings for confirmatory pathology however inadequate reamings could be obtained.  A [employer] TNFA 125 degree x 40 cm x 12 mm nail was selected.  This was advanced to an appropriate depth over the guidewire confirmed by fluoroscopy.  The ball-tipped guidewire was then removed.  We then advanced the guide for the lag screw down to bone through a small lateral thigh incision.   The guidewire was then advanced into the central position within the femoral neck and head confirmed by fluoroscopy.  We then measured for and selected an appropriate length lag screw.  We reamed for the lag screw and then placed a lag screw by hand.  The setscrew was advanced and tightened.  All instruments were then removed.  We confirmed placement of the hardware proximally using AP and lateral fluoroscopy.  When we were satisfied perfect [place] imaging was obtained distally.  We then drilled for and placed two statically interlocked distal interlocking screws achieving good bicortical purchase.  Final fluoroscopic imaging was obtained demonstrating good alignment of the fracture good positioning of all hardware.  We then thoroughly irrigated and closed in layers with 2-0 Vicryl, and staples.  The wounds were anesthetized with bupivacaine and sterile dressings were applied.  Patient was transported to the recovery room in stable condition, sustaining no complications. There were no complications and the patient tolerated well.    PLAN:    Activity: Weight-bear as tolerated, range of motion as tolerated, physical therapy and occupational therapy consults  Antibiotics: 24 hours IV antibiotics per standard postop protocol  DVT:  SCD's and chemical prophylaxis with Lovenox  Discharge:  Case management social work consult for placement  Follow-up:  Follow up at [facility] in 2 weeks with repeat x-rays AP and  lateral left hip and femur    Tuan Alcantara MD  [facility]

## 2022-04-04 ENCOUNTER — APPOINTMENT (OUTPATIENT)
Dept: GENERAL RADIOLOGY | Facility: CLINIC | Age: 47
DRG: 480 | End: 2022-04-04
Attending: PHYSICIAN ASSISTANT
Payer: MEDICARE

## 2022-04-04 ENCOUNTER — APPOINTMENT (OUTPATIENT)
Dept: GENERAL RADIOLOGY | Facility: CLINIC | Age: 47
DRG: 480 | End: 2022-04-04
Attending: ORTHOPAEDIC SURGERY
Payer: MEDICARE

## 2022-04-04 LAB
ANION GAP SERPL CALCULATED.3IONS-SCNC: 4 MMOL/L (ref 3–14)
ATRIAL RATE - MUSE: 86 BPM
BUN SERPL-MCNC: 8 MG/DL (ref 7–30)
CALCIUM SERPL-MCNC: 8.8 MG/DL (ref 8.5–10.1)
CHLORIDE BLD-SCNC: 109 MMOL/L (ref 94–109)
CO2 SERPL-SCNC: 24 MMOL/L (ref 20–32)
CREAT SERPL-MCNC: 0.41 MG/DL (ref 0.66–1.25)
DIASTOLIC BLOOD PRESSURE - MUSE: NORMAL MMHG
ERYTHROCYTE [DISTWIDTH] IN BLOOD BY AUTOMATED COUNT: 13.2 % (ref 10–15)
GFR SERPL CREATININE-BSD FRML MDRD: >90 ML/MIN/1.73M2
GLUCOSE BLD-MCNC: 126 MG/DL (ref 70–99)
HCT VFR BLD AUTO: 24.3 % (ref 40–53)
HGB BLD-MCNC: 8 G/DL (ref 13.3–17.7)
INTERPRETATION ECG - MUSE: NORMAL
MAGNESIUM SERPL-MCNC: 1.6 MG/DL (ref 1.6–2.3)
MAGNESIUM SERPL-MCNC: 1.6 MG/DL (ref 1.6–2.3)
MCH RBC QN AUTO: 33.1 PG (ref 26.5–33)
MCHC RBC AUTO-ENTMCNC: 32.9 G/DL (ref 31.5–36.5)
MCV RBC AUTO: 100 FL (ref 78–100)
P AXIS - MUSE: 52 DEGREES
PHOSPHATE SERPL-MCNC: 2.9 MG/DL (ref 2.5–4.5)
PHOSPHATE SERPL-MCNC: 3.5 MG/DL (ref 2.5–4.5)
PLATELET # BLD AUTO: 195 10E3/UL (ref 150–450)
POTASSIUM BLD-SCNC: 3.9 MMOL/L (ref 3.4–5.3)
PR INTERVAL - MUSE: 136 MS
QRS DURATION - MUSE: 98 MS
QT - MUSE: 396 MS
QTC - MUSE: 473 MS
R AXIS - MUSE: -23 DEGREES
RBC # BLD AUTO: 2.42 10E6/UL (ref 4.4–5.9)
SODIUM SERPL-SCNC: 137 MMOL/L (ref 133–144)
SYSTOLIC BLOOD PRESSURE - MUSE: NORMAL MMHG
T AXIS - MUSE: 49 DEGREES
VENTRICULAR RATE- MUSE: 86 BPM
WBC # BLD AUTO: 9 10E3/UL (ref 4–11)

## 2022-04-04 PROCEDURE — 250N000013 HC RX MED GY IP 250 OP 250 PS 637: Performed by: NURSE PRACTITIONER

## 2022-04-04 PROCEDURE — 258N000003 HC RX IP 258 OP 636: Performed by: INTERNAL MEDICINE

## 2022-04-04 PROCEDURE — 250N000011 HC RX IP 250 OP 636: Performed by: INTERNAL MEDICINE

## 2022-04-04 PROCEDURE — 250N000013 HC RX MED GY IP 250 OP 250 PS 637: Performed by: ORTHOPAEDIC SURGERY

## 2022-04-04 PROCEDURE — 999N000157 HC STATISTIC RCP TIME EA 10 MIN

## 2022-04-04 PROCEDURE — 99223 1ST HOSP IP/OBS HIGH 75: CPT | Performed by: NURSE PRACTITIONER

## 2022-04-04 PROCEDURE — 82306 VITAMIN D 25 HYDROXY: CPT | Performed by: PHYSICIAN ASSISTANT

## 2022-04-04 PROCEDURE — 120N000001 HC R&B MED SURG/OB

## 2022-04-04 PROCEDURE — 85041 AUTOMATED RBC COUNT: CPT | Performed by: ORTHOPAEDIC SURGERY

## 2022-04-04 PROCEDURE — 94150 VITAL CAPACITY TEST: CPT

## 2022-04-04 PROCEDURE — 80048 BASIC METABOLIC PNL TOTAL CA: CPT | Performed by: ORTHOPAEDIC SURGERY

## 2022-04-04 PROCEDURE — 71045 X-RAY EXAM CHEST 1 VIEW: CPT

## 2022-04-04 PROCEDURE — 250N000011 HC RX IP 250 OP 636: Performed by: ORTHOPAEDIC SURGERY

## 2022-04-04 PROCEDURE — 99232 SBSQ HOSP IP/OBS MODERATE 35: CPT | Performed by: INTERNAL MEDICINE

## 2022-04-04 PROCEDURE — 36415 COLL VENOUS BLD VENIPUNCTURE: CPT | Performed by: ORTHOPAEDIC SURGERY

## 2022-04-04 PROCEDURE — 77075 RADEX OSSEOUS SURVEY COMPL: CPT

## 2022-04-04 PROCEDURE — 84100 ASSAY OF PHOSPHORUS: CPT | Performed by: ORTHOPAEDIC SURGERY

## 2022-04-04 PROCEDURE — 94799 UNLISTED PULMONARY SVC/PX: CPT

## 2022-04-04 PROCEDURE — 83735 ASSAY OF MAGNESIUM: CPT | Performed by: ORTHOPAEDIC SURGERY

## 2022-04-04 RX ORDER — LORAZEPAM 2 MG/ML
1 CONCENTRATE ORAL ONCE
Status: COMPLETED | OUTPATIENT
Start: 2022-04-04 | End: 2022-04-04

## 2022-04-04 RX ORDER — ZOLEDRONIC ACID 0.04 MG/ML
4 INJECTION, SOLUTION INTRAVENOUS ONCE
Status: DISCONTINUED | OUTPATIENT
Start: 2022-04-04 | End: 2022-04-04

## 2022-04-04 RX ORDER — HYDROXYZINE HYDROCHLORIDE 50 MG/1
50 TABLET, FILM COATED ORAL EVERY 6 HOURS
Status: DISCONTINUED | OUTPATIENT
Start: 2022-04-04 | End: 2022-04-06

## 2022-04-04 RX ORDER — MORPHINE SULFATE 4 MG/ML
4-8 INJECTION, SOLUTION INTRAMUSCULAR; INTRAVENOUS
Status: DISCONTINUED | OUTPATIENT
Start: 2022-04-04 | End: 2022-04-06

## 2022-04-04 RX ORDER — MORPHINE SULFATE 15 MG/1
60 TABLET ORAL ONCE
Status: COMPLETED | OUTPATIENT
Start: 2022-04-04 | End: 2022-04-04

## 2022-04-04 RX ORDER — METHOCARBAMOL 750 MG/1
750 TABLET, FILM COATED ORAL 2 TIMES DAILY
Status: DISCONTINUED | OUTPATIENT
Start: 2022-04-04 | End: 2022-04-06

## 2022-04-04 RX ADMIN — METHOCARBAMOL 750 MG: 750 TABLET ORAL at 15:26

## 2022-04-04 RX ADMIN — MORPHINE SULFATE 60 MG: 15 TABLET ORAL at 04:00

## 2022-04-04 RX ADMIN — MORPHINE SULFATE 300 MG: 30 TABLET, FILM COATED, EXTENDED RELEASE ORAL at 22:47

## 2022-04-04 RX ADMIN — MORPHINE SULFATE 60 MG: 15 TABLET ORAL at 13:31

## 2022-04-04 RX ADMIN — MORPHINE SULFATE 60 MG: 15 TABLET ORAL at 13:11

## 2022-04-04 RX ADMIN — MORPHINE SULFATE 60 MG: 15 TABLET ORAL at 08:08

## 2022-04-04 RX ADMIN — ZOLEDRONIC ACID 4 MG: 4 INJECTION, SOLUTION, CONCENTRATE INTRAVENOUS at 19:32

## 2022-04-04 RX ADMIN — HYDROXYZINE HYDROCHLORIDE 25 MG: 25 TABLET, FILM COATED ORAL at 10:45

## 2022-04-04 RX ADMIN — NAPROXEN 500 MG: 250 TABLET ORAL at 18:19

## 2022-04-04 RX ADMIN — METHOCARBAMOL 750 MG: 750 TABLET ORAL at 20:26

## 2022-04-04 RX ADMIN — ACETAMINOPHEN 975 MG: 325 TABLET, FILM COATED ORAL at 21:32

## 2022-04-04 RX ADMIN — MORPHINE SULFATE 60 MG: 15 TABLET ORAL at 21:31

## 2022-04-04 RX ADMIN — NAPROXEN 500 MG: 250 TABLET ORAL at 08:09

## 2022-04-04 RX ADMIN — MORPHINE SULFATE 300 MG: 30 TABLET, FILM COATED, EXTENDED RELEASE ORAL at 17:01

## 2022-04-04 RX ADMIN — GABAPENTIN 1200 MG: 600 TABLET, FILM COATED ORAL at 20:26

## 2022-04-04 RX ADMIN — PREGABALIN 300 MG: 300 CAPSULE ORAL at 20:26

## 2022-04-04 RX ADMIN — ACETAMINOPHEN 975 MG: 325 TABLET, FILM COATED ORAL at 13:12

## 2022-04-04 RX ADMIN — GABAPENTIN 1200 MG: 600 TABLET, FILM COATED ORAL at 08:08

## 2022-04-04 RX ADMIN — MORPHINE SULFATE 300 MG: 30 TABLET, FILM COATED, EXTENDED RELEASE ORAL at 10:45

## 2022-04-04 RX ADMIN — CEFAZOLIN 1 G: 1 INJECTION, POWDER, FOR SOLUTION INTRAMUSCULAR; INTRAVENOUS at 05:27

## 2022-04-04 RX ADMIN — NICOTINE 1 PATCH: 21 PATCH, EXTENDED RELEASE TRANSDERMAL at 08:15

## 2022-04-04 RX ADMIN — MORPHINE SULFATE 60 MG: 15 TABLET ORAL at 18:19

## 2022-04-04 RX ADMIN — LORAZEPAM 1 MG: 2 LIQUID ORAL at 13:31

## 2022-04-04 RX ADMIN — LORAZEPAM 1 MG: 2 LIQUID ORAL at 21:32

## 2022-04-04 RX ADMIN — GABAPENTIN 1200 MG: 600 TABLET, FILM COATED ORAL at 13:12

## 2022-04-04 RX ADMIN — ACETAMINOPHEN 975 MG: 325 TABLET, FILM COATED ORAL at 05:25

## 2022-04-04 RX ADMIN — PREGABALIN 300 MG: 300 CAPSULE ORAL at 08:08

## 2022-04-04 RX ADMIN — HYDROXYZINE HYDROCHLORIDE 50 MG: 50 TABLET, FILM COATED ORAL at 17:02

## 2022-04-04 RX ADMIN — LORAZEPAM 0.5 MG: 2 LIQUID ORAL at 05:34

## 2022-04-04 RX ADMIN — MORPHINE SULFATE 300 MG: 30 TABLET, FILM COATED, EXTENDED RELEASE ORAL at 05:23

## 2022-04-04 RX ADMIN — ENOXAPARIN SODIUM 40 MG: 40 INJECTION SUBCUTANEOUS at 10:50

## 2022-04-04 RX ADMIN — HYDROXYZINE HYDROCHLORIDE 50 MG: 50 TABLET, FILM COATED ORAL at 23:00

## 2022-04-04 RX ADMIN — LORAZEPAM 0.5 MG: 2 LIQUID ORAL at 23:53

## 2022-04-04 ASSESSMENT — ACTIVITIES OF DAILY LIVING (ADL)
ADLS_ACUITY_SCORE: 15
ADLS_ACUITY_SCORE: 19
ADLS_ACUITY_SCORE: 19
ADLS_ACUITY_SCORE: 15
ADLS_ACUITY_SCORE: 19
ADLS_ACUITY_SCORE: 20
ADLS_ACUITY_SCORE: 18
ADLS_ACUITY_SCORE: 17
ADLS_ACUITY_SCORE: 20
ADLS_ACUITY_SCORE: 19
ADLS_ACUITY_SCORE: 20
ADLS_ACUITY_SCORE: 17
ADLS_ACUITY_SCORE: 15
ADLS_ACUITY_SCORE: 20
ADLS_ACUITY_SCORE: 18
ADLS_ACUITY_SCORE: 15
ADLS_ACUITY_SCORE: 15
ADLS_ACUITY_SCORE: 19
ADLS_ACUITY_SCORE: 15
ADLS_ACUITY_SCORE: 19
ADLS_ACUITY_SCORE: 17
ADLS_ACUITY_SCORE: 19

## 2022-04-04 NOTE — PLAN OF CARE
Goal Outcome Evaluation:    .Patient vital signs are at baseline: Yes  Patient able to ambulate as they were prior to admission or with assist devices provided by therapies during their stay:  Reason: Not yet came up to the floor around 2150  Patient MUST void prior to discharge:  Yes  Patient able to tolerate oral intake:  yes   Pain has adequate pain control using Oral analgesics:  Yes  Does patient have an identified :  Yes  Has goal D/C date and time been discussed with patient:  No,  Reason:to work with therapy.  Patient came back from PACU around 2150. Patient was calm and stable until he started asking for his earrings. Writer told patient that his earrings and electronics he locked them in his bag before going for surgery , patient was yelling stating he will call police because we have stolen his earrings.  Charge nurse notified ,  after multiple attempts patient agreed to check his bag and he found  the earrings in the bag.    Managed to take only one set of vitals, refused capo . Plan to discharge will be determined.  ml.

## 2022-04-04 NOTE — CONSULTS
[facility]  Palliative Care Consultation   Text Page    Assessment & Plan   Beto Tran is a 46 year old male who was admitted on 4/3/2022.   Consulted by Dr. Phil Fishman to assist with symptom management (post operative hip pain and development of plan of care regarding ongoing hospice enrollment.    Recommendations:  1. Goals of Care- No CPR- Do NOT Intubate  Hospitalization goals discussed with patient at the bedside.  Goal for adequate symptom management while inpatient, considering ongoing hospice enrollment  Decisional Capacity- Intact. Patient does not have an advance directive. Per  informed consent policy next of kin should be involved if patient becomes unable.  POLST on file and dated 01/13/2022.  DNR,Comfort focused measures only, no tube feeds, oral antibiotics permitted.    2. Pain   Chronic pain secondary to malignancy.  Now with acute left hip and right thoracic pain secondary to fall and hip fracture.  Additionally, POD#1 femur nailing.      Patient's opioid use in past 24 hours: Morphine PO/IV 1464 mg, Fentanyl  mcg, methadone PO 28 mg   = 1,701mg Daily Morphine Equivalent  [place] Board of Pharmacy Data Base Reviewed:    YES; As expected, no concern for misuse/abuse of controlled medications based on this report.     Multimodal pain management plan:  - acetaminophen 975 mg three times daily  - naproxen 500 mg two times daily with meals  - gabapentin 1200 mg three times daily  - pregabalin 300 mg two times daily  - hydroxyzine 50 mg three times daily, additional 25 - 50 mg every 4 hours prn  - lidocaine patch 1 patch daily for 12 hours on/12 hours off  - Methocarbamol 750 mg two times daily, additional 750 mg two times daily prn  - MS Contin 300 mg PO four times daily  - morphine IR 60 mg every 3 hours prn  - morphine injection 4-8 mg every 3 hours prn    3. Anxiety  Long term anxiety related to chronic and acute conditions.  Associated  "frustration, intermittent hopelessness.  Patient denies thoughts of suicide or harming others.  Acknowledges acute stress and feelings of being overwhelmed.    - lorazepam 1 mg SL solution at bedtime  - lorazepam 0.25 - 0.5 mg SL solution BID prn  - appreciate  consult  - mindful exercises and distraction with TV, processing through situation with conversation.    4. Spiritual Care  Oriented to Spiritual Health as part of Palliative Care team. Appreciate Care of  .  Spiritual Background: undesignated    5. Care Planning  Appreciate Care of multidisciplinary team.  - disposition pending.  Return home on hospice plan of care v TCU placement post op.    Medical Decision Making and Goals of Care:  Discussed on April 4, 2022 with Paulette Amador APRN, CNP:   Met with Beto at the bedside.  He is tearful and states frustration.  He reviews his health history and the recent event.  States he feels \"abandoned and alone\" with his plan of care and that he is uncertain if he would benefit most from ongoing hospice services vs rehabilitation.  States he feels that he \"is wasting his breath\" and would like to transfer to [place] for care.  He states he \"does not know what to do next.\"    Reviewed his current symptoms of pain and anxiety.  He acknowledges that his pain has been fairly controlled.  He is fearful of how he will feel with movement.  States he feels his bones are porcelain and that he \"cannot be expected to get up after surgery\".  He mentions his anticipatory anxiety and that he is \"overwhelmed with thoughts of it all.\"  He states a goal of getting home, but notes that his fiance is managing so much for him that he needs her to have support.    Educated on multimodal pain management plan as listed in plan of care above.  He verbalized understanding and agreement to the plan.  He denies questions or concerns and states follow up in the coming days regarding his plan of care would be his " preference.      Thank you for involving us in the patient's care.     Paulette JONES, CNP  Pain Management and Palliative Care  [facility]  Pgr: [phone]    Time Spent on this Encounter   Total unit/floor time 95 minutes, time consisted of the following, examination of the patient, reviewing the record and completing documentation. >50% of time spent in counseling and coordination of care, Bedside Nurse Angeles and Hospitalist Dr. Martinez.  Time spend counseling with patient consisted of the following topics, goals of care, care planning for discharge and symptom management.    Understanding of disease process:   This has been discussed with patient and primary team.    History of Present Illness   History is obtained from the patient and electronic health record    Beto Tran is a 46 year old male with a past medical history of multiple myeloma on hospice x 8 years, osteoporosis and current smoker, who presents with fall with subsequent left femur fracture s/p femur nailing on 4/3.  Work up in ED revealed findings of right rib fractures as well, patient declined further imaging of CT C/A/P.   This is in the setting of ongoing complicated course of hospice management and multiple myeloma treatment plan.  He has been hospitalized 6 times in the past 8 months for recurrent acute pain flares, falls, sequela of multiple myeloma. There has been a decline in daily function including decreased mobilization, use of walker.  There is no documented unitentional weight loss in recent months.      Past Medical History   I have reviewed this patient's medical history and updated it with pertinent information if needed.   Past Medical History:   Diagnosis Date     Anesthesia complication     states woke up during procedure (endoscopic ultrasound) in June of 2014     Broken rib      Cancer (H)      Gastro-oesophageal reflux disease      Multiple myeloma (H)      Osteoporosis         Past Surgical History   I have reviewed this patient's surgical history and updated it with pertinent information if needed.  Past Surgical History:   Procedure Laterality Date     OPTICAL TRACKING SYSTEM KYPHOPLASTY N/A 12/16/2014    Procedure: OPTICAL TRACKING SYSTEM KYPHOPLASTY;  Surgeon: Abner Vasquez MD;  Location: [facility] OR     ORTHOPEDIC SURGERY      right thumb surgery     RESECT FIRST RIB  8/14/2012    Procedure: RESECT FIRST RIB;  Left First Rib Resection & Scalentectomy;  Surgeon: Keith Tucker MD;  Location: [facility] OR       Prior to Admission Medications   Prior to Admission Medications   Prescriptions Last Dose Informant Patient Reported? Taking?   LORazepam (LORAZEPAM INTENSOL) 2 MG/ML (HIGH CONC) oral solution   Yes Yes   Sig: Take 1 mg by mouth At Bedtime   LORazepam (LORAZEPAM INTENSOL) 2 MG/ML (HIGH CONC) oral solution   Yes Yes   Sig: Take 0.25-0.5 mg by mouth 2 times daily as needed for anxiety   SENNA-docusate sodium (SENNA S) 8.6-50 MG tablet Not Taking at Unknown time  No No   Sig: Take 1 tablet by mouth 2 times daily as needed (constipation)   Patient not taking: Reported on 4/3/2022   acetaminophen (TYLENOL) 500 MG tablet 4/2/2022 at Unknown time  No Yes   Sig: Take 2 tablets (1,000 mg) by mouth every 8 hours as needed for mild pain   gabapentin (NEURONTIN) 600 MG tablet   Yes Yes   Sig: Take 1,200 mg by mouth 3 times daily   haloperidol (HALDOL) 2 MG tablet Not Taking at Unknown time  No No   Sig: Take 1 tablet (2 mg) by mouth 4 times daily   Patient not taking: Reported on 4/3/2022   hydrOXYzine (ATARAX) 25 MG tablet Past Week at Unknown time  No Yes   Sig: Take 1 tablet (25 mg) by mouth every 6 hours   loratadine (CLARITIN) 10 MG tablet More than a month at Unknown time  Yes Yes   Sig: Take 10 mg by mouth daily as needed for allergies   methocarbamol (ROBAXIN) 750 MG tablet Past Month at Unknown time  No Yes   Sig: Take 1 tablet (750 mg) by mouth At Bedtime   methylnaltrexone  (RELISTOR) 12 MG/0.6ML SOLN injection More than a month at Unknown time  Yes Yes   Sig: Inject 12 mg Subcutaneous Every other day as needed   morphine (MS CONTIN) 60 MG 12 hr tablet   Yes Yes   Sig: Take 300 mg by mouth 4 times daily   morphine (MSIR) 15 MG IR tablet   No Yes   Sig: Take 4 tablets (60 mg) by mouth every 3 hours as needed for severe pain   multivitamin w/minerals (THERA-VIT-M) tablet   Yes Yes   Sig: Take 1 tablet by mouth daily   naproxen (NAPROSYN) 500 MG tablet   No Yes   Sig: Take 1 tablet (500 mg) by mouth 2 times daily (with meals)   omeprazole (PRILOSEC OTC) 20 MG EC tablet Not Taking at Unknown time  No No   Sig: Take 1 tablet (20 mg) by mouth daily   Patient not taking: Reported on 4/3/2022   ondansetron (ZOFRAN-ODT) 8 MG ODT tab More than a month at Unknown time  No Yes   Sig: Take 1 tablet (8 mg) by mouth every 8 hours as needed for nausea   polyethylene glycol (MIRALAX) 17 GM/Dose powder 4/2/2022 at Unknown time  No Yes   Sig: Take 17 g by mouth daily   pregabalin (LYRICA) 300 MG capsule 4/2/2022 at Unknown time  No Yes   Sig: Take 1 capsule (300 mg) by mouth 2 times daily      Facility-Administered Medications: None     Allergies   Allergies   Allergen Reactions     Nka [No Known Allergies]        Social History   I have updated and reviewed the following Social History Narrative:   Social History     Social History Narrative     Not on file           Living situation: home with significant other       Support system: significant other       Actual/Potential Caregiver: significant other       Functional status: requires assist with ADLs  History of substance use/abuse: current tobacco use    Family History   I have reviewed this patient's family history and updated it with pertinent information if needed.   Family History   Problem Relation Age of Onset     Unknown/Adopted No family hx of      Family History Negative No family hx of      Asthma No family hx of      C.A.D. No family hx of       Diabetes No family hx of      Hypertension No family hx of      Cerebrovascular Disease No family hx of      Breast Cancer No family hx of      Cancer - colorectal No family hx of      Prostate Cancer No family hx of      Alcohol/Drug No family hx of      Allergies No family hx of      Alzheimer Disease No family hx of      Anesthesia Reaction No family hx of      Arthritis No family hx of      Blood Disease No family hx of      Cancer No family hx of      Cardiovascular No family hx of      Circulatory No family hx of      Congenital Anomalies No family hx of      Connective Tissue Disorder No family hx of      Depression No family hx of      Endocrine Disease No family hx of      Eye Disorder No family hx of      Genetic Disorder No family hx of      Gastrointestinal Disease No family hx of      Genitourinary Problems No family hx of      Gynecology No family hx of      Heart Disease No family hx of      Lipids No family hx of      Musculoskeletal Disorder No family hx of      Neurologic Disorder No family hx of      Obesity No family hx of      Osteoporosis No family hx of      Psychotic Disorder No family hx of      Respiratory No family hx of      Thyroid Disease No family hx of      Hearing Loss No family hx of        Review of Systems   The 10 point Review of Systems is negative other than noted in the HPI or here.     Palliative Symptom Review (0=no symptom/no concern, 1=mild, 2=moderate, 3=severe):      Pain: 3-severe      Fatigue: 2-moderate      Nausea: 0-none      Constipation: 0-none      Diarrhea: 0-none      Depressive Symptoms: 3-severe      Anxiety: 3-severe      Drowsiness: 1-mild      Poor Appetite: 2-moderate      Shortness of Breath: 0-none      Insomnia: 1-mild      Overall (0 good/no concerns, 3 very poor):  2    Physical Exam   Temp:  [96.9  F (36.1  C)-98.4  F (36.9  C)] 97.5  F (36.4  C)  Pulse:  [79-99] 84  Resp:  [8-27] 18  BP: (104-159)/(62-90) 119/62  SpO2:  [92 %-99 %] 93 %  153  lbs 3.2 oz  Exam:  GEN:  Alert, oriented x 3, lying in bed, tearful.  HEENT:  Normocephalic/atraumatic, no scleral icterus, no nasal discharge, mouth moist.  CV:  RRR, S1, S2; no murmurs or other irregularities noted.  +3 DP/PT pulses bilatererally; trace edema BLE.  RESP:  Clear to auscultation bilaterally without rales/rhonchi/wheezing/retractions.  Symmetric chest rise on inhalation noted.  Normal respiratory effort.  ABD:  Rounded, soft, non-tender/non-distended.  +BS  EXT:  Edema & pulses as noted above.  CMS intact x 4.     M/S:   Tender to palpation over right thoracic area.    SKIN:  Dry to touch, no exanthems noted in the visualized areas.    NEURO: No focal deficits.  PAIN BEHAVIOR: Cooperative  Psych:  Tearful affect, closes eyes during conversation, raises voice intermittently during conversation, facial grimace      Data   Results for orders placed or performed during the hospital encounter of 04/03/22 (from the past 24 hour(s))   XR Surgery PATTY L/T 5 Min Fluoro w Stills    Narrative    This exam was marked as non-reportable because it will not be read by a   radiologist or a [place] non-radiologist provider.         Creatinine   Result Value Ref Range    Creatinine 0.40 (L) 0.66 - 1.25 mg/dL    GFR Estimate >90 >60 mL/min/1.73m2   XR Chest Port 1 View    Narrative    EXAM: XR CHEST PORT 1 VIEW  LOCATION: [facility]  DATE/TIME: 4/4/2022 5:56 AM    INDICATION: Trauma Patient with Rib Fracture(s).  COMPARISON: 4/3/2022.      Impression    IMPRESSION: Normal heart size and pulmonary vascularity. No acute-appearing infiltrates or consolidation. Minimally displaced right upper posterior rib fractures seen including the second, third, and fourth ribs. Mildly displaced left third posterior rib   fracture. Other known fracture is not as well-seen on this study. No pneumothorax.               Basic metabolic panel   Result Value Ref Range    Sodium 137 133 - 144 mmol/L    Potassium 3.9  3.4 - 5.3 mmol/L    Chloride 109 94 - 109 mmol/L    Carbon Dioxide (CO2) 24 20 - 32 mmol/L    Anion Gap 4 3 - 14 mmol/L    Urea Nitrogen 8 7 - 30 mg/dL    Creatinine 0.41 (L) 0.66 - 1.25 mg/dL    Calcium 8.8 8.5 - 10.1 mg/dL    Glucose 126 (H) 70 - 99 mg/dL    GFR Estimate >90 >60 mL/min/1.73m2   Magnesium   Result Value Ref Range    Magnesium 1.6 1.6 - 2.3 mg/dL   Phosphorus   Result Value Ref Range    Phosphorus 3.5 2.5 - 4.5 mg/dL   CBC with platelets   Result Value Ref Range    WBC Count 9.0 4.0 - 11.0 10e3/uL    RBC Count 2.42 (L) 4.40 - 5.90 10e6/uL    Hemoglobin 8.0 (L) 13.3 - 17.7 g/dL    Hematocrit 24.3 (L) 40.0 - 53.0 %     78 - 100 fL    MCH 33.1 (H) 26.5 - 33.0 pg    MCHC 32.9 31.5 - 36.5 g/dL    RDW 13.2 10.0 - 15.0 %    Platelet Count 195 150 - 450 10e3/uL

## 2022-04-04 NOTE — PROGRESS NOTES
"Pt seen for NIF/Flutter valve instruct today.     As RT was exiting room. Pt asked if he could ask a question regarding significant other. States he is worried about her and she is \"not doing well, especially today\" wondering if any resources can go out to see her. He states \"I wanted her to come in to the ER with me the night I came and she wouldn't.\"    Writer advised that he can always call the local police department to do a welfare check if he is concerned. Pt responded with anxiety/frustration stating \"I don't need the  coming with their guns shooting my girlfriend\" and also stated \" I also have a gun in the house\". Attempted to deescalate conversation, pt difficult to redirect. Patient speaking in calm matter but will continuously talk/spiral on a conversation. Writer apologized that I was unable to provide any other information at the time but if any information were provided I would pass along.     Pt likely needs SW follow-up to assess home-life and support system. Would recommend SW consult. Pt being seen by palliative.     Medina Benítez, LRT, BSRT-RRT            "

## 2022-04-04 NOTE — PROGRESS NOTES
Orthopedic Surgery  Beto RODRIGUEZ Peggymichael  2022  Admit Date:  4/3/2022  POD 1 Day Post-Op  S/P Procedure(s):  Surgical treatment of left pathologic subtrochanteric femur fracture with cephalomedullary device    Patient resting comfortably in bed.    Pain controlled.  Tolerating oral intake.    Denies nausea or vomiting  Denies chest pain or shortness of breath  Tearful today throughout todays visit. Doesn't feel taken care of. Feels that he has not had proper treatment. Has been on Hospice x8 years. Patient states that this is the only way his pain was treated was to go on hospice. Wanting Zomeda injection/infusion for osteoporosis. Explained to patient that the osteopenia did not cause his fracture and his Myeloma was the cause.    Alert and orient to person, place, and time.  Vital Sign Ranges  Temperature Temp  Av.3  F (36.3  C)  Min: 96.9  F (36.1  C)  Max: 97.6  F (36.4  C)   Blood pressure Systolic (24hrs), Av , Min:104 , Max:120        Diastolic (24hrs), Av, Min:62, Max:79      Pulse Pulse  Av.1  Min: 80  Max: 99   Respirations Resp  Avg: 15.2  Min: 8  Max: 27   Pulse oximetry SpO2  Av.6 %  Min: 93 %  Max: 99 %       Dressing is clean, dry, and intact.   Minimal erythema of the surrounding skin.   Moderate posterior bruising with TTP  Bilateral calves are soft, non-tender.  Bilateral lower extremity is NVI.  Sensation intact bilateral lower extremities  5/5 motor with resisted dorsi and plantar flexion bilaterally  +Dp pulse      Labs:  Recent Labs   Lab Test 22  0739 227 22  0715   POTASSIUM 3.9 3.1* 4.5     Recent Labs   Lab Test 22  0739 22  0147 22  0715   HGB 8.0* 11.7* 7.5*     Recent Labs   Lab Test 14  0000   INR 1.0     Recent Labs   Lab Test 22  0739 22  0147 22  0715    237 216       A/P  1. S/p left pathologic subtrochanteric femur fracture   Continue Lovenox for DVT prophylaxis.     Mobilize with  PT/OT    WBAT with walker   Leave dressings intact   XR ordered of all the long bones.     Continue current pain regiment.    Angeles Lemos PA-C

## 2022-04-04 NOTE — PLAN OF CARE
To do:     Pertinent assessments: Patient is AOx4 but very agitated/frustrated/tearful during duration of shift. Complains of chronic pain in addition to acute pain related to rib/hip fx. PRN and scheduled medications given with slight relief. Hypoactive bowel sounds, flat appearance in abdomen. Numbness and tingling in all extremities related to neuropathy. Diminished but clear lung sounds in all fields. Incision on left hip covered with dressing that has drainage marked. Encouraging activity but patient refused.     Major shift events: Spiritual health consult. Pain team intervention. Skeletal scan. Patient stating that he wants to leave, does not feel safe or appreciated here. MD and Nurse manager updated appropriately.     Treatment plan: Continue with pain management. Monitor incision. PT. Spiritual health support.     Bedside nurse: Angeles Lewis RN

## 2022-04-04 NOTE — PROVIDER NOTIFICATION
DATE:  4/4/2022   TIME OF RECEIPT FROM LAB:  0815  LAB TEST:  Hgb  LAB VALUE:  8.0. Critical drop from yesterdays value of 11.7  RESULTS GIVEN WITH READ-BACK TO: Dr. Martinez  TIME LAB VALUE REPORTED TO PROVIDER:   0834

## 2022-04-04 NOTE — PROGRESS NOTES
"Pt educated on need to be on pulse oximetry with use of po narcotic for pain.Pt refused to have pulse oximeter, stated\"this is less than my normal dose of pain medication, I don't need that.'  Pt educated on need to mobilize with assist of staff to prevent post op complications, refused to be moved.Pcd;s applied to legs, pt ok with that.    6.15 pm.  Web paged Md ,concerning pt complaint of\" ringing in right ear\".  Medicated with morphine sulphate tabs prn, scheduled ms contin and vistaril for pain with relief.  "

## 2022-04-04 NOTE — PLAN OF CARE
A&Ox4. Calm; no behavioral episodes. Fluids infusing. Drsgs x5 have scant moist drainage. Ice on hip. Baseline numbness and tinlging in hands and feet. Hypoactive bowel sounds; not passing gas.   Patient vital signs are at baseline: Yes  Patient able to ambulate as they were prior to admission or with assist devices provided by therapies during their stay:  No,  Reason:  Will work with PT this AM.   Patient MUST void prior to discharge:  Yes, voiding in bedside urinal.   Patient able to tolerate oral intake:  Yes, tolerating a regular diet.   Pain has adequate pain control using Oral analgesics:  Yes, taking scheduled pain meds, see MAR. PRN 60mg morphine also given.   Does patient have an identified :  No,  Reason:  Pt will work with PT today.   Has goal D/C date and time been discussed with patient:  No,  Reason:  Pt will work with PT today.

## 2022-04-04 NOTE — PROGRESS NOTES
SPIRITUAL HEALTH SERVICES Progress Note  [facility]    Responded to a [facility] consult; staff requesting emotional support for pt.  Pt was not available today.  Consulted pt's RN about best time to see him.    Plan: Will follow up with pt tomorrow 4/5/2022 to orient him to [facility] and assess needs.    Kurtis Mayberry M.Div., [facility]  Staff   Phone [phone]

## 2022-04-04 NOTE — PROGRESS NOTES
Pt was very agitated and did not want pulse ox on. Proceeded to be uncooperative and raising voice. I was unable to deescalate despite trying three times. Pt does not want Respiratory Therapy today. RT to follow up this evening to see if pt would like to start NIF / Flutter / FVC, as ordered.    Kasia Harris, RT on 4/4/2022 at 9:00 AM

## 2022-04-04 NOTE — PROGRESS NOTES
[facility]    Medicine Progress Note - Hospitalist Service    Date of Admission:  4/3/2022    Assessment & Plan          Beto Tran is a 46 year old male with a history of advanced multiple myeloma previously on hospice, active nicotine dependence with cigarettes, chronic pain with opiate dependence, anxiety, history of thoracic outlet syndrome, anemia of chronic disease who was admitted with a new left femur fracture, right lateral iliac wing fracture, multiple rib fractures after twisting while transferring from a wheelchair.  He has been seen by her trauma surgery team and orthopedic surgery team.    1.  Left femur fracture.  Occurred during traumatic fall as noted above.  Seen in consultation by orthopedic surgery.  Went to the operating room for femur fracture repair with cephalomedullary device on 4/3.  -Orthopedic surgery following.  -Pain medications as needed.  -Very complex pain history.  -Pain team following.  -Use incentive spirometry.  -Work with therapy.    2.  Right iliac wing and multiple right rib fractures.  Occurred during traumatic fall as noted above.  Seen in consultation by trauma surgery team.  Is also been seen in consultation by orthopedic surgery.  -Pain medications as needed.  -Pain team following.  -Further imaging today per orthopedic surgery.    3.  Multiple myeloma.  Previous autologous stem cell transplant.  Follows with oncology team at [facility].  -Oncology consult.    4.  Acute blood loss on chronic anemia.  Acute blood loss due to trauma and surgery.  Does have underlying history of multiple myeloma as noted above.  Hemoglobin dropped to 8 today.  -Recheck CBC tomorrow.  -Oncology consult.    5.  Tobacco use disorder.  -Continue nicotine patch.  -Add additional nicotine gum if needed.    6.  Chronic opiate related constipation.  -Continue methylnaltrexone injections every other day.    7.  Hypokalemia.  Potassium 3.1 at time of admission.  -Start  potassium replacement protocol.    8.  Severe frustration.  He does have very significant underlying medical problems that he does not feel are being well managed by his care team in the outpatient setting.  He is very frustrated that he has had recurrent fractures requiring hospital stay.  -I did offer psychiatry consult.  He is not interested at this time.  -He did feel that seeing the oncologist might be as helpful as anything.  -Oncology consult placed.  -He does have lorazepam available if needed.     Diet: Advance Diet as Tolerated: Regular Diet Adult    DVT Prophylaxis: Enoxaparin (Lovenox) SQ  Tavares Catheter: Not present  Central Lines: None  Cardiac Monitoring: None  Code Status: No CPR- Do NOT Intubate        Tigre Martinez,   Hospitalist Service  [facility]  Securely message with the Vocera Web Console (learn more here)  Text page via [employer] Paging/Directory         Clinically Significant Risk Factors Present on Admission                 ______________________________________________________________________    Interval History   Having diffuse body pains.  Worst pain in the left hip area.  Does feel the pain medications are keeping these pains tolerable.  Denies shortness of breath, fevers, chills, nausea, or vomiting.  He does express significant frustration at his underlying health problems and having recurrent fractures causing him to be in the hospital at this time.    Data reviewed today: I reviewed all medications, new labs and imaging results over the last 24 hours. I personally reviewed no images or EKG's today.    Physical Exam   Vital Signs: Temp: 97.5  F (36.4  C) Temp src: Temporal BP: 119/62 Pulse: 84   Resp: 18 SpO2: 93 % O2 Device: None (Room air) Oxygen Delivery: 2 LPM  Weight: 153 lbs 3.2 oz  Gen:  NAD, A&Ox3.  Frustrated.  Eyes:  PERRL, sclera anicteric.  OP:  MMM, no lesions.  Neck:  Supple.  CV:  Regular, no murmurs.  Lung:  CTA b/l, normal effort.  Ab:   +BS, soft.  Skin:  Warm, dry to touch.  No rash.      Data   Recent Labs   Lab 04/04/22  0739 04/03/22  2214 04/03/22  0147   WBC 9.0  --  5.6   HGB 8.0*  --  11.7*     --  103*     --  237     --  137   POTASSIUM 3.9  --  3.1*   CHLORIDE 109  --  106   CO2 24  --  29   BUN 8  --  6*   CR 0.41* 0.40* 0.41*   ANIONGAP 4  --  2*   JOSE 8.8  --  10.0   *  --  106*   ALBUMIN  --   --  3.4   PROTTOTAL  --   --  6.0*   BILITOTAL  --   --  0.2   ALKPHOS  --   --  215*   ALT  --   --  38   AST  --   --  40

## 2022-04-04 NOTE — PROGRESS NOTES
[facility]    Trauma Progress Note          Assessment and Plan:   Assessment:    Beto Tran is a 46 year old male admitted after fall when transferring himself from bed to wheelchair  Traumatic injuries by imaging:       - mildly displaced acute right-sided rib fractures present including the right 1st through 3rd ribs, as well as a few mid right posterior lateral rib including the second, third, and fourth ribs. No pneumothorax       - fracture of proximal subtrochanteric left femur with mild displacement. Suggestion of a fracture of the right lateral iliac wing just above the anterior-superior iliac spine - management by Orthopedics  History of multiple myeloma  and peripheral neuropathy   Chronic pain due to above, on high dose narcotics - Palliative care consult         Plan:   Pain mgmt: Palliative care consult.   Diet: regular per Ortho  IVFs: NS  No other injuries identified, no acute surgical issues  Discussed with Dr Martinez, Hospitalist, who accepts transfer of care.  Trauma surgery will sign off.          Interval History:   Pt sitting up in bed.  Tearful, upset about recent events and outlook for future.  Mainly complains of left pain, some right rib pain.  Eating breakfast but stated nothing tasted good.          Physical Exam:   Temp: 97.5  F (36.4  C) Temp src: Temporal BP: 119/62 Pulse: 84   Resp: 18   SpO2: 93 % O2 Device: None (Room air) Oxygen Delivery: 2 LPM      I/O last 3 completed shifts:  In: 1100 [I.V.:1100]  Out: 1225 [Urine:1225]    Constitutional: alert, no distress and tearful             Data:   Data   Recent Labs   Lab 04/04/22  0739 04/03/22  2214 04/03/22  0147   WBC 9.0  --  5.6   HGB 8.0*  --  11.7*     --  103*     --  237     --  137   POTASSIUM 3.9  --  3.1*   CHLORIDE 109  --  106   CO2 24  --  29   BUN 8  --  6*   CR 0.41* 0.40* 0.41*   ANIONGAP 4  --  2*   JOSE 8.8  --  10.0   *  --  106*   ALBUMIN  --   --  3.4    PROTTOTAL  --   --  6.0*   BILITOTAL  --   --  0.2   ALKPHOS  --   --  215*   ALT  --   --  38   AST  --   --  40        Lisa Kellogg PA-C     Seen and agree,    Phil Fishman MD  [facility]

## 2022-04-04 NOTE — PROVIDER NOTIFICATION
04/04/22 1615   Negative Inspiratory Force (NIF)   Negative Inspiratory Force (cm H2O) -40   Effort (NIF) good   Patient Position (NIF) semi-Aguilar's   Negative Inspiratory Force (DOCT ONLY) Yes       Pt education on flutter valve as well as education on deep breathing and focusing on rebuilding strength for breathing muscles.   Likely can discontinue the NIF orders as pt was able to get over -40 with hardly any effort.     Medina Benítez, LRT, BSRT-RRT

## 2022-04-04 NOTE — ANESTHESIA POSTPROCEDURE EVALUATION
Patient: Beto Tran    Procedure: Procedure(s):  OPEN REDUCTION INTERNAL FIXATION, FRACTURE, FEMUR, USING INTRAMEDULLARY JAIME AND FRACTURE TABLE       Anesthesia Type:  General    Note:  Disposition: Inpatient   Postop Pain Control: Uneventful            Sign Out: Well controlled pain   PONV: No   Neuro/Psych: Uneventful            Sign Out: Acceptable/Baseline neuro status   Airway/Respiratory: Uneventful            Sign Out: Acceptable/Baseline resp. status   CV/Hemodynamics: Uneventful            Sign Out: Acceptable CV status   Other NRE: NONE   DID A NON-ROUTINE EVENT OCCUR? No           Last vitals:  Vitals Value Taken Time   /78 04/03/22 2050   Temp     Pulse 97 04/03/22 2056   Resp 12 04/03/22 2056   SpO2 100 % 04/03/22 2042   Vitals shown include unvalidated device data.    Electronically Signed By: Franny Dejesus MD  April 3, 2022  8:57 PM

## 2022-04-05 ENCOUNTER — APPOINTMENT (OUTPATIENT)
Dept: GENERAL RADIOLOGY | Facility: CLINIC | Age: 47
DRG: 480 | End: 2022-04-05
Attending: ORTHOPAEDIC SURGERY
Payer: MEDICARE

## 2022-04-05 ENCOUNTER — APPOINTMENT (OUTPATIENT)
Dept: PHYSICAL THERAPY | Facility: CLINIC | Age: 47
DRG: 480 | End: 2022-04-05
Attending: ORTHOPAEDIC SURGERY
Payer: MEDICARE

## 2022-04-05 LAB
ANION GAP SERPL CALCULATED.3IONS-SCNC: 2 MMOL/L (ref 3–14)
BLD PROD TYP BPU: NORMAL
BLOOD COMPONENT TYPE: NORMAL
BUN SERPL-MCNC: 13 MG/DL (ref 7–30)
CALCIUM SERPL-MCNC: 9 MG/DL (ref 8.5–10.1)
CHLORIDE BLD-SCNC: 113 MMOL/L (ref 94–109)
CO2 SERPL-SCNC: 25 MMOL/L (ref 20–32)
CODING SYSTEM: NORMAL
CREAT SERPL-MCNC: 0.54 MG/DL (ref 0.66–1.25)
CROSSMATCH: NORMAL
ERYTHROCYTE [DISTWIDTH] IN BLOOD BY AUTOMATED COUNT: 13.8 % (ref 10–15)
GFR SERPL CREATININE-BSD FRML MDRD: >90 ML/MIN/1.73M2
GLUCOSE BLD-MCNC: 102 MG/DL (ref 70–99)
HCT VFR BLD AUTO: 20.6 % (ref 40–53)
HGB BLD-MCNC: 6.8 G/DL (ref 13.3–17.7)
ISSUE DATE AND TIME: NORMAL
MAGNESIUM SERPL-MCNC: 1.6 MG/DL (ref 1.6–2.3)
MCH RBC QN AUTO: 33.8 PG (ref 26.5–33)
MCHC RBC AUTO-ENTMCNC: 33 G/DL (ref 31.5–36.5)
MCV RBC AUTO: 103 FL (ref 78–100)
PHOSPHATE SERPL-MCNC: 2 MG/DL (ref 2.5–4.5)
PLATELET # BLD AUTO: 179 10E3/UL (ref 150–450)
POTASSIUM BLD-SCNC: 3.6 MMOL/L (ref 3.4–5.3)
RBC # BLD AUTO: 2.01 10E6/UL (ref 4.4–5.9)
SODIUM SERPL-SCNC: 140 MMOL/L (ref 133–144)
UNIT ABO/RH: NORMAL
UNIT NUMBER: NORMAL
UNIT STATUS: NORMAL
UNIT TYPE ISBT: 5100
WBC # BLD AUTO: 7.1 10E3/UL (ref 4–11)

## 2022-04-05 PROCEDURE — 250N000013 HC RX MED GY IP 250 OP 250 PS 637: Performed by: INTERNAL MEDICINE

## 2022-04-05 PROCEDURE — 250N000013 HC RX MED GY IP 250 OP 250 PS 637: Performed by: ORTHOPAEDIC SURGERY

## 2022-04-05 PROCEDURE — 83735 ASSAY OF MAGNESIUM: CPT | Performed by: ORTHOPAEDIC SURGERY

## 2022-04-05 PROCEDURE — P9040 RBC LEUKOREDUCED IRRADIATED: HCPCS | Performed by: INTERNAL MEDICINE

## 2022-04-05 PROCEDURE — 71045 X-RAY EXAM CHEST 1 VIEW: CPT

## 2022-04-05 PROCEDURE — 84100 ASSAY OF PHOSPHORUS: CPT | Performed by: INTERNAL MEDICINE

## 2022-04-05 PROCEDURE — 120N000001 HC R&B MED SURG/OB

## 2022-04-05 PROCEDURE — 97110 THERAPEUTIC EXERCISES: CPT | Mod: GP | Performed by: PHYSICAL THERAPIST

## 2022-04-05 PROCEDURE — 85027 COMPLETE CBC AUTOMATED: CPT | Performed by: INTERNAL MEDICINE

## 2022-04-05 PROCEDURE — 99233 SBSQ HOSP IP/OBS HIGH 50: CPT | Performed by: INTERNAL MEDICINE

## 2022-04-05 PROCEDURE — 250N000013 HC RX MED GY IP 250 OP 250 PS 637: Performed by: NURSE PRACTITIONER

## 2022-04-05 PROCEDURE — 36415 COLL VENOUS BLD VENIPUNCTURE: CPT | Performed by: INTERNAL MEDICINE

## 2022-04-05 PROCEDURE — 99221 1ST HOSP IP/OBS SF/LOW 40: CPT | Performed by: PHYSICIAN ASSISTANT

## 2022-04-05 PROCEDURE — 97162 PT EVAL MOD COMPLEX 30 MIN: CPT | Mod: GP | Performed by: PHYSICAL THERAPIST

## 2022-04-05 PROCEDURE — 80048 BASIC METABOLIC PNL TOTAL CA: CPT | Performed by: INTERNAL MEDICINE

## 2022-04-05 PROCEDURE — 84100 ASSAY OF PHOSPHORUS: CPT | Performed by: ORTHOPAEDIC SURGERY

## 2022-04-05 PROCEDURE — 250N000011 HC RX IP 250 OP 636: Performed by: ORTHOPAEDIC SURGERY

## 2022-04-05 RX ORDER — HYDROXYZINE HYDROCHLORIDE 50 MG/1
50 TABLET, FILM COATED ORAL EVERY 6 HOURS
Qty: 60 TABLET | Refills: 0 | Status: SHIPPED | OUTPATIENT
Start: 2022-04-05

## 2022-04-05 RX ORDER — METHOCARBAMOL 750 MG/1
750 TABLET, FILM COATED ORAL 2 TIMES DAILY
Qty: 40 TABLET | Refills: 0 | Status: SHIPPED | OUTPATIENT
Start: 2022-04-05

## 2022-04-05 RX ADMIN — HYDROXYZINE HYDROCHLORIDE 50 MG: 50 TABLET, FILM COATED ORAL at 22:56

## 2022-04-05 RX ADMIN — PREGABALIN 300 MG: 300 CAPSULE ORAL at 09:07

## 2022-04-05 RX ADMIN — MULTIPLE VITAMINS W/ MINERALS TAB 1 TABLET: TAB at 09:08

## 2022-04-05 RX ADMIN — GABAPENTIN 1200 MG: 600 TABLET, FILM COATED ORAL at 20:25

## 2022-04-05 RX ADMIN — MORPHINE SULFATE 60 MG: 15 TABLET ORAL at 14:05

## 2022-04-05 RX ADMIN — NICOTINE 1 PATCH: 21 PATCH, EXTENDED RELEASE TRANSDERMAL at 09:08

## 2022-04-05 RX ADMIN — POTASSIUM & SODIUM PHOSPHATES POWDER PACK 280-160-250 MG 1 PACKET: 280-160-250 PACK at 20:25

## 2022-04-05 RX ADMIN — ACETAMINOPHEN 975 MG: 325 TABLET, FILM COATED ORAL at 05:06

## 2022-04-05 RX ADMIN — GABAPENTIN 1200 MG: 600 TABLET, FILM COATED ORAL at 14:05

## 2022-04-05 RX ADMIN — ACETAMINOPHEN 975 MG: 325 TABLET, FILM COATED ORAL at 22:12

## 2022-04-05 RX ADMIN — METHOCARBAMOL 750 MG: 750 TABLET ORAL at 09:08

## 2022-04-05 RX ADMIN — PREGABALIN 300 MG: 300 CAPSULE ORAL at 20:25

## 2022-04-05 RX ADMIN — MORPHINE SULFATE 300 MG: 30 TABLET, FILM COATED, EXTENDED RELEASE ORAL at 05:04

## 2022-04-05 RX ADMIN — NAPROXEN 500 MG: 250 TABLET ORAL at 09:08

## 2022-04-05 RX ADMIN — NAPROXEN 500 MG: 250 TABLET ORAL at 17:03

## 2022-04-05 RX ADMIN — ENOXAPARIN SODIUM 40 MG: 40 INJECTION SUBCUTANEOUS at 10:54

## 2022-04-05 RX ADMIN — POLYETHYLENE GLYCOL 3350 17 G: 17 POWDER, FOR SOLUTION ORAL at 09:14

## 2022-04-05 RX ADMIN — MORPHINE SULFATE 300 MG: 30 TABLET, FILM COATED, EXTENDED RELEASE ORAL at 10:57

## 2022-04-05 RX ADMIN — METHOCARBAMOL 750 MG: 750 TABLET ORAL at 20:25

## 2022-04-05 RX ADMIN — HYDROXYZINE HYDROCHLORIDE 50 MG: 50 TABLET, FILM COATED ORAL at 17:03

## 2022-04-05 RX ADMIN — GABAPENTIN 1200 MG: 600 TABLET, FILM COATED ORAL at 09:08

## 2022-04-05 RX ADMIN — LORAZEPAM 1 MG: 2 LIQUID ORAL at 22:12

## 2022-04-05 RX ADMIN — MORPHINE SULFATE 60 MG: 15 TABLET ORAL at 20:44

## 2022-04-05 RX ADMIN — METHOCARBAMOL 750 MG: 750 TABLET ORAL at 15:36

## 2022-04-05 RX ADMIN — LORAZEPAM 0.5 MG: 2 LIQUID ORAL at 20:44

## 2022-04-05 RX ADMIN — LORAZEPAM 0.5 MG: 2 LIQUID ORAL at 11:08

## 2022-04-05 RX ADMIN — MORPHINE SULFATE 300 MG: 30 TABLET, FILM COATED, EXTENDED RELEASE ORAL at 17:03

## 2022-04-05 RX ADMIN — HYDROXYZINE HYDROCHLORIDE 50 MG: 50 TABLET, FILM COATED ORAL at 11:09

## 2022-04-05 RX ADMIN — ACETAMINOPHEN 975 MG: 325 TABLET, FILM COATED ORAL at 14:05

## 2022-04-05 RX ADMIN — POTASSIUM & SODIUM PHOSPHATES POWDER PACK 280-160-250 MG 1 PACKET: 280-160-250 PACK at 14:06

## 2022-04-05 RX ADMIN — MORPHINE SULFATE 300 MG: 30 TABLET, FILM COATED, EXTENDED RELEASE ORAL at 22:56

## 2022-04-05 RX ADMIN — HYDROXYZINE HYDROCHLORIDE 50 MG: 50 TABLET, FILM COATED ORAL at 05:05

## 2022-04-05 RX ADMIN — POTASSIUM & SODIUM PHOSPHATES POWDER PACK 280-160-250 MG 1 PACKET: 280-160-250 PACK at 09:07

## 2022-04-05 ASSESSMENT — ACTIVITIES OF DAILY LIVING (ADL)
ADLS_ACUITY_SCORE: 20

## 2022-04-05 NOTE — CONSULTS
"Care Management Note    Length of Stay (days): 2    Expected Discharge Date: 04/06/2022     Concerns to be Addressed:  discharge planning     Patient plan of care discussed at interdisciplinary rounds: Yes    Anticipated Discharge Disposition:  tbd     Anticipated Discharge Services:    Anticipated Discharge DME:      Patient/family educated on Medicare website which has current facility and service quality ratings:    Education Provided on the Discharge Plan:    Patient/Family in Agreement with the Plan:      Referrals Placed by CM/SW:    Private pay costs discussed: Not applicable    Additional Information:  Reviewed pt's status and discussed in rounds.     Pt admitted with \"left hip pain and right chest pain after fall\" (per H&P), noted to have unplanned readmission risk of 25%. Pt went to the OR on 4/3 for surgical treatment of left pathologic subtrochanteric femur fracture. Appears pt may need to go back to the OR for additional surgery.     Social work is following and will assess for discharge needs when medically appropriate.      CARLOS Valentine, LSW  Inpatient Care Coordination  [facility], [facility]  [phone]    CARLOS Gómez        "

## 2022-04-05 NOTE — PLAN OF CARE
"Pt received prn ativan for anxiety, and scheduled pain meds. Writer spoke with pt. In length about issues he's facing physically, financially, emotionally, overwhelming for pt. May benefit from SW consult. Anger seems to be stemming from feelings of helplessness and fear. Pt calm/cooperative this shift and sleeping comfortably. Will continue to monitor.  Vitals: /70 (BP Location: Right arm, Patient Position: Semi-Aguilar's, Cuff Size: Adult Regular)   Pulse 95   Temp 97.2  F (36.2  C) (Temporal)   Resp 16   Ht 1.753 m (5' 9\")   Wt 69.5 kg (153 lb 3.2 oz)   SpO2 94%   BMI 22.62 kg/m    BMI= Body mass index is 22.62 kg/m .                        "

## 2022-04-05 NOTE — PROGRESS NOTES
Orthopedic Surgery  Beto Tran  2022  Admit Date:  4/3/2022  POD 2 Days Post-Op  S/P Procedure(s):  Surgical treatment of left pathologic subtrochanteric femur fracture with cephalomedullary device    Patient resting comfortably in bed.    Pain controlled.  Tolerating oral intake.    Denies nausea or vomiting  Denies chest pain or shortness of breath  No events overnight.     Alert and orient to person, place, and time.  Vital Sign Ranges  Temperature Temp  Av.4  F (36.3  C)  Min: 97.2  F (36.2  C)  Max: 97.9  F (36.6  C)   Blood pressure Systolic (24hrs), Av , Min:95 , Max:125        Diastolic (24hrs), Av, Min:64, Max:70      Pulse Pulse  Av  Min: 89  Max: 100   Respirations Resp  Av  Min: 14  Max: 18   Pulse oximetry SpO2  Av.3 %  Min: 93 %  Max: 94 %       Dressing is clean, dry, and intact.   Minimal erythema of the surrounding skin.   Bilateral calves are soft, non-tender.  Bilateral lower extremity is NVI.  Sensation intact bilateral lower extremities  5/5 motor with resisted dorsi and plantar flexion bilaterally  +Dp pulse    XR of right femur shows diffuse metastasis with impending femur fracture  XR of left and right humerus is the same    Labs:  Recent Labs   Lab Test 22  0646 22  0739 22  0147   POTASSIUM 3.6 3.9 3.1*     Recent Labs   Lab Test 22  0646 22  0739 22  0147   HGB 6.8* 8.0* 11.7*     Recent Labs   Lab Test 14  0000   INR 1.0     Recent Labs   Lab Test 22  0646 22  0739 22  0147    195 237       A/P  1. S/p left intertrochanteric femur fracture, pathologic  Impending fracture right femur, left and right humerus, diffuse spinal mets   Hold Lovenox for DVT prophylaxis for surgery tomorrow    Bed rest but may pivot transfer using left LE for commode, or lisa lift   Leave left hip dressing intact   NPO after midnight   Pain medication as needed   Neurosurgery consult for mobilization, ?  Brace etc for post-operative therapy recommendations with his spine mets    Angeles Lemos PA-C

## 2022-04-05 NOTE — PROGRESS NOTES
[facility]    Medicine Progress Note - Hospitalist Service    Date of Admission:  4/3/2022    Assessment & Plan          Beto Tran is a 46 year old male with a history of advanced multiple myeloma previously on hospice, active nicotine dependence with cigarettes, chronic pain with opiate dependence, anxiety, history of thoracic outlet syndrome, anemia of chronic disease who was admitted with a new left femur fracture, right lateral iliac wing fracture, multiple rib fractures after twisting while transferring from a wheelchair.  He has been seen by her trauma surgery team and orthopedic surgery team.     1.  Left femur fracture.  Occurred during traumatic fall as noted above.  Seen in consultation by orthopedic surgery.  Went to the operating room for femur fracture repair with cephalomedullary device on 4/3.  -Orthopedic surgery following.  -Pain medications as needed.  -Very complex pain history.  -Pain team following.  -Use incentive spirometry.  -Work with therapy.     2.  Right iliac wing and multiple right rib fractures.  Occurred during traumatic fall as noted above.  Seen in consultation by trauma surgery team.  Also seen in consultation by orthopedic surgery.  -Pain medications as needed.  -Pain team following.  -Orthopedic surgery continues to follow.     3.  Multiple myeloma.  Previous autologous stem cell transplant.  Follows with oncology team at [facility].  -Oncology consult appreciated.  -He did have a dose of zoledronic acid 4 mg IV once on 4/4.     4.  Acute blood loss on chronic anemia.  Acute blood loss due to trauma and surgery.  Does have underlying history of multiple myeloma as noted above.  Hemoglobin dropped to 6.8 today.  -Transfuse 1 unit packed red blood cells.    -Recheck CBC tomorrow.  -Oncology consult appreciated.     5.  Tobacco use disorder.  -Continue nicotine patch.  -Add additional nicotine gum if needed.     6.  Chronic opiate related  constipation.  -Continue methylnaltrexone injections every other day.     7.  Hypokalemia.  Potassium 3.1 at time of admission.  -Start potassium replacement protocol.     8.   Depression.  He does have very significant underlying medical problems that he does not feel are being well managed by his care team in the outpatient setting.  He is very frustrated that he has had recurrent fractures requiring hospital stay.  -I did offer psychiatry consult again on 4/5.  He remains not interested at this time.  -He does have lorazepam available if needed.    9.  Hypophosphatemia.  Phosphorus 2 today.  -Phosphorus replacement protocol.           Diet: Advance Diet as Tolerated: Regular Diet Adult  Diet  NPO per Anesthesia Guidelines for Procedure/Surgery Except for: Meds    DVT Prophylaxis: Pneumatic Compression Devices  Tavares Catheter: Not present  Central Lines: None  Cardiac Monitoring: None  Code Status: No CPR- Do NOT Intubate          Tigre Martinez,   Hospitalist Service  [facility]  Securely message with the Vocera Web Console (learn more here)  Text page via [employer] Paging/Directory         Clinically Significant Risk Factors Present on Admission                 ______________________________________________________________________    Interval History   Having multiple areas of pain.  Does feel the pain medications to at least keep the area as tolerable.  Occasional nausea.  Denies shortness of breath, fevers, chills, or diarrhea.    Data reviewed today: I reviewed all medications, new labs and imaging results over the last 24 hours.    Physical Exam   Vital Signs: Temp: 97.9  F (36.6  C) Temp src: Temporal BP: 114/64 Pulse: 89   Resp: 16 SpO2: 93 % O2 Device: None (Room air)    Weight: 153 lbs 3.2 oz  Gen:  NAD, A&Ox3.  Eyes:  PERRL, sclera anicteric.  OP:  MMM, no lesions.  Neck:  Supple.  CV:  Regular, no murmurs.  Lung:  CTA b/l, normal effort.  Ab:  +BS, soft.  Skin:  Warm, dry to  touch.  No rash.  Ext:  No pitting edema LE b/l.      Data   Recent Labs   Lab 04/05/22  0646 04/04/22  0739 04/03/22  2214 04/03/22  0147   WBC 7.1 9.0  --  5.6   HGB 6.8* 8.0*  --  11.7*   * 100  --  103*    195  --  237    137  --  137   POTASSIUM 3.6 3.9  --  3.1*   CHLORIDE 113* 109  --  106   CO2 25 24  --  29   BUN 13 8  --  6*   CR 0.54* 0.41* 0.40* 0.41*   ANIONGAP 2* 4  --  2*   JOSE 9.0 8.8  --  10.0   * 126*  --  106*   ALBUMIN  --   --   --  3.4   PROTTOTAL  --   --   --  6.0*   BILITOTAL  --   --   --  0.2   ALKPHOS  --   --   --  215*   ALT  --   --   --  38   AST  --   --   --  40

## 2022-04-05 NOTE — PLAN OF CARE
Physical Therapy Discharge Summary    Reason for therapy discharge:    Pt discharged from IP PT due to diffuse bony mets in spine and all 4 limbs and high fracture risk with mobility.  Pt to possibly undergo further surgeries to stabilize bones.    Progress towards therapy goal(s). See goals on Care Plan in [facility] electronic health record for goal details.  Goals met    Therapy recommendation(s):    No further therapy recommended at this time until pt more stabilized.    Goal Outcome Evaluation:

## 2022-04-05 NOTE — PROGRESS NOTES
04/05/22 0700   Quick Adds   Type of Visit Initial PT Evaluation   Living Environment   People in Home alone   Current Living Arrangements condominium   Home Accessibility no concerns   Transportation Anticipated family or friend will provide   Living Environment Comments Pt reports living in his mother's condo alone.  No stairs.  Pt later stating that his fiancee lives with him.   Self-Care   Usual Activity Tolerance moderate   Current Activity Tolerance fair   Equipment Currently Used at Home commode chair;hospital bed;shower chair;walker, rolling;wheelchair, manual   Fall history within last six months yes   Number of times patient has fallen within last six months 1   Activity/Exercise/Self-Care Comment Pt states he was able to walk short household distances with a FWW.  Pt was independent with dressing and toileting but received SBA for showering (PCA from hospice).  Bathtub shower with transfer tub bench.   General Information   Onset of Illness/Injury or Date of Surgery 04/03/22   Referring Physician Dr. Tuan Alcantara   Patient/Family Therapy Goals Statement (PT) To return home    Pertinent History of Current Problem (include personal factors and/or comorbidities that impact the POC) Beto Tran is a 46 year old male who presents with a left pathologic femur fracture while transferring. Pt underwent L hip ORIF.  Pt also sustained R rib fxs.  PmHx: multiple myeloma, peripheral neuropathy. and diffuse bone mets B UEs, LEs, pelvis   Existing Precautions/Restrictions no known precautions/restrictions   Weight-Bearing Status - LLE weight-bearing as tolerated   General Observations pt lying in bed upon arrival   Cognition   Orientation Status (Cognition) oriented x 3   Cognitive Status Comments Pt alert and oriented.  Pt expresses concerns about movement and fracturing his R LE with stance.   Pain Assessment   Patient Currently in Pain Yes, see Vital Sign flowsheet  (L hip 3-5/10)   Integumentary/Edema    Integumentary/Edema Comments L hip incision   Range of Motion (ROM)   ROM Comment limited L hip flexion due to pain   Strength (Manual Muscle Testing)   Strength Comments Decreased L LE stregnth.  Pt able to perform L SLR in small range without assist.  Diffuse mms weakness and decondioning due to multiple myeloma and diffuse bone mets, limiting mobility for the past 8 yrs.   Bed Mobility   Comment, (Bed Mobility) not tested due to diffuse bone mets in all 4 extremities, spine and pelvis   Transfers   Comment, (Transfers) not tested due to diffuse bone mets in all 4 extremities, spine and pelvis   Gait/Stairs (Locomotion)   Comment, (Gait/Stairs) not tested due to diffuse bone mets in all 4 extremities, spine and pelvis   Balance   Balance Comments not tested due to diffuse bone mets in all 4 extremities, spine and pelvis   Sensory Examination   Sensory Perception Comments per chart, peripheral neuropathy   Muscle Tone   Muscle Tone no deficits were identified   Clinical Impression   Criteria for Skilled Therapeutic Intervention Other (see comments)   PT Diagnosis (PT) Decreased functional mobility   Influenced by the following impairments diffuse bone mets, pain, generalzed mms weakness and decontioning, impaired mobility   Functional limitations due to impairments unable to mobilize due to diffuse bone mets and high risk for fractures.   Clinical Presentation (PT Evaluation Complexity) Unstable/Unpredictable   Clinical Presentation Rationale complex medical hx with diffuse bone mets   Clinical Decision Making (Complexity) moderate complexity   Planned Therapy Interventions (PT) home exercise program;patient/family education;ROM (range of motion);strengthening   Anticipated Equipment Needs at Discharge (PT)   (pt receives all DME from hospice)   Risk & Benefits of therapy have been explained evaluation/treatment results reviewed;care plan/treatment goals reviewed;risks/benefits reviewed;current/potential barriers  reviewed;participants voiced agreement with care plan;participants included;patient   PT Discharge Planning   PT Discharge Recommendation (DC Rec) Transitional Care Facility;LTACH, pending meets medical criteria   PT Rationale for DC Rec Pt currently unable to mobilize due to diffuse bone mets and high risk for fractures.  Pt to possibly undergo further surgical stabilization.  Depending upon precautions and mobility restrictions following future surgeries pt may be a candidate for TCU or LTAC to progress independence with mobility to enable pt to return home safely.   PT Brief overview of current status bedrest   Total Evaluation Time   Total Evaluation Time (Minutes) 15   Physical Therapy Goals   PT Frequency One time eval and treatment only   PT Predicated Duration/Target Date for Goal Attainment 04/05/22   PT Goals PT Goal 1   PT: Goal 1 Pt will perform gentle LE exercises for ROM and strengthening to promote healing, ciculation and prevent mms atrophy during bedrest

## 2022-04-05 NOTE — CONSULTS
Hematology / Oncology Consultation      Beto Tran MRN# [id number]   YOB: 1975 Age: 46 year old   Date of Admission: 4/3/2022     Reason for consult: Multiple myeloma           Assessment and Plan:   #1 Multiple myeloma  - Very unique case.  Has been on hospice for 7 years, with no myeloma treatment.  He has very extensive lytic bone lesions throughout his body, and essentially has no normal bone left.  Very high risk of fractures with minimal contact.  However, he still has not have renal failure or hypercalcemia related to multiple myeloma.  I suspect some of the anemia could be myeloma related.    - With him being on hospice over the past 7 years, his narcotic dosage has been titrated up to the point it is unsafe to manage without hospice support.  For long acting morphine alone, he is on 1200 mg a day.  - He has been very frustrated with healthcare in general and feels no one is listening to him  - I reviewed with the patient there has been new treatments available for myeloma, but even if he responds to it, it would not improve the extensive lytic bone lesions that he has.    PLAN:  - In my opinion, it would be best for the patient to stay on hospice  - He requested a dose of Zometa, which is a drug we use to help strengthen his bones and help heal the lytic bone lesions.  Without active myeloma treatment, it is unlikely to work.  Also, it would only help if he receives Zometa on a regular basis which he will not be able to after returning to hospice care.  With that said, I think it is not unreasonable to give him a dose of Zometa while he is here.  - He also asked me if our palliative care team could manage his pain meds if he comes off hospice.  I am not very optimistic about this and I also re-iterated that if he decides to pursue any new myeloma treatment, it would not reverse the damage to his bones even if he has a good response.  With that said, I told him I will check with our  palliative care team and get back to him on Wednesday when I return.    Jones Peñaloza M.D.  [facility]  [phone]             Chief Complaint:   Multiple myeloma         History of Present Illness:   This is a 46 year old gentleman with history of multiple myeloma refractory to first 2 lines of treatment and eventually underwent Melphalan conditioning and autologous stem cell transplant in 2015. He did not have a good response to stem cell transplant.  He subsequently enrolled in hospice in 2015, and since then has been with 5 different hospice agencies.  Due to extensive lytic bone lesions, he essentially has no normal bone left.  He is currently hospitalized for left femur fracture after a fall.                Past Medical History:     Past Medical History:   Diagnosis Date     Anesthesia complication     states woke up during procedure (endoscopic ultrasound) in June of 2014     Broken rib      Cancer (H)      Gastro-oesophageal reflux disease      Multiple myeloma (H)      Osteoporosis              Past Surgical History:     Past Surgical History:   Procedure Laterality Date     OPEN REDUCTION INTERNAL FIXATION RODDING INTRAMEDULLAR FEMUR FRACTURE TABLE Left 4/3/2022    Procedure: Surgical treatment of left pathologic subtrochanteric femur fracture with cephalomedullary device;  Surgeon: Tuan Alcantara MD;  Location:  OR     OPTICAL TRACKING SYSTEM KYPHOPLASTY N/A 12/16/2014    Procedure: OPTICAL TRACKING SYSTEM KYPHOPLASTY;  Surgeon: Abner Vasquez MD;  Location:  OR     ORTHOPEDIC SURGERY      right thumb surgery     RESECT FIRST RIB  8/14/2012    Procedure: RESECT FIRST RIB;  Left First Rib Resection & Scalentectomy;  Surgeon: Keith Tucker MD;  Location:  OR               Social History:     Social History     Tobacco Use     Smoking status: Current Some Day Smoker     Packs/day: 0.25     Years: 25.00     Pack years: 6.25     Types: Cigarettes     Last attempt to quit: 2/1/2012      Years since quitting: 10.1     Smokeless tobacco: Never Used     Tobacco comment: 5-7 cigarettes/day    Substance Use Topics     Alcohol use: Yes     Comment: social             Family History:     Family History   Problem Relation Age of Onset     Unknown/Adopted No family hx of      Family History Negative No family hx of      Asthma No family hx of      C.A.D. No family hx of      Diabetes No family hx of      Hypertension No family hx of      Cerebrovascular Disease No family hx of      Breast Cancer No family hx of      Cancer - colorectal No family hx of      Prostate Cancer No family hx of      Alcohol/Drug No family hx of      Allergies No family hx of      Alzheimer Disease No family hx of      Anesthesia Reaction No family hx of      Arthritis No family hx of      Blood Disease No family hx of      Cancer No family hx of      Cardiovascular No family hx of      Circulatory No family hx of      Congenital Anomalies No family hx of      Connective Tissue Disorder No family hx of      Depression No family hx of      Endocrine Disease No family hx of      Eye Disorder No family hx of      Genetic Disorder No family hx of      Gastrointestinal Disease No family hx of      Genitourinary Problems No family hx of      Gynecology No family hx of      Heart Disease No family hx of      Lipids No family hx of      Musculoskeletal Disorder No family hx of      Neurologic Disorder No family hx of      Obesity No family hx of      Osteoporosis No family hx of      Psychotic Disorder No family hx of      Respiratory No family hx of      Thyroid Disease No family hx of      Hearing Loss No family hx of              Medications:     Medications Prior to Admission   Medication Sig Dispense Refill Last Dose     acetaminophen (TYLENOL) 500 MG tablet Take 2 tablets (1,000 mg) by mouth every 8 hours as needed for mild pain 20 tablet 0 4/2/2022 at Unknown time     gabapentin (NEURONTIN) 600 MG tablet Take 1,200 mg by mouth 3  times daily        hydrOXYzine (ATARAX) 25 MG tablet Take 1 tablet (25 mg) by mouth every 6 hours 20 tablet 0 Past Week at Unknown time     loratadine (CLARITIN) 10 MG tablet Take 10 mg by mouth daily as needed for allergies   More than a month at Unknown time     LORazepam (LORAZEPAM INTENSOL) 2 MG/ML (HIGH CONC) oral solution Take 1 mg by mouth At Bedtime        LORazepam (LORAZEPAM INTENSOL) 2 MG/ML (HIGH CONC) oral solution Take 0.25-0.5 mg by mouth 2 times daily as needed for anxiety        methocarbamol (ROBAXIN) 750 MG tablet Take 1 tablet (750 mg) by mouth At Bedtime 20 tablet 0 Past Month at Unknown time     methylnaltrexone (RELISTOR) 12 MG/0.6ML SOLN injection Inject 12 mg Subcutaneous Every other day as needed   More than a month at Unknown time     morphine (MS CONTIN) 60 MG 12 hr tablet Take 300 mg by mouth 4 times daily        morphine (MSIR) 15 MG IR tablet Take 4 tablets (60 mg) by mouth every 3 hours as needed for severe pain 90 tablet 0      multivitamin w/minerals (THERA-VIT-M) tablet Take 1 tablet by mouth daily        naproxen (NAPROSYN) 500 MG tablet Take 1 tablet (500 mg) by mouth 2 times daily (with meals) 10 tablet 0      ondansetron (ZOFRAN-ODT) 8 MG ODT tab Take 1 tablet (8 mg) by mouth every 8 hours as needed for nausea 8 tablet 0 More than a month at Unknown time     polyethylene glycol (MIRALAX) 17 GM/Dose powder Take 17 g by mouth daily 510 g 0 4/2/2022 at Unknown time     pregabalin (LYRICA) 300 MG capsule Take 1 capsule (300 mg) by mouth 2 times daily 15 capsule 0 4/2/2022 at Unknown time     haloperidol (HALDOL) 2 MG tablet Take 1 tablet (2 mg) by mouth 4 times daily (Patient not taking: Reported on 4/3/2022) 6 tablet 0 Not Taking at Unknown time     omeprazole (PRILOSEC OTC) 20 MG EC tablet Take 1 tablet (20 mg) by mouth daily (Patient not taking: Reported on 4/3/2022) 5 tablet 0 Not Taking at Unknown time     SENNA-docusate sodium (SENNA S) 8.6-50 MG tablet Take 1 tablet by mouth  2 times daily as needed (constipation) (Patient not taking: Reported on 4/3/2022) 10 tablet 0 Not Taking at Unknown time             Review of Systems:   The 10 point Review of Systems is negative other than noted in the HPI            Physical Exam:   Vitals were reviewed  Temp: 97.2  F (36.2  C) Temp src: Temporal BP: 125/70 Pulse: 95   Resp: 16 SpO2: 94 % O2 Device: None (Room air)    General: Awake, alert, and oriented  Skin: No rash  Heart: RRR.  No  Murmurs  Lungs: Clear  Abd: S, NT, ND  Ext:  No edema       Data:     Lab Results   Component Value Date    WBC 7.1 04/05/2022    HGB 6.8 (LL) 04/05/2022    HCT 20.6 (L) 04/05/2022     04/05/2022     04/05/2022    POTASSIUM 3.6 04/05/2022    CHLORIDE 113 (H) 04/05/2022    CO2 25 04/05/2022    BUN 13 04/05/2022    CR 0.54 (L) 04/05/2022     (H) 04/05/2022    SED 7 05/07/2014    AST 40 04/03/2022    ALT 38 04/03/2022    ALKPHOS 215 (H) 04/03/2022    BILITOTAL 0.2 04/03/2022    INR 1.0 05/20/2014

## 2022-04-05 NOTE — PROGRESS NOTES
Crosscover:    I was asked to see Nikko for discomfort in his right ear.  He reported diminished hearing as well as crackling sound.  He is also scratching inside his ear and feeling some fluid.  I examined with otoscope and found minimal irritation in the external ear canal otherwise exam was unremarkable.  It is possible he has some jaw abnormality causing his symptoms.  He also reported worsening of his chronic neuropathy symptoms in his left hand.  He will discusse this in the morning with his primary hospitalist.

## 2022-04-05 NOTE — PLAN OF CARE
OT-Orders received, chart review and discussed with care team. Will complete OT orders due to need for further medical management following test results showing diffuse bony mets in spine and all 4 limbs and high fracture risk with mobility.  Pt to possibly undergo further surgeries to stabilize bones.

## 2022-04-05 NOTE — PLAN OF CARE
A&O x 4. Tylenol, gabapentin, atarax, robaxin, MS cotin, and morphine IR for pain  1 unit PRBC. Phos. Being replaced per protocol- recheck in am. IV SL.  Plan for surgery on R femur tomorrow at 0730- NPO at midnight    Per Landy SNYDER from surgery- ok to get pt up with lisa lift or pivot to BSC with weight on surgical leg instead of RLE    Neurosurgery/spiritual health/pain&palliative/hem-onc following

## 2022-04-05 NOTE — CONSULTS
Neurosurgery Consult    HPI    Mr. Tran is a 46-year-old male who is admitted for surgical treatment of femur fracture on the left, and upcoming right surgical operation tomorrow on his femur.  He has a history of multiple myeloma with innumerable lesions in his axial and appendicular spine noted previously with prior compression fractures and multiple lumbar vertebrae L1 and L5 with prior kyphoplasty at L5.  Currently he denies any back pain and states his pain is in his legs where he has his fractures.  He denies any numbness or tingling in his lower extremities denies any radicular pain.    Neurosurgery was consulted for recommendations regarding activity for guidance in his postoperative recovery from his orthopedic surgeries, in light of his spinal pathologies.    Medical history  Multiple myeloma    Social history  Patient states he is struggling to get back to the point where he can return to his apartment, but he does not have an elevator his apartment and he needs to be able to rehabilitate himself to the point where he can walk up stairs.      B/P: 100/58, T: 97.5, P: 85, R: 16       Exam    Alert and oriented no acute distress  Lying in bed  Thoracic cervical and lumbar spine nontender to palpation  Able to move bilateral lower extremities, left more tender than the right due to recent surgery.  Dorsiflexion plantarflexion normal    Imaging    Recent bone scan from yesterday demonstrated widespread appendicular and axial skeletal involvement of multiple myeloma.    Assessment    Multiple myeloma with widespread appendicular and axial skeletal involvement  L1 and L5 prior compression fractures  Status post L5 kyphoplasty  Bilateral femoral fractures    Plan:      With regards to his multiple myeloma and skeletal involvement, the patient can proceed with activity as tolerated unfortunately there is not much that can be done to prevent fractures in the future other than avoiding injury and optimizing bone  health.  Therefore he can proceed with physical therapy and Occupational Therapy as tolerated without any specific restrictions from the neurosurgery/spine perspective.    Discussed with Dr. Mccullough    Total time of 30 minutes spent with the patient today in counseling and coordination of care.

## 2022-04-05 NOTE — PROVIDER NOTIFICATION
DATE:  4/5/2022   TIME OF RECEIPT FROM LAB:  0719  LAB TEST:  hgb  LAB VALUE:  6.8  RESULTS GIVEN WITH READ-BACK TO (PROVIDER):  Dr. Martinez - maritza  TIME LAB VALUE REPORTED TO PROVIDER:   0721

## 2022-04-06 ENCOUNTER — ANESTHESIA (OUTPATIENT)
Dept: SURGERY | Facility: CLINIC | Age: 47
DRG: 480 | End: 2022-04-06
Payer: MEDICARE

## 2022-04-06 ENCOUNTER — APPOINTMENT (OUTPATIENT)
Dept: GENERAL RADIOLOGY | Facility: CLINIC | Age: 47
DRG: 480 | End: 2022-04-06
Attending: ORTHOPAEDIC SURGERY
Payer: MEDICARE

## 2022-04-06 ENCOUNTER — ANESTHESIA EVENT (OUTPATIENT)
Dept: SURGERY | Facility: CLINIC | Age: 47
DRG: 480 | End: 2022-04-06
Payer: MEDICARE

## 2022-04-06 LAB
ERYTHROCYTE [DISTWIDTH] IN BLOOD BY AUTOMATED COUNT: 15 % (ref 10–15)
HCT VFR BLD AUTO: 27.2 % (ref 40–53)
HGB BLD-MCNC: 8.9 G/DL (ref 13.3–17.7)
MAGNESIUM SERPL-MCNC: 1.7 MG/DL (ref 1.6–2.3)
MCH RBC QN AUTO: 32.4 PG (ref 26.5–33)
MCHC RBC AUTO-ENTMCNC: 32.7 G/DL (ref 31.5–36.5)
MCV RBC AUTO: 99 FL (ref 78–100)
PATH REPORT.COMMENTS IMP SPEC: NORMAL
PATH REPORT.FINAL DX SPEC: NORMAL
PATH REPORT.GROSS SPEC: NORMAL
PATH REPORT.MICROSCOPIC SPEC OTHER STN: NORMAL
PATH REPORT.RELEVANT HX SPEC: NORMAL
PHOSPHATE SERPL-MCNC: 2.2 MG/DL (ref 2.5–4.5)
PHOSPHATE SERPL-MCNC: 2.9 MG/DL (ref 2.5–4.5)
PHOTO IMAGE: NORMAL
PLATELET # BLD AUTO: 199 10E3/UL (ref 150–450)
POTASSIUM BLD-SCNC: 4.1 MMOL/L (ref 3.4–5.3)
RBC # BLD AUTO: 2.75 10E6/UL (ref 4.4–5.9)
WBC # BLD AUTO: 7.6 10E3/UL (ref 4–11)

## 2022-04-06 PROCEDURE — 710N000009 HC RECOVERY PHASE 1, LEVEL 1, PER MIN: Performed by: ORTHOPAEDIC SURGERY

## 2022-04-06 PROCEDURE — 258N000003 HC RX IP 258 OP 636

## 2022-04-06 PROCEDURE — 0QH636Z INSERTION OF INTRAMEDULLARY INTERNAL FIXATION DEVICE INTO RIGHT UPPER FEMUR, PERCUTANEOUS APPROACH: ICD-10-PCS | Performed by: ORTHOPAEDIC SURGERY

## 2022-04-06 PROCEDURE — 250N000011 HC RX IP 250 OP 636: Performed by: NURSE ANESTHETIST, CERTIFIED REGISTERED

## 2022-04-06 PROCEDURE — 250N000009 HC RX 250: Performed by: NURSE ANESTHETIST, CERTIFIED REGISTERED

## 2022-04-06 PROCEDURE — 84100 ASSAY OF PHOSPHORUS: CPT | Performed by: ORTHOPAEDIC SURGERY

## 2022-04-06 PROCEDURE — 250N000011 HC RX IP 250 OP 636: Performed by: PHYSICIAN ASSISTANT

## 2022-04-06 PROCEDURE — 84132 ASSAY OF SERUM POTASSIUM: CPT | Performed by: INTERNAL MEDICINE

## 2022-04-06 PROCEDURE — 250N000013 HC RX MED GY IP 250 OP 250 PS 637: Performed by: NURSE PRACTITIONER

## 2022-04-06 PROCEDURE — 88307 TISSUE EXAM BY PATHOLOGIST: CPT | Mod: TC | Performed by: ORTHOPAEDIC SURGERY

## 2022-04-06 PROCEDURE — 272N000001 HC OR GENERAL SUPPLY STERILE: Performed by: ORTHOPAEDIC SURGERY

## 2022-04-06 PROCEDURE — 250N000013 HC RX MED GY IP 250 OP 250 PS 637: Performed by: ORTHOPAEDIC SURGERY

## 2022-04-06 PROCEDURE — 250N000013 HC RX MED GY IP 250 OP 250 PS 637: Performed by: INTERNAL MEDICINE

## 2022-04-06 PROCEDURE — 83735 ASSAY OF MAGNESIUM: CPT | Performed by: ORTHOPAEDIC SURGERY

## 2022-04-06 PROCEDURE — 36415 COLL VENOUS BLD VENIPUNCTURE: CPT | Performed by: ORTHOPAEDIC SURGERY

## 2022-04-06 PROCEDURE — 120N000001 HC R&B MED SURG/OB

## 2022-04-06 PROCEDURE — 370N000017 HC ANESTHESIA TECHNICAL FEE, PER MIN: Performed by: ORTHOPAEDIC SURGERY

## 2022-04-06 PROCEDURE — C1713 ANCHOR/SCREW BN/BN,TIS/BN: HCPCS | Performed by: ORTHOPAEDIC SURGERY

## 2022-04-06 PROCEDURE — 85027 COMPLETE CBC AUTOMATED: CPT | Performed by: INTERNAL MEDICINE

## 2022-04-06 PROCEDURE — 360N000084 HC SURGERY LEVEL 4 W/ FLUORO, PER MIN: Performed by: ORTHOPAEDIC SURGERY

## 2022-04-06 PROCEDURE — C1776 JOINT DEVICE (IMPLANTABLE): HCPCS | Performed by: ORTHOPAEDIC SURGERY

## 2022-04-06 PROCEDURE — 258N000003 HC RX IP 258 OP 636: Performed by: ANESTHESIOLOGY

## 2022-04-06 PROCEDURE — 250N000011 HC RX IP 250 OP 636: Performed by: NURSE PRACTITIONER

## 2022-04-06 PROCEDURE — 99233 SBSQ HOSP IP/OBS HIGH 50: CPT | Performed by: NURSE PRACTITIONER

## 2022-04-06 PROCEDURE — 99232 SBSQ HOSP IP/OBS MODERATE 35: CPT | Performed by: INTERNAL MEDICINE

## 2022-04-06 PROCEDURE — C1769 GUIDE WIRE: HCPCS | Performed by: ORTHOPAEDIC SURGERY

## 2022-04-06 PROCEDURE — 250N000011 HC RX IP 250 OP 636: Performed by: ANESTHESIOLOGY

## 2022-04-06 PROCEDURE — 999N000179 XR SURGERY CARM FLUORO LESS THAN 5 MIN W STILLS: Mod: TC

## 2022-04-06 PROCEDURE — 999N000141 HC STATISTIC PRE-PROCEDURE NURSING ASSESSMENT: Performed by: ORTHOPAEDIC SURGERY

## 2022-04-06 PROCEDURE — 250N000013 HC RX MED GY IP 250 OP 250 PS 637: Performed by: ANESTHESIOLOGY

## 2022-04-06 PROCEDURE — 250N000011 HC RX IP 250 OP 636

## 2022-04-06 PROCEDURE — 71045 X-RAY EXAM CHEST 1 VIEW: CPT

## 2022-04-06 DEVICE — IMPLANTABLE DEVICE: Type: IMPLANTABLE DEVICE | Site: FEMUR | Status: FUNCTIONAL

## 2022-04-06 DEVICE — IMPLANTABLE DEVICE: Type: IMPLANTABLE DEVICE | Site: LEG | Status: FUNCTIONAL

## 2022-04-06 RX ORDER — METHADONE HYDROCHLORIDE 10 MG/ML
INJECTION, SOLUTION INTRAMUSCULAR; INTRAVENOUS; SUBCUTANEOUS PRN
Status: DISCONTINUED | OUTPATIENT
Start: 2022-04-06 | End: 2022-04-06

## 2022-04-06 RX ORDER — LORAZEPAM 2 MG/ML
.5-1 CONCENTRATE ORAL EVERY 8 HOURS PRN
Status: DISCONTINUED | OUTPATIENT
Start: 2022-04-06 | End: 2022-01-01

## 2022-04-06 RX ORDER — AMOXICILLIN 250 MG
1 CAPSULE ORAL 2 TIMES DAILY
Status: DISCONTINUED | OUTPATIENT
Start: 2022-04-06 | End: 2022-01-01

## 2022-04-06 RX ORDER — FENTANYL CITRATE 50 UG/ML
25 INJECTION, SOLUTION INTRAMUSCULAR; INTRAVENOUS
Status: CANCELLED | OUTPATIENT
Start: 2022-04-06

## 2022-04-06 RX ORDER — SODIUM CHLORIDE, SODIUM LACTATE, POTASSIUM CHLORIDE, CALCIUM CHLORIDE 600; 310; 30; 20 MG/100ML; MG/100ML; MG/100ML; MG/100ML
INJECTION, SOLUTION INTRAVENOUS CONTINUOUS
Status: DISCONTINUED | OUTPATIENT
Start: 2022-04-06 | End: 2022-04-06 | Stop reason: HOSPADM

## 2022-04-06 RX ORDER — CEFAZOLIN SODIUM/WATER 2 G/20 ML
2 SYRINGE (ML) INTRAVENOUS
Status: COMPLETED | OUTPATIENT
Start: 2022-04-06 | End: 2022-04-06

## 2022-04-06 RX ORDER — LIDOCAINE 40 MG/G
CREAM TOPICAL
Status: DISCONTINUED | OUTPATIENT
Start: 2022-04-06 | End: 2022-04-06 | Stop reason: HOSPADM

## 2022-04-06 RX ORDER — LORAZEPAM 2 MG/ML
1-2 CONCENTRATE ORAL AT BEDTIME
Status: DISCONTINUED | OUTPATIENT
Start: 2022-04-06 | End: 2022-01-01

## 2022-04-06 RX ORDER — POLYETHYLENE GLYCOL 3350 17 G/17G
17 POWDER, FOR SOLUTION ORAL DAILY
Status: DISCONTINUED | OUTPATIENT
Start: 2022-04-07 | End: 2022-01-01 | Stop reason: HOSPADM

## 2022-04-06 RX ORDER — METHADONE HYDROCHLORIDE 10 MG/ML
2 INJECTION, SOLUTION INTRAMUSCULAR; INTRAVENOUS; SUBCUTANEOUS ONCE
Status: COMPLETED | OUTPATIENT
Start: 2022-04-06 | End: 2022-04-06

## 2022-04-06 RX ORDER — FAMOTIDINE 10 MG
10 TABLET ORAL DAILY
Status: DISCONTINUED | OUTPATIENT
Start: 2022-04-06 | End: 2022-01-01 | Stop reason: HOSPADM

## 2022-04-06 RX ORDER — LIDOCAINE 40 MG/G
CREAM TOPICAL
Status: DISCONTINUED | OUTPATIENT
Start: 2022-04-06 | End: 2022-01-01 | Stop reason: HOSPADM

## 2022-04-06 RX ORDER — LABETALOL HYDROCHLORIDE 5 MG/ML
10 INJECTION, SOLUTION INTRAVENOUS
Status: DISCONTINUED | OUTPATIENT
Start: 2022-04-06 | End: 2022-04-06 | Stop reason: HOSPADM

## 2022-04-06 RX ORDER — MORPHINE SULFATE 15 MG/1
75 TABLET ORAL
Status: DISCONTINUED | OUTPATIENT
Start: 2022-04-06 | End: 2022-04-07

## 2022-04-06 RX ORDER — ONDANSETRON 2 MG/ML
4 INJECTION INTRAMUSCULAR; INTRAVENOUS EVERY 30 MIN PRN
Status: DISCONTINUED | OUTPATIENT
Start: 2022-04-06 | End: 2022-04-06 | Stop reason: HOSPADM

## 2022-04-06 RX ORDER — METHADONE HYDROCHLORIDE 10 MG/ML
2 INJECTION, SOLUTION INTRAMUSCULAR; INTRAVENOUS; SUBCUTANEOUS DAILY PRN
Status: DISCONTINUED | OUTPATIENT
Start: 2022-04-06 | End: 2022-04-06 | Stop reason: CLARIF

## 2022-04-06 RX ORDER — DEXAMETHASONE SODIUM PHOSPHATE 4 MG/ML
INJECTION, SOLUTION INTRA-ARTICULAR; INTRALESIONAL; INTRAMUSCULAR; INTRAVENOUS; SOFT TISSUE PRN
Status: DISCONTINUED | OUTPATIENT
Start: 2022-04-06 | End: 2022-04-06

## 2022-04-06 RX ORDER — ALBUTEROL SULFATE 0.83 MG/ML
2.5 SOLUTION RESPIRATORY (INHALATION) EVERY 4 HOURS PRN
Status: DISCONTINUED | OUTPATIENT
Start: 2022-04-06 | End: 2022-04-06 | Stop reason: HOSPADM

## 2022-04-06 RX ORDER — PROPOFOL 10 MG/ML
INJECTION, EMULSION INTRAVENOUS PRN
Status: DISCONTINUED | OUTPATIENT
Start: 2022-04-06 | End: 2022-04-06

## 2022-04-06 RX ORDER — ONDANSETRON 4 MG/1
4 TABLET, ORALLY DISINTEGRATING ORAL EVERY 30 MIN PRN
Status: DISCONTINUED | OUTPATIENT
Start: 2022-04-06 | End: 2022-04-06 | Stop reason: HOSPADM

## 2022-04-06 RX ORDER — LIDOCAINE HYDROCHLORIDE 10 MG/ML
INJECTION, SOLUTION INFILTRATION; PERINEURAL PRN
Status: DISCONTINUED | OUTPATIENT
Start: 2022-04-06 | End: 2022-04-06

## 2022-04-06 RX ORDER — OXYCODONE HYDROCHLORIDE 5 MG/1
5 TABLET ORAL EVERY 4 HOURS PRN
Status: DISCONTINUED | OUTPATIENT
Start: 2022-04-06 | End: 2022-04-06 | Stop reason: HOSPADM

## 2022-04-06 RX ORDER — SODIUM CHLORIDE, SODIUM LACTATE, POTASSIUM CHLORIDE, CALCIUM CHLORIDE 600; 310; 30; 20 MG/100ML; MG/100ML; MG/100ML; MG/100ML
INJECTION, SOLUTION INTRAVENOUS CONTINUOUS
Status: DISCONTINUED | OUTPATIENT
Start: 2022-04-06 | End: 2022-04-07

## 2022-04-06 RX ORDER — PROCHLORPERAZINE MALEATE 5 MG
10 TABLET ORAL EVERY 6 HOURS PRN
Status: DISCONTINUED | OUTPATIENT
Start: 2022-04-06 | End: 2022-01-01 | Stop reason: HOSPADM

## 2022-04-06 RX ORDER — ONDANSETRON 4 MG/1
4 TABLET, ORALLY DISINTEGRATING ORAL EVERY 6 HOURS PRN
Status: DISCONTINUED | OUTPATIENT
Start: 2022-04-06 | End: 2022-01-01 | Stop reason: HOSPADM

## 2022-04-06 RX ORDER — FENTANYL CITRATE 50 UG/ML
25 INJECTION, SOLUTION INTRAMUSCULAR; INTRAVENOUS EVERY 5 MIN PRN
Status: DISCONTINUED | OUTPATIENT
Start: 2022-04-06 | End: 2022-04-06 | Stop reason: HOSPADM

## 2022-04-06 RX ORDER — MORPHINE SULFATE 4 MG/ML
4-8 INJECTION, SOLUTION INTRAMUSCULAR; INTRAVENOUS
Status: DISCONTINUED | OUTPATIENT
Start: 2022-04-06 | End: 2022-01-01 | Stop reason: HOSPADM

## 2022-04-06 RX ORDER — CEFAZOLIN SODIUM/WATER 2 G/20 ML
2 SYRINGE (ML) INTRAVENOUS SEE ADMIN INSTRUCTIONS
Status: DISCONTINUED | OUTPATIENT
Start: 2022-04-06 | End: 2022-04-06 | Stop reason: HOSPADM

## 2022-04-06 RX ORDER — ONDANSETRON 2 MG/ML
INJECTION INTRAMUSCULAR; INTRAVENOUS PRN
Status: DISCONTINUED | OUTPATIENT
Start: 2022-04-06 | End: 2022-04-06

## 2022-04-06 RX ORDER — CEFAZOLIN SODIUM 1 G/3ML
1 INJECTION, POWDER, FOR SOLUTION INTRAMUSCULAR; INTRAVENOUS EVERY 8 HOURS
Status: COMPLETED | OUTPATIENT
Start: 2022-04-06 | End: 2022-04-07

## 2022-04-06 RX ORDER — LORAZEPAM 2 MG/ML
1 INJECTION INTRAMUSCULAR ONCE
Status: COMPLETED | OUTPATIENT
Start: 2022-04-06 | End: 2022-04-06

## 2022-04-06 RX ORDER — EPHEDRINE SULFATE 50 MG/ML
INJECTION, SOLUTION INTRAVENOUS PRN
Status: DISCONTINUED | OUTPATIENT
Start: 2022-04-06 | End: 2022-04-06

## 2022-04-06 RX ORDER — PHENYLEPHRINE HYDROCHLORIDE 10 MG/ML
INJECTION INTRAVENOUS PRN
Status: DISCONTINUED | OUTPATIENT
Start: 2022-04-06 | End: 2022-04-06

## 2022-04-06 RX ORDER — ONDANSETRON 2 MG/ML
4 INJECTION INTRAMUSCULAR; INTRAVENOUS EVERY 6 HOURS PRN
Status: DISCONTINUED | OUTPATIENT
Start: 2022-04-06 | End: 2022-01-01 | Stop reason: HOSPADM

## 2022-04-06 RX ORDER — BISACODYL 10 MG
10 SUPPOSITORY, RECTAL RECTAL DAILY PRN
Status: DISCONTINUED | OUTPATIENT
Start: 2022-04-06 | End: 2022-01-01 | Stop reason: HOSPADM

## 2022-04-06 RX ORDER — KETOROLAC TROMETHAMINE 30 MG/ML
30 INJECTION, SOLUTION INTRAMUSCULAR; INTRAVENOUS EVERY 6 HOURS
Status: DISCONTINUED | OUTPATIENT
Start: 2022-04-06 | End: 2022-01-01

## 2022-04-06 RX ORDER — GLYCOPYRROLATE 0.2 MG/ML
INJECTION, SOLUTION INTRAMUSCULAR; INTRAVENOUS PRN
Status: DISCONTINUED | OUTPATIENT
Start: 2022-04-06 | End: 2022-04-06

## 2022-04-06 RX ORDER — CELECOXIB 200 MG/1
400 CAPSULE ORAL ONCE
Status: COMPLETED | OUTPATIENT
Start: 2022-04-06 | End: 2022-04-06

## 2022-04-06 RX ORDER — HYDRALAZINE HYDROCHLORIDE 20 MG/ML
10 INJECTION INTRAMUSCULAR; INTRAVENOUS EVERY 10 MIN PRN
Status: DISCONTINUED | OUTPATIENT
Start: 2022-04-06 | End: 2022-04-06 | Stop reason: HOSPADM

## 2022-04-06 RX ORDER — MORPHINE SULFATE 15 MG/1
TABLET, FILM COATED, EXTENDED RELEASE ORAL EVERY 12 HOURS SCHEDULED
Status: CANCELLED | OUTPATIENT
Start: 2022-04-06

## 2022-04-06 RX ORDER — HYDROXYZINE HYDROCHLORIDE 50 MG/1
50-100 TABLET, FILM COATED ORAL EVERY 6 HOURS
Status: DISCONTINUED | OUTPATIENT
Start: 2022-04-06 | End: 2022-01-01

## 2022-04-06 RX ORDER — ACETAMINOPHEN 325 MG/1
975 TABLET ORAL ONCE
Status: COMPLETED | OUTPATIENT
Start: 2022-04-06 | End: 2022-04-06

## 2022-04-06 RX ORDER — METHOCARBAMOL 750 MG/1
750 TABLET, FILM COATED ORAL 3 TIMES DAILY
Status: DISCONTINUED | OUTPATIENT
Start: 2022-04-06 | End: 2022-01-01 | Stop reason: HOSPADM

## 2022-04-06 RX ADMIN — POTASSIUM & SODIUM PHOSPHATES POWDER PACK 280-160-250 MG 1 PACKET: 280-160-250 PACK at 20:29

## 2022-04-06 RX ADMIN — MORPHINE SULFATE 300 MG: 30 TABLET, FILM COATED, EXTENDED RELEASE ORAL at 14:42

## 2022-04-06 RX ADMIN — METHOCARBAMOL 750 MG: 750 TABLET ORAL at 14:42

## 2022-04-06 RX ADMIN — METHOCARBAMOL 750 MG: 750 TABLET ORAL at 20:26

## 2022-04-06 RX ADMIN — LORAZEPAM 2 MG: 2 LIQUID ORAL at 22:09

## 2022-04-06 RX ADMIN — GLYCOPYRROLATE 0.2 MG: 0.2 INJECTION, SOLUTION INTRAMUSCULAR; INTRAVENOUS at 10:43

## 2022-04-06 RX ADMIN — PHENYLEPHRINE HYDROCHLORIDE 200 MCG: 10 INJECTION INTRAVENOUS at 11:22

## 2022-04-06 RX ADMIN — LORAZEPAM 1 MG: 2 INJECTION INTRAMUSCULAR; INTRAVENOUS at 15:45

## 2022-04-06 RX ADMIN — MORPHINE SULFATE 60 MG: 15 TABLET ORAL at 01:57

## 2022-04-06 RX ADMIN — ACETAMINOPHEN 975 MG: 325 TABLET, FILM COATED ORAL at 05:32

## 2022-04-06 RX ADMIN — SODIUM CHLORIDE, POTASSIUM CHLORIDE, SODIUM LACTATE AND CALCIUM CHLORIDE: 600; 310; 30; 20 INJECTION, SOLUTION INTRAVENOUS at 10:31

## 2022-04-06 RX ADMIN — PHENYLEPHRINE HYDROCHLORIDE 200 MCG: 10 INJECTION INTRAVENOUS at 11:33

## 2022-04-06 RX ADMIN — MIDAZOLAM 2 MG: 1 INJECTION INTRAMUSCULAR; INTRAVENOUS at 10:31

## 2022-04-06 RX ADMIN — GABAPENTIN 1200 MG: 600 TABLET, FILM COATED ORAL at 13:59

## 2022-04-06 RX ADMIN — MORPHINE SULFATE 8 MG: 4 INJECTION INTRAVENOUS at 16:07

## 2022-04-06 RX ADMIN — SODIUM CHLORIDE, POTASSIUM CHLORIDE, SODIUM LACTATE AND CALCIUM CHLORIDE: 600; 310; 30; 20 INJECTION, SOLUTION INTRAVENOUS at 14:44

## 2022-04-06 RX ADMIN — EPHEDRINE SULFATE 5 MG: 50 INJECTION, SOLUTION INTRAVENOUS at 10:58

## 2022-04-06 RX ADMIN — MORPHINE SULFATE 60 MG: 15 TABLET ORAL at 14:42

## 2022-04-06 RX ADMIN — HYDROXYZINE HYDROCHLORIDE 100 MG: 50 TABLET, FILM COATED ORAL at 17:10

## 2022-04-06 RX ADMIN — KETOROLAC TROMETHAMINE 30 MG: 30 INJECTION, SOLUTION INTRAMUSCULAR; INTRAVENOUS at 21:53

## 2022-04-06 RX ADMIN — HYDROXYZINE HYDROCHLORIDE 50 MG: 50 TABLET, FILM COATED ORAL at 05:31

## 2022-04-06 RX ADMIN — ACETAMINOPHEN 975 MG: 325 TABLET, FILM COATED ORAL at 21:53

## 2022-04-06 RX ADMIN — PHENYLEPHRINE HYDROCHLORIDE 200 MCG: 10 INJECTION INTRAVENOUS at 11:27

## 2022-04-06 RX ADMIN — LORAZEPAM 1 MG: 2 LIQUID ORAL at 18:15

## 2022-04-06 RX ADMIN — NICOTINE 1 PATCH: 21 PATCH, EXTENDED RELEASE TRANSDERMAL at 14:50

## 2022-04-06 RX ADMIN — ONDANSETRON HYDROCHLORIDE 4 MG: 2 INJECTION, SOLUTION INTRAVENOUS at 11:17

## 2022-04-06 RX ADMIN — LORAZEPAM 0.5 MG: 2 LIQUID ORAL at 01:57

## 2022-04-06 RX ADMIN — PREGABALIN 300 MG: 300 CAPSULE ORAL at 13:59

## 2022-04-06 RX ADMIN — CEFAZOLIN 1 G: 1 INJECTION, POWDER, FOR SOLUTION INTRAMUSCULAR; INTRAVENOUS at 18:15

## 2022-04-06 RX ADMIN — KETOROLAC TROMETHAMINE 30 MG: 30 INJECTION, SOLUTION INTRAMUSCULAR; INTRAVENOUS at 15:45

## 2022-04-06 RX ADMIN — EPHEDRINE SULFATE 10 MG: 50 INJECTION, SOLUTION INTRAVENOUS at 10:43

## 2022-04-06 RX ADMIN — GABAPENTIN 1200 MG: 600 TABLET, FILM COATED ORAL at 20:25

## 2022-04-06 RX ADMIN — METHADONE HYDROCHLORIDE 2 MG: 10 INJECTION, SOLUTION INTRAMUSCULAR; INTRAVENOUS; SUBCUTANEOUS at 12:38

## 2022-04-06 RX ADMIN — Medication 2 G: at 10:31

## 2022-04-06 RX ADMIN — PHENYLEPHRINE HYDROCHLORIDE 200 MCG: 10 INJECTION INTRAVENOUS at 10:39

## 2022-04-06 RX ADMIN — PHENYLEPHRINE HYDROCHLORIDE 200 MCG: 10 INJECTION INTRAVENOUS at 11:16

## 2022-04-06 RX ADMIN — Medication 20 MG: at 10:38

## 2022-04-06 RX ADMIN — CELECOXIB 400 MG: 200 CAPSULE ORAL at 08:38

## 2022-04-06 RX ADMIN — DEXAMETHASONE SODIUM PHOSPHATE 4 MG: 4 INJECTION, SOLUTION INTRA-ARTICULAR; INTRALESIONAL; INTRAMUSCULAR; INTRAVENOUS; SOFT TISSUE at 10:38

## 2022-04-06 RX ADMIN — PHENYLEPHRINE HYDROCHLORIDE 100 MCG: 10 INJECTION INTRAVENOUS at 10:58

## 2022-04-06 RX ADMIN — MORPHINE SULFATE 8 MG: 4 INJECTION INTRAVENOUS at 13:53

## 2022-04-06 RX ADMIN — MORPHINE SULFATE 75 MG: 15 TABLET ORAL at 22:13

## 2022-04-06 RX ADMIN — EPHEDRINE SULFATE 10 MG: 50 INJECTION, SOLUTION INTRAVENOUS at 10:52

## 2022-04-06 RX ADMIN — PROPOFOL 50 MG: 10 INJECTION, EMULSION INTRAVENOUS at 11:07

## 2022-04-06 RX ADMIN — LIDOCAINE HYDROCHLORIDE 50 MG: 10 INJECTION, SOLUTION INFILTRATION; PERINEURAL at 10:38

## 2022-04-06 RX ADMIN — ACETAMINOPHEN 975 MG: 325 TABLET, FILM COATED ORAL at 08:37

## 2022-04-06 RX ADMIN — PREGABALIN 300 MG: 300 CAPSULE ORAL at 20:25

## 2022-04-06 RX ADMIN — MORPHINE SULFATE 300 MG: 30 TABLET, FILM COATED, EXTENDED RELEASE ORAL at 05:31

## 2022-04-06 RX ADMIN — PROPOFOL 150 MG: 10 INJECTION, EMULSION INTRAVENOUS at 10:38

## 2022-04-06 RX ADMIN — METHADONE HYDROCHLORIDE 2 MG: 10 INJECTION, SOLUTION INTRAMUSCULAR; INTRAVENOUS; SUBCUTANEOUS at 12:20

## 2022-04-06 RX ADMIN — SODIUM CHLORIDE, POTASSIUM CHLORIDE, SODIUM LACTATE AND CALCIUM CHLORIDE: 600; 310; 30; 20 INJECTION, SOLUTION INTRAVENOUS at 10:32

## 2022-04-06 RX ADMIN — MORPHINE SULFATE 75 MG: 15 TABLET ORAL at 18:15

## 2022-04-06 RX ADMIN — MORPHINE SULFATE 300 MG: 30 TABLET, FILM COATED, EXTENDED RELEASE ORAL at 20:27

## 2022-04-06 RX ADMIN — ACETAMINOPHEN 975 MG: 325 TABLET, FILM COATED ORAL at 13:59

## 2022-04-06 RX ADMIN — PHENYLEPHRINE HYDROCHLORIDE 100 MCG: 10 INJECTION INTRAVENOUS at 10:43

## 2022-04-06 RX ADMIN — FAMOTIDINE 10 MG: 10 TABLET ORAL at 17:10

## 2022-04-06 ASSESSMENT — ACTIVITIES OF DAILY LIVING (ADL)
ADLS_ACUITY_SCORE: 20

## 2022-04-06 ASSESSMENT — LIFESTYLE VARIABLES: TOBACCO_USE: 1

## 2022-04-06 NOTE — PROGRESS NOTES
[facility]    Medicine Progress Note - Hospitalist Service    Date of Admission:  4/3/2022    Assessment & Plan          Beto Tran is a 46 year old male with a history of advanced multiple myeloma previously on hospice, active nicotine dependence with cigarettes, chronic pain with opiate dependence, anxiety, history of thoracic outlet syndrome, anemia of chronic disease who was admitted with a new left femur fracture, right lateral iliac wing fracture, multiple rib fractures after twisting while transferring from a wheelchair.  He has been seen by her trauma surgery team and orthopedic surgery team.     1.  Left femur fracture.  Occurred during traumatic fall as noted above.  Seen in consultation by orthopedic surgery.  Went to the operating room for femur fracture repair with cephalomedullary device on 4/3.  -Orthopedic surgery following.  -Pain medications as needed.  -Very complex pain history.  -Pain team following.  -Use incentive spirometry.  -Work with therapy.     2.  Right iliac wing and multiple right rib fractures.  Occurred during traumatic fall as noted above.  Seen in consultation by trauma surgery team.  Also seen in consultation by orthopedic surgery.  -Pain medications as needed.  -Pain team following.  -Orthopedic surgery continues to follow.     3.  Multiple myeloma.  Previous autologous stem cell transplant.  Follows with oncology team at [facility].  -Oncology consult appreciated.  -He did have a dose of zoledronic acid 4 mg IV once on 4/4.  -Widespread metastatic lesions noted on bone survey.  -Underwent right femur intramedullary nail fixation to attempt to prevent future fracture on 4/6.     4.  Acute blood loss on chronic anemia.  Acute blood loss due to trauma and surgery.  Does have underlying history of multiple myeloma as noted above.  Hemoglobin dropped to 6.8 on 4/5.  -Transfuse 1 unit packed red blood cells on 4/5.  -Hemoglobin improved to 8.9  today  -Recheck hemoglobin tomorrow.  -Oncology consult appreciated.     5.  Tobacco use disorder.  -Continue nicotine patch.  -Additional nicotine gum if needed.     6.  Chronic opiate related constipation.  -Continue methylnaltrexone injections every other day.     7.  Hypokalemia.  Potassium 3.1 at time of admission.  -Potassium replacement protocol.     8.   Depression.  He does have very significant underlying medical problems that he does not feel are being well managed by his care team in the outpatient setting.  He is very frustrated that he has had recurrent fractures requiring hospital stay.  -I did offer psychiatry consult on 4/5.  He remains not interested at this time.  -He does have lorazepam available if needed.     9.  Hypophosphatemia.  Phosphorus again low at 2.2 today.  -Phosphorus replacement protocol.          Diet: Diet  Advance Diet as Tolerated: Regular Diet Adult    DVT Prophylaxis: Pneumatic Compression Devices  Tavares Catheter: Not present  Central Lines: None  Cardiac Monitoring: None  Code Status: No CPR- Do NOT Intubate          Tigre Martinez DO  Hospitalist Service  [facility]  Securely message with the Vocera Web Console (learn more here)  Text page via [employer] Paging/Directory         Clinically Significant Risk Factors Present on Admission                 ______________________________________________________________________    Interval History   Having diffuse body pains.  Denies chest pain, shortness of breath, fevers, chills, nausea, or vomiting.  No diarrhea.    Data reviewed today: I reviewed all medications, new labs and imaging results over the last 24 hours.    Physical Exam   Vital Signs: Temp: 97.6  F (36.4  C) Temp src: Temporal BP: 110/57 Pulse: 100   Resp: 15 SpO2: 93 % O2 Device: None (Room air) Oxygen Delivery: 2 LPM  Weight: 153 lbs 3.2 oz  Gen:  NAD, A&Ox3.  Eyes:  PERRL, sclera anicteric.  OP:  MMM, no lesions.  Neck:  Supple.  CV:   Regular, no murmurs.  Lung:  CTA b/l, normal effort.  Ab:  +BS, soft.  Skin:  Warm, dry to touch.  No rash.  Ext:  No pitting edema LE b/l.      Data   Recent Labs   Lab 04/06/22  0606 04/05/22  0646 04/04/22  0739 04/03/22  2214 04/03/22  0147   WBC 7.6 7.1 9.0  --  5.6   HGB 8.9* 6.8* 8.0*  --  11.7*   MCV 99 103* 100  --  103*    179 195  --  237   NA  --  140 137  --  137   POTASSIUM 4.1 3.6 3.9  --  3.1*   CHLORIDE  --  113* 109  --  106   CO2  --  25 24  --  29   BUN  --  13 8  --  6*   CR  --  0.54* 0.41* 0.40* 0.41*   ANIONGAP  --  2* 4  --  2*   JOSE  --  9.0 8.8  --  10.0   GLC  --  102* 126*  --  106*   ALBUMIN  --   --   --   --  3.4   PROTTOTAL  --   --   --   --  6.0*   BILITOTAL  --   --   --   --  0.2   ALKPHOS  --   --   --   --  215*   ALT  --   --   --   --  38   AST  --   --   --   --  40

## 2022-04-06 NOTE — OP NOTE
[facility]  Orthopedic Operative Note    Long Cephallomedullary Nailing    Beto Tran MRN# [id number]   YOB: 1975  Procedure Date:  4/6/2022  Age: 46 year old     PREOPERATIVE DIAGNOSIS: Impending pathologic fracture right femur    POSTOPERATIVE DIAGNOSIS:   Same    PROCEDURE PERFORMED: Prophylactic fixation of right impending pathologic femur fracture with intramedullary nail    SURGEON:  Tuan Alcantara MD    FIRST ASSISTANT: Demetrio Brink PA-C    ANESTHESIA:  General     EBL: 50 mL    COMPLICATIONS: None     DISPOSITION: Post Anesthesia Care Unit     CONDITION: Stable     INDICATIONS:  Beto Tran  is a 46 year old male presentting with a right impending pathologic femur fracture due to multiple myeloma.  Discussed both operative and nonoperative management. Risks of surgery discussed included but not limited to bleeding, infection, damage to surrounding neurovascular structures, need for revision surgery, blood clots, pulmonary embolus, and the medical risks of anesthesia.  Benefits of surgery discussed included avoidance of fracture and pain relief.  Alternatives discussed included nonoperative management which I did not recommend.  The patient acknowledges risks and wishes to proceed. The informed consent was signed and documented.  I met with the patient preoperatively and marked the operative extremity.      IMPLANTS:  Implant Name Type Inv. Item Serial No.  Lot No. LRB No. Used Action   IMP NAIL SYN CAN FEM PROX TFNA [id number] 125D RT 04.037.230S - [id number] Metallic Hardware/[place] IMP NAIL SYN CAN FEM PROX TFNA [id number] 125D RT 04.037.230S  [employer] [id number] Right 1 Implanted   IMP SCR SYN TFNA FENESTRATED LAG 95MM 04.038.195S - [id number] Metallic Hardware/[place] IMP SCR SYN TFNA FENESTRATED LAG 95MM 04.038.195S  [employer] [id number] Right 1 Implanted   5.0mm locking screw 40mm    [employer] [id number] Right 1 Implanted        PROCEDURE: The patient was brought to the OR and underwent general anesthesia.  The patient was subsequently transferred in a supine position to the fracture table.  The peroneal post was placed.  The perineum was engaged gently into the perineal post.  Both feet were placed in well-padded traction boots.  Patient placed in a scissored position with the fractured side and in-line traction and the well leg placed in approximately 30 degrees of hip extension.  The right hip was prepped and draped in normal sterile fashion.  Antibiotic administration was confirmed and timeout was completed.  Following generalized agreement, the starting guidepin was advanced down to the start point on the greater trochanter.  We confirmed the start point on fluoroscopy and then advanced the guidewire into the femur.  The position of the guidewire was confirmed with fluoroscopy.  We then made an incision around the pin and gained access to the intramedullary nail using the entry reamer.  All instruments were removed.  The long ball-tipped guidewire was advanced down to the knee.  We measured for and selected the appropriate length nail.  We then sequentially reamed up to a size 13.5 mm reamer.  A [employer] TFNA 125 degree x 40 cm x 11 mm nail was selected.  This was advanced to an appropriate depth over the guidewire confirmed by fluoroscopy.  The ball-tipped guidewire was then removed.  We then advanced the guide for the lag screw down to bone through a small lateral thigh incision.   The guidewire was then advanced into the central position within the femoral neck and head confirmed by fluoroscopy.  We then measured for and selected an appropriate length lag screw.  We reamed for the lag screw and then placed a lag screw by hand.  The setscrew was advanced and tightened.  All instruments were then removed.  We confirmed placement of the hardware proximally using AP and lateral fluoroscopy.  When we were satisfied perfect [place]  imaging was obtained distally.  We then drilled for and placed one statically interlocked distal interlocking screw achieving good bicortical purchase.  Final fluoroscopic imaging was obtained demonstrating good alignment of the fracture good positioning of all hardware.  We then thoroughly irrigated and closed in layers with 2-0 Vicryl, and staples.  The wounds were anesthetized with bupivacaine and sterile dressings were applied.  Patient was transported to the recovery room in stable condition, sustaining no complications. There were no complications and the patient tolerated well.    PLAN:    Activity: Weight-bear as tolerated, range of motion as tolerated, physical therapy and occupational therapy consults  Antibiotics: 24 hours IV antibiotics per standard postop protocol  DVT:  SCD's and chemical prophylaxis with Lovenox  Discharge:  Case management social work consult for placement  Follow-up:  Follow up at [facility] in 2 weeks with repeat x-rays AP and lateral bilateral hip and femur    Tuan Alcantara MD  [facility]

## 2022-04-06 NOTE — PLAN OF CARE
"Goal Outcome Evaluation:       /60 (BP Location: Right arm)   Pulse 91   Temp 97.8  F (36.6  C) (Temporal)   Resp 16   Ht 1.753 m (5' 9\")   Wt 69.5 kg (153 lb 3.2 oz)   SpO2 92%   BMI 22.62 kg/m      Pt is A&O x4. Pain managed with scheduled tylenol, gabapentin, atarax, robaxin and morphine. Breakthrough pain managed with PRN morphine IR x 2. Ativan given x 2 for anxiety. L hip dressings are CDI. Baseline Numbness/tingling to BLE & BUE. NPO. Scheduled to have R femur surgery this morning 0730. Surgical scrub completed. Voiding adequate amount via urinal. On +K, MG and Phos replacement protocol. Will continue to monitor.     Surgery rescheduled for 0930. Report given to PACU nurse.            "

## 2022-04-06 NOTE — ANESTHESIA PROCEDURE NOTES
Airway       Patient location during procedure: OR  Staff -        Anesthesiologist:  Kelly Smith APRN CRNA       Performed By: CRNA  Consent for Airway        Urgency: elective  Indications and Patient Condition       Indications for airway management: tiago-procedural       Induction type:intravenous       Mask difficulty assessment: 1 - vent by mask    Final Airway Details       Final airway type: supraglottic airway    Supraglottic Airway Details        Type: LMA       Brand: I-Gel       LMA size: 5    Post intubation assessment        Placement verified by: capnometry, equal breath sounds and chest rise        Number of attempts at approach: 1       Secured with: plastic tape       Ease of procedure: easy       Dentition: Intact

## 2022-04-06 NOTE — ANESTHESIA POSTPROCEDURE EVALUATION
Patient: Beto Tran    Procedure: Procedure(s):  INTERNAL FIXATION, FRACTURE, TROCHANTERIC, RIGHT HIP,       Anesthesia Type:  General    Note:  Disposition: Inpatient   Postop Pain Control: Uneventful            Sign Out: Well controlled pain   PONV: No   Neuro/Psych: Uneventful            Sign Out: Acceptable/Baseline neuro status   Airway/Respiratory: Uneventful            Sign Out: Acceptable/Baseline resp. status   CV/Hemodynamics: Uneventful            Sign Out: Acceptable CV status   Other NRE: NONE   DID A NON-ROUTINE EVENT OCCUR? No           Last vitals:  Vitals Value Taken Time   /62 04/06/22 1310   Temp 97.5  F (36.4  C) 04/06/22 1240   Pulse 101 04/06/22 1318   Resp 15 04/06/22 1318   SpO2 91 % 04/06/22 1318   Vitals shown include unvalidated device data.    Electronically Signed By: Ted Mcclain MD  April 6, 2022  4:35 PM

## 2022-04-06 NOTE — PLAN OF CARE
A&O x 4. Regular diet. NS at 75 ml/hr. Dressing CDI. Dr. Martinez aware that pt is coughing up blood.     Pt back from surgery around 1345. Pt frustrated about his pain medications getting off schedule due to surgery and that he did not take his 11 am dose. Pain plan started with pt, RN, and pain team to get pt's pain better under control since surgery. Pt verbalized that his pain is more controlled when this RN left for the shift. Offer PRN medications when available and try to give scheduled medications right on time.      Refused to be changed for a few hours after urinal spilled in bed while pt was using it. Refused to turn in bed to offload weight. Per pt he is moving around. Pt refused some vital signs and monitoring right after surgery but agreeable to have BP checked during the night and for capnography to stay in place.     Phosphorus replacement started per protocol. Potassium and magnesium WNL per protocol and recheck tomorrow.     Hem-onc/pain&palliative/PT/OT/hospitalist/ortho following

## 2022-04-06 NOTE — PROGRESS NOTES
Ortho Daily Progress Note    Pain controlled this AM.  Doing well today.  We discussed skeletal survey findings which are consistent with widespread metastatic disease to his right femur and bilateral humeri.  I recommended proceeding with prophylactic intramedullary nail fixation of the right femur to prevent fracture.  With regard to his upper extremities, he is not having any pain.  He will also require these to help him mobilize.  As such we elected to defer management of these and see how he is doing in a few weeks.  He is ready to proceed with surgery on his right hip/femur.    Temp:  [97  F (36.1  C)-97.9  F (36.6  C)] 97.1  F (36.2  C)  Pulse:  [] 99  Resp:  [10-19] 15  BP: ()/(54-66) 91/60  SpO2:  [85 %-96 %] 92 %  Hemoglobin   Date Value Ref Range Status   04/06/2022 8.9 (L) 13.3 - 17.7 g/dL Final   06/18/2015 11.7 (L) 13.3 - 17.7 g/dL Final   ]    Alert, appropriate, and following commands  Breathing easily without wheeze  Dressing/wound c/d/i on the left, intact df/pf/ehl bilaterally, SILT bilaterally, palpable dp pulse bilaterally        A/P - 46 year old male s/p intramedullary nail fixation of pathologic left subtrochanteric femur fracture    ABX: Ancef x24 hrs post-op today then none required  Activity: Weightbearing as tolerated bilateral lower extremities.  Weight-bear as tolerated bilateral upper extremities.  DVT: SCDs, Lovenox 40 mg subcutaneous x2 weeks postop then transition to aspirin 325 twice daily for 4 weeks.  Dispo: discharge planning for TCU    Tuan Walker  [facility]

## 2022-04-06 NOTE — ANESTHESIA PREPROCEDURE EVALUATION
Anesthesia Pre-Procedure Evaluation    Patient: Beto Tran   MRN: [id number] : 1975        Procedure : Procedure(s):  INTERNAL FIXATION, FRACTURE, TROCHANTERIC, RIGHT HIP,          Past Medical History:   Diagnosis Date     Anesthesia complication     states woke up during procedure (endoscopic ultrasound) in 2014     Broken rib      Cancer (H)      Gastro-oesophageal reflux disease      Multiple myeloma (H)      Osteoporosis       Past Surgical History:   Procedure Laterality Date     OPEN REDUCTION INTERNAL FIXATION RODDING INTRAMEDULLAR FEMUR FRACTURE TABLE Left 4/3/2022    Procedure: Surgical treatment of left pathologic subtrochanteric femur fracture with cephalomedullary device;  Surgeon: Tuan Alcantara MD;  Location: [facility] OR     OPTICAL TRACKING SYSTEM KYPHOPLASTY N/A 2014    Procedure: OPTICAL TRACKING SYSTEM KYPHOPLASTY;  Surgeon: Abner Vasquez MD;  Location: [facility] OR     ORTHOPEDIC SURGERY      right thumb surgery     RESECT FIRST RIB  2012    Procedure: RESECT FIRST RIB;  Left First Rib Resection & Scalentectomy;  Surgeon: Keith Tucker MD;  Location: [facility] OR      Allergies   Allergen Reactions     Nka [No Known Allergies]       Social History     Tobacco Use     Smoking status: Current Some Day Smoker     Packs/day: 0.25     Years: 25.00     Pack years: 6.25     Types: Cigarettes     Last attempt to quit: 2012     Years since quitting: 10.1     Smokeless tobacco: Never Used     Tobacco comment: 5-7 cigarettes/day    Substance Use Topics     Alcohol use: Yes     Comment: social      Wt Readings from Last 1 Encounters:   22 69.5 kg (153 lb 3.2 oz)        Anesthesia Evaluation            ROS/MED HX  ENT/Pulmonary:     (+) tobacco use, Current use,     Neurologic:  - neg neurologic ROS     Cardiovascular:  - neg cardiovascular ROS     METS/Exercise Tolerance:     Hematologic: Comments: Multiple myeloma      Musculoskeletal:   (+) arthritis, fracture,      GI/Hepatic:     (+) GERD,     Renal/Genitourinary:  - neg Renal ROS     Endo:  - neg endo ROS     Psychiatric/Substance Use:     (+) H/O chronic opiod use .     Infectious Disease:  - neg infectious disease ROS     Malignancy:   (+) Malignancy, History of Lymphoma/Leukemia.Lymph CA Active status post.        Other:      (+) , H/O Chronic Pain,        Physical Exam    Airway        Mallampati: II   TM distance: > 3 FB   Neck ROM: full   Mouth opening: > 3 cm    Respiratory Devices and Support         Dental  no notable dental history         Cardiovascular   cardiovascular exam normal          Pulmonary   pulmonary exam normal                OUTSIDE LABS:  CBC:   Lab Results   Component Value Date    WBC 7.6 04/06/2022    WBC 7.1 04/05/2022    HGB 8.9 (L) 04/06/2022    HGB 6.8 (LL) 04/05/2022    HCT 27.2 (L) 04/06/2022    HCT 20.6 (L) 04/05/2022     04/06/2022     04/05/2022     BMP:   Lab Results   Component Value Date     04/05/2022     04/04/2022    POTASSIUM 3.6 04/05/2022    POTASSIUM 3.9 04/04/2022    CHLORIDE 113 (H) 04/05/2022    CHLORIDE 109 04/04/2022    CO2 25 04/05/2022    CO2 24 04/04/2022    BUN 13 04/05/2022    BUN 8 04/04/2022    CR 0.54 (L) 04/05/2022    CR 0.41 (L) 04/04/2022     (H) 04/05/2022     (H) 04/04/2022     COAGS:   Lab Results   Component Value Date    INR 1.0 05/20/2014     POC:   Lab Results   Component Value Date     (H) 08/16/2012     HEPATIC:   Lab Results   Component Value Date    ALBUMIN 3.4 04/03/2022    PROTTOTAL 6.0 (L) 04/03/2022    ALT 38 04/03/2022    AST 40 04/03/2022    ALKPHOS 215 (H) 04/03/2022    BILITOTAL 0.2 04/03/2022     OTHER:   Lab Results   Component Value Date    LACT 2.2 (H) 12/19/2014    JOSE 9.0 04/05/2022    PHOS 2.2 (L) 04/06/2022    MAG 1.7 04/06/2022    LIPASE 217 04/14/2014    AMYLASE 103 04/14/2014    TSH 1.72 06/11/2014    T4 0.78 04/14/2014    CRP <5.0 05/07/2014    SED 7 05/07/2014       Anesthesia  Plan    ASA Status:  3   NPO Status:  NPO Appropriate    Anesthesia Type: General.     - Airway: LMA   Induction: Intravenous.   Maintenance: Balanced.   Techniques and Equipment:       - Drips/Meds: Dexmed. infusion     Consents    Anesthesia Plan(s) and associated risks, benefits, and realistic alternatives discussed. Questions answered and patient/representative(s) expressed understanding.    - Discussed:     - Discussed with:  Patient      - Extended Intubation/Ventilatory Support Discussed: No.      - Patient is DNR/DNI Status: Yes             Suspend during perioperative period? No (NO CPR, defib ok, meds ok).    Use of blood products discussed: No .     Postoperative Care    Pain management: IV analgesics, Oral pain medications, Multi-modal analgesia.     - Plan for long acting post-op opioid use   PONV prophylaxis: Ondansetron (or other 5HT-3), Dexamethasone or Solumedrol     Comments:    Other Comments: NO CPR            Ted Mcclain MD

## 2022-04-06 NOTE — PROGRESS NOTES
[facility]  Palliative Care Progress Note  Text Page     Assessment & Plan   Recommendations:  1. Goals of Care- No CPR- Do NOT Intubate  Hospitalization goals discussed with patient at the bedside.  Goal for adequate symptom management while inpatient, considering ongoing hospice enrollment  Decisional Capacity- Intact. Patient does not have an advance directive. Per  informed consent policy next of kin should be involved if patient becomes unable.  POLST on file and dated 01/13/2022.  DNR,Comfort focused measures only, no tube feeds, oral antibiotics permitted.     2. Pain   Chronic pain secondary to malignancy.  Now with acute left hip and right thoracic pain secondary to fall and hip fracture.  Additionally, POD#1 femur nailing.       Patient's opioid use in past 24 hours: Morphine PO/IV 1380 mg, Fentanyl IV 0 mcg, methadone PO 0 mg   = 1380 mg Daily Morphine Equivalent  [place] Board of Pharmacy Data Base Reviewed:    YES; As expected, no concern for misuse/abuse of controlled medications based on this report.     Multimodal pain management plan:  - acetaminophen 975 mg three times daily  - naproxen 500 mg two times daily with meals--hold   -*Toradol 30 mg IV every 6 hrs x 3 days   - gabapentin 1200 mg three times daily  - pregabalin 300 mg two times daily  - hydroxyzine  mg three times daily, discontinue additional 25 - 50 mg every 4 hours prn  - lidocaine patch 1 patch daily for 12 hours on/12 hours off  - *Methocarbamol 750 mg tid  times daily,  Discontinue additional 750 mg two times daily prn  - MS Contin 300 mg PO four times daily  - *morphine IR 75 mg every 3 hours prn re- evaluate in am and resume at 60 mg tid when pain under control  - *morphine injection 4-8 mg every 2 hours prn   *changes to manage acute pain flare, resume home chronic home doses once pain controlled    3. Anxiety  Long term anxiety related to chronic and acute conditions.  Associated  "frustration, intermittent hopelessness.  Patient denies thoughts of suicide or harming others.  Acknowledges acute stress and feelings of being overwhelmed.    - *lorazepam 1-2 mg SL solution at bedtime  - *lorazepam 0.5-1 mg SL solution every 8 hrs  - appreciate  consult  - mindful exercises and distraction with TV, processing through situation with conversation.     4. Spiritual Care  Oriented to Spiritual Health as part of Palliative Care team. Appreciate Care of  .  Spiritual Background: undesignated     5. Care Planning  Appreciate Care of multidisciplinary team.  - disposition pending.  Return home on hospice plan of care vs TCU placement post op.     Medical Decision Making and Goals of Care:  Discussed on April 6, 2022 with ALEJANDRO PutnamC,APRN,CNP,ACHPN: summoned by nurse to patient. Pain out of control, has missed sone dose of Ms Contin and short acting Morphine. Describes pain as intolerable,  Saying they made a big mistake and it needs to be righted. He is highly anxious measured breathing, shivering.   Patient familiar to this writer, informed him of potential changes and pain regimen to manage pain and anxiety.  He is agreeable to plan of increasing multimodal ( where able), increasing  SA Morphine amount and availability and increasing anxiolytics.  He is agreeable to increase monitoring C02 monitor b/p and IV fluids through night as safeguards and supportive therapies.  He is aware and in agreement to return to chronic pain plan once pain flare is controlled     Discussed on April 4, 2022 with Paulette Amador APRN, CNP:   Met with Beto at the bedside.  He is tearful and states frustration.  He reviews his health history and the recent event.  States he feels \"abandoned and alone\" with his plan of care and that he is uncertain if he would benefit most from ongoing hospice services vs rehabilitation.  States he feels that he \"is wasting his breath\" and would like " "to transfer to [place] for care.  He states he \"does not know what to do next.\"     Reviewed his current symptoms of pain and anxiety.  He acknowledges that his pain has been fairly controlled.  He is fearful of how he will feel with movement.  States he feels his bones are porcelain and that he \"cannot be expected to get up after surgery\".  He mentions his anticipatory anxiety and that he is \"overwhelmed with thoughts of it all.\"  He states a goal of getting home, but notes that his fiance is managing so much for him that he needs her to have support.    Educated on multimodal pain management plan as listed in plan of care above.  He verbalized understanding and agreement to the plan.  He denies questions or concerns and states follow up in the coming days regarding his plan of care would be his preference.     Kasia Luis RN, PGMT-BC,APRN, CNP, ACHPN  Pain Management and Palliative Care  [facility]  Pgr: [phone]    Office     Time Spent on this Encounter   Total unit/floor time 45 minutes, time consisted of the following, examination of the patient, reviewing the record and completing documentation. >50% of time spent in counseling and coordination of care, Bedside Nurse Tenisha Bartholomew RN  and Hospitalist Tigre Martinez DO .  Time spend counseling with patient consisted of the following topics, symptom management.    Review of Systems    CONSTITUTIONAL: NEGATIVE for fever, chills, change in weight  ENT/MOUTH: NEGATIVE for ear, mouth and throat problems  RESP: NEGATIVE for significant cough or SOB  CV: NEGATIVE for chest pain, palpitations or peripheral edema    Palliative Symptom Review (0=no symptom/no concern, 1=mild, 2=moderate, 3=severe):      Pain: 3-severe      Fatigue: 1-mild      Nausea: 0-none      Constipation: 0-none last BM 4/3      Diarrhea: 0-none      Depressive Symptoms: 0-none      Anxiety: 3-severe      Drowsiness: 0-none      Poor " Appetite: 0-none      Shortness of Breath: 0-none      Insomnia: 1-mild      Other:        Overall (0 good/no concerns, 3 very poor):  2    Physical Exam   Temp:  [97.1  F (36.2  C)-97.8  F (36.6  C)] 97.6  F (36.4  C)  Pulse:  [] 95  Resp:  [10-24] 16  BP: ()/(54-66) 108/54  SpO2:  [85 %-96 %] 93 %  153 lbs 3.2 oz  Exam:  GEN:  Alert, oriented x 3, appears comfortable, NAD.  HEENT:  Normocephalic/atraumatic, no scleral icterus, no nasal discharge, mouth moist.  CV:  RRR, S1, S2; no murmurs or other irregularities noted.  +3 DP/PT pulses bilatererally; no edema BLE.  RESP:  Clear to auscultation bilaterally without rales/rhonchi/wheezing/retractions.  Symmetric chest rise on inhalation noted.  Normal respiratory effort.  ABD:  Rounded, soft, non-tender/non-distended.  +BS  EXT:  Edema & pulses as noted above.  CMS intact x 4.     M/S:   Tender to palpation  throughout.    SKIN:  Dry to touch, no exanthems noted in the visualized areas.    NEURO: Symmetric strength +5/5.  Sensation to touch intact all extremities.   There is no area of allodynia or hyperesthesia.  PAIN BEHAVIOR: Cooperative  Psych:  Normal affect.  Calm, cooperative, conversant appropriately.    Medications     lactated ringers 75 mL/hr at 04/06/22 1444       acetaminophen  975 mg Oral Q8H [facility]     ceFAZolin  1 g Intravenous Q8H     [Held by provider] enoxaparin ANTICOAGULANT  40 mg Subcutaneous Q24H     famotidine  10 mg Oral Daily     gabapentin  1,200 mg Oral TID     hydrOXYzine   mg Oral Q6H     ketorolac  30 mg Intravenous Q6H     lidocaine  1 patch Transdermal Q24H     lidocaine   Transdermal Q8H     LORazepam  1-2 mg Oral At Bedtime     methocarbamol  750 mg Oral TID     [Held by provider] methylnaltrexone  12 mg Subcutaneous Every Other Day     morphine  300 mg Oral 4x Daily     multivitamin w/minerals  1 tablet Oral Daily     [Held by provider] naproxen  500 mg Oral BID w/meals     nicotine  1 patch Transdermal Daily      nicotine   Transdermal Q8H     [START ON 4/7/2022] polyethylene glycol  17 g Oral Daily     pregabalin  300 mg Oral BID     senna-docusate  1 tablet Oral BID     sodium chloride (PF)  3 mL Intracatheter Q8H     sodium chloride (PF)  3 mL Intracatheter Q8H       Data   Results for orders placed or performed during the hospital encounter of 04/03/22 (from the past 24 hour(s))   Phosphorus   Result Value Ref Range    Phosphorus 2.9 2.5 - 4.5 mg/dL   Magnesium   Result Value Ref Range    Magnesium 1.7 1.6 - 2.3 mg/dL   Phosphorus   Result Value Ref Range    Phosphorus 2.2 (L) 2.5 - 4.5 mg/dL   CBC with platelets   Result Value Ref Range    WBC Count 7.6 4.0 - 11.0 10e3/uL    RBC Count 2.75 (L) 4.40 - 5.90 10e6/uL    Hemoglobin 8.9 (L) 13.3 - 17.7 g/dL    Hematocrit 27.2 (L) 40.0 - 53.0 %    MCV 99 78 - 100 fL    MCH 32.4 26.5 - 33.0 pg    MCHC 32.7 31.5 - 36.5 g/dL    RDW 15.0 10.0 - 15.0 %    Platelet Count 199 150 - 450 10e3/uL   Potassium   Result Value Ref Range    Potassium 4.1 3.4 - 5.3 mmol/L   XR Chest Port 1 View    Narrative    EXAM: XR CHEST PORT 1 VIEW  LOCATION: [facility]  DATE/TIME: 4/6/2022 5:56 AM    INDICATION: Trauma Patient with Rib Fracture(s)  COMPARISON: Chest radiograph yesterday.      Impression    IMPRESSION: Overall similar to yesterday. Stable interstitial coarsening. No new lung consolidation, pleural fluid or pneumothorax. Right-sided rib fractures again noted. Stable cardiac silhouette.   XR Surgery PATTY L/T 5 Min Fluoro w Stills    Narrative    This exam was marked as non-reportable because it will not be read by a   radiologist or a [place] non-radiologist provider.

## 2022-04-06 NOTE — ANESTHESIA CARE TRANSFER NOTE
Patient: Beto Tran    Procedure: Procedure(s):  INTERNAL FIXATION, FRACTURE, TROCHANTERIC, RIGHT HIP,       Diagnosis: CA in situ [D09.9]  Diagnosis Additional Information: No value filed.    Anesthesia Type:   General     Note:    Oropharynx: oropharynx clear of all foreign objects and spontaneously breathing  Level of Consciousness: awake  Oxygen Supplementation: face mask    Independent Airway: airway patency satisfactory and stable  Dentition: dentition unchanged  Vital Signs Stable: post-procedure vital signs reviewed and stable  Report to RN Given: handoff report given  Patient transferred to: PACU  Comments: Report and signed off to RN in PACU.  Good Resps, skin pink, VSS, O2 via face tent.  Handoff Report: Identifed the Patient, Identified the Reponsible Provider, Reviewed the pertinent medical history, Discussed the surgical course, Reviewed Intra-OP anesthesia mangement and issues during anesthesia, Set expectations for post-procedure period and Allowed opportunity for questions and acknowledgement of understanding      Vitals:  Vitals Value Taken Time   BP 90/66 04/06/22 1151   Temp     Pulse 93 04/06/22 1155   Resp 16 04/06/22 1155   SpO2 85 % 04/06/22 1155   Vitals shown include unvalidated device data.    Electronically Signed By: KAREN Damon CRNA  April 6, 2022  11:57 AM

## 2022-04-07 ENCOUNTER — APPOINTMENT (OUTPATIENT)
Dept: PHYSICAL THERAPY | Facility: CLINIC | Age: 47
DRG: 480 | End: 2022-04-07
Payer: MEDICARE

## 2022-04-07 ENCOUNTER — APPOINTMENT (OUTPATIENT)
Dept: GENERAL RADIOLOGY | Facility: CLINIC | Age: 47
DRG: 480 | End: 2022-04-07
Attending: INTERNAL MEDICINE
Payer: MEDICARE

## 2022-04-07 ENCOUNTER — APPOINTMENT (OUTPATIENT)
Dept: GENERAL RADIOLOGY | Facility: CLINIC | Age: 47
DRG: 480 | End: 2022-04-07
Attending: ORTHOPAEDIC SURGERY
Payer: MEDICARE

## 2022-04-07 LAB
ABO/RH(D): NORMAL
ANTIBODY SCREEN: NEGATIVE
BLD PROD TYP BPU: NORMAL
BLOOD COMPONENT TYPE: NORMAL
CODING SYSTEM: NORMAL
CROSSMATCH: NORMAL
HGB BLD-MCNC: 6.5 G/DL (ref 13.3–17.7)
ISSUE DATE AND TIME: NORMAL
MAGNESIUM SERPL-MCNC: 1.8 MG/DL (ref 1.6–2.3)
PHOSPHATE SERPL-MCNC: 2 MG/DL (ref 2.5–4.5)
POTASSIUM BLD-SCNC: 4 MMOL/L (ref 3.4–5.3)
SPECIMEN EXPIRATION DATE: NORMAL
UNIT ABO/RH: NORMAL
UNIT NUMBER: NORMAL
UNIT STATUS: NORMAL
UNIT TYPE ISBT: 5100

## 2022-04-07 PROCEDURE — 250N000013 HC RX MED GY IP 250 OP 250 PS 637: Performed by: NURSE PRACTITIONER

## 2022-04-07 PROCEDURE — P9040 RBC LEUKOREDUCED IRRADIATED: HCPCS | Performed by: STUDENT IN AN ORGANIZED HEALTH CARE EDUCATION/TRAINING PROGRAM

## 2022-04-07 PROCEDURE — 97530 THERAPEUTIC ACTIVITIES: CPT | Mod: GP | Performed by: PHYSICAL THERAPIST

## 2022-04-07 PROCEDURE — 99233 SBSQ HOSP IP/OBS HIGH 50: CPT | Performed by: STUDENT IN AN ORGANIZED HEALTH CARE EDUCATION/TRAINING PROGRAM

## 2022-04-07 PROCEDURE — 250N000013 HC RX MED GY IP 250 OP 250 PS 637: Performed by: ORTHOPAEDIC SURGERY

## 2022-04-07 PROCEDURE — 250N000013 HC RX MED GY IP 250 OP 250 PS 637

## 2022-04-07 PROCEDURE — 86923 COMPATIBILITY TEST ELECTRIC: CPT | Performed by: STUDENT IN AN ORGANIZED HEALTH CARE EDUCATION/TRAINING PROGRAM

## 2022-04-07 PROCEDURE — 84132 ASSAY OF SERUM POTASSIUM: CPT | Performed by: INTERNAL MEDICINE

## 2022-04-07 PROCEDURE — 250N000011 HC RX IP 250 OP 636: Performed by: NURSE PRACTITIONER

## 2022-04-07 PROCEDURE — 97161 PT EVAL LOW COMPLEX 20 MIN: CPT | Mod: GP | Performed by: PHYSICAL THERAPIST

## 2022-04-07 PROCEDURE — 120N000001 HC R&B MED SURG/OB

## 2022-04-07 PROCEDURE — 36415 COLL VENOUS BLD VENIPUNCTURE: CPT | Performed by: ORTHOPAEDIC SURGERY

## 2022-04-07 PROCEDURE — 71045 X-RAY EXAM CHEST 1 VIEW: CPT

## 2022-04-07 PROCEDURE — 99233 SBSQ HOSP IP/OBS HIGH 50: CPT | Performed by: NURSE PRACTITIONER

## 2022-04-07 PROCEDURE — 250N000011 HC RX IP 250 OP 636

## 2022-04-07 PROCEDURE — 85018 HEMOGLOBIN: CPT | Performed by: STUDENT IN AN ORGANIZED HEALTH CARE EDUCATION/TRAINING PROGRAM

## 2022-04-07 PROCEDURE — 86850 RBC ANTIBODY SCREEN: CPT | Performed by: STUDENT IN AN ORGANIZED HEALTH CARE EDUCATION/TRAINING PROGRAM

## 2022-04-07 PROCEDURE — 258N000003 HC RX IP 258 OP 636

## 2022-04-07 PROCEDURE — 84100 ASSAY OF PHOSPHORUS: CPT | Performed by: ORTHOPAEDIC SURGERY

## 2022-04-07 PROCEDURE — 83735 ASSAY OF MAGNESIUM: CPT | Performed by: ORTHOPAEDIC SURGERY

## 2022-04-07 PROCEDURE — 250N000013 HC RX MED GY IP 250 OP 250 PS 637: Performed by: INTERNAL MEDICINE

## 2022-04-07 PROCEDURE — 97110 THERAPEUTIC EXERCISES: CPT | Mod: GP | Performed by: PHYSICAL THERAPIST

## 2022-04-07 PROCEDURE — 71045 X-RAY EXAM CHEST 1 VIEW: CPT | Mod: 77

## 2022-04-07 PROCEDURE — 250N000011 HC RX IP 250 OP 636: Performed by: STUDENT IN AN ORGANIZED HEALTH CARE EDUCATION/TRAINING PROGRAM

## 2022-04-07 PROCEDURE — 36415 COLL VENOUS BLD VENIPUNCTURE: CPT | Performed by: STUDENT IN AN ORGANIZED HEALTH CARE EDUCATION/TRAINING PROGRAM

## 2022-04-07 PROCEDURE — 258N000003 HC RX IP 258 OP 636: Performed by: STUDENT IN AN ORGANIZED HEALTH CARE EDUCATION/TRAINING PROGRAM

## 2022-04-07 RX ORDER — LIDOCAINE 4 G/G
2 PATCH TOPICAL
Status: DISCONTINUED | OUTPATIENT
Start: 2022-04-07 | End: 2022-01-01

## 2022-04-07 RX ORDER — CEFTRIAXONE 1 G/1
1 INJECTION, POWDER, FOR SOLUTION INTRAMUSCULAR; INTRAVENOUS EVERY 24 HOURS
Status: COMPLETED | OUTPATIENT
Start: 2022-04-07 | End: 2022-01-01

## 2022-04-07 RX ORDER — AZITHROMYCIN 500 MG/5ML
500 INJECTION, POWDER, LYOPHILIZED, FOR SOLUTION INTRAVENOUS EVERY 24 HOURS
Status: COMPLETED | OUTPATIENT
Start: 2022-04-07 | End: 2022-04-09

## 2022-04-07 RX ORDER — FUROSEMIDE 10 MG/ML
20 INJECTION INTRAMUSCULAR; INTRAVENOUS ONCE
Status: COMPLETED | OUTPATIENT
Start: 2022-04-07 | End: 2022-04-07

## 2022-04-07 RX ADMIN — METHOCARBAMOL 750 MG: 750 TABLET ORAL at 08:10

## 2022-04-07 RX ADMIN — DICLOFENAC SODIUM 4 G: 10 GEL TOPICAL at 11:26

## 2022-04-07 RX ADMIN — HYDROXYZINE HYDROCHLORIDE 100 MG: 50 TABLET, FILM COATED ORAL at 17:25

## 2022-04-07 RX ADMIN — KETOROLAC TROMETHAMINE 30 MG: 30 INJECTION, SOLUTION INTRAMUSCULAR; INTRAVENOUS at 04:07

## 2022-04-07 RX ADMIN — AZITHROMYCIN MONOHYDRATE 500 MG: 500 INJECTION, POWDER, LYOPHILIZED, FOR SOLUTION INTRAVENOUS at 18:00

## 2022-04-07 RX ADMIN — MORPHINE SULFATE 300 MG: 30 TABLET, FILM COATED, EXTENDED RELEASE ORAL at 11:31

## 2022-04-07 RX ADMIN — KETOROLAC TROMETHAMINE 30 MG: 30 INJECTION, SOLUTION INTRAMUSCULAR; INTRAVENOUS at 10:31

## 2022-04-07 RX ADMIN — DICLOFENAC SODIUM 4 G: 10 GEL TOPICAL at 16:11

## 2022-04-07 RX ADMIN — GABAPENTIN 1200 MG: 600 TABLET, FILM COATED ORAL at 08:10

## 2022-04-07 RX ADMIN — MULTIPLE VITAMINS W/ MINERALS TAB 1 TABLET: TAB at 10:32

## 2022-04-07 RX ADMIN — HYDROXYZINE HYDROCHLORIDE 50 MG: 50 TABLET, FILM COATED ORAL at 06:04

## 2022-04-07 RX ADMIN — CEFTRIAXONE 1 G: 1 INJECTION, POWDER, FOR SOLUTION INTRAMUSCULAR; INTRAVENOUS at 17:26

## 2022-04-07 RX ADMIN — MORPHINE SULFATE 8 MG: 4 INJECTION INTRAVENOUS at 20:20

## 2022-04-07 RX ADMIN — METHOCARBAMOL 750 MG: 750 TABLET ORAL at 20:06

## 2022-04-07 RX ADMIN — POTASSIUM & SODIUM PHOSPHATES POWDER PACK 280-160-250 MG 1 PACKET: 280-160-250 PACK at 13:21

## 2022-04-07 RX ADMIN — LORAZEPAM 2 MG: 2 LIQUID ORAL at 22:07

## 2022-04-07 RX ADMIN — GABAPENTIN 1200 MG: 600 TABLET, FILM COATED ORAL at 20:06

## 2022-04-07 RX ADMIN — LORAZEPAM 1 MG: 2 LIQUID ORAL at 10:32

## 2022-04-07 RX ADMIN — MORPHINE SULFATE 60 MG: 15 TABLET ORAL at 14:06

## 2022-04-07 RX ADMIN — METHOCARBAMOL 750 MG: 750 TABLET ORAL at 13:21

## 2022-04-07 RX ADMIN — PREGABALIN 300 MG: 300 CAPSULE ORAL at 08:10

## 2022-04-07 RX ADMIN — KETOROLAC TROMETHAMINE 30 MG: 30 INJECTION, SOLUTION INTRAMUSCULAR; INTRAVENOUS at 22:07

## 2022-04-07 RX ADMIN — KETOROLAC TROMETHAMINE 30 MG: 30 INJECTION, SOLUTION INTRAMUSCULAR; INTRAVENOUS at 16:11

## 2022-04-07 RX ADMIN — LORAZEPAM 1 MG: 2 LIQUID ORAL at 20:03

## 2022-04-07 RX ADMIN — MORPHINE SULFATE 60 MG: 15 TABLET ORAL at 08:10

## 2022-04-07 RX ADMIN — FUROSEMIDE 20 MG: 10 INJECTION, SOLUTION INTRAMUSCULAR; INTRAVENOUS at 16:11

## 2022-04-07 RX ADMIN — HYDROXYZINE HYDROCHLORIDE 100 MG: 50 TABLET, FILM COATED ORAL at 00:03

## 2022-04-07 RX ADMIN — HYDROXYZINE HYDROCHLORIDE 100 MG: 50 TABLET, FILM COATED ORAL at 11:32

## 2022-04-07 RX ADMIN — CEFAZOLIN 1 G: 1 INJECTION, POWDER, FOR SOLUTION INTRAMUSCULAR; INTRAVENOUS at 02:03

## 2022-04-07 RX ADMIN — ACETAMINOPHEN 975 MG: 325 TABLET, FILM COATED ORAL at 22:07

## 2022-04-07 RX ADMIN — MORPHINE SULFATE 300 MG: 30 TABLET, FILM COATED, EXTENDED RELEASE ORAL at 06:03

## 2022-04-07 RX ADMIN — MORPHINE SULFATE 300 MG: 30 TABLET, FILM COATED, EXTENDED RELEASE ORAL at 17:59

## 2022-04-07 RX ADMIN — ACETAMINOPHEN 975 MG: 325 TABLET, FILM COATED ORAL at 13:21

## 2022-04-07 RX ADMIN — LORAZEPAM 1 MG: 2 LIQUID ORAL at 02:22

## 2022-04-07 RX ADMIN — PREGABALIN 300 MG: 300 CAPSULE ORAL at 20:06

## 2022-04-07 RX ADMIN — POTASSIUM & SODIUM PHOSPHATES POWDER PACK 280-160-250 MG 1 PACKET: 280-160-250 PACK at 10:32

## 2022-04-07 RX ADMIN — ACETAMINOPHEN 975 MG: 325 TABLET, FILM COATED ORAL at 06:04

## 2022-04-07 RX ADMIN — MORPHINE SULFATE 60 MG: 15 TABLET ORAL at 17:25

## 2022-04-07 RX ADMIN — MORPHINE SULFATE 300 MG: 30 TABLET, FILM COATED, EXTENDED RELEASE ORAL at 02:03

## 2022-04-07 RX ADMIN — SODIUM CHLORIDE, POTASSIUM CHLORIDE, SODIUM LACTATE AND CALCIUM CHLORIDE: 600; 310; 30; 20 INJECTION, SOLUTION INTRAVENOUS at 02:03

## 2022-04-07 RX ADMIN — NICOTINE 1 PATCH: 21 PATCH, EXTENDED RELEASE TRANSDERMAL at 08:10

## 2022-04-07 RX ADMIN — POLYETHYLENE GLYCOL 3350 17 G: 17 POWDER, FOR SOLUTION ORAL at 08:10

## 2022-04-07 RX ADMIN — GABAPENTIN 1200 MG: 600 TABLET, FILM COATED ORAL at 13:21

## 2022-04-07 ASSESSMENT — ACTIVITIES OF DAILY LIVING (ADL)
ADLS_ACUITY_SCORE: 22
ADLS_ACUITY_SCORE: 17
ADLS_ACUITY_SCORE: 22
ADLS_ACUITY_SCORE: 20
ADLS_ACUITY_SCORE: 17
ADLS_ACUITY_SCORE: 20
ADLS_ACUITY_SCORE: 17
ADLS_ACUITY_SCORE: 22
ADLS_ACUITY_SCORE: 22
ADLS_ACUITY_SCORE: 20
ADLS_ACUITY_SCORE: 17
ADLS_ACUITY_SCORE: 22
ADLS_ACUITY_SCORE: 20
ADLS_ACUITY_SCORE: 20
ADLS_ACUITY_SCORE: 22
ADLS_ACUITY_SCORE: 17
ADLS_ACUITY_SCORE: 20

## 2022-04-07 NOTE — PROGRESS NOTES
[facility]  Palliative Care Progress Note  Text Page     Assessment & Plan   Recommendations:  1. Goals of Care- No CPR- Do NOT Intubate  Hospitalization goals discussed with patient at the bedside.  Goal for adequate symptom management while inpatient, considering ongoing hospice enrollment  Decisional Capacity- Intact. Patient does not have an advance directive. Per  informed consent policy next of kin should be involved if patient becomes unable.  POLST on file and dated 01/13/2022.  DNR,Comfort focused measures only, no tube feeds, oral antibiotics permitted.     2. Pain   Chronic pain secondary to malignancy.  Now with acute left hip and right thoracic pain secondary to fall and hip fracture.  Additionally, POD#1 femur nailing.    Patient's opioid use in past 24 hours: Morphine PO/IV 1810 mg, Fentanyl  mcg, methadone PO 24 mg   =  1950 mg Daily Morphine Equivalent + Fentanyl 400 mcg  [place] Board of Pharmacy Data Base Reviewed:    YES; As expected, no concern for misuse/abuse of controlled medications based on this report.  ORS= 900   Multimodal pain management plan:  - acetaminophen 975 mg three times daily  - naproxen 500 mg two times daily with meals--hold,resume tomorrow   -*Toradol 30 mg IV every 6 hrs x 3 days   - gabapentin 1200 mg three times daily  - pregabalin 300 mg two times daily  - hydroxyzine  mg three times daily, discontinue additional 25 - 50 mg every 4 hours prn  - lidocaine patch 1 patch daily for 12 hours on/12 hours off, to right chest and thoracic area  -Add diclofenac gel 1 % 4 grams to right chest wall and thoracic area   - *Methocarbamol 750 mg tid  times daily,  Discontinue additional 750 mg two times daily prn for now   - MS Contin 300 mg PO four times daily  - *morphine IR 60 mg every 3 hours prn re- evaluate in am if able change to q 4 hr prn   - *morphine injection 4-8 mg every 2 hours prn   *changes to manage acute pain flare, resume  home chronic home doses once pain controlled.      3. Anxiety  Long term anxiety related to chronic and acute conditions.  Associated frustration, intermittent hopelessness.  Patient denies thoughts of suicide or harming others.  Acknowledges acute stress and feelings of being overwhelmed.    - *lorazepam 1-2 mg SL solution at bedtime  - *lorazepam 0.5-1 mg SL solution every 8 hrs  - appreciate  consult  - mindful exercises and distraction with TV, processing through situation with conversation.     4. Spiritual Care  Oriented to Spiritual Health as part of Palliative Care team. Appreciate Care of  .  Spiritual Background: undesignated     5. Care Planning  Appreciate Care of multidisciplinary team.  - disposition pending.  Return home on hospice plan of care vs TCU placement post op.  Advise pre op pain management consultation in future prior to any surgical intervention to manage pathologic fractures,      Medical Decision Making and Goals of Care:  Discussed on April 7, 2022 with ALEJANDRO PutnamC,APRN,CNP,ACHPN  Pain under better control , less anxious.  Agreeable to reduction in short acting opioids to chronic prn dose.  Right rib and back pain.  No SOB, pulse O2 intermittently under 90%.  No BM, feel he may have one today.     Discussed on April 6, 2022 with ALEJANDRO PutnamC,APRN,CNP,ACHPN: summoned by nurse to patient. Pain out of control, has missed sone dose of Ms Contin and short acting Morphine. Describes pain as intolerable,  Saying they made a big mistake and it needs to be righted. He is highly anxious measured breathing, shivering.   Patient familiar to this writer, informed him of potential changes and pain regimen to manage pain and anxiety.  He is agreeable to plan of increasing multimodal ( where able), increasing  SA Morphine amount and availability and increasing anxiolytics.  He is agreeable to increase monitoring C02 monitor b/p and IV fluids through  "night as safeguards and supportive therapies.  He is aware and in agreement to return to chronic pain plan once pain flare is controlled     Discussed on April 4, 2022 with Paulette JONES, CNP:   Met with Beto at the bedside.  He is tearful and states frustration.  He reviews his health history and the recent event.  States he feels \"abandoned and alone\" with his plan of care and that he is uncertain if he would benefit most from ongoing hospice services vs rehabilitation.  States he feels that he \"is wasting his breath\" and would like to transfer to [facility] for care.  He states he \"does not know what to do next.\"     Reviewed his current symptoms of pain and anxiety.  He acknowledges that his pain has been fairly controlled.  He is fearful of how he will feel with movement.  States he feels his bones are porcelain and that he \"cannot be expected to get up after surgery\".  He mentions his anticipatory anxiety and that he is \"overwhelmed with thoughts of it all.\"  He states a goal of getting home, but notes that his fiance is managing so much for him that he needs her to have support.    Educated on multimodal pain management plan as listed in plan of care above.  He verbalized understanding and agreement to the plan.  He denies questions or concerns and states follow up in the coming days regarding his plan of care would be his preference.     Kasia Luis RN, PGMT-BC,APRN, CNP, ACHPN  Pain Management and Palliative Care  [facility]  Pgr: [phone]    Office     Time Spent on this Encounter   Total unit/floor time 45 minutes, time consisted of the following, examination of the patient, reviewing the record and completing documentation. >50% of time spent in counseling and coordination of care, Bedside Nurse Tenisha Bartholomew RN  and Hospitalist Tigre Martinez DO .  Time spend counseling with patient consisted of the following topics, symptom " management.    Review of Systems    CONSTITUTIONAL: NEGATIVE for fever, chills, change in weight  ENT/MOUTH: NEGATIVE for ear, mouth and throat problems  RESP: NEGATIVE for significant cough or SOB  CV: NEGATIVE for chest pain, palpitations or peripheral edema    Palliative Symptom Review (0=no symptom/no concern, 1=mild, 2=moderate, 3=severe):      Pain: 3-severe      Fatigue: 1-mild      Nausea: 0-none      Constipation: 0-none last BM 4/3      Diarrhea: 0-none      Depressive Symptoms: 0-none      Anxiety: 3-severe      Drowsiness: 0-none      Poor Appetite: 0-none      Shortness of Breath: 0-none      Insomnia: 1-mild      Other:        Overall (0 good/no concerns, 3 very poor):  2    Physical Exam   Temp:  [97.1  F (36.2  C)-97.9  F (36.6  C)] 97.6  F (36.4  C)  Pulse:  [] 92  Resp:  [10-24] 16  BP: ()/(44-66) 96/49  SpO2:  [75 %-96 %] 91 %  153 lbs 3.2 oz  Exam:  GEN:  Alert, oriented x 3, appears comfortable, NAD.  HEENT:  Normocephalic/atraumatic, no scleral icterus, no nasal discharge, mouth moist.  CV:  RRR, S1, S2; no murmurs or other irregularities noted.  +3 DP/PT pulses bilatererally; no edema BLE.  RESP:  Clear to auscultation bilaterally without rales/rhonchi/wheezing/retractions.  Symmetric chest rise on inhalation noted.  Normal respiratory effort.  ABD:  Rounded, soft, non-tender/non-distended.  +BS  EXT:  Edema & pulses as noted above.  CMS intact x 4.     M/S:   Tender to palpation  throughout.    SKIN:  Dry to touch, no exanthems noted in the visualized areas.    NEURO: Symmetric strength +5/5.  Sensation to touch intact all extremities.   There is no area of allodynia or hyperesthesia.  PAIN BEHAVIOR: Cooperative  Psych:  Normal affect.  Calm, cooperative, conversant appropriately.    Medications     lactated ringers 75 mL/hr at 04/07/22 0203       acetaminophen  975 mg Oral Q8H GEETA     diclofenac  4 g Topical 4x Daily     [Held by provider] enoxaparin ANTICOAGULANT  40 mg  Subcutaneous Q24H     famotidine  10 mg Oral Daily     gabapentin  1,200 mg Oral TID     hydrOXYzine   mg Oral Q6H     ketorolac  30 mg Intravenous Q6H     lidocaine  1 patch Transdermal Q24H     lidocaine  2 patch Transdermal Q24h    And     lidocaine   Transdermal Q8H     lidocaine   Transdermal Q8H     LORazepam  1-2 mg Oral At Bedtime     methocarbamol  750 mg Oral TID     methylnaltrexone  12 mg Subcutaneous Every Other Day     morphine  300 mg Oral 4x Daily     multivitamin w/minerals  1 tablet Oral Daily     [Held by provider] naproxen  500 mg Oral BID w/meals     nicotine  1 patch Transdermal Daily     nicotine   Transdermal Q8H     polyethylene glycol  17 g Oral Daily     potassium & sodium phosphates  1 packet Oral TID     pregabalin  300 mg Oral BID     senna-docusate  1 tablet Oral BID     sodium chloride (PF)  3 mL Intracatheter Q8H     sodium chloride (PF)  3 mL Intracatheter Q8H       Data   Results for orders placed or performed during the hospital encounter of 04/03/22 (from the past 24 hour(s))   XR Surgery PATTY L/T 5 Min Fluoro w Stills    Narrative    This exam was marked as non-reportable because it will not be read by a   radiologist or a [place] non-radiologist provider.         XR Chest Port 1 View    Narrative    EXAM: XR CHEST PORT 1 VIEW  LOCATION: [facility]  DATE/TIME: 4/7/2022 6:36 AM    INDICATION: Trauma Patient with Rib Fracture(s)  COMPARISON: 04/06/2022      Impression    IMPRESSION: Heart size is normal. Diffuse patchy interstitial prominence is stable. No visible pneumothorax. Severe osteopenia with redemonstrated acute right posterior rib fractures. There are also nonacute fracture deformities in the bilateral ribs.   Magnesium   Result Value Ref Range    Magnesium 1.8 1.6 - 2.3 mg/dL   Phosphorus   Result Value Ref Range    Phosphorus 2.0 (L) 2.5 - 4.5 mg/dL   Potassium   Result Value Ref Range    Potassium 4.0 3.4 - 5.3 mmol/L

## 2022-04-07 NOTE — PLAN OF CARE
"Goal Outcome Evaluation:    Plan of Care Reviewed With: patient     Overall Patient Progress: improving     Vitals stable. Hypotension this AM. Tachycardia 100-110. HGB 6.5. to get 1 unit of blood today. 3 pulse oximeters used today as sats <90%. Crackles mainly to right lower lobe. Oxygen attempted to wean- however had to stay on 5L per NC to keep sats >90%. IS encouraged however have not seen patient use. Up in chair with 2 assist, walker, GB, however, patient able to do 95% of his own mobility. Dressings changed per [place]. Able to lift both legs up off the bed- right leg moves easier than the left leg. Scant edema to bilateral hips. Voiding per patient. No BM- miralax provided. Alert, oriented x4. Making needs known. Patient is advocate for self on timing of medications and when interventions can be given. His plan is \"go home\" at discharge. Pain 7/10 mainly pain to right ribs. Pale and lightheaded today. Bed alarm on for safety. Food and fluids fair. TKO IV.   1347 blood stated 1 unit. Patient in agreement of I unit RBCs. phosphorus level in AM- replaced orally for 3 doses. 1510 had to increase his oxygen to 6L per NC. IS and flutter valve used. IS 2000. Crackles to lungs. Text page Dr. Coto. Appears in no distress.   "

## 2022-04-07 NOTE — PROGRESS NOTES
Orthopedic Surgery  Beto Tran  2022  Admit Date:  4/3/2022  POD 1 Day Post-Op  S/P Procedure(s):  Prophylactic fixation of right impending pathologic femur fracture with intramedullary nail    Patient resting comfortably in bed.    Pain controlled.  Tolerating oral intake.    Denies nausea or vomiting  Denies chest pain or shortness of breath  No events overnight.     Alert and orient to person, place, and time.  Vital Sign Ranges  Temperature Temp  Av.6  F (36.4  C)  Min: 97.5  F (36.4  C)  Max: 97.9  F (36.6  C)   Blood pressure Systolic (24hrs), Av , Min:91 , Max:113        Diastolic (24hrs), Av, Min:44, Max:66      Pulse Pulse  Av.7  Min: 90  Max: 112   Respirations Resp  Avg: 15.2  Min: 10  Max: 24   Pulse oximetry SpO2  Av %  Min: 75 %  Max: 96 %     Dressing is clean, dry, and intact.   Minimal erythema of the surrounding skin.   Bilateral calves are soft, non-tender.  Bilateral lower extremity is NVI.  Sensation intact bilateral lower extremities  5/5 motor with resisted dorsi and plantar flexion bilaterally  +Dp pulse    Labs:  Recent Labs   Lab Test 22  0734 22  0606 22  0646   POTASSIUM 4.0 4.1 3.6     Recent Labs   Lab Test 22  0908 22  0606 22  0646   HGB 6.5* 8.9* 6.8*     Recent Labs   Lab Test 14  0000   INR 1.0     Recent Labs   Lab Test 22  0606 22  0646 22  0739    179 195       A/P  1. S/p right impending pathologic fracture IM nail, left pathologic fracture left femur IM nail   Continue Lovenox for DVT prophylaxis.     Mobilize with PT/OT    WBAT with walker.     Left dressings changed at bedside - Aquacel placed   Leave right dressing intact   Continue current pain regiment.    2. Disposition   Anticipate d/c to home hopefully based on patients wishes.    Angeles Lemos PA-C

## 2022-04-07 NOTE — PROGRESS NOTES
DATE:  4/7/2022   TIME OF RECEIPT FROM LAB:  1020  LAB TEST:  HGB  LAB VALUE:  6.5  RESULTS GIVEN WITH READ-BACK TO (PROVIDER):  Dr. Coto  TIME LAB VALUE REPORTED TO PROVIDER:   1025

## 2022-04-07 NOTE — PROGRESS NOTES
[facility]  Hospitalist Progress Note  Brad Coto, DO 04/07/22       Reason for Stay (Diagnosis): Left femur fracture, right iliac wing fracture, multiple rib fractures         Assessment and Plan:      Summary of Stay: Beto Tran is a 46 year old male with a history of advanced multiple myeloma previously on hospice, active nicotine dependence with cigarettes, chronic pain with opiate dependence, anxiety, history of thoracic outlet syndrome, anemia of chronic disease who was admitted with a new left femur fracture, right lateral iliac wing fracture, multiple rib fractures after twisting while transferring from a wheelchair.    Orthopedic surgery was consulted and the patient went to the OR for left femur fracture repair with a cephalomedullary device on 4/3.  He also underwent device placement on the right side on 4/6 given impending fracture risk of the right femur.    Postop course has been complicated by severe pain as the patient missed many hours of his PTA narcotic regimen which is significant.  Pain and palliative have been following and the patient is feeling much better from a pain aspect.    Problem List/Assessment and Plan:   Left femur fracture:  Occurred during a traumatic fall/transition as noted above.  Orthopedic surgery was consulted and the patient went to the operating room with femur fracture repair with a cephalomedullary device on 4/30.  He also underwent prophylactic fixation of the right impending pathologic femur fracture with intramedullary nail.  -Orthopedic surgery following, appreciate recommendations  -Pain pain team following, appreciate recommendations   -Pain medications as needed (discussed case with pain today 4/7)  -Incentive spirometry  -PT/OT    Right iliac wing and multiple rib fractures:  Occurred during traumatic fall/transition as noted above.  Seen in consultation by trauma surgery team and orthopedic surgery.  -Orthopedic surgery following,  patient recommendations  -Pain medications as needed    Multiple myeloma:  Previous autologous stem cell transplant.  Follows the oncology team at [facility].  Per report, it seems he has been on hospice for over 7 years now with no myeloma treatment.  Extensive lytic bone lesions throughout the body and essentially no normal bone left.  Very high risk fracture.  -Oncology consulted, appreciate commendations   -Recommend patient stay on hospice   -Do not believe that treating his myeloma would improve bone health for the stable future   -Recommend that palliative care team may have to manage his medications    Acute blood loss on chronic anemia:  Thought secondary to acute blood loss anemia due to tremor and surgery.  Does have a history of multiple myeloma which may be contributing.  He received 2 units packed red blood cells on 4/5 and 4/7 for hemoglobin less than 7.  -1 unit packed blood cell today 4/7  -Daily CBC    Chronic opiate related constipation:  -Continue methylnaltrexone injections every other day    Hypokalemia  Hypophosphatemia:  -Replace as needed with nurse driven protocol    Depression:  Significant underlying medical problems that he feels are not being managed well.  Frustrated given ongoing fractures.  Not interested in psychiatry consult during this admission.  -As needed lorazepam for anxiety    Tobacco use disorder:  -NRT      DVT Prophylaxis: Pneumatic Compression Devices  Code Status: DNR / DNI  Discharge Dispo/Date: Anticipate discharge in 2 to 3 days pending ability to tolerate PT/OT, possible placement, adequate pain control, resolution of respiratory failure        Interval History (Subjective):      The patient feels better today.  He denies any excruciating, uncontrolled pain.  He reports that yesterday was absolutely miserable, but today has been better.  He reports that his pain is much better controlled now.  Otherwise no acute events overnight.  The patient denies any new  "symptoms.                  Physical Exam:      Last Vital Signs:  /63   Pulse 112   Temp 97.6  F (36.4  C) (Temporal)   Resp 16   Ht 1.753 m (5' 9\")   Wt 69.5 kg (153 lb 3.2 oz)   SpO2 93%   BMI 22.62 kg/m        Intake/Output Summary (Last 24 hours) at 4/7/2022 1329  Last data filed at 4/7/2022 1300  Gross per 24 hour   Intake 1000 ml   Output 2050 ml   Net -1050 ml       General: Alert, awake, no acute distress.  HEENT: NC/AT, eyes anicteric, external occular movements intact, face symmetric.    Cardiac: RRR, S1, S2.  No murmurs appreciated.  Pulmonary: Normal work of breathing.  Lungs with some rales in the bases.   Abdomen: soft, non-tender, non-distended.  Bowel sounds present.  No guarding.  Extremities: no deformities.  Warm, well perfused.  Skin: no rashes or lesions noted.  Warm and dry.  Neuro: No gross deficits noted.  Speech clear.    Psych: Appropriate affect.         Medications:      All current medications were reviewed with changes reflected in problem list.         Data:      All new lab and imaging data was reviewed.   Labs:       Lab Results   Component Value Date     04/05/2022     04/04/2022     04/03/2022     01/30/2015     01/07/2015     12/23/2014    Lab Results   Component Value Date    CHLORIDE 113 04/05/2022    CHLORIDE 109 04/04/2022    CHLORIDE 106 04/03/2022    CHLORIDE 104 01/30/2015    CHLORIDE 104 01/07/2015    CHLORIDE 106 12/23/2014    Lab Results   Component Value Date    BUN 13 04/05/2022    BUN 8 04/04/2022    BUN 6 04/03/2022    BUN 12 01/30/2015    BUN 8 01/07/2015    BUN 10 12/23/2014      Lab Results   Component Value Date    POTASSIUM 4.0 04/07/2022    POTASSIUM 4.1 04/06/2022    POTASSIUM 3.6 04/05/2022    POTASSIUM 3.2 06/18/2015    POTASSIUM 3.6 06/12/2015    POTASSIUM 3.9 06/08/2015    Lab Results   Component Value Date    CO2 25 04/05/2022    CO2 24 04/04/2022    CO2 29 04/03/2022    CO2 28 01/30/2015    CO2 32 " 01/07/2015    CO2 27 12/23/2014    Lab Results   Component Value Date    CR 0.54 04/05/2022    CR 0.41 04/04/2022    CR 0.40 04/03/2022    CR 0.60 02/20/2015    CR 0.61 01/30/2015    CR 0.52 01/07/2015        Recent Labs   Lab 04/07/22  0908 04/06/22  0606   WBC  --  7.6   HGB 6.5* 8.9*   HCT  --  27.2*   MCV  --  99   PLT  --  199      Imaging:   Recent Results (from the past 48 hour(s))   XR Chest Port 1 View    Narrative    EXAM: XR CHEST PORT 1 VIEW  LOCATION: [facility]  DATE/TIME: 4/6/2022 5:56 AM    INDICATION: Trauma Patient with Rib Fracture(s)  COMPARISON: Chest radiograph yesterday.      Impression    IMPRESSION: Overall similar to yesterday. Stable interstitial coarsening. No new lung consolidation, pleural fluid or pneumothorax. Right-sided rib fractures again noted. Stable cardiac silhouette.   XR Surgery PATTY L/T 5 Min Fluoro w Stills    Narrative    This exam was marked as non-reportable because it will not be read by a   radiologist or a [place] non-radiologist provider.         XR Chest Port 1 View    Narrative    EXAM: XR CHEST PORT 1 VIEW  LOCATION: [facility]  DATE/TIME: 4/7/2022 6:36 AM    INDICATION: Trauma Patient with Rib Fracture(s)  COMPARISON: 04/06/2022      Impression    IMPRESSION: Heart size is normal. Diffuse patchy interstitial prominence is stable. No visible pneumothorax. Severe osteopenia with redemonstrated acute right posterior rib fractures. There are also nonacute fracture deformities in the bilateral ribs.       Brad Coto DO

## 2022-04-07 NOTE — PROGRESS NOTES
7P-11P  Pt is alert and oriented, lung sounds clear,capno with 4L of O2, up with assist of one walker and gait. Tolerating regular diet, passing gas, pain controlled with sched/prn MS contin,robaxin,atarax,tylenol, and Toradol,Dressing to Mk. LE clean dry and intact. Cms intact, baseline numbness and tingling to LE.P,MG,K., Care reviewed with pt. Will continue to monitor.

## 2022-04-07 NOTE — PROGRESS NOTES
"   04/07/22 0830   Quick Adds   Type of Visit PT Re-evaluation   Living Environment   People in Home alone   Current Living Arrangements condominium   Home Accessibility no concerns   Transportation Anticipated family or friend will provide   Living Environment Comments Pt lives in his mother's condo alone, no stairs. Has wheelchair that he can propel himself, FWW, hospital bed, shower chair/transfer bench   Self-Care   Usual Activity Tolerance moderate   Current Activity Tolerance fair   Regular Exercise No   Equipment Currently Used at Home commode chair;hospital bed;shower chair;walker, rolling;wheelchair, manual   Fall history within last six months yes   Number of times patient has fallen within last six months 1   Activity/Exercise/Self-Care Comment Pt states he wants to be able to walk short household distances with FWW. Pt was independent with dressing and toileting but received SBA for showering (PCA from hospice). Bathtub shower with transfer tub bench.    General Information   Onset of Illness/Injury or Date of Surgery 04/03/22   Referring Physician Demetrio Brink, HARRY   Patient/Family Therapy Goals Statement (PT) Per discussion at first pt stating he wanted to be able to ultimately return to his 2nd floor level apartment with stairs and enjoy the time he has left (stating \"this could be my last summer\"). Discussion about realistic and safe goals with overall prognosis and hx of metastatic bone fractures. Pt receptive and understanding of PTs recommended goal to pursue safe mobility and maintain independence as much as possible.    Pertinent History of Current Problem (include personal factors and/or comorbidities that impact the POC) s/p L hip ORIF d/t left pathologic femur fracture while transferring and s/p R hip nailing. PMH: 46 year old male with a history of advanced multiple myeloma previously on hospice, active nicotine dependence with cigarettes, chronic pain with opiate dependence, anxiety, " history of thoracic outlet syndrome, anemia of chronic disease who was admitted with a new left femur fracture, right lateral iliac wing fracture, multiple rib fractures after twisting while transferring from a wheelchair.  He has been seen by her trauma surgery team and orthopedic surgery team.   Existing Precautions/Restrictions weight bearing;fall   Weight-Bearing Status - LUE weight-bearing as tolerated   Weight-Bearing Status - RUE weight-bearing as tolerated   Weight-Bearing Status - LLE weight-bearing as tolerated   Weight-Bearing Status - RLE weight-bearing as tolerated   Cognition   Affect/Mental Status (Cognition) WFL   Orientation Status (Cognition) oriented x 4   Cognitive Status Comments pt notably frustrated with overall pain management following orthopedic surgeries, medical cares and overall prognosis.    Pain Assessment   Patient Currently in Pain Yes, see Vital Sign flowsheet  (diffuse pain all over )   Bed Mobility   Comment, (Bed Mobility) Mod A x 1 for bed mobility supine <> sit using log roll technique and use of bed sheets    Transfers   Comment, (Transfers) CGA sit <> stand with FWW and gait belt (open hands on pt's abdomen and back to assist with guarding as gait belt is painful and near rib fractures)    Gait/Stairs (Locomotion)   [place] Level (Gait) contact guard   Assistive Device (Gait) walker, front-wheeled   Distance in Feet (Required for LE Total Joints) 5'    Comment, (Gait/Stairs) bed <> chair with 1 person CGA and another on SBA to assist with equipment.    Clinical Impression   Criteria for Skilled Therapeutic Intervention Yes, treatment indicated   PT Diagnosis (PT) impaired mobility    Influenced by the following impairments orthopedic restrictions, diffuse bone mets, pain, generalzed mms weakness and decontioning, impaired mobility   Functional limitations due to impairments unable to mobilize due to diffuse bone mets and high risk for fractures.   Clinical Presentation  (PT Evaluation Complexity) Unstable/Unpredictable   Clinical Presentation Rationale clinical judgment    Clinical Decision Making (Complexity) moderate complexity   Planned Therapy Interventions (PT) balance training;bed mobility training;home exercise program;gait training;patient/family education;transfer training;wheelchair management/propulsion training   Anticipated Equipment Needs at Discharge (PT) walker, rolling;wheelchair   Risk & Benefits of therapy have been explained evaluation/treatment results reviewed;care plan/treatment goals reviewed;risks/benefits reviewed;current/potential barriers reviewed;participants voiced agreement with care plan;participants included;patient   PT Discharge Planning   PT Discharge Recommendation (DC Rec) home with home care physical therapy;Transitional Care Facility   PT Rationale for DC Rec Pt able to mobilize today with PT and extreme caution taken given overall poor prognosis with hx of pathologic fractures as a result of bone metastases throughout bilateral femurs s/p orthopedic surgeries and bilateral humerus. Pt accepted risks of mobilizing and wishes to return home with home PT and hospice cares with goald of walking short distances and utilizing wheelchair. Pt needing Mod A  x 1 for bed mobility and CGA with standing and ambulating 5 feet from bed <> chair. Pt is limited by pain, generalized weakness in context of complicated medical status overall. Will continue to assess approrpiate discharge disposition based on medical progress, therapy progress, and patient's goals. Pt is high fall risk with concern of more pathologic fractures.    PT Brief overview of current status Mod A bed mob, CGA standing and 5 feet amb bed <> chair, spinal precautions and avoided all pivoting motions utilized. Pt aware of fracture risk with mobilization - RN,MD have ok'ed OOB activity with WBAT status    Plan of Care Review   Plan of Care Reviewed With patient   Total Evaluation Time    Total Evaluation Time (Minutes) 20   Physical Therapy Goals   PT Frequency Daily   PT Predicated Duration/Target Date for Goal Attainment 04/15/22   PT Goals Bed Mobility;Transfers;Gait;Wheelchair Mobility   PT: Bed Mobility Supervision/stand-by assist;Supine to/from sit;Within precautions  (following spinal precautions )   PT: Transfers Supervision/stand-by assist;Sit to/from stand;Bed to/from chair;Assistive device;Within precautions  (avoiding pivoting )   PT: Gait Supervision/stand-by assist;Rolling walker;25 feet   PT: Wheelchair Mobility 50 feet;Caregiver SBA;manual wheelchair   PT: Goal 1 Pt will perform gentle LE exercises for ROM and strengthening to promote healing, ciculation and prevent mms atrophy

## 2022-04-07 NOTE — PROGRESS NOTES
Oncology/Hematology Follow Up Note:    Assessment and Plan:  #1 Multiple myeloma  - Very unique case.  Has been on hospice for 7 years, with no myeloma treatment.  He has very extensive lytic bone lesions throughout his body, and essentially has no normal bone left.  Very high risk of fractures with minimal contact.  However, he still has not have renal failure or hypercalcemia related to multiple myeloma.  I suspect some of the anemia could be myeloma related.    - With him being on hospice over the past 7 years, his narcotic dosage has been titrated up to the point it is unsafe to manage without hospice support.  For long acting morphine alone, he is on 1200 mg a day.  - He has been very frustrated with healthcare in general and feels no one is listening to him  - I reviewed with the patient there has been new treatments available for myeloma, but even if he responds to it, it would not improve his bone health for the foreseeable future.     PLAN:  - In my opinion, it would be best for the patient to stay on hospice  - Treating his myeloma would not improve his bone health for the foreseeable future even if he has a good response.  - He also asked me if our palliative care team could manage his pain meds if he comes off hospice for myeloma treatment.  I checked with our palliative care NP, who felt she would not be comfortable prescribing such high dosages of pain meds.  I told Beto I would check with our palliative care physician Dr. Shanks tomorrow.     Jones Peñaloza M.D.  [facility]  [phone]    Subjective:    No new symptoms.  Pain reasonably well controlled      Labs:  All labs reviewed    CBCRecent Labs   Lab 04/06/22  0606 04/05/22  0646 04/04/22  0739   WBC 7.6 7.1 9.0   HGB 8.9* 6.8* 8.0*   MCV 99 103* 100    179 195       CMP  Recent Labs   Lab 04/06/22  0606 04/05/22  2356 04/05/22  0646 04/04/22  0739 04/03/22  2214 04/03/22 2214 04/03/22  0147   NA  --   --  140 137  --    --  137   POTASSIUM 4.1  --  3.6 3.9  --   --  3.1*   CHLORIDE  --   --  113* 109  --   --  106   CO2  --   --  25 24  --   --  29   ANIONGAP  --   --  2* 4  --   --  2*   GLC  --   --  102* 126*  --   --  106*   BUN  --   --  13 8  --   --  6*   CR  --   --  0.54* 0.41*  --  0.40* 0.41*   GFRESTIMATED  --   --  >90 >90  --  >90 >90   JOSE  --   --  9.0 8.8  --   --  10.0   MAG 1.7  --  1.6 1.6  --  1.6  --    PHOS 2.2* 2.9 2.0* 3.5   < > 2.9  --    PROTTOTAL  --   --   --   --   --   --  6.0*   ALBUMIN  --   --   --   --   --   --  3.4   BILITOTAL  --   --   --   --   --   --  0.2   ALKPHOS  --   --   --   --   --   --  215*   AST  --   --   --   --   --   --  40   ALT  --   --   --   --   --   --  38    < > = values in this interval not displayed.       INRNo lab results found in last 7 days.    Blood CultureNo results for input(s): CULT in the last 168 hours.      Jones Peñaloza MD  [facility]  4/6/2022 7:25 PM

## 2022-04-07 NOTE — PLAN OF CARE
A/O, VSS Flat affect pale Patient took off oxygen at night and was found to have Stats in the 70's.  Placed a oxy mask on pt o2 5L stats came up to 94%.  Pain 5/10 goal is a 3.  Pain is in bilateral hips, arms and back. On scheduled atarax, Toradol and MS contin.  Voiding per urinal refused to reposition.  Has not been out of bed since his R hip surgery.  OT/PT are following

## 2022-04-08 ENCOUNTER — APPOINTMENT (OUTPATIENT)
Dept: CT IMAGING | Facility: CLINIC | Age: 47
DRG: 480 | End: 2022-04-08
Attending: INTERNAL MEDICINE
Payer: MEDICARE

## 2022-04-08 ENCOUNTER — APPOINTMENT (OUTPATIENT)
Dept: GENERAL RADIOLOGY | Facility: CLINIC | Age: 47
DRG: 480 | End: 2022-04-08
Attending: ORTHOPAEDIC SURGERY
Payer: MEDICARE

## 2022-04-08 LAB
ANION GAP SERPL CALCULATED.3IONS-SCNC: 6 MMOL/L (ref 3–14)
BASE EXCESS BLDV CALC-SCNC: -0.1 MMOL/L (ref -7.7–1.9)
BASOPHILS # BLD AUTO: 0 10E3/UL (ref 0–0.2)
BASOPHILS NFR BLD AUTO: 0 %
BUN SERPL-MCNC: 13 MG/DL (ref 7–30)
CALCIUM SERPL-MCNC: 7.9 MG/DL (ref 8.5–10.1)
CHLORIDE BLD-SCNC: 108 MMOL/L (ref 94–109)
CO2 SERPL-SCNC: 24 MMOL/L (ref 20–32)
CREAT SERPL-MCNC: 0.49 MG/DL (ref 0.66–1.25)
DEPRECATED CALCIDIOL+CALCIFEROL SERPL-MC: <22 UG/L (ref 20–75)
EOSINOPHIL # BLD AUTO: 0.5 10E3/UL (ref 0–0.7)
EOSINOPHIL NFR BLD AUTO: 5 %
ERYTHROCYTE [DISTWIDTH] IN BLOOD BY AUTOMATED COUNT: 15.5 % (ref 10–15)
GFR SERPL CREATININE-BSD FRML MDRD: >90 ML/MIN/1.73M2
GLUCOSE BLD-MCNC: 121 MG/DL (ref 70–99)
HCO3 BLDV-SCNC: 24 MMOL/L (ref 21–28)
HCT VFR BLD AUTO: 22 % (ref 40–53)
HGB BLD-MCNC: 7 G/DL (ref 13.3–17.7)
HGB BLD-MCNC: 7.1 G/DL (ref 13.3–17.7)
IMM GRANULOCYTES # BLD: 0.1 10E3/UL
IMM GRANULOCYTES NFR BLD: 1 %
LDH SERPL L TO P-CCNC: 1619 U/L (ref 85–227)
LYMPHOCYTES # BLD AUTO: 1.6 10E3/UL (ref 0.8–5.3)
LYMPHOCYTES NFR BLD AUTO: 18 %
MAGNESIUM SERPL-MCNC: 1.7 MG/DL (ref 1.6–2.3)
MCH RBC QN AUTO: 32.6 PG (ref 26.5–33)
MCHC RBC AUTO-ENTMCNC: 32.3 G/DL (ref 31.5–36.5)
MCV RBC AUTO: 101 FL (ref 78–100)
MONOCYTES # BLD AUTO: 0.5 10E3/UL (ref 0–1.3)
MONOCYTES NFR BLD AUTO: 5 %
NEUTROPHILS # BLD AUTO: 6.5 10E3/UL (ref 1.6–8.3)
NEUTROPHILS NFR BLD AUTO: 71 %
O2/TOTAL GAS SETTING VFR VENT: 15 %
PATH REPORT.COMMENTS IMP SPEC: NORMAL
PATH REPORT.FINAL DX SPEC: NORMAL
PATH REPORT.GROSS SPEC: NORMAL
PATH REPORT.MICROSCOPIC SPEC OTHER STN: NORMAL
PATH REPORT.RELEVANT HX SPEC: NORMAL
PCO2 BLDV: 36 MM HG (ref 40–50)
PH BLDV: 7.43 [PH] (ref 7.32–7.43)
PHOSPHATE SERPL-MCNC: 1.2 MG/DL (ref 2.5–4.5)
PHOSPHATE SERPL-MCNC: 2.1 MG/DL (ref 2.5–4.5)
PHOTO IMAGE: NORMAL
PLATELET # BLD AUTO: 173 10E3/UL (ref 150–450)
PO2 BLDV: 47 MM HG (ref 25–47)
POTASSIUM BLD-SCNC: 3.5 MMOL/L (ref 3.4–5.3)
POTASSIUM BLD-SCNC: 4.1 MMOL/L (ref 3.4–5.3)
PROCALCITONIN SERPL-MCNC: 0.91 NG/ML
RBC # BLD AUTO: 2.18 10E6/UL (ref 4.4–5.9)
SODIUM SERPL-SCNC: 138 MMOL/L (ref 133–144)
VITAMIN D2 SERPL-MCNC: <5 UG/L
VITAMIN D3 SERPL-MCNC: 17 UG/L
WBC # BLD AUTO: 9.1 10E3/UL (ref 4–11)
WBC # BLD AUTO: NORMAL 10*3/UL

## 2022-04-08 PROCEDURE — 84145 PROCALCITONIN (PCT): CPT | Performed by: INTERNAL MEDICINE

## 2022-04-08 PROCEDURE — 999N000157 HC STATISTIC RCP TIME EA 10 MIN

## 2022-04-08 PROCEDURE — 84100 ASSAY OF PHOSPHORUS: CPT | Performed by: ORTHOPAEDIC SURGERY

## 2022-04-08 PROCEDURE — 82310 ASSAY OF CALCIUM: CPT | Performed by: STUDENT IN AN ORGANIZED HEALTH CARE EDUCATION/TRAINING PROGRAM

## 2022-04-08 PROCEDURE — 88341 IMHCHEM/IMCYTCHM EA ADD ANTB: CPT | Mod: 26 | Performed by: PATHOLOGY

## 2022-04-08 PROCEDURE — 85018 HEMOGLOBIN: CPT | Performed by: STUDENT IN AN ORGANIZED HEALTH CARE EDUCATION/TRAINING PROGRAM

## 2022-04-08 PROCEDURE — 250N000013 HC RX MED GY IP 250 OP 250 PS 637

## 2022-04-08 PROCEDURE — 250N000013 HC RX MED GY IP 250 OP 250 PS 637: Performed by: ORTHOPAEDIC SURGERY

## 2022-04-08 PROCEDURE — 99207 PR APP CREDIT; MD BILLING SHARED VISIT: CPT | Performed by: INTERNAL MEDICINE

## 2022-04-08 PROCEDURE — 99222 1ST HOSP IP/OBS MODERATE 55: CPT | Mod: 24 | Performed by: INTERNAL MEDICINE

## 2022-04-08 PROCEDURE — 99233 SBSQ HOSP IP/OBS HIGH 50: CPT | Performed by: NURSE PRACTITIONER

## 2022-04-08 PROCEDURE — 83735 ASSAY OF MAGNESIUM: CPT | Performed by: ORTHOPAEDIC SURGERY

## 2022-04-08 PROCEDURE — 36415 COLL VENOUS BLD VENIPUNCTURE: CPT | Performed by: STUDENT IN AN ORGANIZED HEALTH CARE EDUCATION/TRAINING PROGRAM

## 2022-04-08 PROCEDURE — 999N000111 HC STATISTIC OT IP EVAL DEFER

## 2022-04-08 PROCEDURE — 250N000011 HC RX IP 250 OP 636: Performed by: NURSE PRACTITIONER

## 2022-04-08 PROCEDURE — 87449 NOS EACH ORGANISM AG IA: CPT | Performed by: INTERNAL MEDICINE

## 2022-04-08 PROCEDURE — 88307 TISSUE EXAM BY PATHOLOGIST: CPT | Mod: 26 | Performed by: PATHOLOGY

## 2022-04-08 PROCEDURE — 36415 COLL VENOUS BLD VENIPUNCTURE: CPT | Performed by: INTERNAL MEDICINE

## 2022-04-08 PROCEDURE — 83615 LACTATE (LD) (LDH) ENZYME: CPT | Performed by: INTERNAL MEDICINE

## 2022-04-08 PROCEDURE — 250N000011 HC RX IP 250 OP 636: Performed by: STUDENT IN AN ORGANIZED HEALTH CARE EDUCATION/TRAINING PROGRAM

## 2022-04-08 PROCEDURE — 120N000001 HC R&B MED SURG/OB

## 2022-04-08 PROCEDURE — 85025 COMPLETE CBC W/AUTO DIFF WBC: CPT | Performed by: STUDENT IN AN ORGANIZED HEALTH CARE EDUCATION/TRAINING PROGRAM

## 2022-04-08 PROCEDURE — G1004 CDSM NDSC: HCPCS

## 2022-04-08 PROCEDURE — 258N000003 HC RX IP 258 OP 636: Performed by: STUDENT IN AN ORGANIZED HEALTH CARE EDUCATION/TRAINING PROGRAM

## 2022-04-08 PROCEDURE — 250N000009 HC RX 250: Performed by: STUDENT IN AN ORGANIZED HEALTH CARE EDUCATION/TRAINING PROGRAM

## 2022-04-08 PROCEDURE — 94640 AIRWAY INHALATION TREATMENT: CPT

## 2022-04-08 PROCEDURE — 71045 X-RAY EXAM CHEST 1 VIEW: CPT

## 2022-04-08 PROCEDURE — 88342 IMHCHEM/IMCYTCHM 1ST ANTB: CPT | Mod: 26 | Performed by: PATHOLOGY

## 2022-04-08 PROCEDURE — 250N000013 HC RX MED GY IP 250 OP 250 PS 637: Performed by: NURSE PRACTITIONER

## 2022-04-08 PROCEDURE — 82803 BLOOD GASES ANY COMBINATION: CPT | Performed by: INTERNAL MEDICINE

## 2022-04-08 PROCEDURE — 84100 ASSAY OF PHOSPHORUS: CPT | Performed by: STUDENT IN AN ORGANIZED HEALTH CARE EDUCATION/TRAINING PROGRAM

## 2022-04-08 PROCEDURE — 36415 COLL VENOUS BLD VENIPUNCTURE: CPT | Performed by: ORTHOPAEDIC SURGERY

## 2022-04-08 PROCEDURE — 99233 SBSQ HOSP IP/OBS HIGH 50: CPT | Performed by: STUDENT IN AN ORGANIZED HEALTH CARE EDUCATION/TRAINING PROGRAM

## 2022-04-08 PROCEDURE — 80048 BASIC METABOLIC PNL TOTAL CA: CPT | Performed by: STUDENT IN AN ORGANIZED HEALTH CARE EDUCATION/TRAINING PROGRAM

## 2022-04-08 PROCEDURE — 83735 ASSAY OF MAGNESIUM: CPT | Performed by: INTERNAL MEDICINE

## 2022-04-08 PROCEDURE — 87581 M.PNEUMON DNA AMP PROBE: CPT | Performed by: INTERNAL MEDICINE

## 2022-04-08 PROCEDURE — 87205 SMEAR GRAM STAIN: CPT | Performed by: INTERNAL MEDICINE

## 2022-04-08 PROCEDURE — 250N000011 HC RX IP 250 OP 636: Performed by: INTERNAL MEDICINE

## 2022-04-08 PROCEDURE — 94660 CPAP INITIATION&MGMT: CPT

## 2022-04-08 PROCEDURE — 250N000013 HC RX MED GY IP 250 OP 250 PS 637: Performed by: INTERNAL MEDICINE

## 2022-04-08 PROCEDURE — 250N000013 HC RX MED GY IP 250 OP 250 PS 637: Performed by: STUDENT IN AN ORGANIZED HEALTH CARE EDUCATION/TRAINING PROGRAM

## 2022-04-08 RX ORDER — IOPAMIDOL 755 MG/ML
500 INJECTION, SOLUTION INTRAVASCULAR ONCE
Status: COMPLETED | OUTPATIENT
Start: 2022-04-08 | End: 2022-04-08

## 2022-04-08 RX ORDER — CARBOXYMETHYLCELLULOSE SODIUM 5 MG/ML
1 SOLUTION/ DROPS OPHTHALMIC
Status: DISCONTINUED | OUTPATIENT
Start: 2022-04-08 | End: 2022-01-01 | Stop reason: HOSPADM

## 2022-04-08 RX ORDER — IPRATROPIUM BROMIDE AND ALBUTEROL SULFATE 2.5; .5 MG/3ML; MG/3ML
3 SOLUTION RESPIRATORY (INHALATION) EVERY 4 HOURS PRN
Status: DISCONTINUED | OUTPATIENT
Start: 2022-04-08 | End: 2022-01-01 | Stop reason: HOSPADM

## 2022-04-08 RX ORDER — CALCIUM CARBONATE 500 MG/1
500-1000 TABLET, CHEWABLE ORAL 3 TIMES DAILY PRN
Status: DISCONTINUED | OUTPATIENT
Start: 2022-04-08 | End: 2022-01-01 | Stop reason: HOSPADM

## 2022-04-08 RX ORDER — NAPROXEN 250 MG/1
500 TABLET ORAL 2 TIMES DAILY WITH MEALS
Status: DISCONTINUED | OUTPATIENT
Start: 2022-01-01 | End: 2022-01-01 | Stop reason: HOSPADM

## 2022-04-08 RX ORDER — MAGNESIUM SULFATE HEPTAHYDRATE 40 MG/ML
2 INJECTION, SOLUTION INTRAVENOUS ONCE
Status: COMPLETED | OUTPATIENT
Start: 2022-04-08 | End: 2022-04-08

## 2022-04-08 RX ADMIN — SENNOSIDES AND DOCUSATE SODIUM 1 TABLET: 50; 8.6 TABLET ORAL at 08:15

## 2022-04-08 RX ADMIN — MORPHINE SULFATE 300 MG: 30 TABLET, FILM COATED, EXTENDED RELEASE ORAL at 00:11

## 2022-04-08 RX ADMIN — GABAPENTIN 1200 MG: 600 TABLET, FILM COATED ORAL at 08:15

## 2022-04-08 RX ADMIN — METHOCARBAMOL 750 MG: 750 TABLET ORAL at 20:08

## 2022-04-08 RX ADMIN — NICOTINE 1 PATCH: 21 PATCH, EXTENDED RELEASE TRANSDERMAL at 08:16

## 2022-04-08 RX ADMIN — SALINE NASAL SPRAY 1 SPRAY: 1.5 SOLUTION NASAL at 18:17

## 2022-04-08 RX ADMIN — FAMOTIDINE 10 MG: 10 TABLET ORAL at 08:15

## 2022-04-08 RX ADMIN — ACETAMINOPHEN 975 MG: 325 TABLET, FILM COATED ORAL at 06:05

## 2022-04-08 RX ADMIN — LORAZEPAM 1 MG: 2 LIQUID ORAL at 04:25

## 2022-04-08 RX ADMIN — MORPHINE SULFATE 8 MG: 4 INJECTION INTRAVENOUS at 08:15

## 2022-04-08 RX ADMIN — AZITHROMYCIN MONOHYDRATE 500 MG: 500 INJECTION, POWDER, LYOPHILIZED, FOR SOLUTION INTRAVENOUS at 17:31

## 2022-04-08 RX ADMIN — IPRATROPIUM BROMIDE AND ALBUTEROL SULFATE 3 ML: .5; 3 SOLUTION RESPIRATORY (INHALATION) at 14:29

## 2022-04-08 RX ADMIN — MORPHINE SULFATE 8 MG: 4 INJECTION INTRAVENOUS at 14:40

## 2022-04-08 RX ADMIN — PREGABALIN 300 MG: 300 CAPSULE ORAL at 08:15

## 2022-04-08 RX ADMIN — MAGNESIUM SULFATE HEPTAHYDRATE 2 G: 40 INJECTION, SOLUTION INTRAVENOUS at 09:29

## 2022-04-08 RX ADMIN — SODIUM PHOSPHATE, MONOBASIC, MONOHYDRATE 30 MMOL: 276; 142 INJECTION, SOLUTION INTRAVENOUS at 10:39

## 2022-04-08 RX ADMIN — HYDROXYZINE HYDROCHLORIDE 100 MG: 50 TABLET, FILM COATED ORAL at 06:05

## 2022-04-08 RX ADMIN — GABAPENTIN 1200 MG: 600 TABLET, FILM COATED ORAL at 20:08

## 2022-04-08 RX ADMIN — MORPHINE SULFATE 4 MG: 4 INJECTION INTRAVENOUS at 12:09

## 2022-04-08 RX ADMIN — CALCIUM CARBONATE (ANTACID) CHEW TAB 500 MG 1000 MG: 500 CHEW TAB at 20:08

## 2022-04-08 RX ADMIN — PREGABALIN 300 MG: 300 CAPSULE ORAL at 20:08

## 2022-04-08 RX ADMIN — METHOCARBAMOL 750 MG: 750 TABLET ORAL at 13:51

## 2022-04-08 RX ADMIN — MORPHINE SULFATE 8 MG: 4 INJECTION INTRAVENOUS at 04:30

## 2022-04-08 RX ADMIN — ACETAMINOPHEN 975 MG: 325 TABLET, FILM COATED ORAL at 22:19

## 2022-04-08 RX ADMIN — MORPHINE SULFATE 300 MG: 30 TABLET, FILM COATED, EXTENDED RELEASE ORAL at 18:17

## 2022-04-08 RX ADMIN — CEFTRIAXONE 1 G: 1 INJECTION, POWDER, FOR SOLUTION INTRAMUSCULAR; INTRAVENOUS at 16:16

## 2022-04-08 RX ADMIN — HYDROXYZINE HYDROCHLORIDE 100 MG: 50 TABLET, FILM COATED ORAL at 18:17

## 2022-04-08 RX ADMIN — MORPHINE SULFATE 300 MG: 30 TABLET, FILM COATED, EXTENDED RELEASE ORAL at 06:04

## 2022-04-08 RX ADMIN — KETOROLAC TROMETHAMINE 30 MG: 30 INJECTION, SOLUTION INTRAMUSCULAR; INTRAVENOUS at 16:05

## 2022-04-08 RX ADMIN — METHOCARBAMOL 750 MG: 750 TABLET ORAL at 08:15

## 2022-04-08 RX ADMIN — MORPHINE SULFATE 8 MG: 4 INJECTION INTRAVENOUS at 17:46

## 2022-04-08 RX ADMIN — HYDROXYZINE HYDROCHLORIDE 100 MG: 50 TABLET, FILM COATED ORAL at 00:12

## 2022-04-08 RX ADMIN — IOPAMIDOL 55 ML: 755 INJECTION, SOLUTION INTRAVENOUS at 05:00

## 2022-04-08 RX ADMIN — MORPHINE SULFATE 300 MG: 30 TABLET, FILM COATED, EXTENDED RELEASE ORAL at 11:59

## 2022-04-08 RX ADMIN — LORAZEPAM 1 MG: 2 LIQUID ORAL at 16:15

## 2022-04-08 RX ADMIN — MORPHINE SULFATE 8 MG: 4 INJECTION INTRAVENOUS at 22:40

## 2022-04-08 RX ADMIN — ACETAMINOPHEN 975 MG: 325 TABLET, FILM COATED ORAL at 13:51

## 2022-04-08 RX ADMIN — HYDROXYZINE HYDROCHLORIDE 100 MG: 50 TABLET, FILM COATED ORAL at 12:20

## 2022-04-08 RX ADMIN — KETOROLAC TROMETHAMINE 30 MG: 30 INJECTION, SOLUTION INTRAMUSCULAR; INTRAVENOUS at 04:24

## 2022-04-08 RX ADMIN — GABAPENTIN 1200 MG: 600 TABLET, FILM COATED ORAL at 13:51

## 2022-04-08 RX ADMIN — KETOROLAC TROMETHAMINE 30 MG: 30 INJECTION, SOLUTION INTRAMUSCULAR; INTRAVENOUS at 22:19

## 2022-04-08 RX ADMIN — LORAZEPAM 1 MG: 2 LIQUID ORAL at 22:18

## 2022-04-08 RX ADMIN — MULTIPLE VITAMINS W/ MINERALS TAB 1 TABLET: TAB at 08:15

## 2022-04-08 ASSESSMENT — ACTIVITIES OF DAILY LIVING (ADL)
ADLS_ACUITY_SCORE: 17

## 2022-04-08 ASSESSMENT — ENCOUNTER SYMPTOMS
COUGH: 0
NAUSEA: 0
SPUTUM PRODUCTION: 1
WEAKNESS: 1
SHORTNESS OF BREATH: 1
BRUISES/BLEEDS EASILY: 0
STRIDOR: 0
HEARTBURN: 0
WHEEZING: 0
HEMOPTYSIS: 0
SINUS PAIN: 0
FEVER: 0
EYES NEGATIVE: 1
CHILLS: 0
CARDIOVASCULAR NEGATIVE: 1
VOMITING: 0
FALLS: 1

## 2022-04-08 NOTE — PLAN OF CARE
OT: Orders received. Chart reviewed and discussed with care team.  OT not indicated due to pt's decreased activity tolerance and current functional limitations. PT to continue to address mobility needs while IP. Chart indicates potential discharge to home on hospice.  Defer discharge recommendations to PT/care team.  Will complete IP OT orders.

## 2022-04-08 NOTE — PROGRESS NOTES
Paged for worsening hypoxia and now on 15L oxygen. Chart reviewed and pt examined. He informs he feels more SOB than earlier today. His rib fractures on the right don't hurt too bad. He does not want to do IS at present and states he is comfortable on face mask at 15 L for now. He has been having small amount of hemoptysis.    Temp: 97.9  F (36.6  C) Temp src: Temporal BP: 95/56 Pulse: 102   Resp: 17 SpO2: (!) 89 % O2 Device: Nasal cannula Oxygen Delivery: 6 LPM     Gen - AAO x 3, NAD.  Lungs - + crackles B.  Heart - tachycardic, S1+S2 nml, no m/g/r.    A/P - encourage IS, continue oxygen delivery for sats > 92%, can use high flow if needed.  - I discussed using BiPAP and he is ok if his sats go lower on max oxygen.  - discussed with patient, rationale for CT chest to r/o PE given post-op and worsening hypoxia. He is agreeable.    CXR - no PTX, coarse interstitial infiltrates given shallow inspiration.

## 2022-04-08 NOTE — PLAN OF CARE
Goal Outcome Evaluation:    Plan of Care Reviewed With: patient       Pain well controlled with pain team regime.  Given three doses of IV Morphine to help him relax and slow breathing with good success.  Patient able to rest today.  LS diminished.  Voiding in adequate amounts.  CMS intact.  Aquacel to right hip and left hip and knee.  Respiratory weaned him off CPAP and is now on Oxymask.  Nebs ordered and given

## 2022-04-08 NOTE — CONSULTS
"CLINICAL NUTRITION SERVICES  -  ASSESSMENT NOTE      Recommendations:   Continue diet as ordered.  Discussed ability to order small/frequent meals if needed/desired.    Ok for prn Ensure.  Discussed available flavors and how to order via room service.     Malnutrition Diagnosis: Unable to determine due to lack of nutrition hx, NFPE, not able to confirm wt trends with patient          REASON FOR ASSESSMENT  Beto Tran is a 46 year old male seen by Registered Dietitian for LOS.    PMH of: MM.    Admit 2/2: Fall w/ femur fracture + rib fractures s/p repair on 4/03.    NUTRITION HISTORY  - Information obtained from mostly chart as limited from patient himself.  He at first declined to meet w/ me, therefore nutrition history and NFPE not obtained, able to discuss availability of supplements and that RD available to be paged as needed throughout admit.    - On hospice PTA for 7 years per review of Oncology notes, pain/medication related.  - Allergies: NKFA.        CURRENT NUTRITION ORDERS  Diet Order:     Regular    Current Intake/Tolerance:  Minimal flowsheet recordings.  Missed meals or small meals throughout admit.  Suspect meeting <75% needs (or less) during admit and possibly chronically based on wt trends, if admit wt accurate.      NUTRITION FOCUSED PHYSICAL ASSESSMENT FOR DIAGNOSING MALNUTRITION)  No, patient declined    Obtained from Chart/Interdisciplinary Team:  - No documentation of PI  - Stooling patterns reviewed    ANTHROPOMETRICS  Height: 5' 9\"  Weight: 153 lbs 3.2 oz  Body mass index is 22.62 kg/m .  Weight Status:  Normal BMI  Weight History:  Wt Readings from Last 10 Encounters:   04/03/22 69.5 kg (153 lb 3.2 oz)   01/02/22 73.3 kg (161 lb 11.2 oz)   12/22/21 73.8 kg (162 lb 11.2 oz)   06/19/15 72.7 kg (160 lb 4.8 oz)   02/20/15 73.8 kg (162 lb 11.2 oz)   02/11/15 76.1 kg (167 lb 11.2 oz)   02/10/15 76.7 kg (169 lb 1.5 oz)   01/30/15 77.3 kg (170 lb 6.7 oz)   01/21/15 75.9 kg (167 lb 6.4 oz) "   01/07/15 77.5 kg (170 lb 12.8 oz)     - Unclear if admit wt accurate as also w/ wt of 165# from 3/16/2022.  Would indicate 5% wt loss in the past ~1 month.  Not able to confirm wt or PO trends with patient today.      LABS  Labs reviewed:  Electrolytes  Potassium (mmol/L)   Date Value   04/08/2022 4.1   04/07/2022 4.0   04/06/2022 4.1   06/18/2015 3.2 (L)   06/12/2015 3.6   06/08/2015 3.9     Phosphorus (mg/dL)   Date Value   04/08/2022 1.2 (L)   04/07/2022 2.0 (L)   04/06/2022 2.2 (L)   04/05/2022 2.9   04/05/2022 2.0 (L)   12/12/2014 2.3 (L)   10/23/2014 3.8   10/22/2014 3.0   05/07/2014 4.9 (H)    Blood Glucose  Glucose (mg/dL)   Date Value   04/05/2022 102 (H)   04/04/2022 126 (H)   04/03/2022 106 (H)   01/11/2022 97   01/05/2022 94   01/30/2015 97   01/07/2015 93   12/23/2014 80   12/19/2014 109 (H)   12/12/2014 136 (H)    Inflammatory Markers  CRP Inflammation (mg/L)   Date Value   05/07/2014 <5.0   04/14/2014 <5.0     WBC (10e9/L)   Date Value   06/18/2015 6.4   06/12/2015 3.9 (L)   06/08/2015 4.2     WBC Count (10e3/uL)   Date Value   04/08/2022 9.1   04/06/2022 7.6   04/05/2022 7.1     Albumin (g/dL)   Date Value   04/03/2022 3.4   02/20/2015 4.5   01/30/2015 4.0   01/07/2015 3.9      Magnesium (mg/dL)   Date Value   04/08/2022 1.7   04/07/2022 1.8   04/06/2022 1.7   11/07/2014 1.8   10/23/2014 1.5 (L)   10/22/2014 1.4 (L)     Sodium (mmol/L)   Date Value   04/05/2022 140   04/04/2022 137   04/03/2022 137   01/30/2015 138   01/07/2015 137   12/23/2014 138    Renal  Urea Nitrogen (mg/dL)   Date Value   04/05/2022 13   04/04/2022 8   04/03/2022 6 (L)   01/30/2015 12   01/07/2015 8   12/23/2014 10     Creatinine (mg/dL)   Date Value   04/05/2022 0.54 (L)   04/04/2022 0.41 (L)   04/03/2022 0.40 (L)   02/20/2015 0.60 (L)   01/30/2015 0.61 (L)   01/07/2015 0.52 (L)     Additional  Triglycerides (mg/dL)   Date Value   04/18/2014 244   04/14/2014 164 (H)     Ketones Urine (mg/dL)   Date Value   12/19/2014  Negative        B/P: 108/55, T: 97.5, P: 90, R: 20      MEDICATIONS  Medications reviewed:    acetaminophen  975 mg Oral Q8H GEETA     azithromycin  500 mg Intravenous Q24H     cefTRIAXone  1 g Intravenous Q24H     diclofenac  4 g Topical 4x Daily     [Held by provider] enoxaparin ANTICOAGULANT  40 mg Subcutaneous Q24H     famotidine  10 mg Oral Daily     gabapentin  1,200 mg Oral TID     hydrOXYzine   mg Oral Q6H     ketorolac  30 mg Intravenous Q6H     lidocaine  2 patch Transdermal Q24h    And     lidocaine   Transdermal Q8H     LORazepam  1-2 mg Oral At Bedtime     methocarbamol  750 mg Oral TID     methylnaltrexone  12 mg Subcutaneous Every Other Day     morphine  300 mg Oral 4x Daily     multivitamin w/minerals  1 tablet Oral Daily     [Held by provider] naproxen  500 mg Oral BID w/meals     nicotine  1 patch Transdermal Daily     nicotine   Transdermal Q8H     polyethylene glycol  17 g Oral Daily     pregabalin  300 mg Oral BID     senna-docusate  1 tablet Oral BID     sodium chloride (PF)  3 mL Intracatheter Q8H     sodium phosphate  30 mmol Intravenous Once        - MEDICATION INSTRUCTIONS -       - MEDICATION INSTRUCTIONS -            ASSESSED NUTRITION NEEDS PER APPROVED PRACTICE GUIDELINES:    Dosing Weight 70 kg   Estimated Energy Needs: 25-30+ Kcal/Kg  Justification: minimum maintenance  Estimated Protein Needs: 1-1.2+ g pro/Kg  Justification: preservation of lean body mass  Estimated Fluid Needs: per MD      NUTRITION DIAGNOSIS:  Inadequate oral intake related to suspect decreased appetite as evidenced by meeting <75% needs (or less) during admit.    NUTRITION INTERVENTIONS  Recommendations / Nutrition Prescription  Continue diet as ordered.  Discussed ability to order small/frequent meals if needed/desired.    Ok for prn Ensure.  Discussed available flavors and how to order via room service.      Implementation  Nutrition education: Provided education on above.  Also discussed RD available to be  paged by nursing as needed.    Medical Food Supplement: As above.    Nutrition Goals  Patient to consume at least 50-75% of meals or supplements TID while acutely admitted.       MONITORING AND EVALUATION:  Progress towards goals will be monitored and evaluated per protocol and Practice Guidelines          Kasia Guzmán RDN, LD  Clinical Dietitian  3rd floor/ICU: [phone]  All other floors: [phone]  Weekend/holiday: [phone]  Office: [phone]

## 2022-04-08 NOTE — PLAN OF CARE
A&Ox4. IV SL. Baseline numbness and tingling in hands and feet.   Patient vital signs are at baseline: No,  Reason:  HR tachy. Soft Bps in the 90s/50s. Pt placed on remote tele overnight. [facility] Pt is now on Cpap. Respiratory therapy following.   Patient able to ambulate as they were prior to admission or with assist devices provided by therapies during their stay:  No,  Reason:  Pivot transfers with Ax1 with the walker to the [facility].   Patient MUST void prior to discharge:  Yes, voiding in the bedside urinal.   Patient able to tolerate oral intake:  Yes, tolerating a regular diet.   Pain has adequate pain control using Oral analgesics:  No, pt has consistent 8/10 pain in his R ribs.   Does patient have an identified :  No,  Reason:  Pt not medically stable to be discharged.   Has goal D/C date and time been discussed with patient:  No,  Reason:  SW following. Pt not medically stable to be discharged.

## 2022-04-08 NOTE — CONSULTS
"                                                         Pulmonary Consult  Beto Tran MRN: [id number]  1975  Date of Admission:4/3/2022  Primary care provider: No Ref-Primary, Physician  ___________________________________    Beto Tran MRN# [id number]   YOB: 1975 Age: 46 year old   Date of Admission: 4/3/2022     Reason for consult: I was asked by Dr. Coto to evaluate this patient for \"Respiratory failure, concern for hypersensitivity pneumoninitis\".          Assessment and Recommendations:       ## Possible hypersensitivity pneumonitis  ## Possible atypical infection  No recognized triggers for hypersensitivity pneumonitis.  Atypical/viral infection is possible, though the onset is surprisingly rapid and would expect other infectious symptoms such as fever or malaise.  PJP is a consideration in his immunocompromised state, however I would expect him to have a cough if this were the case.  His I/Os appear net negative so volume overload is unlikely.  No hemoptysis to suggest diffuse alveolar hemorrhage, though this is in the differential.    There are case reports of interstitial lung disease secondary to multiple myeloma in patients who have undergone stem cell transplant so this is also a consideration.    -Check procalcitonin (ordered)  -Check 1 3 beta D glucan (ordered)  -Sputum culture (ordered)  -Respiratory viral panel (ordered)  -CBC with differential to assess for eosinophilia (ordered)    -If he continues to have high oxygen requirements, high flow nasal cannula may be helpful  -If respiratory status continues to decline, a trial of high-dose steroids could be considered.  Could start with 60 mg prednisone equivalent per day, though if there is significant change recommend contacting on-call pulmonologist for additional recommendations.    If the etiology remains unclear and he is not showing signs of improvement on Monday, will consider bronchoscopy with BAL for " flow cytometry and cell counts.  -N.p.o. after midnight on Sunday        Massimo Frankel M.D.  Pulmonary & Critical Care  Pager: Click Here to page    Pulmonary will continue to follow. We are in house at [facility] on Monday, Wednesday, and Friday. For assistance on other days, please page the on-call pulmonologist through [facility] or the .             HPI:     Beto Tran is a 46 year old male with complex medical history including multiple myeloma, GERD, osteoporosis admitted 4/3/2022 for left hip pain and right chest pain after falling while transferring at home being seen for concern for hypersensitivity pneumonitis.    Found to have a left femur fracture repaired operatively on 4/3    Of note he has been on hospice for approximately 8 years, though is currently working on transitioning to restorative course.    Previously underwent autologous stem cell transplant several years ago.  Followed by the [facility].  Per the patient it sounds like there are no other effective treatments at this time.    He was asymptomatic from a respiratory standpoint until the morning of 8/7 when he developed sudden onset of dyspnea and hypoxia.  He requires approximately 15 L supplemental oxygen.    Chest CT with diffuse groundglass infiltrates concerning for atypical infection versus hypersensitivity pneumonitis.    He has no identified triggers for hypersensitivity pneumonitis.  He has not been working in the past several years due to his multiple myeloma.  He lives in a condo with radiator heat and no humidifiers.  No known mold or water damage.  He does not use a sauna, hot tub, or pool.  He does not pointy wind instruments.  He does not have any pets.  He does smoke approximately half pack per day of cigarettes but does not use any vaporizer device and does not smoke marijuana.  No recent medication changes recognized to cause hypersensitivity pneumonitis.         Past Medical History:     Past Medical History:    Diagnosis Date     Anesthesia complication     states woke up during procedure (endoscopic ultrasound) in June of 2014     Broken rib      Cancer (H)      Gastro-oesophageal reflux disease      Multiple myeloma (H)      Osteoporosis               Past Surgical History:      Past Surgical History:   Procedure Laterality Date     OPEN REDUCTION INTERNAL FIXATION HIP NAILING Right 4/6/2022    Procedure: Prophylactic fixation of right impending pathologic femur fracture with intramedullary nail;  Surgeon: Tuan Alcantara MD;  Location: [facility] OR     OPEN REDUCTION INTERNAL FIXATION RODDING INTRAMEDULLAR FEMUR FRACTURE TABLE Left 4/3/2022    Procedure: Surgical treatment of left pathologic subtrochanteric femur fracture with cephalomedullary device;  Surgeon: Tuan Alcantara MD;  Location: [facility] OR     OPTICAL TRACKING SYSTEM KYPHOPLASTY N/A 12/16/2014    Procedure: OPTICAL TRACKING SYSTEM KYPHOPLASTY;  Surgeon: Abner Vasquez MD;  Location: [facility] OR     ORTHOPEDIC SURGERY      right thumb surgery     RESECT FIRST RIB  8/14/2012    Procedure: RESECT FIRST RIB;  Left First Rib Resection & Scalentectomy;  Surgeon: Keith Tucker MD;  [facility]              Social History:     Social History     Socioeconomic History     Marital status: Single     Spouse name: Not on file     Number of children: Not on file     Years of education: Not on file     Highest education level: Not on file   Occupational History     Not on file   Tobacco Use     Smoking status: Current Some Day Smoker     Packs/day: 0.25     Years: 25.00     Pack years: 6.25     Types: Cigarettes     Last attempt to quit: 2/1/2012     Years since quitting: 10.1     Smokeless tobacco: Never Used     Tobacco comment: 5-7 cigarettes/day    Substance and Sexual Activity     Alcohol use: Yes     Comment: social     Drug use: No     Sexual activity: Yes     Partners: Female   Other Topics Concern     Parent/sibling w/ CABG, MI or angioplasty before 65F  55M? Not Asked   Social History Narrative     Not on file     Social Determinants of Health     Financial Resource Strain: Not on file   Food Insecurity: Not on file   Transportation Needs: Not on file   Physical Activity: Not on file   Stress: Not on file   Social Connections: Not on file   Intimate Partner Violence: Not on file   Housing Stability: Not on file               Family History:     Family History   Problem Relation Age of Onset     Unknown/Adopted No family hx of      Family History Negative No family hx of      Asthma No family hx of      C.A.D. No family hx of      Diabetes No family hx of      Hypertension No family hx of      Cerebrovascular Disease No family hx of      Breast Cancer No family hx of      Cancer - colorectal No family hx of      Prostate Cancer No family hx of      Alcohol/Drug No family hx of      Allergies No family hx of      Alzheimer Disease No family hx of      Anesthesia Reaction No family hx of      Arthritis No family hx of      Blood Disease No family hx of      Cancer No family hx of      Cardiovascular No family hx of      Circulatory No family hx of      Congenital Anomalies No family hx of      Connective Tissue Disorder No family hx of      Depression No family hx of      Endocrine Disease No family hx of      Eye Disorder No family hx of      Genetic Disorder No family hx of      Gastrointestinal Disease No family hx of      Genitourinary Problems No family hx of      Gynecology No family hx of      Heart Disease No family hx of      Lipids No family hx of      Musculoskeletal Disorder No family hx of      Neurologic Disorder No family hx of      Obesity No family hx of      Osteoporosis No family hx of      Psychotic Disorder No family hx of      Respiratory No family hx of      Thyroid Disease No family hx of      Hearing Loss No family hx of               Immunizations:     Immunization History   Administered Date(s) Administered     COVID-19,PF,Pfizer (12+ Yrs)  04/17/2021, 05/08/2021     COVID-19,PF,Pfizer 12+ Yrs (2022 and After) 02/23/2022     Influenza Vaccine IM > 6 months Valent IIV4 (Alfuria,Fluzone) 12/23/2014     Pneumococcal 23 valent 12/23/2014     Tdap (Adacel,Boostrix) 01/20/2009              Allergies:     Allergies   Allergen Reactions     Nka [No Known Allergies]                 Medications:     Current Facility-Administered Medications   Medication     acetaminophen (TYLENOL) tablet 975 mg     azithromycin 500 mg (ZITHROMAX) in 0.9% NaCl 250 mL intermittent infusion 500 mg     benzocaine-menthol (CHLORASEPTIC) 6-10 MG lozenge 1 lozenge     bisacodyl (DULCOLAX) Suppository 10 mg     carboxymethylcellulose PF (REFRESH PLUS) 0.5 % ophthalmic solution 1 drop     cefTRIAXone (ROCEPHIN) 1 g vial to attach to  mL bag for ADULTS or NS 50 mL bag for PEDS     diclofenac (VOLTAREN) 1 % topical gel 4 g     [Held by provider] enoxaparin ANTICOAGULANT (LOVENOX) injection 40 mg     famotidine (PEPCID) tablet 10 mg     gabapentin (NEURONTIN) tablet 1,200 mg     hydrOXYzine (ATARAX) tablet  mg     ketorolac (TORADOL) injection 30 mg     Lidocaine (LIDOCARE) 4 % Patch 2 patch    And     lidocaine patch in PLACE     lidocaine (LMX4) cream     lidocaine 1 % 0.1-1 mL     LORazepam (ATIVAN) 2 MG/ML (HIGH CONC) oral solution 0.5-1 mg     LORazepam (ATIVAN) 2 MG/ML (HIGH CONC) oral solution 1-2 mg     magnesium hydroxide (MILK OF MAGNESIA) suspension 30 mL     methocarbamol (ROBAXIN) tablet 750 mg     methylnaltrexone (RELISTOR) injection 12 mg     morphine (MS CONTIN) 12 hr tablet 300 mg     morphine (MSIR) IR tablet 60 mg     morphine (PF) injection 4-8 mg     multivitamin w/minerals (THERA-VIT-M) tablet 1 tablet     naloxone (NARCAN) injection 0.2 mg    Or     naloxone (NARCAN) injection 0.4 mg    Or     naloxone (NARCAN) injection 0.2 mg    Or     naloxone (NARCAN) injection 0.4 mg     [Held by provider] naproxen (NAPROSYN) tablet 500 mg     nicotine (NICODERM CQ)  "21 MG/24HR 24 hr patch 1 patch     nicotine (NICORETTE) gum 2 mg     nicotine Patch in Place     No lozenges or gum should be given while patient on BIPAP/AVAPS/AVAPS AE     ondansetron (ZOFRAN-ODT) ODT tab 4 mg    Or     ondansetron (ZOFRAN) injection 4 mg     Patient may continue current oral medications     polyethylene glycol (MIRALAX) Packet 17 g     pregabalin (LYRICA) capsule CAPS 300 mg     prochlorperazine (COMPAZINE) injection 10 mg    Or     prochlorperazine (COMPAZINE) tablet 10 mg     senna-docusate (SENOKOT-S/PERICOLACE) 8.6-50 MG per tablet 1 tablet     senna-docusate (SENOKOT-S/PERICOLACE) 8.6-50 MG per tablet 1 tablet     sodium chloride (PF) 0.9% PF flush 3 mL     sodium chloride (PF) 0.9% PF flush 3 mL     sodium phosphate 30 mmol in D5W 250 mL intermittent infusion               Review of Systems:     Review of Systems   Constitutional: Negative for chills, fever and malaise/fatigue.   HENT: Negative for congestion and sinus pain.    Eyes: Negative.    Respiratory: Positive for sputum production and shortness of breath. Negative for cough, hemoptysis, wheezing and stridor.    Cardiovascular: Negative.    Gastrointestinal: Negative for heartburn, nausea and vomiting.   Genitourinary: Negative.    Musculoskeletal: Positive for falls and joint pain.   Skin: Negative.    Neurological: Positive for weakness.   Endo/Heme/Allergies: Does not bruise/bleed easily.              Exam:   /55 (BP Location: Right arm)   Pulse 90   Temp 97.5  F (36.4  C) (Temporal)   Resp 20   Ht 1.753 m (5' 9\")   Wt 69.5 kg (153 lb 3.2 oz)   SpO2 94%   BMI 22.62 kg/m      Vitals:    04/03/22 0855   Weight: 69.5 kg (153 lb 3.2 oz)         Physical Exam  Vitals and nursing note reviewed.   Constitutional:       Appearance: He is normal weight.   HENT:      Head: Normocephalic and atraumatic.      Mouth/Throat:      Mouth: Mucous membranes are moist.      Pharynx: Oropharynx is clear.   Eyes:      Extraocular " Movements: Extraocular movements intact.      Conjunctiva/sclera: Conjunctivae normal.      Pupils: Pupils are equal, round, and reactive to light.   Cardiovascular:      Rate and Rhythm: Normal rate and regular rhythm.      Heart sounds: No murmur heard.  Pulmonary:      Effort: Pulmonary effort is normal.      Comments: Occasional faint rales, no rhonchi or wheezes  Abdominal:      General: Bowel sounds are normal.      Palpations: Abdomen is soft.   Musculoskeletal:      Cervical back: Normal range of motion and neck supple.      Right lower leg: No edema.      Left lower leg: Edema present.   Skin:     General: Skin is warm and dry.   Neurological:      General: No focal deficit present.      Mental Status: He is alert and oriented to person, place, and time.                Data:   ROUTINE ICU LABS (Last four results)  CMP  Recent Labs   Lab 04/08/22  0658 04/07/22  0734 04/06/22  0606 04/05/22  2356 04/05/22  0646 04/04/22  0739 04/03/22  2214 04/03/22  0147 04/03/22  0147   NA  --   --   --   --  140 137  --   --  137   POTASSIUM 4.1 4.0 4.1  --  3.6 3.9  --   --  3.1*   CHLORIDE  --   --   --   --  113* 109  --   --  106   CO2  --   --   --   --  25 24  --   --  29   ANIONGAP  --   --   --   --  2* 4  --   --  2*   GLC  --   --   --   --  102* 126*  --   --  106*   BUN  --   --   --   --  13 8  --   --  6*   CR  --   --   --   --  0.54* 0.41* 0.40*  --  0.41*   GFRESTIMATED  --   --   --   --  >90 >90 >90  --  >90   JOSE  --   --   --   --  9.0 8.8  --   --  10.0   MAG 1.7 1.8 1.7  --  1.6 1.6 1.6   < >  --    PHOS 1.2* 2.0* 2.2* 2.9 2.0* 3.5 2.9   < >  --    PROTTOTAL  --   --   --   --   --   --   --   --  6.0*   ALBUMIN  --   --   --   --   --   --   --   --  3.4   BILITOTAL  --   --   --   --   --   --   --   --  0.2   ALKPHOS  --   --   --   --   --   --   --   --  215*   AST  --   --   --   --   --   --   --   --  40   ALT  --   --   --   --   --   --   --   --  38    < > = values in this interval not  displayed.     CBC  Recent Labs   Lab 04/08/22  0658 04/07/22  0908 04/06/22  0606 04/05/22  0646 04/04/22  0739   WBC 9.1  --  7.6 7.1 9.0   RBC 2.18*  --  2.75* 2.01* 2.42*   HGB 7.1* 6.5* 8.9* 6.8* 8.0*   HCT 22.0*  --  27.2* 20.6* 24.3*   *  --  99 103* 100   MCH 32.6  --  32.4 33.8* 33.1*   MCHC 32.3  --  32.7 33.0 32.9   RDW 15.5*  --  15.0 13.8 13.2     --  199 179 195     INFLAMMATIONNo lab results found in last 7 days.    Invalid input(s):  ESR    INRNo lab results found in last 7 days.  Arterial Blood Gas  Recent Labs   Lab 04/08/22  0009   O2PER 15       ALL CULTURESNo results for input(s): CULT in the last 168 hours.           Imaging:     CT chest 4/8/2022  1.  Negative for pulmonary embolism.     2.  Extensive, confluent bilateral groundglass airspace infiltrates in both lungs which could reflect infection, though inflammatory etiologies including hypersensitivity pneumonitis or drug reaction would also be possible.     3.  Severe heterogeneous osteopenia consistent with patient's known multiple myeloma. There are bilateral rib fractures of varying ages, though at least 2 of these appear to be acute on the right.          The above note was dictated using voice recognition software and may include typographical errors. Please contact the author for any clarifications.

## 2022-04-08 NOTE — PROGRESS NOTES
Web base paged respiratory therapy:  Please come and assess pt to either place on cpap or high flow. Pt maxed at 15L via oxymask. O2 consistently between 84-89%. Multiple O2 sensors tried (fingers, toes, ears). Rib fx pt.

## 2022-04-08 NOTE — PROGRESS NOTES
[facility]  Hospitalist Progress Note  Brad Coto,  04/08/22       Reason for Stay (Diagnosis): Left femur fracture, right iliac wing fracture, multiple rib fractures         Assessment and Plan:      Summary of Stay: Beto Tran is a 46 year old male with a history of advanced multiple myeloma previously on hospice, active nicotine dependence with cigarettes, chronic pain with opiate dependence, anxiety, history of thoracic outlet syndrome, anemia of chronic disease who was admitted with a new left femur fracture, right lateral iliac wing fracture, multiple rib fractures after twisting while transferring from a wheelchair.    Orthopedic surgery was consulted and the patient went to the OR for left femur fracture repair with a cephalomedullary device on 4/3.  He also underwent device placement on the right side on 4/6 given impending fracture risk of the right femur.    Postop course has been complicated by severe pain as the patient missed many hours of his PTA narcotic regimen which is significant.  Pain and palliative have been following and the patient is feeling much better from a pain aspect.    Problem List/Assessment and Plan:   Acute hypoxemic respiratory failure:  Patient was requiring up to 5 L nasal cannula postoperatively.  Chest x-ray at that time were showing some infiltrates that were nonspecific.  The patient did have a productive cough and did have rales on exam and therefore empiric diuretics and community-acquired pneumonia antibiotics were initiated.  Unfortunately the patient continued to worsen overnight requiring CPAP.  CT PE was completed which was negative for PE but did show extensive, confluent bilateral groundglass infiltrates in both lungs which could reflect infection, but etiology may also include hypersensitivity pneumonitis/drug reaction, atypical infection.  Pulmonary has been consulted.  -Pulmonary consulted, appreciate recommendations   -Work-up with  procalcitonin, beta D glucan, sputum culture, RVP, CBC with differential   -If ongoing high oxygen requirements consider high flow nasal cannula   -If respiratory status continues to decline a trial of high-dose steroids should be considered, starting with 60 mg prednisone equivalent per day   -On-call pulmonologist should also be called if he continues to decline   -Consideration for bronchoscopy with BAL on Monday if he fails to improve  -Continuous oximetry, supplemental oxygen as needed  -He has the aerobic and incentive spirometry in his room which he is using  -Continue ceftriaxone and azithromycin for the time being  -DNR/DNI    Left femur fracture:  Occurred during a traumatic fall/transition as noted above.  Orthopedic surgery was consulted and the patient went to the operating room with femur fracture repair with a cephalomedullary device on 4/30.  He also underwent prophylactic fixation of the right impending pathologic femur fracture with intramedullary nail.  -Orthopedic surgery following, appreciate recommendations  -Pain pain team following, appreciate recommendations   -Pain medications as needed (discussed case with pain today 4/7)  -Incentive spirometry  -PT/OT    Right iliac wing and multiple rib fractures:  Occurred during traumatic fall/transition as noted above.  Seen in consultation by trauma surgery team and orthopedic surgery.  -Orthopedic surgery following, patient recommendations  -Pain medications as needed    Multiple myeloma:  Previous autologous stem cell transplant.  Follows the oncology team at [facility].  Per report, it seems he has been on hospice for over 7 years now with no myeloma treatment.  Extensive lytic bone lesions throughout the body and essentially no normal bone left.  Very high risk fracture.  -Oncology consulted, appreciate commendations   -Recommend patient stay on hospice   -Do not believe that treating his myeloma would improve bone health for the stable  "future   -Recommend that palliative care team may have to manage his medications    Acute blood loss on chronic anemia:  Thought secondary to acute blood loss anemia due to tremor and surgery.  Does have a history of multiple myeloma which may be contributing.  He received 2 units packed red blood cells on 4/5 and 4/7 for hemoglobin less than 7.  -Daily CBC    Chronic opiate related constipation:  -Continue methylnaltrexone injections every other day    Hypokalemia  Hypophosphatemia:  -Replace as needed with nurse driven protocol    Depression:  Significant underlying medical problems that he feels are not being managed well.  Frustrated given ongoing fractures.  Not interested in psychiatry consult during this admission.  -As needed lorazepam for anxiety    Tobacco use disorder:  -NRT      DVT Prophylaxis: Pneumatic Compression Devices  Code Status: DNR / DNI  Discharge Dispo/Date: Anticipate discharge in 2 to 3 days pending ability to tolerate PT/OT, possible placement, adequate pain control, resolution of respiratory failure.        Interval History (Subjective):      Patient had acute worsening of his breathing last night.  He reports that he was up on the toilet and was feeling fine.  By the time he was done having a bowel movement he had significant shortness of breath and worsened respiratory failure.  For this he was placed on 15 L oxygen mask with ongoing hypoxemia.  Therefore he was initiated on CPAP.  This has stabilized his breathing, but he continues to feel dyspneic even with just speaking.  Otherwise he feels that his pain is generally controlled fairly well.  He does feel anxious with the idea of needing such significant respiratory support as he knows he would not want to be intubated if he gets worse.                  Physical Exam:      Last Vital Signs:  /55 (BP Location: Right arm)   Pulse 90   Temp 97.5  F (36.4  C) (Temporal)   Resp 20   Ht 1.753 m (5' 9\")   Wt 69.5 kg (153 lb 3.2 " oz)   SpO2 95%   BMI 22.62 kg/m        Intake/Output Summary (Last 24 hours) at 4/7/2022 1329  Last data filed at 4/7/2022 1300  Gross per 24 hour   Intake 1000 ml   Output 2050 ml   Net -1050 ml       General: Alert, awake, mild increased work of breathing.  Does have to take breaths while talking to take breaths.  HEENT: NC/AT, eyes anicteric, external occular movements intact, face symmetric.    Cardiac: RRR, S1, S2.  No murmurs appreciated.  Pulmonary: Increased work of breathing.  Lungs with some rales in the bases.  No obvious wheezing.  Abdomen: soft, non-tender, non-distended.  Bowel sounds present.  No guarding.  Extremities: no deformities.  Warm, well perfused.  Skin: no rashes or lesions noted.  Warm and dry.  Neuro: No gross deficits noted.  Speech clear.    Psych: Anxious affect.         Medications:      All current medications were reviewed with changes reflected in problem list.         Data:      All new lab and imaging data was reviewed.   Labs:       Lab Results   Component Value Date     04/05/2022     04/04/2022     04/03/2022     01/30/2015     01/07/2015     12/23/2014    Lab Results   Component Value Date    CHLORIDE 113 04/05/2022    CHLORIDE 109 04/04/2022    CHLORIDE 106 04/03/2022    CHLORIDE 104 01/30/2015    CHLORIDE 104 01/07/2015    CHLORIDE 106 12/23/2014    Lab Results   Component Value Date    BUN 13 04/05/2022    BUN 8 04/04/2022    BUN 6 04/03/2022    BUN 12 01/30/2015    BUN 8 01/07/2015    BUN 10 12/23/2014      Lab Results   Component Value Date    POTASSIUM 4.0 04/07/2022    POTASSIUM 4.1 04/06/2022    POTASSIUM 3.6 04/05/2022    POTASSIUM 3.2 06/18/2015    POTASSIUM 3.6 06/12/2015    POTASSIUM 3.9 06/08/2015    Lab Results   Component Value Date    CO2 25 04/05/2022    CO2 24 04/04/2022    CO2 29 04/03/2022    CO2 28 01/30/2015    CO2 32 01/07/2015    CO2 27 12/23/2014    Lab Results   Component Value Date    CR 0.54 04/05/2022    CR  0.41 04/04/2022    CR 0.40 04/03/2022    CR 0.60 02/20/2015    CR 0.61 01/30/2015    CR 0.52 01/07/2015        Recent Labs   Lab 04/08/22  1209 04/08/22  0658   WBC  --  9.1   HGB 7.0* 7.1*   HCT  --  22.0*   MCV  --  101*   PLT  --  173      Imaging:   Recent Results (from the past 48 hour(s))   XR Chest Port 1 View    Narrative    EXAM: XR CHEST PORT 1 VIEW  LOCATION: [facility]  DATE/TIME: 4/6/2022 5:56 AM    INDICATION: Trauma Patient with Rib Fracture(s)  COMPARISON: Chest radiograph yesterday.      Impression    IMPRESSION: Overall similar to yesterday. Stable interstitial coarsening. No new lung consolidation, pleural fluid or pneumothorax. Right-sided rib fractures again noted. Stable cardiac silhouette.   XR Surgery PATTY L/T 5 Min Fluoro w Stills    Narrative    This exam was marked as non-reportable because it will not be read by a   radiologist or a [facility] non-radiologist provider.         XR Chest Port 1 View    Narrative    EXAM: XR CHEST PORT 1 VIEW  LOCATION: [facility]  DATE/TIME: 4/7/2022 6:36 AM    INDICATION: Trauma Patient with Rib Fracture(s)  COMPARISON: 04/06/2022      Impression    IMPRESSION: Heart size is normal. Diffuse patchy interstitial prominence is stable. No visible pneumothorax. Severe osteopenia with redemonstrated acute right posterior rib fractures. There are also nonacute fracture deformities in the bilateral ribs.       Brad Coto DO

## 2022-04-08 NOTE — PROGRESS NOTES
[facility]  Palliative Care Progress Note  Text Page     Assessment & Plan   Recommendations:  1. Goals of Care- No CPR- Do NOT Intubate  Hospitalization goals discussed with patient at the bedside.  Goal for adequate symptom management while inpatient, considering ongoing hospice enrollment  Decisional Capacity- Intact. Patient does not have an advance directive. Per  informed consent policy next of kin should be involved if patient becomes unable.  POLST on file and dated 01/13/2022.  DNR,Comfort focused measures only, no tube feeds, oral antibiotics permitted.     2. Pain   Chronic pain secondary to malignancy.  Now with acute left hip and right thoracic pain secondary to fall and hip fracture.  Additionally, POD#1 femur nailing.    Patient's opioid use in past 24 hours: Morphine PO/ 1380 mg, IV 16 Fentanyl IV 0, methadone PO 0 mg   =  1420 mg Daily Morphine Equivalent   [place] Board of Pharmacy Data Base Reviewed:    YES; As expected, no concern for misuse/abuse of controlled medications based on this report.  ORS= 900   Multimodal pain management plan:  - acetaminophen 975 mg three times daily  - naproxen 500 mg two times daily with meals--hold, resume  4/11/22 am dose  -*Toradol 30 mg IV every 6 hrs x 5 days (4/10/22 1600)  - gabapentin 1200 mg three times daily  - pregabalin 300 mg two times daily  - hydroxyzine  mg three times daily, discontinue additional 25 - 50 mg every 4 hours prn  - lidocaine patch 1 patch daily for 12 hours on/12 hours off, to right chest and thoracic area  -Add diclofenac gel 1 % 4 grams to right chest wall and thoracic area   - *Methocarbamol 750 mg tid  times daily,  Discontinue additional 750 mg two times daily prn for now   - MS Contin 300 mg PO four times daily  - *morphine IR 60 mg every 3 hours prn re- evaluate in am if able change to q 4 hr prn   - *morphine injection 4-8 mg every 2 hours prn     *changes to manage acute pain flare, resume  home chronic home doses once pain controlled.      3. Anxiety  Long term anxiety related to chronic and acute conditions.  Associated frustration, intermittent hopelessness.  Patient denies thoughts of suicide or harming others.  Acknowledges acute stress and feelings of being overwhelmed.    - *lorazepam 1-2 mg SL solution at bedtime (chronic dose 1 mg)  - *lorazepam 0.5-1 mg SL solution every 8 hrs ( chronic dose 0.25-0.5 mg bid prn)   - appreciate  consult  - mindful exercises and distraction with TV, processing through situation with conversation.     *changes to manage acute pain flare/anxiety, resume home chronic home doses once pain controlled.    4. Spiritual Care  Oriented to Spiritual Health as part of Palliative Care team. Appreciate Care of  .  Spiritual Background: undesignated     5. Care Planning  Appreciate Care of multidisciplinary team.  - disposition pending.  Return home on hospice plan of care vs TCU placement post op.  Advise pre op pain management consultation in future prior to any surgical intervention to manage pathologic fractures  Resume pain management regimen at discharge      Home chronic medication doses  Ms Contin 60 tid, Morphine IR 60 mg every 4 hrs prn  Methocarbamol ( Robaxin) 750 mg bid ant 750 mg at hs  Lorazepam concentrate 0.25-0.50 mg bid prn, 1 mg at hs [presently on higher dose      Medical Decision Making and Goals of Care:  Discussed on April 7, 2022 with ALEJANDRO PutnamC,APRN,CNP,ACHPN  Pain under better control , less anxious.  Agreeable to reduction in short acting opioids to chronic prn dose.  Right rib and back pain.  No SOB, pulse O2 intermittently under 90%.  No BM, feel he may have one today.     Discussed on April 6, 2022 with ALEJANDRO PutnamC,APRN,CNP,ACHPN: summoned by nurse to patient. Pain out of control, has missed sone dose of Ms Contin and short acting Morphine. Describes pain as intolerable,  Saying they made a big  "mistake and it needs to be righted. He is highly anxious measured breathing, shivering.   Patient familiar to this writer, informed him of potential changes and pain regimen to manage pain and anxiety.  He is agreeable to plan of increasing multimodal ( where able), increasing  SA Morphine amount and availability and increasing anxiolytics.  He is agreeable to increase monitoring C02 monitor b/p and IV fluids through night as safeguards and supportive therapies.  He is aware and in agreement to return to chronic pain plan once pain flare is controlled     Discussed on April 4, 2022 with Paulette Amador APRN, CNP:   Met with Beto at the bedside.  He is tearful and states frustration.  He reviews his health history and the recent event.  States he feels \"abandoned and alone\" with his plan of care and that he is uncertain if he would benefit most from ongoing hospice services vs rehabilitation.  States he feels that he \"is wasting his breath\" and would like to transfer to [place] for care.  He states he \"does not know what to do next.\"     Reviewed his current symptoms of pain and anxiety.  He acknowledges that his pain has been fairly controlled.  He is fearful of how he will feel with movement.  States he feels his bones are porcelain and that he \"cannot be expected to get up after surgery\".  He mentions his anticipatory anxiety and that he is \"overwhelmed with thoughts of it all.\"  He states a goal of getting home, but notes that his fiance is managing so much for him that he needs her to have support.    Educated on multimodal pain management plan as listed in plan of care above.  He verbalized understanding and agreement to the plan.  He denies questions or concerns and states follow up in the coming days regarding his plan of care would be his preference.     Kasia Luis RN, PGMT-BC,APRN, CNP, ACHPN  Pain Management and Palliative Care  [facility]  Pgr: " [phone]    Office     Time Spent on this Encounter   Total unit/floor time 45 minutes, time consisted of the following, examination of the patient, reviewing the record and completing documentation. >50% of time spent in counseling and coordination of care, Bedside Nurse Tenisha Suresh;wm RN  and Hospitalist Tigre Martinez DO .  Time spend counseling with patient consisted of the following topics, symptom management.    Review of Systems    CONSTITUTIONAL: NEGATIVE for fever, chills, change in weight  ENT/MOUTH: NEGATIVE for ear, mouth and throat problems  RESP: NEGATIVE for significant cough or SOB  CV: NEGATIVE for chest pain, palpitations or peripheral edema    Palliative Symptom Review (0=no symptom/no concern, 1=mild, 2=moderate, 3=severe):      Pain: 3-severe      Fatigue: 1-mild      Nausea: 0-none      Constipation: 0-none last BM 4/3      Diarrhea: 0-none      Depressive Symptoms: 0-none      Anxiety: 3-severe      Drowsiness: 0-none      Poor Appetite: 0-none      Shortness of Breath: 0-none      Insomnia: 1-mild      Other:        Overall (0 good/no concerns, 3 very poor):  2    Physical Exam   Temp:  [97.5  F (36.4  C)-98.1  F (36.7  C)] 97.5  F (36.4  C)  Pulse:  [] 90  Resp:  [16-22] 20  BP: ()/(47-67) 108/55  FiO2 (%):  [40 %-50 %] 40 %  SpO2:  [77 %-96 %] 95 %  153 lbs 3.2 oz  Exam:  GEN:  Alert, oriented x 3, appears comfortable, NAD. Resting comfortable with  Nasal  CPAP  HEENT:  Normocephalic/atraumatic, no scleral icterus, no nasal discharge, mouth moist.  CV:  RRR, S1, S2; no murmurs or other irregularities noted.  +3 DP/PT pulses bilatererally; no edema BLE.  RESP:  Clear to auscultation bilaterally without rales/rhonchi/wheezing/retractions.  Symmetric chest rise on inhalation noted.  Normal respiratory effort.  ABD:  Rounded, soft, non-tender/non-distended.  +BS  EXT:  Edema & pulses as noted above.  CMS intact x 4.     M/S:   Tender to palpation  throughout.     SKIN:  Dry to touch, no exanthems noted in the visualized areas.    NEURO: Symmetric strength +5/5.  Sensation to touch intact all extremities.   There is no area of allodynia or hyperesthesia.  PAIN BEHAVIOR: Cooperative  Psych:  Normal affect.  Calm, cooperative, conversant appropriately.    Medications     - MEDICATION INSTRUCTIONS -       - MEDICATION INSTRUCTIONS -         acetaminophen  975 mg Oral Q8H GEETA     azithromycin  500 mg Intravenous Q24H     cefTRIAXone  1 g Intravenous Q24H     diclofenac  4 g Topical 4x Daily     [Held by provider] enoxaparin ANTICOAGULANT  40 mg Subcutaneous Q24H     famotidine  10 mg Oral Daily     gabapentin  1,200 mg Oral TID     hydrOXYzine   mg Oral Q6H     ketorolac  30 mg Intravenous Q6H     lidocaine  2 patch Transdermal Q24h    And     lidocaine   Transdermal Q8H     LORazepam  1-2 mg Oral At Bedtime     methocarbamol  750 mg Oral TID     methylnaltrexone  12 mg Subcutaneous Every Other Day     morphine  300 mg Oral 4x Daily     multivitamin w/minerals  1 tablet Oral Daily     [Held by provider] naproxen  500 mg Oral BID w/meals     nicotine  1 patch Transdermal Daily     nicotine   Transdermal Q8H     polyethylene glycol  17 g Oral Daily     pregabalin  300 mg Oral BID     senna-docusate  1 tablet Oral BID     sodium chloride (PF)  3 mL Intracatheter Q8H     sodium phosphate  30 mmol Intravenous Once       Data   Results for orders placed or performed during the hospital encounter of 04/03/22 (from the past 24 hour(s))   XR Chest Port 1 View    Narrative    EXAM: XR CHEST PORT 1 VIEW  LOCATION: [facility]  DATE/TIME: 4/7/2022 11:21 PM    INDICATION: SOB  COMPARISON: 04/07/2022      Impression    IMPRESSION: Very shallow inspiration. Coarse interstitial infiltrates again seen allowing for the more shallow inspiration not grossly changed. Normal heart size. Bilateral rib fractures.   Blood gas venous   Result Value Ref Range    pH Venous 7.43  7.32 - 7.43    pCO2 Venous 36 (L) 40 - 50 mm Hg    pO2 Venous 47 25 - 47 mm Hg    Bicarbonate Venous 24 21 - 28 mmol/L    Base Excess/Deficit (+/-) -0.1 -7.7 - 1.9 mmol/L    FIO2 15    CT Chest Pulmonary Embolism w Contrast    Narrative    EXAM: CT CHEST PULMONARY EMBOLISM W CONTRAST  LOCATION: [facility]  DATE/TIME: 4/8/2022 4:56 AM    INDICATION: Worsening hypoxia. Postop. History of rib fracture.  COMPARISON: 04/07/2022  TECHNIQUE: CT chest pulmonary angiogram during arterial phase injection of IV contrast. Multiplanar reformats and MIP reconstructions were performed. Dose reduction techniques were used.   CONTRAST: 55mL Isovue 370    FINDINGS:  ANGIOGRAM CHEST: Pulmonary arteries are normal caliber and negative for pulmonary emboli. Thoracic aorta is negative for dissection. No CT evidence of right heart strain.    LUNGS AND PLEURA: Diffuse centrilobular emphysema. There are areas of groundglass airspace opacification throughout both lungs, though with relatively less involvement of the lung bases. No significant interstitial thickening. There is an 8 mm right   apical nodule on image 38 of series 7.    MEDIASTINUM/AXILLAE: Heart size is normal. No pericardial effusion. No adenopathy.    CORONARY ARTERY CALCIFICATION: None.    UPPER ABDOMEN: Normal.    MUSCULOSKELETAL: Severe, heterogeneous demineralization throughout the visualized skeleton. There are severe compression fracture deformities of the T8 and T12 vertebral bodies. Old fracture deformities of the sternal body. There are multiple bilateral   rib fracture deformities of varying ages, though fractures of the right anterior third and fourth ribs are favored to be acute. Probable old fracture deformity of the medial aspect of the left scapula.      Impression    IMPRESSION:  1.  Negative for pulmonary embolism.    2.  Extensive, confluent bilateral groundglass airspace infiltrates in both lungs which could reflect infection, though  inflammatory etiologies including hypersensitivity pneumonitis or drug reaction would also be possible.    3.  Severe heterogeneous osteopenia consistent with patient's known multiple myeloma. There are bilateral rib fractures of varying ages, though at least 2 of these appear to be acute on the right.   Magnesium   Result Value Ref Range    Magnesium 1.7 1.6 - 2.3 mg/dL   Phosphorus   Result Value Ref Range    Phosphorus 1.2 (L) 2.5 - 4.5 mg/dL   Potassium   Result Value Ref Range    Potassium 4.1 3.4 - 5.3 mmol/L   CBC with platelets   Result Value Ref Range    WBC Count 9.1 4.0 - 11.0 10e3/uL    RBC Count 2.18 (L) 4.40 - 5.90 10e6/uL    Hemoglobin 7.1 (L) 13.3 - 17.7 g/dL    Hematocrit 22.0 (L) 40.0 - 53.0 %     (H) 78 - 100 fL    MCH 32.6 26.5 - 33.0 pg    MCHC 32.3 31.5 - 36.5 g/dL    RDW 15.5 (H) 10.0 - 15.0 %    Platelet Count 173 150 - 450 10e3/uL   WBC and differential    Narrative    The following orders were created for panel order WBC and differential.  Procedure                               Abnormality         Status                     ---------                               -----------         ------                     WBC and Differential[[id number]]                             Preliminary result           Please view results for these tests on the individual orders.   WBC and Differential   Result Value Ref Range    WBC Count     XR Chest Port 1 View    Narrative    XR CHEST PORT 1 VIEW 4/8/2022 8:47 AM    HISTORY: Trauma Patient with Rib Fracture(s)    COMPARISON: 4/8/2022      Impression    IMPRESSION: Osteopenia. Multiple rib fractures of varying ages and  vertebral compression fractures better assessed by the CT earlier  today. Acute right second rib fracture corresponding to the fracture  seen on the CT, stable. No pneumothorax, infiltrate or pleural  effusion. Extensive coarse interstitial opacities in the lungs is  stable.    MAGDALENA VEGA MD         SYSTEM ID:  [id number]   Hemoglobin    Result Value Ref Range    Hemoglobin 7.0 (L) 13.3 - 17.7 g/dL   Phosphorus   Result Value Ref Range    Phosphorus 2.1 (L) 2.5 - 4.5 mg/dL

## 2022-04-08 NOTE — PROGRESS NOTES
RT called to the room for decreasing saturations on 15 L Oxymask. Refer to previous RN note for more details. Patient was placed on CPAP 5, 100% weaned down to 50% while maintaining saturations in to the mid 90's. RT to follow up to lower oxygen. RN to page with any issues.    Unable to plug in blue nursing cord for safety due to continued alarming while plugged in without reason and unable to clear alarm. RN and RT aware.    Danilo Pulido, RT on 4/8/2022 at 1:53 AM      0300: Pt requested increased pressure. Pressure increased to +8, 40%.    0500: Pt brought down to CT on CPAP 8 100%

## 2022-04-08 NOTE — PLAN OF CARE
Evening RN ([id number])    Patient vital signs are at baseline: No,  Reason:  Requiring 6L oxygen via NC to maintain saturations, tachy in low 100's, Bps softer 95/40's.  Patient able to ambulate as they were prior to admission or with assist devices provided by therapies during their stay:  Yes  Patient MUST void prior to discharge:  Yes  Patient able to tolerate oral intake:  Yes  Pain has adequate pain control using Oral analgesics:  No,  Reason:  Utilizing IV toradol as scheduled as well as all other medications.  Does patient have an identified :  Yes  Has goal D/C date and time been discussed with patient:  Yes    Pt A/O x4.  VSS - as noted above and in flowsheets.  Pain managed adequately with PRN PO IR Morphine, PO ativan; scheduled voltaren gel, IV toradol, PO Atarax, Tylenol, Gabapentin, Lyrica, Robaxin.  CMS intact - baseline neuropathy in all extremities.  Skin ok.  Up A2 with walker and gait belt.  Nicotine patch L shoulder.  Voiding adequately.  Trying to have a bm at the end of shift. Tolerating regular diet well.  Lung sounds slight crackles - pt using IS - is coughing up small amounts of blood from time to time.  Antibiotics started this shift - Rocephin and Zithromax.  Lasix given IV x1 this shift.  Unit of blood completed at around 1630 - Per Dr. Coto's note CBC in AM.  Plan is to be decided.  Will continue to monitor.

## 2022-04-08 NOTE — PROVIDER NOTIFICATION
Web base paged MD:  Pt SOB since getting back to bed at 2100. O2 sats at 89-90%; O2 turned up from 6L to 15L via oxymask. Pt has multiple rib fxs on the R. Please advise. Thank you.

## 2022-04-08 NOTE — PROGRESS NOTES
Web base paged RT:  pt sats at 94%; reporting he cannot breathe well and wants his cpap turned up. please assist with this. Thank you.

## 2022-04-08 NOTE — PROGRESS NOTES
Oncology/Hematology Follow Up Note:    Per my previous discussion with Beto, I checked with our palliative care physician Dr. Shanks, how reviewed Beto's med list and indicated she would be comfortable managing his pain meds if he does come off hospice for myeloma treatment.    Beto wants to take care of a few things and talk to her family.  I would recommend him to be discharged to home hospice when he is ready for discharge and he can always come off hospice to pursue myeloma treatment any time in the outpatient setting.      Please give the patient my clinic's contact information at the time of discharge (Phone: [phone], [facility])    Jones Peñaloza M.D.  [facility]  [phone]

## 2022-04-08 NOTE — PROGRESS NOTES
Orthopedic Surgery  Beto Tran  2022  Admit Date:  4/3/2022  POD # 2 s/p prophylactic IM nail of right femur    Beto Tran is a 45 y/o male whom was rounded on 2 days s/p right hip IM nail.  Pain controlled.  Tolerating oral intake.  No events overnight. The patient denies chest pain, SOB, calf pain.    Alert and orient to person, place, and time.  Vital Sign Ranges  Temperature Temp  Av.8  F (36.6  C)  Min: 97.6  F (36.4  C)  Max: 98.1  F (36.7  C)   Blood pressure Systolic (24hrs), Av , Min:92 , Max:113        Diastolic (24hrs), Av, Min:47, Max:67      Pulse Pulse  Av.6  Min: 92  Max: 112   Respirations Resp  Av  Min: 16  Max: 22   Pulse oximetry SpO2  Av.8 %  Min: 77 %  Max: 96 %       Right hip dressing is clean, dry, and intact. Minimal erythema of the surrounding skin and no signs of infection  Bilateral calves are soft, non-tender.  right lower extremity is NVI. 2+ DP/PT pulses   Able to actively move distal extremity  5/5 strength distally    Labs:  Recent Labs   Lab Test 22  0734 22  0606 22  0646   POTASSIUM 4.0 4.1 3.6     Recent Labs   Lab Test 22  0908 22  0606 22  0646   HGB 6.5* 8.9* 6.8*     Recent Labs   Lab Test 14  0000   INR 1.0     Recent Labs   Lab Test 22  0606 22  0646 22  0739    179 195       A/P  1. S/p prophylactic IM nail right femur fracture   Continue Lovenox for DVT prophylaxis.     Mobilize with PT/OT with walker WBAT.     Continue current pain regiment.    2. Disposition   Anticipate d/c to home pending continual strengthening and ambulation with PT and medicine clearance.    Young Metcalf PA-C  [phone]

## 2022-04-09 ENCOUNTER — APPOINTMENT (OUTPATIENT)
Dept: GENERAL RADIOLOGY | Facility: CLINIC | Age: 47
DRG: 480 | End: 2022-04-09
Attending: ORTHOPAEDIC SURGERY
Payer: MEDICARE

## 2022-04-09 LAB
1,3 BETA GLUCAN SER-MCNC: <31 PG/ML
ANION GAP SERPL CALCULATED.3IONS-SCNC: 6 MMOL/L (ref 3–14)
BASE EXCESS BLDV CALC-SCNC: -1.6 MMOL/L (ref -7.7–1.9)
BLD PROD TYP BPU: NORMAL
BLOOD COMPONENT TYPE: NORMAL
BUN SERPL-MCNC: 15 MG/DL (ref 7–30)
C PNEUM DNA SPEC QL NAA+PROBE: NOT DETECTED
CALCIUM SERPL-MCNC: 8 MG/DL (ref 8.5–10.1)
CHLORIDE BLD-SCNC: 112 MMOL/L (ref 94–109)
CO2 SERPL-SCNC: 23 MMOL/L (ref 20–32)
CODING SYSTEM: NORMAL
CREAT SERPL-MCNC: 0.55 MG/DL (ref 0.66–1.25)
CROSSMATCH: NORMAL
ERYTHROCYTE [DISTWIDTH] IN BLOOD BY AUTOMATED COUNT: 15.6 % (ref 10–15)
FERRITIN SERPL-MCNC: 4172 NG/ML (ref 26–388)
FLUAV H1 2009 PAND RNA SPEC QL NAA+PROBE: NOT DETECTED
FLUAV H1 RNA SPEC QL NAA+PROBE: NOT DETECTED
FLUAV H3 RNA SPEC QL NAA+PROBE: NOT DETECTED
FLUAV RNA SPEC QL NAA+PROBE: NOT DETECTED
FLUBV RNA SPEC QL NAA+PROBE: NOT DETECTED
FOLATE SERPL-MCNC: 13.2 NG/ML
GFR SERPL CREATININE-BSD FRML MDRD: >90 ML/MIN/1.73M2
GLUCOSE BLD-MCNC: 108 MG/DL (ref 70–99)
GLUCOSE BLDC GLUCOMTR-MCNC: 128 MG/DL (ref 70–99)
HADV DNA SPEC QL NAA+PROBE: NOT DETECTED
HCO3 BLDV-SCNC: 23 MMOL/L (ref 21–28)
HCOV PNL SPEC NAA+PROBE: NOT DETECTED
HCT VFR BLD AUTO: 21.3 % (ref 40–53)
HGB BLD-MCNC: 6.8 G/DL (ref 13.3–17.7)
HMPV RNA SPEC QL NAA+PROBE: NOT DETECTED
HOLD SPECIMEN: NORMAL
HPIV1 RNA SPEC QL NAA+PROBE: NOT DETECTED
HPIV2 RNA SPEC QL NAA+PROBE: NOT DETECTED
HPIV3 RNA SPEC QL NAA+PROBE: NOT DETECTED
HPIV4 RNA SPEC QL NAA+PROBE: NOT DETECTED
IRON SATN MFR SERPL: 27 % (ref 15–46)
IRON SERPL-MCNC: 65 UG/DL (ref 35–180)
IRON SERPL-MCNC: NORMAL UG/DL
ISSUE DATE AND TIME: NORMAL
M PNEUMO DNA SPEC QL NAA+PROBE: NOT DETECTED
MAGNESIUM SERPL-MCNC: 2.5 MG/DL (ref 1.6–2.3)
MAGNESIUM SERPL-MCNC: NORMAL MG/DL
MCH RBC QN AUTO: 32.4 PG (ref 26.5–33)
MCHC RBC AUTO-ENTMCNC: 31.9 G/DL (ref 31.5–36.5)
MCV RBC AUTO: 101 FL (ref 78–100)
O2/TOTAL GAS SETTING VFR VENT: 50 %
OBSERVATION IMP: NEGATIVE
PCO2 BLDV: 36 MM HG (ref 40–50)
PH BLDV: 7.41 [PH] (ref 7.32–7.43)
PHOSPHATE SERPL-MCNC: 2 MG/DL (ref 2.5–4.5)
PHOSPHATE SERPL-MCNC: NORMAL MG/DL
PLATELET # BLD AUTO: 173 10E3/UL (ref 150–450)
PO2 BLDV: 32 MM HG (ref 25–47)
POTASSIUM BLD-SCNC: 4.9 MMOL/L (ref 3.4–5.3)
RBC # BLD AUTO: 2.1 10E6/UL (ref 4.4–5.9)
RETICS # AUTO: 0.1 10E6/UL (ref 0.03–0.1)
RETICS/RBC NFR AUTO: 4.5 % (ref 0.5–2)
RSV RNA SPEC QL NAA+PROBE: NOT DETECTED
RSV RNA SPEC QL NAA+PROBE: NOT DETECTED
RV+EV RNA SPEC QL NAA+PROBE: NOT DETECTED
SODIUM SERPL-SCNC: 141 MMOL/L (ref 133–144)
SODIUM SERPL-SCNC: NORMAL MMOL/L
TIBC SERPL-MCNC: 242 UG/DL (ref 240–430)
UNIT ABO/RH: NORMAL
UNIT NUMBER: NORMAL
UNIT STATUS: NORMAL
UNIT TYPE ISBT: 5100
VIT B12 SERPL-MCNC: 957 PG/ML (ref 193–986)
WBC # BLD AUTO: 9.6 10E3/UL (ref 4–11)

## 2022-04-09 PROCEDURE — 250N000013 HC RX MED GY IP 250 OP 250 PS 637: Performed by: ORTHOPAEDIC SURGERY

## 2022-04-09 PROCEDURE — 258N000003 HC RX IP 258 OP 636: Performed by: STUDENT IN AN ORGANIZED HEALTH CARE EDUCATION/TRAINING PROGRAM

## 2022-04-09 PROCEDURE — 85045 AUTOMATED RETICULOCYTE COUNT: CPT | Performed by: STUDENT IN AN ORGANIZED HEALTH CARE EDUCATION/TRAINING PROGRAM

## 2022-04-09 PROCEDURE — 99233 SBSQ HOSP IP/OBS HIGH 50: CPT | Performed by: STUDENT IN AN ORGANIZED HEALTH CARE EDUCATION/TRAINING PROGRAM

## 2022-04-09 PROCEDURE — 94660 CPAP INITIATION&MGMT: CPT

## 2022-04-09 PROCEDURE — 250N000013 HC RX MED GY IP 250 OP 250 PS 637: Performed by: STUDENT IN AN ORGANIZED HEALTH CARE EDUCATION/TRAINING PROGRAM

## 2022-04-09 PROCEDURE — 82947 ASSAY GLUCOSE BLOOD QUANT: CPT | Performed by: STUDENT IN AN ORGANIZED HEALTH CARE EDUCATION/TRAINING PROGRAM

## 2022-04-09 PROCEDURE — 250N000013 HC RX MED GY IP 250 OP 250 PS 637: Performed by: INTERNAL MEDICINE

## 2022-04-09 PROCEDURE — 85027 COMPLETE CBC AUTOMATED: CPT | Performed by: STUDENT IN AN ORGANIZED HEALTH CARE EDUCATION/TRAINING PROGRAM

## 2022-04-09 PROCEDURE — 36415 COLL VENOUS BLD VENIPUNCTURE: CPT | Performed by: EMERGENCY MEDICINE

## 2022-04-09 PROCEDURE — 84100 ASSAY OF PHOSPHORUS: CPT | Performed by: ORTHOPAEDIC SURGERY

## 2022-04-09 PROCEDURE — 999N000157 HC STATISTIC RCP TIME EA 10 MIN

## 2022-04-09 PROCEDURE — 83550 IRON BINDING TEST: CPT | Performed by: STUDENT IN AN ORGANIZED HEALTH CARE EDUCATION/TRAINING PROGRAM

## 2022-04-09 PROCEDURE — 250N000013 HC RX MED GY IP 250 OP 250 PS 637: Performed by: NURSE PRACTITIONER

## 2022-04-09 PROCEDURE — 250N000011 HC RX IP 250 OP 636: Performed by: STUDENT IN AN ORGANIZED HEALTH CARE EDUCATION/TRAINING PROGRAM

## 2022-04-09 PROCEDURE — 250N000013 HC RX MED GY IP 250 OP 250 PS 637

## 2022-04-09 PROCEDURE — 84100 ASSAY OF PHOSPHORUS: CPT | Performed by: EMERGENCY MEDICINE

## 2022-04-09 PROCEDURE — 83735 ASSAY OF MAGNESIUM: CPT | Performed by: ORTHOPAEDIC SURGERY

## 2022-04-09 PROCEDURE — 82607 VITAMIN B-12: CPT | Performed by: EMERGENCY MEDICINE

## 2022-04-09 PROCEDURE — 82803 BLOOD GASES ANY COMBINATION: CPT | Performed by: STUDENT IN AN ORGANIZED HEALTH CARE EDUCATION/TRAINING PROGRAM

## 2022-04-09 PROCEDURE — 36415 COLL VENOUS BLD VENIPUNCTURE: CPT | Performed by: STUDENT IN AN ORGANIZED HEALTH CARE EDUCATION/TRAINING PROGRAM

## 2022-04-09 PROCEDURE — 83550 IRON BINDING TEST: CPT | Performed by: EMERGENCY MEDICINE

## 2022-04-09 PROCEDURE — 82947 ASSAY GLUCOSE BLOOD QUANT: CPT | Performed by: EMERGENCY MEDICINE

## 2022-04-09 PROCEDURE — 82746 ASSAY OF FOLIC ACID SERUM: CPT | Performed by: EMERGENCY MEDICINE

## 2022-04-09 PROCEDURE — 120N000001 HC R&B MED SURG/OB

## 2022-04-09 PROCEDURE — 250N000011 HC RX IP 250 OP 636: Performed by: NURSE PRACTITIONER

## 2022-04-09 PROCEDURE — 82728 ASSAY OF FERRITIN: CPT | Performed by: EMERGENCY MEDICINE

## 2022-04-09 PROCEDURE — 84466 ASSAY OF TRANSFERRIN: CPT | Performed by: EMERGENCY MEDICINE

## 2022-04-09 PROCEDURE — 250N000009 HC RX 250: Performed by: STUDENT IN AN ORGANIZED HEALTH CARE EDUCATION/TRAINING PROGRAM

## 2022-04-09 PROCEDURE — 83735 ASSAY OF MAGNESIUM: CPT | Performed by: EMERGENCY MEDICINE

## 2022-04-09 PROCEDURE — 250N000011 HC RX IP 250 OP 636

## 2022-04-09 PROCEDURE — P9040 RBC LEUKOREDUCED IRRADIATED: HCPCS | Performed by: STUDENT IN AN ORGANIZED HEALTH CARE EDUCATION/TRAINING PROGRAM

## 2022-04-09 PROCEDURE — 71045 X-RAY EXAM CHEST 1 VIEW: CPT

## 2022-04-09 RX ORDER — FLUTICASONE PROPIONATE 50 MCG
1 SPRAY, SUSPENSION (ML) NASAL DAILY
Status: DISCONTINUED | OUTPATIENT
Start: 2022-04-09 | End: 2022-01-01 | Stop reason: HOSPADM

## 2022-04-09 RX ORDER — SODIUM CHLORIDE, SODIUM LACTATE, POTASSIUM CHLORIDE, CALCIUM CHLORIDE 600; 310; 30; 20 MG/100ML; MG/100ML; MG/100ML; MG/100ML
INJECTION, SOLUTION INTRAVENOUS CONTINUOUS
Status: DISCONTINUED | OUTPATIENT
Start: 2022-04-09 | End: 2022-01-01

## 2022-04-09 RX ORDER — PSEUDOEPHEDRINE HCL 30 MG
30 TABLET ORAL EVERY 4 HOURS PRN
Status: DISCONTINUED | OUTPATIENT
Start: 2022-04-09 | End: 2022-01-01 | Stop reason: HOSPADM

## 2022-04-09 RX ADMIN — GABAPENTIN 1200 MG: 600 TABLET, FILM COATED ORAL at 20:16

## 2022-04-09 RX ADMIN — SENNOSIDES AND DOCUSATE SODIUM 1 TABLET: 50; 8.6 TABLET ORAL at 20:17

## 2022-04-09 RX ADMIN — HYDROXYZINE HYDROCHLORIDE 100 MG: 50 TABLET, FILM COATED ORAL at 11:48

## 2022-04-09 RX ADMIN — LORAZEPAM 2 MG: 2 LIQUID ORAL at 22:35

## 2022-04-09 RX ADMIN — KETOROLAC TROMETHAMINE 30 MG: 30 INJECTION, SOLUTION INTRAMUSCULAR; INTRAVENOUS at 22:35

## 2022-04-09 RX ADMIN — PSEUDOEPHEDRINE HCL 30 MG: 30 TABLET ORAL at 18:28

## 2022-04-09 RX ADMIN — ACETAMINOPHEN 975 MG: 325 TABLET, FILM COATED ORAL at 14:17

## 2022-04-09 RX ADMIN — FLUTICASONE PROPIONATE 1 SPRAY: 50 SPRAY, METERED NASAL at 14:35

## 2022-04-09 RX ADMIN — GABAPENTIN 1200 MG: 600 TABLET, FILM COATED ORAL at 14:17

## 2022-04-09 RX ADMIN — SULFAMETHOXAZOLE AND TRIMETHOPRIM 320 MG: 80; 16 INJECTION, SOLUTION, CONCENTRATE INTRAVENOUS at 17:11

## 2022-04-09 RX ADMIN — MORPHINE SULFATE 300 MG: 30 TABLET, FILM COATED, EXTENDED RELEASE ORAL at 11:49

## 2022-04-09 RX ADMIN — LORAZEPAM 1 MG: 2 LIQUID ORAL at 11:19

## 2022-04-09 RX ADMIN — MORPHINE SULFATE 300 MG: 30 TABLET, FILM COATED, EXTENDED RELEASE ORAL at 18:19

## 2022-04-09 RX ADMIN — MORPHINE SULFATE 4 MG: 4 INJECTION INTRAVENOUS at 12:40

## 2022-04-09 RX ADMIN — PREGABALIN 300 MG: 300 CAPSULE ORAL at 20:16

## 2022-04-09 RX ADMIN — METHOCARBAMOL 750 MG: 750 TABLET ORAL at 20:17

## 2022-04-09 RX ADMIN — MORPHINE SULFATE 300 MG: 30 TABLET, FILM COATED, EXTENDED RELEASE ORAL at 05:56

## 2022-04-09 RX ADMIN — AZITHROMYCIN MONOHYDRATE 500 MG: 500 INJECTION, POWDER, LYOPHILIZED, FOR SOLUTION INTRAVENOUS at 15:51

## 2022-04-09 RX ADMIN — MORPHINE SULFATE 300 MG: 30 TABLET, FILM COATED, EXTENDED RELEASE ORAL at 00:28

## 2022-04-09 RX ADMIN — LORAZEPAM 1 MG: 2 LIQUID ORAL at 04:29

## 2022-04-09 RX ADMIN — MORPHINE SULFATE 4 MG: 4 INJECTION INTRAVENOUS at 18:16

## 2022-04-09 RX ADMIN — HYDROXYZINE HYDROCHLORIDE 100 MG: 50 TABLET, FILM COATED ORAL at 00:07

## 2022-04-09 RX ADMIN — KETOROLAC TROMETHAMINE 30 MG: 30 INJECTION, SOLUTION INTRAMUSCULAR; INTRAVENOUS at 15:51

## 2022-04-09 RX ADMIN — SODIUM PHOSPHATE, MONOBASIC, MONOHYDRATE 9 MMOL: 276; 142 INJECTION, SOLUTION INTRAVENOUS at 16:41

## 2022-04-09 RX ADMIN — METHOCARBAMOL 750 MG: 750 TABLET ORAL at 08:20

## 2022-04-09 RX ADMIN — SODIUM CHLORIDE, POTASSIUM CHLORIDE, SODIUM LACTATE AND CALCIUM CHLORIDE: 600; 310; 30; 20 INJECTION, SOLUTION INTRAVENOUS at 20:36

## 2022-04-09 RX ADMIN — PREGABALIN 300 MG: 300 CAPSULE ORAL at 08:27

## 2022-04-09 RX ADMIN — KETOROLAC TROMETHAMINE 30 MG: 30 INJECTION, SOLUTION INTRAMUSCULAR; INTRAVENOUS at 10:01

## 2022-04-09 RX ADMIN — FAMOTIDINE 10 MG: 10 TABLET ORAL at 08:22

## 2022-04-09 RX ADMIN — MORPHINE SULFATE 4 MG: 4 INJECTION INTRAVENOUS at 20:44

## 2022-04-09 RX ADMIN — CEFTRIAXONE 1 G: 1 INJECTION, POWDER, FOR SOLUTION INTRAMUSCULAR; INTRAVENOUS at 15:07

## 2022-04-09 RX ADMIN — NICOTINE 1 PATCH: 21 PATCH, EXTENDED RELEASE TRANSDERMAL at 08:36

## 2022-04-09 RX ADMIN — SODIUM CHLORIDE 500 MG: 9 INJECTION, SOLUTION INTRAVENOUS at 18:24

## 2022-04-09 RX ADMIN — GABAPENTIN 1200 MG: 600 TABLET, FILM COATED ORAL at 08:20

## 2022-04-09 RX ADMIN — HYDROXYZINE HYDROCHLORIDE 50 MG: 50 TABLET, FILM COATED ORAL at 18:19

## 2022-04-09 RX ADMIN — PSEUDOEPHEDRINE HCL 30 MG: 30 TABLET ORAL at 10:07

## 2022-04-09 RX ADMIN — METHOCARBAMOL 750 MG: 750 TABLET ORAL at 14:17

## 2022-04-09 RX ADMIN — HYDROXYZINE HYDROCHLORIDE 100 MG: 50 TABLET, FILM COATED ORAL at 05:57

## 2022-04-09 RX ADMIN — MORPHINE SULFATE 4 MG: 4 INJECTION INTRAVENOUS at 03:40

## 2022-04-09 RX ADMIN — MORPHINE SULFATE 4 MG: 4 INJECTION INTRAVENOUS at 08:25

## 2022-04-09 RX ADMIN — ACETAMINOPHEN 975 MG: 325 TABLET, FILM COATED ORAL at 05:55

## 2022-04-09 RX ADMIN — MORPHINE SULFATE 4 MG: 4 INJECTION INTRAVENOUS at 20:43

## 2022-04-09 RX ADMIN — KETOROLAC TROMETHAMINE 30 MG: 30 INJECTION, SOLUTION INTRAMUSCULAR; INTRAVENOUS at 03:40

## 2022-04-09 ASSESSMENT — ACTIVITIES OF DAILY LIVING (ADL)
ADLS_ACUITY_SCORE: 17

## 2022-04-09 NOTE — PROGRESS NOTES
Oncology chart check for Dr. Peñaloza:    No new recommendations at this time.  Please see Dr. Peñaloza's note from 4/8/2022

## 2022-04-09 NOTE — PLAN OF CARE
7PM-7AM RN     Patient vital signs are at baseline: No. O2 required.   Patient able to ambulate as they were prior to admission or with assist devices provided by therapies during their stay: No.   Patient MUST void prior to discharge: Yes    Patient able to tolerate oral intake: Yes   Patient has adequate pain control using oral analgesics: Yes    Bedrest overnight (Ax1 with pivot to commode or to chair per notes). On C-pap with 50-60% O2. Saturation from % in any given time period d/t nasal congestion (per patient) or apparent anxiety. Has a tendency to take c-pap off to do something and then panics trying to put it back on. Hyperventilates and O2 drops. IV ativan given x1 along with IV morphine, MS Contin, Atarax and Toradol. Pain remains 7/8. Sleeps in between cares however. Dressings are clean, dry, and intact. Discharge placement and time TBD.

## 2022-04-09 NOTE — PLAN OF CARE
Goal Outcome Evaluation:    Plan of Care Reviewed With: patient   Pt c/o pain in ribs exacerbated by labored breathing. SOB & tchypneic. Cough productive of bloody sputom. Rales bilat bases. Nasal swab performed & sputom spec sent. Pt had a difficult time with swab and sats dropped into the 80s needed to go back on cpap for awhile. Back on oxymask at 15L for 2 hrs then started dropping to low to mid 80s so cpap put back on at HS- Maintaining sats on 50% O2. Pt very anxious- ativan prn & scheduled is helping. Pt declines oob but is able to reposition himself in bed. Voidin in good amts per urinal. Poor appetite. Baseline numbness & tingling otherwise cms intact. Drsg CD&I. Cont iv abx as ordered.

## 2022-04-09 NOTE — PROGRESS NOTES
Patient remains on cpap 50-60% fio2 throughout the day, mostly lethargic/somnolent throughout day but able to be awake/alert during cares. Patient complains of SOB with any exertion. Patient is coughing up brown red-tinged sputum. On rocephin and zithro, provider added bactrim and solumedrol. Pain meds given as scheduled, as well as prn morphine, ativan, and sudafed per patient requests. Dressings CDI. CMS intact. 1 unit of red blood cells given. Phosphorus to be replaced, recheck in AM. Tele - SR BBB. NPO order starting at midnight, unsure what this is for at this time. Will continue to monitor.

## 2022-04-09 NOTE — PROGRESS NOTES
A CPAP of +8 @ 50% was applied to the pt via the mask for an increase in WOB and/or SOB.  The bridge of the nose is intact.Pt is tolerating it well. Will continue to monitor and assess the pt's current respiratory status and needs.      Meena Nolasco, RT

## 2022-04-09 NOTE — PROVIDER NOTIFICATION
DATE:  4/9/2022   TIME OF RECEIPT FROM LAB:  0625  LAB TEST:  Hgb  LAB VALUE:  6.8  RESULTS GIVEN WITH READ-BACK TO (PROVIDER):  Admitting overnight.     TIME LAB VALUE REPORTED TO PROVIDER:   0610

## 2022-04-09 NOTE — PROGRESS NOTES
[facility]  Hospitalist Progress Note  Brad Coto,  04/09/22       Reason for Stay (Diagnosis): Left femur fracture, right iliac wing fracture, multiple rib fractures         Assessment and Plan:      Summary of Stay: Beto Tran is a 46 year old male with a history of advanced multiple myeloma previously on hospice, active nicotine dependence with cigarettes, chronic pain with opiate dependence, anxiety, history of thoracic outlet syndrome, anemia of chronic disease who was admitted with a new left femur fracture, right lateral iliac wing fracture, multiple rib fractures after twisting while transferring from a wheelchair.    Orthopedic surgery was consulted and the patient went to the OR for left femur fracture repair with a cephalomedullary device on 4/3.  He also underwent device placement on the right side on 4/6 given impending fracture risk of the right femur.    Postop course has been complicated by severe pain as the patient missed many hours of his PTA narcotic regimen which is significant.  Pain and palliative have been following and the patient is feeling much better from a pain aspect.    Problem List/Assessment and Plan:   Acute hypoxemic respiratory failure:  Patient was requiring up to 5 L nasal cannula postoperatively.  Chest x-ray at that time were showing some infiltrates that were nonspecific.  The patient did have a productive cough and did have rales on exam and therefore empiric diuretics and community-acquired pneumonia antibiotics were initiated.  Unfortunately the patient continued to worsen overnight requiring CPAP.  CT PE was completed which was negative for PE but did show extensive, confluent bilateral groundglass infiltrates in both lungs which could reflect infection, but etiology may also include hypersensitivity pneumonitis/drug reaction, atypical infection.  Procalcitonin is elevated.  LDH is elevated.  RVP negative.  CBC with diff without any  eosinphilia.   -Pulmonary consulted, appreciate recommendations   -F/u beta-d-glucan   -If ongoing high oxygen requirements consider high flow nasal cannula   -If respiratory status continues to decline a trial of high-dose steroids should be considered, starting with 60 mg prednisone equivalent per day (will hold for today 4/9 as respiratory status is stable, unless pulmonary feels strongly)   -On-call pulmonologist should also be called if he continues to decline   -Consideration for bronchoscopy with BAL on Monday if he fails to improve  -Continuous oximetry, supplemental oxygen as needed  -He has the aerobic and incentive spirometry in his room which he is using  -Continue ceftriaxone and azithromycin for the time being  -DNR/DNI  -Trialing nasal saline spray, flonase, pseudophed to allow better use of nasal PPV    ADDENDUM:   Discussed with pulmonary. Will opt to start therapeutic treatment for PCP PNA and possible DAH/hypersensitivity pneumonitis with therapeutic bactrim, and pulse dose steroids. PPI ppx.     Left femur fracture:  Occurred during a traumatic fall/transition as noted above.  Orthopedic surgery was consulted and the patient went to the operating room with femur fracture repair with a cephalomedullary device on 4/30.  He also underwent prophylactic fixation of the right impending pathologic femur fracture with intramedullary nail.  -Orthopedic surgery following, appreciate recommendations  -Pain pain team following, appreciate recommendations   -Pain medications as needed   -Incentive spirometry  -PT/OT    Right iliac wing and multiple rib fractures:  Occurred during traumatic fall/transition as noted above.  Seen in consultation by trauma surgery team and orthopedic surgery.  -Orthopedic surgery following, patient recommendations  -Pain medications as needed    Multiple myeloma:  Previous autologous stem cell transplant.  Follows the oncology team at [facility].  Per report, it seems he has  been on hospice for over 7 years now with no myeloma treatment.  Extensive lytic bone lesions throughout the body and essentially no normal bone left.  Very high risk fracture.  -Oncology consulted, appreciate commendations   -Recommend patient stay on hospice   -Do not believe that treating his myeloma would improve bone health for the stable future   -Recommend that palliative care team may have to manage his medications    Acute blood loss on chronic anemia:  Thought secondary to acute blood loss anemia due to tremor and surgery.  Does have a history of multiple myeloma which may be contributing.  He received 2 units packed red blood cells on 4/5 and 4/7 for hemoglobin less than 7.  -Daily CBC    Chronic opiate related constipation:  -Continue methylnaltrexone injections every other day    Hypokalemia  Hypophosphatemia:  -Replace as needed with nurse driven protocol    Depression:  Significant underlying medical problems that he feels are not being managed well.  Frustrated given ongoing fractures.  Not interested in psychiatry consult during this admission.  -As needed lorazepam for anxiety    Tobacco use disorder:  -NRT      DVT Prophylaxis: Pneumatic Compression Devices  Code Status: DNR / DNI  Discharge Dispo/Date: Discharge TBD depending on improvement in respiratory failure.        Interval History (Subjective):      Patient continues to have increased work of breathing he reports that he is feeling quite tired both from work of breathing as well as being unable to sleep.  He continues to have a productive cough, occasionally with some questionable blood.  He was able to come off CPAP for a short period of time yesterday afternoon, but he did require to go back on it for sleep.  His pain continues to be pretty adequately controlled.  Does not seem that there are any other acute events overnight.                  Physical Exam:      Last Vital Signs:  /45 (BP Location: Right arm)   Pulse 87   Temp  "97.1  F (36.2  C) (Temporal)   Resp 20   Ht 1.753 m (5' 9\")   Wt 69.5 kg (153 lb 3.2 oz)   SpO2 93%   BMI 22.62 kg/m        Intake/Output Summary (Last 24 hours) at 4/7/2022 1329  Last data filed at 4/7/2022 1300  Gross per 24 hour   Intake 1000 ml   Output 2050 ml   Net -1050 ml       General: Alert, awake, mild increased work of breathing.  Does have to take breaths while talking to take breaths.  HEENT: NC/AT, eyes anicteric, external occular movements intact, face symmetric.  Sounds congested.  Cardiac: RRR, S1, S2.  No murmurs appreciated.  Pulmonary: Increased work of breathing.  Lungs with some rales in the bases, but largely clear otherwise.  No obvious wheezing.  Abdomen: soft, non-tender, non-distended.  Bowel sounds present.  No guarding.  Extremities: no deformities.  Warm, well perfused.  Skin: no rashes or lesions noted.  Warm and dry.  Neuro: No gross deficits noted.  Speech clear.    Psych: Anxious affect.         Medications:      All current medications were reviewed with changes reflected in problem list.         Data:      All new lab and imaging data was reviewed.   Labs:       Lab Results   Component Value Date     04/05/2022     04/04/2022     04/03/2022     01/30/2015     01/07/2015     12/23/2014    Lab Results   Component Value Date    CHLORIDE 113 04/05/2022    CHLORIDE 109 04/04/2022    CHLORIDE 106 04/03/2022    CHLORIDE 104 01/30/2015    CHLORIDE 104 01/07/2015    CHLORIDE 106 12/23/2014    Lab Results   Component Value Date    BUN 13 04/05/2022    BUN 8 04/04/2022    BUN 6 04/03/2022    BUN 12 01/30/2015    BUN 8 01/07/2015    BUN 10 12/23/2014      Lab Results   Component Value Date    POTASSIUM 4.0 04/07/2022    POTASSIUM 4.1 04/06/2022    POTASSIUM 3.6 04/05/2022    POTASSIUM 3.2 06/18/2015    POTASSIUM 3.6 06/12/2015    POTASSIUM 3.9 06/08/2015    Lab Results   Component Value Date    CO2 25 04/05/2022    CO2 24 04/04/2022    CO2 29 " 04/03/2022    CO2 28 01/30/2015    CO2 32 01/07/2015    CO2 27 12/23/2014    Lab Results   Component Value Date    CR 0.54 04/05/2022    CR 0.41 04/04/2022    CR 0.40 04/03/2022    CR 0.60 02/20/2015    CR 0.61 01/30/2015    CR 0.52 01/07/2015        Recent Labs   Lab 04/09/22  0550   WBC 9.6   HGB 6.8*   HCT 21.3*   *         Imaging:   Recent Results (from the past 48 hour(s))   XR Chest Port 1 View    Narrative    EXAM: XR CHEST PORT 1 VIEW  LOCATION: [facility]  DATE/TIME: 4/6/2022 5:56 AM    INDICATION: Trauma Patient with Rib Fracture(s)  COMPARISON: Chest radiograph yesterday.      Impression    IMPRESSION: Overall similar to yesterday. Stable interstitial coarsening. No new lung consolidation, pleural fluid or pneumothorax. Right-sided rib fractures again noted. Stable cardiac silhouette.   XR Surgery PATTY L/T 5 Min Fluoro w Stills    Narrative    This exam was marked as non-reportable because it will not be read by a   radiologist or a [place] non-radiologist provider.         XR Chest Port 1 View    Narrative    EXAM: XR CHEST PORT 1 VIEW  LOCATION: [facility]  DATE/TIME: 4/7/2022 6:36 AM    INDICATION: Trauma Patient with Rib Fracture(s)  COMPARISON: 04/06/2022      Impression    IMPRESSION: Heart size is normal. Diffuse patchy interstitial prominence is stable. No visible pneumothorax. Severe osteopenia with redemonstrated acute right posterior rib fractures. There are also nonacute fracture deformities in the bilateral ribs.       Brad Coto DO

## 2022-04-09 NOTE — PROGRESS NOTES
Cross cover page for hemoglobin down to 6.8  He has known myeloma and runs low  Patient is considering hospice but is not on hospice  Patient has been symptomatic  I will order 1 unit of PRBCs in hopes to help with his symptoms  Transfusion ordered

## 2022-04-09 NOTE — PROVIDER NOTIFICATION
Lab called and informed charge nurse that all morning labs could be wrong d/t the wrong tube being used for blood draw. They will send someone up STAT to do re-draw's. Bedside nurse and Dr. Coto informed

## 2022-04-10 NOTE — PROGRESS NOTES
[facility]  Hospitalist Progress Note  Brad Coto, DO 04/10/22       Reason for Stay (Diagnosis): Left femur fracture, right iliac wing fracture, multiple rib fractures         Assessment and Plan:      Summary of Stay: Beto Tran is a 46 year old male with a history of advanced multiple myeloma previously on hospice, active nicotine dependence with cigarettes, chronic pain with opiate dependence, anxiety, history of thoracic outlet syndrome, anemia of chronic disease who was admitted with a new left femur fracture, right lateral iliac wing fracture, multiple rib fractures after twisting while transferring from a wheelchair.    Orthopedic surgery was consulted and the patient went to the OR for left femur fracture repair with a cephalomedullary device on 4/3.  He also underwent device placement on the right side on 4/6 given impending fracture risk of the right femur.    Postop course has been complicated by severe pain as the patient missed many hours of his PTA narcotic regimen which is significant.  Pain and palliative have been following and the patient is feeling much better from a pain aspect.    Post-op course also complicated by respiratory failure with etiology unclear. Requiring resp support with BIPAP. Pulmonary following and consideration for bronchoscopy.    Problem List/Assessment and Plan:   Acute hypoxemic respiratory failure:  Patient was requiring up to 5 L nasal cannula postoperatively and ultimately progressed to needing BIPAP. CXR w/ stable non-specific infiltrates. CT PE was completed which was negative for PE but did show extensive, confluent bilateral groundglass infiltrates in both lungs which could reflect infection, but etiology may also include hypersensitivity pneumonitis/drug reaction, atypical infection.  Procalcitonin is elevated.  LDH is elevated but beta-D-glucan negative therefore PCP PNA unlikely.  RVP negative.  CBC with diff without any eosinphilia.     -Pulmonary consulted, appreciate recommendations   -Initiated pulse dose solumedrol 500 mg BID on 4/9   -Discontinue bactrim as PCP unlikely   -On-call pulmonologist should also be called if he continues to decline   -Consideration for bronchoscopy with BAL on Monday if he fails to improve (NPO midnight 4/10). Of note he is DNR/DNI, but has historically been ok with intubation for procedures. I discussed this with him on 4/10, but he was still pondering.   -Continuous oximetry, supplemental oxygen as needed  -He has the aerobika and incentive spirometry in his room which he is using as able  -Continue ceftriaxone and azithromycin for the time being  -DNR/DNI  -Trialing nasal saline spray, flonase, pseudophed to allow better use of nasal PPV  -PPI for ulcer ppx    Left femur fracture:  Occurred during a traumatic fall/transition as noted above.  Orthopedic surgery was consulted and the patient went to the operating room with femur fracture repair with a cephalomedullary device on 4/30.  He also underwent prophylactic fixation of the right impending pathologic femur fracture with intramedullary nail.  -Orthopedic surgery following, appreciate recommendations  -Pain pain team following, appreciate recommendations   -Pain medications as needed   -Incentive spirometry  -PT/OT    Right iliac wing and multiple rib fractures:  Occurred during traumatic fall/transition as noted above.  Seen in consultation by trauma surgery team and orthopedic surgery.  -Orthopedic surgery following, patient recommendations  -Pain medications as needed    Multiple myeloma:  Previous autologous stem cell transplant.  Follows the oncology team at [facility].  Per report, it seems he has been on hospice for over 7 years now with no myeloma treatment.  Extensive lytic bone lesions throughout the body and essentially no normal bone left.  Very high risk fracture.  -Oncology consulted, appreciate commendations   -Recommend patient stay on  hospice   -Do not believe that treating his myeloma would improve bone health for the stable future    Acute blood loss on chronic anemia:  Thought secondary to acute blood loss anemia due to tremor and surgery.  Does have a history of multiple myeloma which may be contributing.  He received 2 units packed red blood cells on 4/5 and 4/7 for hemoglobin less than 7. Folate, B12, iron studies all unremarkable, but after transfusion.  -Daily CBC    Chronic opiate related constipation:  -Continue methylnaltrexone injections every other day    Hypokalemia  Hypophosphatemia:  -Replace as needed with nurse driven protocol    Depression:  Significant underlying medical problems that he feels are not being managed well.  Frustrated given ongoing fractures.  Not interested in psychiatry consult during this admission.  -As needed lorazepam for anxiety    Tobacco use disorder:  -NRT      DVT Prophylaxis: Pneumatic Compression Devices  Code Status: DNR / DNI  Discharge Dispo/Date: Discharge TBD depending on improvement in respiratory failure.        Interval History (Subjective):      Ongoing shortness of breath overnight.  The patient is pretty upset this morning.  He reports that he had an extremely rough night.  Reports that he felt ignored.  Reports that he yelled out for asking for a new urinal.  Overall just extremely frustrated with the overall situation.  His breathing continues to be poor.  He continues to be congested.  He denies any other overt new symptoms.    I did spend additional time at bedside talking with them about the events of last night.  I also discussed the plan moving forward.  I brought up the idea of a bronchoscopy tomorrow.  I did note to him that he may need to be intubated for the procedure if it was within his goals of care.  He seemed to be pondering it.                  Physical Exam:      Last Vital Signs:  BP (P) 126/56 (BP Location: Left arm)   Pulse (P) 94   Temp (P) 97.5  F (36.4  C)  "(Temporal)   Resp (P) 18   Ht 1.753 m (5' 9\")   Wt 69.5 kg (153 lb 3.2 oz)   SpO2 93%   BMI 22.62 kg/m        Intake/Output Summary (Last 24 hours) at 4/7/2022 1329  Last data filed at 4/7/2022 1300  Gross per 24 hour   Intake 1000 ml   Output 2050 ml   Net -1050 ml       General: Alert, awake, mild increased work of breathing.  Is able to talk through BIPAP, however.   HEENT: NC/AT, eyes anicteric, external occular movements intact, face symmetric.  Sounds congested.  Cardiac: RRR, S1, S2.  No murmurs appreciated.  Pulmonary: Increased work of breathing.  Ongoing rales in the bases. No obvious wheezing.  Abdomen: soft, non-tender, non-distended.  Bowel sounds present.  No guarding.  Extremities: no deformities.  Warm, well perfused.  Skin: no rashes or lesions noted.  Warm and dry.  Neuro: No gross deficits noted.  Speech clear.    Psych: Anxious affect.         Medications:      All current medications were reviewed with changes reflected in problem list.         Data:      All new lab and imaging data was reviewed.   Labs:       Lab Results   Component Value Date     04/05/2022     04/04/2022     04/03/2022     01/30/2015     01/07/2015     12/23/2014    Lab Results   Component Value Date    CHLORIDE 113 04/05/2022    CHLORIDE 109 04/04/2022    CHLORIDE 106 04/03/2022    CHLORIDE 104 01/30/2015    CHLORIDE 104 01/07/2015    CHLORIDE 106 12/23/2014    Lab Results   Component Value Date    BUN 13 04/05/2022    BUN 8 04/04/2022    BUN 6 04/03/2022    BUN 12 01/30/2015    BUN 8 01/07/2015    BUN 10 12/23/2014      Lab Results   Component Value Date    POTASSIUM 4.0 04/07/2022    POTASSIUM 4.1 04/06/2022    POTASSIUM 3.6 04/05/2022    POTASSIUM 3.2 06/18/2015    POTASSIUM 3.6 06/12/2015    POTASSIUM 3.9 06/08/2015    Lab Results   Component Value Date    CO2 25 04/05/2022    CO2 24 04/04/2022    CO2 29 04/03/2022    CO2 28 01/30/2015    CO2 32 01/07/2015    CO2 27 12/23/2014    " Lab Results   Component Value Date    CR 0.54 04/05/2022    CR 0.41 04/04/2022    CR 0.40 04/03/2022    CR 0.60 02/20/2015    CR 0.61 01/30/2015    CR 0.52 01/07/2015        Recent Labs   Lab 04/10/22  0558   WBC 7.8   HGB 7.7*   HCT 22.7*   MCV 95         Imaging:   Recent Results (from the past 48 hour(s))   XR Chest Port 1 View    Narrative    EXAM: XR CHEST PORT 1 VIEW  LOCATION: [facility]  DATE/TIME: 4/6/2022 5:56 AM    INDICATION: Trauma Patient with Rib Fracture(s)  COMPARISON: Chest radiograph yesterday.      Impression    IMPRESSION: Overall similar to yesterday. Stable interstitial coarsening. No new lung consolidation, pleural fluid or pneumothorax. Right-sided rib fractures again noted. Stable cardiac silhouette.   XR Surgery PATTY L/T 5 Min Fluoro w Stills    Narrative    This exam was marked as non-reportable because it will not be read by a   radiologist or a [facility] non-radiologist provider.         XR Chest Port 1 View    Narrative    EXAM: XR CHEST PORT 1 VIEW  LOCATION: [facility]  DATE/TIME: 4/7/2022 6:36 AM    INDICATION: Trauma Patient with Rib Fracture(s)  COMPARISON: 04/06/2022      Impression    IMPRESSION: Heart size is normal. Diffuse patchy interstitial prominence is stable. No visible pneumothorax. Severe osteopenia with redemonstrated acute right posterior rib fractures. There are also nonacute fracture deformities in the bilateral ribs.       Brad Coto DO

## 2022-04-10 NOTE — PLAN OF CARE
A/Ox4.  Anxious/restless at times.  Reports feeling frustrated with his night. Good amount time spent offering reassurance to pt and allowing him time to express his feelings.  Dressings D/I to bilateral legs. Voiding per urinal.  Remains on HFNC-per discussion with RT will keep on that for now and continue to assess throughout the evening.  Taking sips of fluids and ice chips. Given PRN Morphine x1 this AM, otherwise continues on scheduled chronic pain meds.  Did not get out of bed today as he felt like it would take too much out of him with his breathing. Encouraged deep breaths and use of IS as able.  Will continue to monitor closely.

## 2022-04-10 NOTE — PLAN OF CARE
"Patient alert and oriented x4. Patient regularly refuses nursing cares and instruction. After evening medications were given, patient slept majority of shift. RN awoke patient to give AM scheduled medications, which aggravated patient. Patient refused to be NPO despite frequent reinforcement of education about safe use of BiPAP/CPAP, and potential tests that would require a patient to be NPO. Patient repeatedly stated he \"just wants to be done\" and that \"no one is telling him what's going on\". Updates given to patient as appropriate. On two separate occasions, RN asked patient if they could grab him anything else, to which he responded, \"A shotgun?\". When RN asked for more information regarding patient's comment, patient denied thoughts of self-harm and stated that he was \"just in Hell\". Frequent safety checks performed throughout shift. Patient continues to remove CPAP independently to drink water.     "

## 2022-04-10 NOTE — PLAN OF CARE
Writer walked into pt's room and heard pt screaming at another staff member using foul language.  Writer tried to redirect multiple times but pt continued to be agitated.     requested charge nurse to come to the room and speak with pt about his concerns. Charged nurse apologized and reassured that this will not happen again, but pt continued to use loud voice, pt requested RT due to increased labored breathing.    PRN morphine administered per order. After a while patient seemed more calm, and was able to be redirected. Pt apologized to staff about his behavior.

## 2022-04-10 NOTE — PROVIDER NOTIFICATION
Dr. Coto and RT updated about ABG results.  Dr. Coto at bedside to evaluate pt. O2 sats on monitor currently 91-96% on HFNC, but at times this afternoon dropped to 86%. Will discuss with RT and have them assess if needs CPAP or if can continue on HFNC.

## 2022-04-10 NOTE — PROGRESS NOTES
Pt remained on  CPAP of +8 @ 60-75% for an increase in WOB and/or SOB.  The bridge of the nose is intact.Pt is tolerating it well. Will continue to monitor and assess the pt's current respiratory status and needs.      Meena Nolasco, RT

## 2022-04-10 NOTE — PROGRESS NOTES
[facility]    Orthopedic Post-op Progress Note  POD# 4    Assessment & Plan     Procedure: left IM nail    -4 Days Post-Op  Normal healing wound.  No immediate surgical complications identified.  No excessive bleeding  Pain well-controlled.  Tolerating physical therapy and rehabilitation well.    Plan:  -Advance activity as tolerated  -Continue supportive and symptomatic treatment  -Start or continue physical therapy  -Pain control measures  -Routine wound care  -WBAT  -Lovenox for DVT prophylaxis        ERICK CONWAY      Physical Exam   Temp: (P) 97.5  F (36.4  C) Temp src: (P) Temporal BP: (P) 126/56 Pulse: (P) 94   Resp: (P) 18 SpO2: 93 % O2 Device: High Flow Nasal Cannula (HFNC)    Vitals:    04/03/22 0855   Weight: 69.5 kg (153 lb 3.2 oz)     Vital Signs with Ranges  Temp:  [97.1  F (36.2  C)-97.6  F (36.4  C)] (P) 97.5  F (36.4  C)  Pulse:  [80-97] (P) 94  Resp:  [12-24] (P) 18  BP: (100-123)/(52-65) (P) 126/56  FiO2 (%):  [50 %-85 %] 85 %  SpO2:  [88 %-100 %] 93 %  I/O last 3 completed shifts:  In: 521 [I.V.:170]  Out: 950 [Urine:950]    Physical Exam:      CMS: intact  Incision: C&D  ROM: restricted secondary to pain    Medications     - MEDICATION INSTRUCTIONS -       - MEDICATION INSTRUCTIONS -          acetaminophen  975 mg Oral Q8H GEETA     cefTRIAXone  1 g Intravenous Q24H     diclofenac  4 g Topical 4x Daily     [Held by provider] enoxaparin ANTICOAGULANT  40 mg Subcutaneous Q24H     famotidine  10 mg Oral Daily     fluticasone  1 spray Both Nostrils Daily     gabapentin  1,200 mg Oral TID     hydrOXYzine   mg Oral Q6H     ketorolac  30 mg Intravenous Q6H     lidocaine  2 patch Transdermal Q24h    And     lidocaine   Transdermal Q8H     LORazepam  1-2 mg Oral At Bedtime     methocarbamol  750 mg Oral TID     methylnaltrexone  12 mg Subcutaneous Every Other Day     methylPREDNISolone  500 mg Intravenous Q12H     morphine  300 mg Oral 4x Daily     multivitamin w/minerals  1  tablet Oral Daily     [START ON 4/11/2022] naproxen  500 mg Oral BID w/meals     nicotine  1 patch Transdermal Daily     nicotine   Transdermal Q8H     pantoprazole (PROTONIX) IV  40 mg Intravenous Daily with breakfast     polyethylene glycol  17 g Oral Daily     pregabalin  300 mg Oral BID     senna-docusate  1 tablet Oral BID     sodium chloride (PF)  3 mL Intracatheter Q8H       Data   Recent Labs   Lab 04/10/22  0558 04/09/22  0550 04/08/22  1209   HGB 7.7* 6.8* 7.0*     Recent Labs   Lab 04/10/22  0558 04/09/22  0801 04/09/22  0550   WBC 7.8  --  9.6    141  --    POTASSIUM 4.7  4.7 4.9  --    CR 0.50* 0.55*  --

## 2022-04-10 NOTE — PROGRESS NOTES
RT called to Pt's room multiple times to titrate's O2 needs. Pt very worried about taking of mask. When CPAP mask is removed PT begins to breath rapidly and get agitated. SATs anywhere from % while on Oxymask.   Pt requested to go back on to CPAP with nasal mask. Currently +8 50%. SATs 98%.     1143 The HFNC was applied to the pt @ 50LPM and 85% for PEEP therapy.      arterial blood gases done on 95% HFNC  Last Arterial Blood Gas:  pH Arterial   Date Value Ref Range Status   04/10/2022 7.48 (H) 7.35 - 7.45 Final     pCO2 Arterial   Date Value Ref Range Status   04/10/2022 28 (L) 35 - 45 mm Hg Final     pO2 Arterial   Date Value Ref Range Status   04/10/2022 50 (L) 80 - 105 mm Hg Final     Bicarbonate Arterial   Date Value Ref Range Status   04/10/2022 21 21 - 28 mmol/L Final     Base Excess/Deficit (+/-)   Date Value Ref Range Status   04/10/2022 -2.1 -9.0 - 1.8 mmol/L Final       Pt placed on HFNC 50l/m 85% for alternate with CPAP.    RT will follow.

## 2022-04-11 NOTE — PROGRESS NOTES
[facility]  Hospitalist Progress Note  Demetrio Andrade MD 04/11/2022    Reason for Stay (Diagnosis): s/p femur fx repair, acute hypoxic resp failure         Assessment and Plan:      Summary of Stay: Beto Tran is a 46 year old male with a history of advanced multiple myeloma previously on hospice, active nicotine dependence with cigarettes, chronic pain with opiate dependence, anxiety, history of thoracic outlet syndrome, anemia of chronic disease who was admitted with a new left femur fracture, right lateral iliac wing fracture, multiple rib fractures after twisting while transferring from a wheelchair.     Orthopedic surgery was consulted and the patient went to the OR for left femur fracture repair with a cephalomedullary device on 4/3.  He also underwent device placement on the right side on 4/6 given impending fracture risk of the right femur.     Postop course has been complicated by severe pain as the patient missed many hours of his PTA narcotic regimen which is significant.  Pain and palliative have been following and the patient is feeling much better from a pain aspect.     Post-op course also complicated by respiratory failure with etiology unclear. Requiring resp support with BIPAP. Pulmonary was consulted; bronchoscopy had been considered but given need for intubation and patient not wanting procedure this was cancelled    Plan to monitor patient over the next several days on current therapy.  If not improving pt requesting discharge home to continue hospice    Plans today:  - Lasix 20 mg IV x 2 doses today.  Although I don't suspect pulm edema primary cause here, keeping pt euvolemic/dry will help underlying hypoxic resp failure  - continue solumedrol  - continue Rocephin  - continue BIPAP/HFNC for hypoxia.  Wean as able     Problem List/Assessment and Plan:   Acute hypoxemic respiratory failure:  Patient was requiring up to 5 L nasal cannula postoperatively and  ultimately progressed to needing BIPAP. CXR w/ stable non-specific infiltrates. CT PE was completed which was negative for PE but did show extensive, confluent bilateral groundglass infiltrates in both lungs which could reflect infection, but etiology may also include hypersensitivity pneumonitis/drug reaction, atypical infection.  Procalcitonin is elevated.  LDH is elevated but beta-D-glucan negative therefore PCP PNA unlikely.  RVP negative.  CBC with diff without any eosinphilia.    -Pulmonary consulted, appreciate recommendations   -Initiated pulse dose solumedrol 500 mg BID on 4/9   -There was consideration for bronchoscopy but he has declined this given need for intubation and anticipated prolonged vent support after procedure completed as he would not be able to extubate after procedure given current degree of hypoxia  -Continuous oximetry, supplemental oxygen as needed  -Continue ceftriaxone   -DNR/DNI       Left femur fracture:  Occurred during a traumatic fall/transition as noted above.  Orthopedic surgery was consulted and the patient went to the operating room with femur fracture repair with a cephalomedullary device on 4/30.  He also underwent prophylactic fixation of the right impending pathologic femur fracture with intramedullary nail.  -Orthopedic surgery following, appreciate recommendations  -Pain pain team following, appreciate recommendations               -Pain medications as needed   -Incentive spirometry  -PT/OT     Right iliac wing and multiple rib fractures:  Occurred during traumatic fall/transition as noted above.  Seen in consultation by trauma surgery team and orthopedic surgery.  -Orthopedic surgery following, patient recommendations  -Pain medications as needed     Multiple myeloma:  Previous autologous stem cell transplant.  Follows the oncology team at [facility].  Per report, it seems he has been on hospice for over 7 years now with no myeloma treatment.  Extensive lytic bone  lesions throughout the body and essentially no normal bone left.  Very high risk fracture.  -Oncology consulted, appreciate commendations               -Recommend patient stay on hospice               -Do not believe that treating his myeloma would improve bone health for the stable future     Acute blood loss on chronic anemia:  Thought secondary to acute blood loss anemia due to tremor and surgery.  Does have a history of multiple myeloma which may be contributing.  He received 2 units packed red blood cells on 4/5 and 4/7 for hemoglobin less than 7. Folate, B12, iron studies all unremarkable, but after transfusion.  -Daily CBC     Chronic opiate related constipation:  -Continue methylnaltrexone injections every other day     Hypokalemia  Hypophosphatemia:  -Replace as needed with nurse driven protocol     Depression:  - has very labile mood  - c/o underlying medical problems that he feels are not being managed well.  Frustrated given ongoing fractures.  Not interested in psychiatry consult during this admission.  -As needed lorazepam for anxiety     Tobacco use disorder:  -NRT     Addendum:  Significant other was updated at bedside today     DVT Prophylaxis: Pneumatic Compression Devices  Code Status: DNR / DNI  Discharge Dispo/Date: Discharge TBD depending on improvement in respiratory failure.          Interval History (Subjective):      Initially had multiple different complaints, and was mostly upset about the bronchoscopy.  He is happy it was cancelled.  By the end of my visit after talking things through with his sig other present he was feeling better.  He is interested in continuing current care, but if little improvement over the next several days he would be interested in discharge with hospice.  He does not have a set date in mind but wants to take this day by day.  Reconfirmed his dnr/dni status today                  Physical Exam:      Last Vital Signs:  /69 (BP Location: Right arm, Patient  "Position: Semi-Aguilar's)   Pulse 104   Temp 97.6  F (36.4  C) (Temporal)   Resp 24   Ht 1.753 m (5' 9\")   Wt 71 kg (156 lb 9.6 oz)   SpO2 97%   BMI 23.13 kg/m        Intake/Output Summary (Last 24 hours) at 4/11/2022 1452  Last data filed at 4/11/2022 0613  Gross per 24 hour   Intake 200 ml   Output 800 ml   Net -600 ml       Constitutional: Awake, alert, cooperative, no apparent distress.  Labile mood   Respiratory: Diffuse crackles.  On HFNC currently.  Able to complete sentences and is quite talkative   Cardiovascular: Regular rate and rhythm, normal S1 and S2, and no murmur noted   Abdomen: Normal bowel sounds, soft, non-distended, non-tender   Skin: No rashes, no cyanosis, dry to touch   Neuro: Alert and oriented x3, no weakness, numbness, memory loss   Extremities: No edema, normal range of motion   Other(s): Sig other present at bedside       All other systems: Negative          Medications:      All current medications were reviewed with changes reflected in problem list.         Data:      All new lab and imaging data was reviewed.   Labs:  Recent Labs   Lab 04/11/22  0628 04/10/22  0558 04/09/22  2221 04/09/22  0801 04/08/22  1501   NA  --  138  --  141 138   POTASSIUM 4.2 4.7  4.7  --  4.9 3.5   CHLORIDE  --  111*  --  112* 108   CO2  --  21  --  23 24   ANIONGAP  --  6  --  6 6   GLC  --  130* 128* 108* 121*   BUN  --  17  --  15 13   CR  --  0.50*  --  0.55* 0.49*   GFRESTIMATED  --  >90  --  >90 >90   JOSE  --  7.9*  --  8.0* 7.9*     Recent Labs   Lab 04/11/22 0628   WBC 9.4   HGB 7.6*   HCT 22.2*   MCV 94   *      Imaging:   No results found for this or any previous visit (from the past 24 hour(s)).   "

## 2022-04-11 NOTE — PROGRESS NOTES
Patient with worsening O2 needs to 85% on bipap.  Not wanting bronch per RN notes.      A:  Too unstable for bronch even with intubation as probability of quick extubation is not high and prolonged ventilator  would be inconsistent with patients wishes.      Will cancel bronch and NPO status for that.    Continue other medications.

## 2022-04-11 NOTE — PLAN OF CARE
Goal Outcome Evaluation:        Patient vital signs are at baseline: No,  Reason:  On HFNC  Patient able to ambulate as they were prior to admission or with assist devices provided by therapies during their stay:  Yes  Patient MUST void prior to discharge:  Yes  Patient able to tolerate oral intake:  Yes  Pain has adequate pain control using Oral analgesics:  Yes  Does patient have an identified :  Yes  Has goal D/C date and time been discussed with patient:  Yes     Patient weaned from bipap to HFNC 85% and 50L. Scheduled MS contin increased to 330mg. Patient had BM. Able to transfer to [facility] with assist of 1 gbaw. Patient performs better if allowed to go at his own pace for transfers.

## 2022-04-11 NOTE — PLAN OF CARE
Patient is Alert and Oriented x4. Restless and anxious at times. Assist x1-2. Refused OOB.  Complaining of pain in bilateral hips.  Scheduled pain meds and Morphine PRN. Dressings CDI. Patient is Saline locked. Voiding in urinal. Scrotal edema elevated and iced. On HFNC most of night. Upon waking up, pt was anxious and was in vfib, O2 desaturated. RT was called, and pt was put on BiPap. Ativan given. Pt converted back to SR/ST. MD paged.  NPO since midnight for possible bronchoscopy today, however pt stated he does not want procedure. Pt's fiance was allowed to stay the night. Will continue to monitor.

## 2022-04-11 NOTE — PROVIDER NOTIFICATION
"Paged MD:  \"FYI. Per tele, pt was in vfib. At the time, pt just woke up and anxious. O2 desaturated. RT is putting BiPAP on. Has converted back to SR-ST now. Thanks.\"   "

## 2022-04-11 NOTE — PROGRESS NOTES
VSS w/ ex oxygenation- pt on HFNC all shift and fluxuated between high 80% to 100%. Indications of poor perfusion on pt extremities made pulse ox monitoring somewhat diffiicult. Pt did not get OOB. Using urinal for voiding. Flatus positive. PIV x 2 SL. Pt was anxious throughout shift and had episodes of tachypnea/ SOB which was improved w/ IV morphine. Pt received PRN oral morphine x 3 for bilat hip pain/ rib pain which was somewhat effective. Pt received PRN ativan x 1 which was somewhat effective. Pt had episode of v-fib which resolved on own- md notified. Pt was found to have scrotal edema later in shift- color WNL and denies pain- MD notified. Scrotum elevated and mild icing applied. Tolerating diet, poor appetite. Pt took some clears oral intake. NPO at midnight for possible bronchoscopy which pt indicated he does not want. Discharge pending.

## 2022-04-11 NOTE — PROGRESS NOTES
[facility]  Palliative Care Progress Note  Text Page     Assessment & Plan   Recommendations:  1. Goals of Care- No CPR- Do NOT Intubate  Hospitalization goals discussed with patient at the bedside.  Goal for adequate symptom management while inpatient. If no improvement in the coming days would consider transitioning to comfort measures and discharging home.      Decisional Capacity- Intact. Patient does not have an advance directive. Per  informed consent policy next of kin should be involved if patient becomes unable.  POLST on file and dated 01/13/2022.  DNR,Comfort focused measures only, no tube feeds, oral antibiotics permitted.       2. Pain   Chronic pain secondary to malignancy.  Now with acute left hip and right thoracic pain secondary to fall and hip fracture. Additionally, s/p bilateral femur nailings.    Patient's opioid use in past 24 hours: Morphine PO 1500 mg,  Morphine IV 8 mg   =  1524 mg Daily Morphine Equivalent     Multimodal pain management plan:  - acetaminophen 975 mg three times daily  - naproxen 500 mg two times daily with meals  - gabapentin 1200 mg three times daily  - pregabalin 300 mg two times daily  - hydroxyzine  mg three times daily, discontinue additional 25 - 50 mg every 4 hours prn  - lidocaine patch 1 patch daily for 12 hours on/12 hours off, to right chest and thoracic area  - Add diclofenac gel 1 % 4 grams to right chest wall and thoracic area   - Methocarbamol 750 mg tid  times daily,  Discontinue additional 750 mg two times daily prn for now   - *MS Contin 330 mg PO four times daily (increased)  - *Morphine IR 60 mg every 3 hours prn re- evaluate in am if able change to q 4 hr prn   - Morphine injection 4-8 mg every 2 hours prn     *changes to manage acute pain flare, resume home chronic home doses once pain controlled.      3. Anxiety  Long term anxiety related to chronic and acute conditions.  Associated frustration, intermittent  hopelessness.  Patient denies thoughts of suicide or harming others.  Acknowledges acute stress and feelings of being overwhelmed.    - *lorazepam 1-2 mg SL solution two times daily (chronic dose 1 mg)   - *lorazepam 0.5-1 mg SL solution every 8 hrs ( chronic dose 0.25-0.5 mg bid prn)   - appreciate  consult  - mindful exercises and distraction with TV, processing through situation with conversation.     *changes to manage acute pain flare/anxiety, resume home chronic home doses once pain controlled.    4. Spiritual Care  Oriented to Spiritual Health as part of Palliative Care team. Appreciate Care of  .  Spiritual Background: undesignated     5. Care Planning  Appreciate Care of multidisciplinary team.  - disposition pending.  Return home on hospice plan of care vs TCU placement post op.  Advise pre op pain management consultation in future prior to any surgical intervention to manage pathologic fractures  Resume pain management regimen at discharge      Home chronic medication doses  Ms Contin 300 four times daily, Morphine IR 60 mg every 4 hrs prn  Methocarbamol ( Robaxin) 750 mg bid and 750 mg at hs  Lorazepam concentrate 0.25-0.50 mg bid prn, 1 mg at hs      Medical Decision Making and Goals of Care:  Discussed on April 11, 2022 with Paulette Amador RN, APRN, CNP:  Met with Dane and Nixon at the bedside.  Reviewed current symptoms of pain, anxiety and dyspnea.  Nikko reviewed his thoughts about pain and anxiety to be perpetuating shortness of breath. Discussed his feelings regarding the treatments offered and the anxiety that his hospital stay has produced.  Educated about anxiety and dyspnea, discussed treatment of anxiety and pain as a means to evaluate correlation to dyspnea.  Beto verbalized understanding of this and agreed to try this plan of care.  Increased medications as above in the plan.      Nixon discussed her concerns about the hospital stay and interventions that are  "done.  Worried that blood draws will increase Dane's chances of getting more sick.  Discussed the need for increased monitoring when dyspnea is present and high flow oxygen or bipap are in use.  Empathized that it is a \"double edged sword\" in terms of his comfort and medical stability.  They verbalized understanding.      Dane reviewed his goals for care, stated that he would not want any interventions such as bronchoscopy that would require intubation.  He would not want any interventions such as PICC line, CPR, Intubation.  He would not want to be in the hospital on high flow for very long and mentioned that if \"things don't turn around\" that he would say \"enough is enough\" and want to be discharged.  Reviewed current need for high flow oxygen and the likelihood of decline if it were to be discontinued.  They verbalized understanding and stated they would want to follow up with PC in the coming days to review plan of care.      Paulette Amador RN, APRN, CNP  Pain Management and Palliative Care  [facility]  Office     Time Spent on this Encounter   Total unit/floor time 65 minutes (1030 - 11:35), time consisted of the following, examination of the patient, reviewing the record and completing documentation. >50% of time spent in counseling and coordination of care, Bedside Nurse Rodney and Hospitalist Dr. Andrade.  Time spend counseling with patient and family consisted of the following topics, goals of care, education about prognosis and symptom management.    Total Visit Time: 65  o For Outpatient, 'Total Visit Time' = Face-to-Face time only.  o For Inpatient, 'Total Visit Time' = Unit Floor + Face-to-Face time.    Total Face-to-Face Prolonged Service Time: 65    Content of the Prolonged Time: goals of care, code status, symptom management, prognosis      Review of Systems    CONSTITUTIONAL: NEGATIVE for fever, chills, change in weight  ENT/MOUTH: NEGATIVE for ear, " mouth and throat problems  CV: NEGATIVE for chest pain, palpitations or peripheral edema    Palliative Symptom Review (0=no symptom/no concern, 1=mild, 2=moderate, 3=severe):      Pain: 3-severe      Fatigue: 1-mild      Nausea: 0-none      Constipation: 0-none last BM 4/3      Diarrhea: 0-none      Depressive Symptoms: 0-none      Anxiety: 3-severe      Drowsiness: 0-none      Poor Appetite: 0-none      Shortness of Breath: 2-moderate      Insomnia: 1-mild      Other:        Overall (0 good/no concerns, 3 very poor):  2    Physical Exam   Temp:  [97.6  F (36.4  C)-97.8  F (36.6  C)] 97.6  F (36.4  C)  Pulse:  [] 95  Resp:  [18-28] 24  BP: (115-141)/() 115/69  FiO2 (%):  [75 %-85 %] 85 %  SpO2:  [89 %-100 %] 94 %  156 lbs 9.6 oz  Exam:  GEN:  Alert, oriented x 3, High flow nasal cannula  HEENT:  Normocephalic/atraumatic, no scleral icterus, no nasal discharge, mouth moist.  CV:  RRR, S1, S2; no murmurs or other irregularities noted.  +3 DP/PT pulses bilatererally; no edema BLE.  RESP:  Symmetric chest rise on inhalation noted.  Increased respiratory effort.  ABD:  Rounded, soft, non-tender/non-distended.  +BS  EXT:  Edema & pulses as noted above.  CMS intact x 4.     M/S:   Tender to palpation  throughout.    SKIN:  Dry to touch, no exanthems noted in the visualized areas.    NEURO: No focal deficits  PAIN BEHAVIOR: Cooperative  Psych:  Normal affect.  Anxious, cooperative, conversant appropriately.    Medications     - MEDICATION INSTRUCTIONS -       - MEDICATION INSTRUCTIONS -         acetaminophen  975 mg Oral Q8H GEETA     cefTRIAXone  1 g Intravenous Q24H     diclofenac  4 g Topical 4x Daily     [Held by provider] enoxaparin ANTICOAGULANT  40 mg Subcutaneous Q24H     famotidine  10 mg Oral Daily     fluticasone  1 spray Both Nostrils Daily     gabapentin  1,200 mg Oral TID     hydrOXYzine   mg Oral Q6H     lidocaine  2 patch Transdermal Q24h    And     lidocaine   Transdermal Q8H     LORazepam   1-2 mg Oral At Bedtime     methocarbamol  750 mg Oral TID     methylnaltrexone  12 mg Subcutaneous Every Other Day     methylPREDNISolone  500 mg Intravenous Q12H     morphine  300 mg Oral 4x Daily     multivitamin w/minerals  1 tablet Oral Daily     naproxen  500 mg Oral BID w/meals     nicotine  1 patch Transdermal Daily     nicotine   Transdermal Q8H     pantoprazole (PROTONIX) IV  40 mg Intravenous Daily with breakfast     polyethylene glycol  17 g Oral Daily     potassium & sodium phosphates  1 packet Oral TID     pregabalin  300 mg Oral BID     senna-docusate  1 tablet Oral BID     sodium chloride (PF)  3 mL Intracatheter Q8H       Data   Results for orders placed or performed during the hospital encounter of 04/03/22 (from the past 24 hour(s))   Asymptomatic COVID-19 Virus (Coronavirus) by PCR Nasopharyngeal    Specimen: Nasopharyngeal; Swab   Result Value Ref Range    SARS CoV2 PCR Negative Negative    Narrative    Testing was performed using the Xpert Xpress SARS-CoV-2 Assay on the   Cepheid Gene-Xpert Instrument Systems. Additional information about   this Emergency Use Authorization (EUA) assay can be found via the Lab   Guide. This test should be ordered for the detection of SARS-CoV-2 in   individuals who meet SARS-CoV-2 clinical and/or epidemiological   criteria. Test performance is unknown in asymptomatic patients. This   test is for in vitro diagnostic use under the FDA EUA for   laboratories certified under CLIA to perform high complexity testing.   This test has not been FDA cleared or approved. A negative result   does not rule out the presence of PCR inhibitors in the specimen or   target RNA in concentration below the limit of detection for the   assay. The possibility of a false negative should be considered if   the patient's recent exposure or clinical presentation suggests   COVID-19. This test was validated by the [facility]. This laboratory is certified  under the Clinical Laboratory Improvement Amendments of 1988 (CLIA-88) as qualified to perform high complexity laboratory testing.     Blood gas arterial   Result Value Ref Range    pH Arterial 7.48 (H) 7.35 - 7.45    pCO2 Arterial 28 (L) 35 - 45 mm Hg    pO2 Arterial 50 (L) 80 - 105 mm Hg    FIO2 95     Bicarbonate Arterial 21 21 - 28 mmol/L    Base Excess/Deficit (+/-) -2.1 -9.0 - 1.8 mmol/L   Magnesium   Result Value Ref Range    Magnesium 2.6 (H) 1.6 - 2.3 mg/dL   Phosphorus   Result Value Ref Range    Phosphorus 2.2 (L) 2.5 - 4.5 mg/dL   CBC with platelets   Result Value Ref Range    WBC Count 9.4 4.0 - 11.0 10e3/uL    RBC Count 2.36 (L) 4.40 - 5.90 10e6/uL    Hemoglobin 7.6 (L) 13.3 - 17.7 g/dL    Hematocrit 22.2 (L) 40.0 - 53.0 %    MCV 94 78 - 100 fL    MCH 32.2 26.5 - 33.0 pg    MCHC 34.2 31.5 - 36.5 g/dL    RDW 16.9 (H) 10.0 - 15.0 %    Platelet Count 136 (L) 150 - 450 10e3/uL   Potassium   Result Value Ref Range    Potassium 4.2 3.4 - 5.3 mmol/L

## 2022-04-11 NOTE — PROGRESS NOTES
Pt remained on HFNC 60 LPM @ 75-95% FiO2. Was called to Pt's room several times throughout the night. Pt refused to go on CPAP. Will continue to monitor Pt.    Meena Nolasco RT

## 2022-04-11 NOTE — PROVIDER NOTIFICATION
Tele contacted this writer than pt was in v-fib. Pt was found to be moving in bed and tachypnic. Pt converted back to sinus tachycardia. MD notified. IV torodal discontinued, PRN ativan changed from q8h to q6h.

## 2022-04-11 NOTE — PROGRESS NOTES
Orthopedic Surgery  Beto Tran  2022  Admit Date:  4/3/2022  POD: 5 Days Post-Op   Procedure(s):  Prophylactic fixation of right impending pathologic femur fracture with intramedullary nail   Left femur IM Nail 2022    Alert and oriented.   Patient resting comfortably in bed.    Pain improving   Tolerating oral intake.    Denies nausea or vomiting  On oxygen     Vital Sign Ranges  Temperature Temp  Av.7  F (36.5  C)  Min: 97.6  F (36.4  C)  Max: 97.8  F (36.6  C)   Blood pressure Systolic (24hrs), Av , Min:115 , Max:141        Diastolic (24hrs), Av, Min:40, Max:108      Pulse Pulse  Av.1  Min: 94  Max: 107   Respirations Resp  Av.8  Min: 18  Max: 28   Pulse oximetry SpO2  Av.4 %  Min: 89 %  Max: 100 %       Dressing left hip: proximal aquacel 70% saturated.  Right hip dressings intact with edges peeling with scant drainage.    Minimal erythema of the surrounding skin.   Bilateral calves are soft, non-tender.  Bilateral lower extremities are NVI.  Sensation intact bilateral lower extremities  Patient able to resist dorsi and plantar flexion bilaterally  +Dp pulse    Labs:  Recent Labs   Lab Test 22  0628 04/10/22  0558 22  0550   WBC 9.4 7.8 9.6     Recent Labs   Lab Test 22  0628 04/10/22  0558 22  0550   HGB 7.6* 7.7* 6.8*     Recent Labs   Lab Test 14  0000   INR 1.0     Recent Labs   Lab Test 22  0628 04/10/22  0558 22  0550   * 175 173     Plan:  -Advance activity as tolerated  -Continue supportive and symptomatic treatment  -Start or continue physical therapy  -Pain control measures  -change dressings today.   -WBAT bilateral LEs  -Lovenox for DVT prophylaxis    2. Disposition   Per medicine team.  No further orthopedic surgical intervention planned this hospital stay.      Landy Sánchez PA-C

## 2022-04-11 NOTE — PROGRESS NOTES
RN called tele tech to further investigate v-fib from previous night. Tele tech stated it lasted 3 minutes, but upon further inspection, she believed it was artifacting and not true v-fib.    Rodney Ferrara RN

## 2022-04-12 NOTE — PLAN OF CARE
"Pt A/O x 4, calm, cooperative with staff. CMS intact prn ativan given before bed. Pain controlled with prn/scheduled meds. Pt anxious about prognosis and need for HFNC. Pt upset he's spending birthday in hospital, but understands need. Will continue to monitor.  Vitals: /64 (BP Location: Right arm, Patient Position: Semi-Aguilar's, Cuff Size: Adult Regular)   Pulse 83   Temp 97.1  F (36.2  C) (Oral)   Resp 24   Ht 1.753 m (5' 9\")   Wt 71 kg (156 lb 9.6 oz)   SpO2 98%   BMI 23.13 kg/m    BMI= Body mass index is 23.13 kg/m .                        "

## 2022-04-12 NOTE — PROGRESS NOTES
"Orthopedic surgery  Procedure(s):  Prophylactic fixation of right impending pathologic femur fracture with intramedullary nail 04/06/2022  Left femur IM Nail 04/03/2022    Stopped in to see patient but he was on the phone and asked to not be seen at that time.    /71 (BP Location: Right arm)   Pulse 82   Temp 97.2  F (36.2  C) (Temporal)   Resp 22   Ht 1.753 m (5' 9\")   Wt 71 kg (156 lb 9.6 oz)   SpO2 96%   BMI 23.13 kg/m      Plan:  -Advance activity as tolerated  -Continue physical therapy  -Pain control measures  -Keep dressings in place.  Change if saturated or peeling off.    -WBAT bilateral LEs  -Lovenox for DVT prophylaxis     2. Disposition              Per medicine team.  No further orthopedic surgical intervention planned this hospital stay.       "

## 2022-04-12 NOTE — PLAN OF CARE
SLP: ST orders received, chart reviewed, and reviewed at length with the pt. Pt alert/upright in chair on HFNC. Very pleasant and excellent recall of events. Pt reported difficulty with long and excess secretions with inability to blow nose/clear with CPAP mask. With time, he was able to clear and suction out large piece with grape sized blood clot on the end.  He also received Sudafed that helped thin/loosen secretions. Since this instance, pt reported no difficulty managing secretions, no concern for oropharyngeal dysphagia symptoms and no s/sx of aspiration. Pt politely declined SLP evaluation. Please re-consult if any changes or if pt requests.

## 2022-04-12 NOTE — PROGRESS NOTES
Has completed 3 days of high dose iv steroids with little improvement in O2 needs so will taper to 40 mg  prednisone

## 2022-04-12 NOTE — PLAN OF CARE
Evening RN    Patient vital signs are at baseline: No,  Reason:  HFNC at 50L and 85%.  Patient able to ambulate as they were prior to admission or with assist devices provided by therapies during their stay:  Yes  Patient MUST void prior to discharge:  Yes  Patient able to tolerate oral intake:  Yes  Pain has adequate pain control using Oral analgesics:  Yes  Does patient have an identified :  Yes  Has goal D/C date and time been discussed with patient:  Yes    Pt A/O x4.  Pt anxious and frustrated at times, mostly pleasant this shift.  VSS and afebrile - see above regarding oxygen needs.  Tele SR.  Still intermittently coughing up blood clots.  Pain managed adequately with scheduled meds, PRN PO ativan, PO IR morphine.  CMS intact - baseline neuropathy, scrotal edema w/ ice applied.  Dressings CDI - changed incision drsg's to RLE and proximal LLE dressing, all aquacels and all CDI.  Up A1 with walker and gait belt.  Voiding adequately.  BM today per pt.  Tolerating regular diet well.  New PIV, pt asking to talk with MD regarding potentially PICC line.  IV Rocephin and Solumedrol. Nicotine patch L shoulder.  Plan is TBD.  Will continue to monitor.

## 2022-04-12 NOTE — PLAN OF CARE
"/71 (BP Location: Right arm)   Pulse 90   Temp 97.2  F (36.2  C) (Temporal)   Resp 24   Ht 1.753 m (5' 9\")   Wt 71 kg (156 lb 9.6 oz)   SpO2 92%   BMI 23.13 kg/m      Pt A&Ox4. VSS on HFNC. Dyspnea on exertion. Pain managed with scheduled MS contin, atarax, tylenol, robaxin & PRN morphine. Denies N/V. Tolerating diet. Phos replaced. Recheck in AM. Pulm, palliative, hem/onc, ST, & ortho following. Will discharge home when medically stable. Cont POC.     Luna Jang RN        "

## 2022-04-12 NOTE — PROGRESS NOTES
Oncology chart check for Dr. Peñaloza:    No new recommendations at this time.    Please see Dr. Peñaloza's note from 4/8/2022.

## 2022-04-12 NOTE — PROGRESS NOTES
[facility]  Hospitalist Progress Note  Demetrio Andrade MD 04/12/2022    Reason for Stay (Diagnosis): femur fx, resp failure         Assessment and Plan:      Summary of Stay: Beto Tran is a 46 year old male with a history of advanced multiple myeloma previously on hospice, active nicotine dependence with cigarettes, chronic pain with opiate dependence, anxiety, history of thoracic outlet syndrome, anemia of chronic disease who was admitted with a new left femur fracture, right lateral iliac wing fracture, multiple rib fractures after twisting while transferring from a wheelchair.     Orthopedic surgery was consulted and the patient went to the OR for left femur fracture repair with a cephalomedullary device on 4/3.  He also underwent device placement on the right side on 4/6 given impending fracture risk of the right femur.     Postop course has been complicated by severe pain as the patient missed many hours of his PTA narcotic regimen which is significant.  Pain and palliative have been following and the patient is feeling much better from a pain aspect.     Post-op course also complicated by respiratory failure with etiology unclear. Requiring resp support with BIPAP initially; now on HFNC. Pulmonary was consulted; bronchoscopy had been considered but given need for intubation and patient not wanting procedure this was cancelled     Pt wanting to discharge home as soon as able.  Remains on significant amount of supplemental O2 via HFNC     Plans today:  - steroid dose decreased per pulm  - wean oxygen as able  - Lovenox for DVT ppx ordered     Problem List/Assessment and Plan:   Acute hypoxemic respiratory failure:  Patient was requiring up to 5 L nasal cannula postoperatively and ultimately progressed to needing BIPAP. CXR w/ stable non-specific infiltrates. CT PE was completed which was negative for PE but did show extensive, confluent bilateral groundglass infiltrates in both lungs  which could reflect infection, but etiology may also include hypersensitivity pneumonitis/drug reaction, atypical infection.  Procalcitonin is elevated.  LDH is elevated but beta-D-glucan negative therefore PCP PNA unlikely.  RVP negative.  CBC with diff without any eosinphilia.    -Pulmonary consulted, appreciate recommendations   -Initiated pulse dose solumedrol 500 mg BID on 4/9; transitioned to oral prednisone 4/12   -There was consideration for bronchoscopy but he has declined this given need for intubation and anticipated prolonged vent support after procedure completed as he would not be able to extubate after procedure given current degree of hypoxia  -Continuous oximetry, supplemental oxygen as needed.  Has been requiring HFNC  -Continue ceftriaxone for now  - no improvement with attempt at IV diuresis; appears near euvolemia on exam  -DNR/DNI        Left femur fracture:  Occurred during a traumatic fall/transition as noted above.  Orthopedic surgery was consulted and the patient went to the operating room with femur fracture repair with a cephalomedullary device on 3/30.  He also underwent prophylactic fixation of the right impending pathologic femur fracture with intramedullary nail.  -Orthopedic surgery following, appreciate recommendations  -Pain pain team following, appreciate recommendations               -Pain medications as needed   -Incentive spirometry  -PT/OT     Right iliac wing and multiple rib fractures:  Occurred during traumatic fall/transition as noted above.  Seen in consultation by trauma surgery team and orthopedic surgery.  -Orthopedic surgery following, patient recommendations  -Pain medications as needed     Multiple myeloma:  Previous autologous stem cell transplant.  Follows the oncology team at [facility].  Per report, it seems he has been on hospice for over 7 years now with no myeloma treatment.  Extensive lytic bone lesions throughout the body and essentially no normal bone  "left.  Very high risk fracture.  -Oncology consulted, appreciate commendations               -they recommend patient stay on hospice               -they do not believe that treating his myeloma would improve bone health for the stable future     Acute blood loss on chronic anemia:  Thought secondary to acute blood loss anemia due to tremor and surgery.  Does have a history of multiple myeloma which may be contributing.  He received 2 units packed red blood cells on 4/5 and 4/7 for hemoglobin less than 7. Folate, B12, iron studies all unremarkable, but after transfusion.     Chronic opiate related constipation:  -Continue methylnaltrexone injections every other day     Hypokalemia  Hypophosphatemia:  -Replace as needed with nurse driven protocol     Depression:  - has very labile mood that can make cares challenging for staff  - c/o underlying medical problems that he feels are not being managed well.  Frustrated given ongoing fractures.  Not interested in psychiatry consult during this admission.  -As needed lorazepam for anxiety     Tobacco use disorder:  -NRT       DVT Prophylaxis: lovenox  Code Status: DNR / DNI  Discharge Dispo/Date: Discharge TBD depending on improvement in respiratory failure.        Interval History (Subjective):      Feels that his medical problems this admission were handled poorly; thinks surgery timing should have been different and that it led to multiple myeloma \"inflammation\" that caused his subsequent resp failure and hypoxia.  C/o lack of sleep.  Wants to leave as soon as able.                     Physical Exam:      Last Vital Signs:  /71 (BP Location: Right arm)   Pulse 82   Temp 97.2  F (36.2  C) (Temporal)   Resp 22   Ht 1.753 m (5' 9\")   Wt 71 kg (156 lb 9.6 oz)   SpO2 96%   BMI 23.13 kg/m        Intake/Output Summary (Last 24 hours) at 4/12/2022 1447  Last data filed at 4/12/2022 1139  Gross per 24 hour   Intake 440 ml   Output 1450 ml   Net -1010 ml "       Constitutional: Awake, alert, no apparent distress, cooperative with me, very tangential speech   Respiratory: B crackles.  Less tachypnea today.  No wheeze.  Able to complete sentences   Cardiovascular: Regular rate and rhythm, normal S1 and S2, and no murmur noted   Abdomen: Normal bowel sounds, soft, non-distended, non-tender   Skin: No rashes, no cyanosis, dry to touch   Neuro: Alert and oriented x3, no weakness, numbness, memory loss   Extremities: No edema, normal range of motion   Other(s):        All other systems: Negative          Medications:      All current medications were reviewed with changes reflected in problem list.         Data:      All new lab and imaging data was reviewed.   Labs:  Recent Labs   Lab 04/12/22  0805 04/11/22  0628 04/10/22  0558 04/09/22  2221 04/09/22  0801     --  138  --  141   POTASSIUM 4.2 4.2 4.7  4.7  --  4.9   CHLORIDE 112*  --  111*  --  112*   CO2 23  --  21  --  23   ANIONGAP 4  --  6  --  6   *  --  130* 128* 108*   BUN 31*  --  17  --  15   CR 0.50*  --  0.50*  --  0.55*   GFRESTIMATED >90  --  >90  --  >90   JOSE 7.8*  --  7.9*  --  8.0*     Recent Labs   Lab 04/12/22  0805   WBC 9.5   HGB 7.7*   HCT 22.9*   MCV 95         Imaging:   No results found for this or any previous visit (from the past 24 hour(s)).

## 2022-04-13 NOTE — PROGRESS NOTES
The HFNC was applied to the pt @ 30LPM and 50% for PEEP therapy.  Skin is in good condition.     RT will follow.

## 2022-04-13 NOTE — PROGRESS NOTES
Patient continues on HFNC 30L 60%. Tolerating this well after titration to current levels. Will continue to monitor for adjustments.    RT Sravanthi on 4/13/2022 at 4:47 AM

## 2022-04-13 NOTE — PROGRESS NOTES
"SPIRITUAL HEALTH SERVICES Progress Note  [facility]    Saw pt Beto per staff referral from Palliative Care Nurse, requesting emotional support for pt.  Beto processed his thoughts and feelings about his hospitalization.  He gave voice to his frustration and repeated several times that he feels \"unsafe and uncared for\" and that he wants to leave as soon as possible. Beto shared that his fiancee Bharathi visited him yesterday for his birthday, which he described as \"a horrific day.\"  Attempted to provide emotional support through empathetic listening and validation of feelings.    Consulted with Unit Nurse Manager and RN regarding pt's frustrations.      Plan: Will continue to see pt 1-2 times per week for ongoing emotional support.    Kurtis Mayberry M.Div., [facility]  Staff   Phone [phone]    "

## 2022-04-13 NOTE — PROGRESS NOTES
[facility]  Hospitalist Progress Note  Demetrio Andrade MD 04/13/2022    Reason for Stay (Diagnosis): femur fx, s/p operative repair.  resp failure         Assessment and Plan:      Summary of Stay: Beto Tran is a 46 year old male with a history of advanced multiple myeloma previously on hospice, active nicotine dependence with cigarettes, chronic pain with opiate dependence, anxiety, history of thoracic outlet syndrome, anemia of chronic disease who was admitted with a new left femur fracture, right lateral iliac wing fracture, multiple rib fractures after twisting while transferring from a wheelchair.     Orthopedic surgery was consulted and the patient went to the OR for left femur fracture repair with a cephalomedullary device on 4/3.  He also underwent device placement on the right side on 4/6 given impending fracture risk of the right femur.     Postop course has been complicated by severe pain as the patient missed many hours of his PTA narcotic regimen which is significant.  Pain and palliative have been following and the patient is feeling much better from a pain aspect.     Post-op course also complicated by respiratory failure with etiology unclear. Requiring resp support with BIPAP initially; now on HFNC. Pulmonary was consulted; bronchoscopy had been considered but given need for intubation and patient not wanting procedure this was cancelled     Pt wanting to discharge home as soon as able.  Remains on significant amount of supplemental O2 via HFNC but O2 needs are slowly improving    The pt has very labile mood that can make caring for him extremely challenging for staff.  He continues to persistently complain about different issues daily that he feels are not being managed well.  .       Plans today:  - continue to wean O2 as able  - d/w nursing staff today re: disposition options.  Staff has concerns that pt may not be able to return to previous living situation.   They will d/w patient further.  Also recommend to reach out to patient's fiancee as well.  May need SW involvement pending outcome of above     Problem List/Assessment and Plan:   Acute hypoxemic respiratory failure:  Patient was requiring up to 5 L nasal cannula postoperatively and ultimately progressed to needing BIPAP. CXR w/ stable non-specific infiltrates. CT PE was completed which was negative for PE but did show extensive, confluent bilateral groundglass infiltrates in both lungs which could reflect infection, but etiology may also include hypersensitivity pneumonitis/drug reaction, atypical infection.  Procalcitonin is elevated.  LDH is elevated but beta-D-glucan negative therefore PCP PNA unlikely.  RVP negative.  CBC with diff without any eosinphilia.    -Pulmonary consulted, appreciate recommendations   -Initiated pulse dose solumedrol 500 mg BID on 4/9; transitioned to oral prednisone 4/12   -There was consideration for bronchoscopy but he has declined this given need for intubation and anticipated prolonged vent support after procedure completed as he would not be able to extubate after procedure given current degree of hypoxia  -Continuous oximetry, supplemental oxygen as needed.  Has been requiring HFNC  -Continue ceftriaxone for now  - no improvement with attempt at IV diuresis; appears near euvolemia on exam  -DNR/DNI        Left femur fracture:  Occurred during a traumatic fall/transition as noted above.  Orthopedic surgery was consulted and the patient went to the operating room with femur fracture repair with a cephalomedullary device on 3/30.  He also underwent prophylactic fixation of the right impending pathologic femur fracture with intramedullary nail.  -Orthopedic surgery following, appreciate recommendations  -Pain pain team following, appreciate recommendations               -Pain medications as needed   -Incentive spirometry  -PT/OT     Right iliac wing and multiple rib  fractures:  Occurred during traumatic fall/transition as noted above.  Seen in consultation by trauma surgery team and orthopedic surgery.  -Orthopedic surgery following, patient recommendations  -Pain medications as needed     Multiple myeloma:  Previous autologous stem cell transplant.  Follows the oncology team at [facility].  Per report, it seems he has been on hospice for over 7 years now with no myeloma treatment.  Extensive lytic bone lesions throughout the body and essentially no normal bone left.  Very high risk fracture.  -Oncology consulted, appreciate commendations               -they recommend patient stay on hospice               -they do not believe that treating his myeloma would improve bone health for the stable future  - pain/palliative care seeing patient     Acute blood loss on chronic anemia:  Thought secondary to acute blood loss anemia due to tremor and surgery.  Does have a history of multiple myeloma which may be contributing.  He received 2 units packed red blood cells on 4/5 and 4/7 for hemoglobin less than 7. Folate, B12, iron studies all unremarkable, but after transfusion.     Chronic opiate related constipation:  -Continue methylnaltrexone injections every other day     Hypokalemia  Hypophosphatemia:  -Replace as needed with nurse driven protocol     Depression, suspected personality disorder:  - has very labile mood that can make caring for him extremely challenging for staff.  He is challenging to please and keep satisfied.    - continues to persistently complain about different issues daily that he feels are not being managed well.  Not interested in psychiatry consult during this admission.  -As needed lorazepam for anxiety     Tobacco use disorder:  -NRT        DVT Prophylaxis: lovenox  Code Status: DNR / DNI  Discharge Dispo/Date: Discharge TBD depending on improvement in respiratory failure.  Unfortunately unable to discharge currently given amount of supplemental O2 needs      "   Interval History (Subjective):      Pt with multiple complaints again today.  Continues to feel that he should not have had \"two surgeries back to back\" but should have had one procedure done and then \"the next one done 2-3 days later\".  He thinks this has caused all his post-operative issues including his respiratory failure by \"affectimg my neurologic system\".  He is angry with staff members and reports that he was \"left on the commode for an hour\" which caused him further distress.  He says that we have \"broken\" him; \"I am now a broken man\".  \"I would never treat anybody the way I have been treated here.  I just want to go home\"                  Physical Exam:      Last Vital Signs:  /67   Pulse 73   Temp 97.4  F (36.3  C) (Temporal)   Resp 20   Ht 1.753 m (5' 9\")   Wt 71 kg (156 lb 9.6 oz)   SpO2 91%   BMI 23.13 kg/m        Intake/Output Summary (Last 24 hours) at 4/13/2022 1343  Last data filed at 4/13/2022 1100  Gross per 24 hour   Intake 1175 ml   Output 3600 ml   Net -2425 ml       Constitutional: Awake, alert.  When I entered the room 2 nursing staff members were present.  The patient had a towel covering his face while talking with them   Respiratory: Clear to auscultation bilaterally, no crackles or wheezing   Cardiovascular: Regular rate and rhythm, normal S1 and S2, and no murmur noted   Abdomen: Normal bowel sounds, soft, non-distended, non-tender   Skin: No rashes, no cyanosis, dry to touch   Neuro: Alert and oriented x3, no weakness, numbness, memory loss   Extremities: No LE edema, normal range of motion   Other(s):        All other systems: Negative          Medications:      All current medications were reviewed with changes reflected in problem list.         Data:      All new lab and imaging data was reviewed.   Labs:  Recent Labs   Lab 04/13/22  0750 04/12/22  0805 04/11/22  0628 04/10/22  0558 04/09/22  2221 04/09/22  0801   NA  --  139  --  138  --  141   POTASSIUM 3.8 4.2 4.2 " 4.7  4.7  --  4.9   CHLORIDE  --  112*  --  111*  --  112*   CO2  --  23  --  21  --  23   ANIONGAP  --  4  --  6  --  6   GLC  --  115*  --  130* 128* 108*   BUN  --  31*  --  17  --  15   CR  --  0.50*  --  0.50*  --  0.55*   GFRESTIMATED  --  >90  --  >90  --  >90   JOSE  --  7.8*  --  7.9*  --  8.0*     Recent Labs   Lab 04/12/22  0805   WBC 9.5   HGB 7.7*   HCT 22.9*   MCV 95         Imaging:   No results found for this or any previous visit (from the past 24 hour(s)).

## 2022-04-13 NOTE — PLAN OF CARE
Care from [id number]:    Pt is A/O x4, up with A1 walker/gb. VSS, still on HFNC now at 30L 50%. Refused to get OOB or work with therapy today. Using urinal at bedside. Met with pain team today d/t ongoing concerns of his pain being unmanaged. Pt spent the morning with his face under a towel, refusing to make eye contact since he was so upset and frustrated with his care so far here; per patient has not slept well in 10 days. Clustered cares as able. Manager and charge nurse aware of complaints. Per pain team, offer PRN medications when pt is awake and they are available since this is what he does at home to manage.    Pt refused writer to look at L hip incision, R hip incision is >60% saturated but refused a dressing change. Refused skin check and most of assessment as he wanted to be left alone to rest most of the day.   1700: Pt has been more thankful to staff and pleasant later this afternoon.

## 2022-04-13 NOTE — PROGRESS NOTES
[facility]  Palliative Care Progress Note  Text Page    Please be proactive with pain management.  Morphine IR/ Ms Contin,  Ativan (scheduled and prn) should be given unless concern for excess sedation or respiratory suppression are apparent.  These are patient's chronic doses so anticipate reasonable good tolerance.      Assessment & Plan   Recommendations:  1. Goals of Care- No CPR- Do NOT Intubate  Hospitalization goals discussed with patient at the bedside.  Goal for adequate symptom management while inpatient. Would like to discharge home when able   Decisional Capacity- Intact. Patient does not have an advance directive. Per  informed consent policy next of kin should be involved if patient becomes unable.  POLST on file and dated 01/13/2022.  DNR,Comfort focused measures only, no tube feeds, oral antibiotics permitted.       2. Pain   Chronic pain secondary to malignancy.  Now with acute left hip and right thoracic pain secondary to fall and hip fracture. Additionally, s/p bilateral femur nailings.    Patient's opioid use in past 24 hours: Morphine PO 1440 mg,  Morphine IV 8 mg   =  1680 mg 24 mg Daily Morphine Equivalent     Multimodal pain management plan:  - acetaminophen 975 mg three times daily  - naproxen 500 mg two times daily with meals  - gabapentin 1200 mg three times daily  - pregabalin 300 mg two times daily  - hydroxyzine  mg three times daily, discontinue additional 25 - 50 mg every 4 hours prn  - lidocaine patch 1 patch daily for 12 hours on/12 hours off, to right chest and thoracic area every day prn   - Add diclofenac gel 1 % 4 grams to right chest wall and thoracic area qid prn   - Methocarbamol 750 mg tid  times daily  - *MS Contin 330 mg PO four times daily  - *Morphine IR 60 mg every 3 hours prn re- evaluate in change to q 4 hr prn when able    - Morphine injection 4-8 mg every 2 hours prn     *changes to manage acute pain flare, resume home chronic home  doses once pain controlled.      3. Anxiety-   Long term anxiety related to chronic and acute conditions. Keep on track with scheduled dose and prn doses, if patient not sedated or has respiratory suppression.     Associated frustration,  Feel he is not being heard., intermittent hopelessness.  Patient denies thoughts of suicide or harming others.  Acknowledges acute stress and feelings of being overwhelmed.    - *lorazepam 1-2 mg SL solution two times daily (chronic dose 1 mg)   - *lorazepam 0.5-1 mg SL solution every 6 hrs prn ( chronic dose 0.25-0.5 mg bid prn)   - appreciate  consult  - mindful exercises and distraction with TV, processing through situation with conversation.     *changes to manage acute pain flare/anxiety, resume home chronic home doses once pain controlled.  4. Acute respiratory failure confluent bilateral groundglass infiltrates in both lungs which could reflect infection, but etiology may also include hypersensitivity pneumonitis/drug reaction, atypical infection.   --slowly improving   -supplemental oxygen attempt titration to 6 liters as tolerated   -     5. Spiritual Care  Oriented to Spiritual Health as part of Palliative Care team. Appreciate Care of  .  Spiritual Background: undesignated  -appreciate input from Kurtis Mayberry, with spiritual and emotional support      6. Care Planning  Appreciate Care of multidisciplinary team.  - disposition pending.  Return home on hospice plan of care vs TCU placement post op.  Advise pre op pain management consultation in future prior to any surgical intervention to manage pathologic fractures  Resume pain management regimen at discharge      Home chronic medication doses  Ms Contin 300 four times daily, Morphine IR 60 mg every 4 hrs prn  Methocarbamol ( Robaxin) 750 mg bid and 750 mg at hs  Lorazepam concentrate 0.25-0.50 mg bid prn, 1 mg at hs      Medical Decision Making and Goals of Care:  Discussed on April 13, 2022 with  ALEJANDRO PutnamC,APRN,CNP,ACHPN;  10:30 Feels not well cared for, complains of with holding meds, expressing feeling of having lack of dignity. Wants to get home.  Rating pain 5/10, chest, arms and head.  Quivering voice, endorses anxiety and would like more anxiety medications.    Patient asked to see this writer~12 noon . Concerned and distressed with symptoms of pain, anxiety and insomnia.  He was angry and with raised voice.  States we are all doing thing wrong, he has been wronged and he wants to get out of here and go home. Is working on transport to leave AMA if he has to.   Writer acknowledged his concern and distress.  I expressed my worry that with his current medication regimen and O2 needs he could die here.  I asked ( as he is hospice at home) if he was interested in a comfort plan that could more aggressively manage symptoms. No, he does not want to die here.  He is having trouble because he is not getting his normal meds (prn and scheduled).    Informed patient  I would make every attempt to co-manage his symptoms with him.        Discussed on April 11, 2022 with Paulette Amador RN, APRN, CNP:  Met with Dane and Nixon at the bedside.  Reviewed current symptoms of pain, anxiety and dyspnea.  Nikko reviewed his thoughts about pain and anxiety to be perpetuating shortness of breath. Discussed his feelings regarding the treatments offered and the anxiety that his hospital stay has produced.  Educated about anxiety and dyspnea, discussed treatment of anxiety and pain as a means to evaluate correlation to dyspnea.  Beto verbalized understanding of this and agreed to try this plan of care.  Increased medications as above in the plan.      Nixon discussed her concerns about the hospital stay and interventions that are done.  Worried that blood draws will increase Dane's chances of getting more sick.  Discussed the need for increased monitoring when dyspnea is present and high flow  "oxygen or bipap are in use.  Empathized that it is a \"double edged sword\" in terms of his comfort and medical stability.  They verbalized understanding.      Dane reviewed his goals for care, stated that he would not want any interventions such as bronchoscopy that would require intubation.  He would not want any interventions such as PICC line, CPR, Intubation.  He would not want to be in the hospital on high flow for very long and mentioned that if \"things don't turn around\" that he would say \"enough is enough\" and want to be discharged.  Reviewed current need for high flow oxygen and the likelihood of decline if it were to be discontinued.  They verbalized understanding and stated they would want to follow up with PC in the coming days to review plan of care.      Kasia Luis RN-PGMT-BC, APRN, CNP, ACHPN  Pain Management and Palliative Care  [facility]  Office   Page: [phone]    Time Spent on this Encounter   Total unit/floor time 65 minutes (1015 - 10:40 ,12:15-12:25 and 1:00- 1:30)time consisted of the following, examination of the patient, reviewing the record and completing documentation. >50% of time spent in counseling and coordination of care, Bedside Nurse Katherine Dowd RN  and Hospitalist Demetrio Andrade MD .  Time spend counseling with patient and family consisted of the following topics, symptom management.    Total Visit Time: 65  o For Outpatient, 'Total Visit Time' = Face-to-Face time only.  o For Inpatient, 'Total Visit Time' = Unit Floor + Face-to-Face time.    Total Face-to-Face Prolonged Service Time: 35     Content of the Prolonged Time:  Pain management emotional support, anxiety management       Review of Systems    CONSTITUTIONAL: NEGATIVE for fever, chills, change in weight  ENT/MOUTH: NEGATIVE for ear, mouth and throat problems  CV: NEGATIVE for chest pain, palpitations or peripheral edema    Palliative Symptom Review (0=no " symptom/no concern, 1=mild, 2=moderate, 3=severe):      Pain: 2- moderate, chestt arem       Fatigue: 1-mild      Nausea: 0-none      Constipation: 0-none last BM 4/12      Diarrhea: 0-none      Depressive Symptoms: 0-none      Anxiety: 3-severe      Drowsiness: 0-none      Poor Appetite: 0-none      Shortness of Breath: 2-moderate high flow 60 FIO2      Insomnia: 1-mild      Other:        Overall (0 good/no concerns, 3 very poor):  2    Physical Exam   Temp:  [97.4  F (36.3  C)] 97.4  F (36.3  C)  Pulse:  [71-98] 73  Resp:  [16-24] 20  BP: (120-133)/(67-77) 120/67  FiO2 (%):  [60 %-75 %] 60 %  SpO2:  [91 %-100 %] 91 %  156 lbs 9.6 oz  Exam:  GEN:  Alert, oriented x 3, High flow nasal cannula  HEENT:  Normocephalic/atraumatic, no scleral icterus, no nasal discharge, mouth moist.  CV:  RRR, S1, S2; no murmurs or other irregularities noted.  +3 DP/PT pulses bilatererally; no edema BLE.  RESP:  Symmetric chest rise on inhalation noted.  Increased respiratory effort.  ABD:  Rounded, soft, non-tender/non-distended.  +BS  EXT:  Edema & pulses as noted above.  CMS intact x 4.     M/S:   Tender to palpation  throughout.    SKIN:  Dry to touch, no exanthems noted in the visualized areas.    NEURO: No focal deficits  PAIN BEHAVIOR: Cooperative  Psych:  Normal affect.  Anxious, cooperative, conversant appropriately.    Medications     - MEDICATION INSTRUCTIONS -       - MEDICATION INSTRUCTIONS -         acetaminophen  975 mg Oral Q8H GEETA     diclofenac  4 g Topical 4x Daily     enoxaparin ANTICOAGULANT  40 mg Subcutaneous Q24H     famotidine  10 mg Oral Daily     fluticasone  1 spray Both Nostrils Daily     gabapentin  1,200 mg Oral TID     hydrOXYzine   mg Oral Q6H     lidocaine  2 patch Transdermal Q24h    And     lidocaine   Transdermal Q8H     LORazepam  1-2 mg Oral BID     methocarbamol  750 mg Oral TID     methylnaltrexone  12 mg Subcutaneous Every Other Day     morphine  330 mg Oral 4x Daily     multivitamin  w/minerals  1 tablet Oral Daily     naproxen  500 mg Oral BID w/meals     nicotine  1 patch Transdermal Daily     nicotine   Transdermal Q8H     pantoprazole (PROTONIX) IV  40 mg Intravenous Daily with breakfast     polyethylene glycol  17 g Oral Daily     potassium & sodium phosphates  2 packet Oral or Feeding Tube TID     predniSONE  40 mg Per NG tube Daily     pregabalin  300 mg Oral BID     sodium chloride (PF)  3 mL Intracatheter Q8H       Data   Results for orders placed or performed during the hospital encounter of 04/03/22 (from the past 24 hour(s))   Magnesium   Result Value Ref Range    Magnesium 2.2 1.6 - 2.3 mg/dL   Phosphorus   Result Value Ref Range    Phosphorus 1.3 (L) 2.5 - 4.5 mg/dL   Potassium   Result Value Ref Range    Potassium 3.8 3.4 - 5.3 mmol/L

## 2022-04-13 NOTE — PLAN OF CARE
Ax1 with walker and gait belt to bedside commode. A&Ox4.  VSS. LS - diminished at bases. 02 - 91% on 30L, 60%.  Left hip dressing CD&I. Right hip dressing, moderate dried drainage on upper aquacel.   CMS intact. Numbness and tingling to BLE per baseline.   Chronic and breakthrough pain medicated with scheduled and prn pain medications.

## 2022-04-13 NOTE — PROGRESS NOTES
Cross cover    Patient's nurse was called to his room and notified that he has scrotal edema.  No erythema.  Patient reports he has been given Lasix in the past for scrotal swelling.  I reviewed his chart.  He had postop complicated by respiratory failure.  I see in the plan that if he does not improve with current treatment of steroids and antibiotics then consideration will be made to diuresis.    I am ordering Lasix 20 mg IV x1.    Isi Rodriguez MD

## 2022-04-14 NOTE — PROGRESS NOTES
Patient continues on HFNC. Did have to increase FiO2 from 40-65% due to sats consistently 87-89%. Has tolerated the change with sats now remaining 93-95%. No increase on the flow, this continues at 30L via NC. Will continue to monitor and adjust as needed.    Matt Carreon, RT on 4/14/2022 at 4:05 AM

## 2022-04-14 NOTE — PROGRESS NOTES
[facility]  Hospitalist Progress Note  Demetrio Andrade MD 04/14/2022    Reason for Stay (Diagnosis): femur fx, metastatic myeloma         Assessment and Plan:      Summary of Stay: Beto Tran is a 46 year old male with a history of advanced multiple myeloma previously on hospice, active nicotine dependence with cigarettes, chronic pain with opiate dependence, anxiety, history of thoracic outlet syndrome, anemia of chronic disease who was admitted with a new left femur fracture, right lateral iliac wing fracture, multiple rib fractures after twisting while transferring from a wheelchair.     Orthopedic surgery was consulted and the patient went to the OR for left femur fracture repair with a cephalomedullary device on 4/3.  He also underwent device placement on the right side on 4/6 given impending fracture risk of the right femur.     Post-op course also complicated by respiratory failure with etiology unclear. Requiring resp support with BIPAP initially; now on HFNC. Pulmonary was consulted; bronchoscopy had been considered but given need for intubation and patient not wanting procedure this was cancelled     Pt wanting to discharge home as soon as able.  Remains on significant amount of supplemental O2 via HFNC but O2 needs are slowly improving     The pt has a very labile mood that can make caring for him extremely challenging for staff.  He has been complaining about different issues daily that he feels are not being managed well.         Plans today:  - continue to wean O2 as able  - anticipate discharge when supplemental O2 need is near 4-6 liters or less     Problem List/Assessment and Plan:   Acute hypoxemic respiratory failure:  - Patient was initially requiring 5 L nasal cannula postoperatively and progressed to needing BIPAP.   - CT PE was completed which was negative for PE but did show extensive, confluent bilateral groundglass infiltrates in both lungs which could  reflect infection, but etiology may also include hypersensitivity pneumonitis/drug reaction, atypical infection.    - work up:  Procalcitonin elevated.  LDH is elevated but beta-D-glucan negative therefore PCP PNA unlikely.  RVP negative.  CBC with diff without any eosinphilia.  sputum cx unimpressive.  COVID neg.    -Pulmonary consulted and the patient was started on empiric steroids.  Initiated pulse dose solumedrol 500 mg BID on 4/9; transitioned to oral prednisone 4/12  - received dose of Rocephin 4/11.  Not currently on antibiotics and not exhibiting signs of infection  - had no improvement with attempt at IV diuresis; appears near euvolemia on exam   -There was consideration for bronchoscopy but he has declined this given need for intubation and anticipated prolonged vent support after procedure completed as he would not be able to extubate after procedure given current degree of hypoxia  - Continues to require HFNC but FIO2 and work of breathing gradually improving  -DNR/DNI        Left femur fracture:  - Occurred during a traumatic fall/transition.    - Orthopedic surgery was consulted and the patient went to the operating room with femur fracture repair with a cephalomedullary device on 4/3.  He then underwent prophylactic fixation of the right impending pathologic femur fracture with intramedullary nail on 4/6.  -Orthopedic surgery following peripherally  -Pain pain team following, appreciate recommendations  -Incentive spirometry  -PT/OT     Right iliac wing and multiple rib fractures:  - Occurred during traumatic fall/transition as noted above.    - Seen in consultation by trauma surgery team and orthopedic surgery.  Conservative management  -Pain medications as needed     Multiple myeloma:  - Distant autologous stem cell transplant.  Follows the oncology team at [facility].    - he has been on hospice for over 7 years now with no myeloma treatment. - Has extensive lytic bone lesions throughout with  "very high risk for fracture.  -Oncology consulted, appreciate commendations               -they recommend patient stay on hospice               -they do not believe that treating his myeloma would improve bone health for the stable future  - pain/palliative care seeing patient     Acute blood loss on chronic anemia:  - Secondary to acute blood loss anemia due to fx and surgery.    - received 2 units packed red blood cells on 4/5 and 4/7 for hemoglobin less than 7.      Chronic opiate related constipation:  -Continue methylnaltrexone injections every other day     Hypokalemia  Hypophosphatemia:  -Replace as needed with nurse driven protocol     Depression, suspected personality disorder:  - has very labile mood that can make caring for him extremely challenging for staff.  He is challenging to please and keep satisfied.    - continues to persistently complain about different issues daily that he feels are not being managed well.  Not interested in psychiatry consult during this admission.  -As needed lorazepam for anxiety     Tobacco use disorder:  -NRT        DVT Prophylaxis: lovenox  Code Status: DNR / DNI  Discharge Dispo/Date: Discharge TBD depending on improvement in respiratory failure.  Unfortunately unable to discharge currently given amount of supplemental O2 needs        Interval History (Subjective):      Improved mood today which he says is due to finally getting some sleep.  Received 2 mg of Ativan last night and slept well - \"the most sleep I've gotten in the last 9 days\".  Not many complaints today compared to previous days.  Is more hopeful today and much more interactive                  Physical Exam:      Last Vital Signs:  /80 (BP Location: Right arm)   Pulse 110   Temp (!) 96.7  F (35.9  C) (Temporal)   Resp 18   Ht 1.753 m (5' 9\")   Wt 71 kg (156 lb 9.6 oz)   SpO2 99%   BMI 23.13 kg/m        Intake/Output Summary (Last 24 hours) at 4/14/2022 1423  Last data filed at 4/14/2022 " 1109  Gross per 24 hour   Intake 600 ml   Output 3575 ml   Net -2975 ml       Constitutional: Awake, alert, cooperative, no apparent distress.  Sitting up in bedside chair   Respiratory: Clear to auscultation bilaterally, no crackles or wheezing   Cardiovascular: Regular rate and rhythm, normal S1 and S2, and no murmur noted   Abdomen: Normal bowel sounds, soft, non-distended, non-tender   Skin: No rashes, no cyanosis, dry to touch   Neuro: Alert and oriented x3, no weakness, numbness, memory loss   Extremities: No edema, normal range of motion   Other(s): Remains on HFNC       All other systems: Negative          Medications:      All current medications were reviewed with changes reflected in problem list.         Data:      All new lab and imaging data was reviewed.   Labs:  Recent Labs   Lab 04/14/22  0812 04/13/22  0750 04/12/22  0805 04/11/22  0628 04/10/22  0558     --  139  --  138   POTASSIUM 4.2 3.8 4.2   < > 4.7  4.7   CHLORIDE 112*  --  112*  --  111*   CO2 24  --  23  --  21   ANIONGAP 5  --  4  --  6   GLC 92  --  115*  --  130*   BUN 12  --  31*  --  17   CR 0.39*  --  0.50*  --  0.50*   GFRESTIMATED >90  --  >90  --  >90   JOSE 7.8*  --  7.8*  --  7.9*    < > = values in this interval not displayed.     Recent Labs   Lab 04/14/22  0812   WBC 9.8   HGB 8.6*   HCT 27.0*            Imaging:   No results found for this or any previous visit (from the past 24 hour(s)).

## 2022-04-14 NOTE — PROGRESS NOTES
Web base paged Respiratory therapy:  Pt consistently between 84-86%. Pulse ox adjusted. Please come assess. Thank you.

## 2022-04-14 NOTE — PROGRESS NOTES
Orthopedic Surgery  Beto Tran  2022  Admit Date:  4/3/2022  POD: 8 Days Post-Op   Procedure(s):  Prophylactic fixation of right impending pathologic femur fracture with intramedullary nail   Left femur IM Nail: 4/3/2022    Alert and oriented.   Patient resting comfortably in chair.    Pain controlled.  Tolerating oral intake.    Remains on oxygen    Vital Sign Ranges  Temperature Temp  Av.9  F (36.1  C)  Min: 96.7  F (35.9  C)  Max: 97.2  F (36.2  C)   Blood pressure Systolic (24hrs), Av , Min:120 , Max:130        Diastolic (24hrs), Av, Min:69, Max:80      Pulse Pulse  Av.9  Min: 64  Max: 110   Respirations Resp  Av  Min: 16  Max: 20   Pulse oximetry SpO2  Av.1 %  Min: 92 %  Max: 97 %       Dressing left hip: proximal aquacel 60% saturated.  Right hip dressings intact.    Minimal erythema of the surrounding skin.   Bilateral calves are soft, non-tender.  Bilateral lower extremities are NVI.  Sensation intact bilateral lower extremities  Patient able to resist dorsi and plantar flexion bilaterally  +Dp pulse    Labs:  Recent Labs   Lab Test 22  0812 22  0805 22  0628   WBC 9.8 9.5 9.4     Recent Labs   Lab Test 22  0812 22  0805 22  0628   HGB 8.6* 7.7* 7.6*     Recent Labs   Lab Test 14  0000   INR 1.0     Recent Labs   Lab Test 22  0812 22  0805 22  0628    155 136*     Plan:  -Advance activity as tolerated  -Continue physical therapy  -Pain control measures  -Keep dressings in place.  Change if saturated or peeling off.  (change proximal left hip dressings today)  -WBAT bilateral LEs  -Lovenox for DVT prophylaxis     2. Disposition              Per medicine team.  No further orthopedic surgical intervention planned this hospital stay.  Ortho will recheck patient POD#14 if still in hospital.        Landy Sánchez PA-C

## 2022-04-14 NOTE — PLAN OF CARE
Lethargic. VSS. IV SL. Baseline numbness and tingling in hands and feet.   Patient vital signs are at baseline: Yes, except on high flow O2; RT following.   Patient able to ambulate as they were prior to admission or with assist devices provided by therapies during their stay:  Yes; ambulated with SBA using the walker and gait belt.   Patient MUST void prior to discharge:  Yes, voiding using the bedside urinals.   Patient able to tolerate oral intake:  Yes, tolerating a regular diet.   Pain has adequate pain control using Oral analgesics:  Yes, taking  prn ativan for anxiety and morphine for pain.   Does patient have an identified :  No,  Reason:  no discharge plan at this time.   Has goal D/C date and time been discussed with patient:  No,  Reason:  pt not medically stable to discharge yet.  Plan: pending pt progress; O2 needs to be weaned.               no

## 2022-04-14 NOTE — PROGRESS NOTES
CLINICAL NUTRITION SERVICES - REASSESSMENT NOTE      Recommendations:   Diet and nutrition-related POC per MD/Oncology/Palliative care teams.  Ongoing goals of care discussions.     Malnutrition Diagnosis: Unable to determine due to lack of nutrition hx, NFPE, not able to confirm wt trends with patient         EVALUATION OF PROGRESS TOWARD GOALS   Diet: Regular, prn Ensure    Intake/Tolerance:  Increase in O2 needs since last assessment, currently requiring HFNC.  Information obtained from chart as patient noted to be frustrated/upset and anxious this AM, continues to work with Oncology and Pain/Palliative teams, noted ongoing goals of care discussions.  Also noted declined SLP evaluation.  Ongoing minimal flowsheet recordings, missed meals (1-2/day on average), and suspect continues to meet <75% needs (or less) throughout admit with barriers of decreased appetite, pain, respiratory needs.  Minimal use of prn Ensure.      ASSESSED NUTRITION NEEDS PER APPROVED PRACTICE GUIDELINES:     Dosing Weight 70 kg   Estimated Energy Needs: 25-30+ Kcal/Kg  Justification: minimum maintenance  Estimated Protein Needs: 1-1.2+ g pro/Kg  Justification: preservation of lean body mass  Estimated Fluid Needs: per MD      NEW FINDINGS:   - Medications reviewed including:     acetaminophen  975 mg Oral Q8H GEETA     enoxaparin ANTICOAGULANT  40 mg Subcutaneous Q24H     famotidine  10 mg Oral Daily     fluticasone  1 spray Both Nostrils Daily     gabapentin  1,200 mg Oral TID     hydrOXYzine   mg Oral Q6H     lidocaine  2 patch Transdermal Q24h    And     lidocaine   Transdermal Q8H     LORazepam  1-2 mg Oral BID     methocarbamol  750 mg Oral TID     methylnaltrexone  12 mg Subcutaneous Every Other Day     morphine  330 mg Oral 4x Daily     multivitamin w/minerals  1 tablet Oral Daily     naproxen  500 mg Oral BID w/meals     nicotine  1 patch Transdermal Daily     nicotine   Transdermal Q8H     pantoprazole (PROTONIX) IV  40 mg  Intravenous Daily with breakfast     polyethylene glycol  17 g Oral Daily     potassium & sodium phosphates  1 packet Oral TID     predniSONE  40 mg Per NG tube Daily     pregabalin  300 mg Oral BID     sodium chloride (PF)  3 mL Intracatheter Q8H        - MEDICATION INSTRUCTIONS -       - MEDICATION INSTRUCTIONS -        - Labs reviewed including:  Electrolytes  Potassium (mmol/L)   Date Value   04/14/2022 4.2   04/13/2022 3.8   04/12/2022 4.2   06/18/2015 3.2 (L)   06/12/2015 3.6   06/08/2015 3.9     Phosphorus (mg/dL)   Date Value   04/14/2022 2.3 (L)   04/13/2022 1.3 (L)   04/12/2022 2.0 (L)   04/11/2022 2.2 (L)   04/10/2022 2.5   12/12/2014 2.3 (L)   10/23/2014 3.8   10/22/2014 3.0   05/07/2014 4.9 (H)    Blood Glucose  Glucose (mg/dL)   Date Value   04/14/2022 92   04/12/2022 115 (H)   04/10/2022 130 (H)   04/09/2022 108 (H)   04/08/2022 121 (H)   01/30/2015 97   01/07/2015 93   12/23/2014 80   12/19/2014 109 (H)   12/12/2014 136 (H)     GLUCOSE BY METER POCT (mg/dL)   Date Value   04/09/2022 128 (H)    Inflammatory Markers  CRP Inflammation (mg/L)   Date Value   05/07/2014 <5.0   04/14/2014 <5.0     WBC (10e9/L)   Date Value   06/18/2015 6.4   06/12/2015 3.9 (L)   06/08/2015 4.2     WBC Count (10e3/uL)   Date Value   04/14/2022 9.8   04/12/2022 9.5   04/11/2022 9.4     Albumin (g/dL)   Date Value   04/03/2022 3.4   02/20/2015 4.5   01/30/2015 4.0   01/07/2015 3.9      Magnesium (mg/dL)   Date Value   04/14/2022 1.9   04/13/2022 2.2   04/12/2022 2.4 (H)   11/07/2014 1.8   10/23/2014 1.5 (L)   10/22/2014 1.4 (L)     Sodium (mmol/L)   Date Value   04/14/2022 141   04/12/2022 139   04/10/2022 138   01/30/2015 138   01/07/2015 137   12/23/2014 138    Renal  Urea Nitrogen (mg/dL)   Date Value   04/14/2022 12   04/12/2022 31 (H)   04/10/2022 17   01/30/2015 12   01/07/2015 8   12/23/2014 10     Creatinine (mg/dL)   Date Value   04/14/2022 0.39 (L)   04/12/2022 0.50 (L)   04/10/2022 0.50 (L)   02/20/2015 0.60 (L)    01/30/2015 0.61 (L)   01/07/2015 0.52 (L)     Additional  Triglycerides (mg/dL)   Date Value   04/18/2014 244   04/14/2014 164 (H)     Ketones Urine (mg/dL)   Date Value   12/19/2014 Negative        -  Weight trending reviewed:   Vitals:    04/03/22 0855 04/11/22 0710   Weight: 69.5 kg (153 lb 3.2 oz) 71 kg (156 lb 9.6 oz)     - Stooling patterns reviewed.      Previous Goals:   Patient to consume at least 50-75% of meals or supplements TID while acutely admitted.   Evaluation: Not met    Previous Nutrition Diagnosis:   Inadequate oral intake related to suspect decreased appetite as evidenced by meeting <75% needs (or less) during admit.  Evaluation: No change      CURRENT NUTRITION DIAGNOSIS  Inadequate oral intake related to suspect decreased appetite, pain, respiratory needs as evidenced by meeting <75% needs (or less) during admit.    INTERVENTIONS  Recommendations / Nutrition Prescription  Diet and nutrition-related POC per MD/Oncology/Palliative care teams.  Ongoing goals of care discussions.    Implementation  Nutrition Education: Not appropriate at this time.    Goals  Ongoing goals of care discussions w/in the next 24-48 hrs.      MONITORING AND EVALUATION:  Progress towards goals will be monitored and evaluated per protocol and Practice Guidelines      Kasia Guzmán RDN, LD  Clinical Dietitian  3rd floor/ICU: [phone]  All other floors: [phone]  Weekend/holiday: [phone]  Office: [phone]

## 2022-04-14 NOTE — PLAN OF CARE
A/Ox4.  Calm today and reports feeling better because he was able to get some sleep last night.  Dressings intact to right hip, upper left hip dressing changed due to drainage.  Mepilex to bottom.  Up with Ax1 et walker to commode/chair. Continues on HFNC-RT planning to wean down slowly overnight and possibly transition to oximyzer. PRN Morphine given x1 today. Pt hopeful to discharge next week. Will continue to monitor.

## 2022-04-15 NOTE — PROGRESS NOTES
Pt weaned off of HFNC today. HFNC and CPAP on stby in room if needed.     RN updated.     Medina Benítez, JESSICAT, BSRT-RRT

## 2022-04-15 NOTE — PROGRESS NOTES
Patient continues on HFNC 30L, 40% O2. Will continue to monitor and adjust as able/needed.    Matt Carreon, RT on 4/15/2022 at 4:34 AM

## 2022-04-15 NOTE — CONSULTS
Hospice consult and discharge planning completed with pt.  Pt needs F2F prior to re-election. Hospice MD Dr Palomino will complete this at [facility] Monday at 12:30. Pt will d/c home Monday following this without hospice in place. [facility] will complete intake at his home Tues at 0900.  Care coordinated today pt, Kasia Rojas CNP, Melodie DURAN and Dr Palomino who agree to POC.    Thank you for this referral.    Kerry Khoury RN [facility]  Referral Specialist  [phone]

## 2022-04-15 NOTE — PROGRESS NOTES
"SPIRITUAL HEALTH SERVICES Progress Note  Ortho Spine 6    Saw pt per nurse manager's request.    Upon entrance and introduction to pt, pt declined services, saying that he is done w/ his 'B' (expletive) session.  Pt reported that he feels 'unsafe and uncared for' and that he could do 'better than the team here'.    Author offered to sit and listen, pt declined.    Author offered to share information with the nurse manager.  Pt expressed he'd already spoken with one of the leads regarding his needs/concerns for 1.5 hrs. Pt indicated that \" [facility]\" systems are broken.    Author validated feelings of frustration during this time and departed after additional offer to pt for listening, since he'd kept talking.    PLAN: [facility] remains available.    JUDE Manley.    Intern  Pager: [phone]    "

## 2022-04-15 NOTE — PROVIDER NOTIFICATION
Paged admitting provider at 2000:  Pt requesting topical benzocaine for chipped tooth pain, if this or something similar is a possibility?  Thank you    Received order, thank you.

## 2022-04-15 NOTE — PROGRESS NOTES
Patient vital signs are at baseline: No,  Reason:  Oxymizer cannula  Patient able to ambulate as they were prior to admission or with assist devices provided by therapies during their stay:  Yes  Patient MUST void prior to discharge:  Yes  Patient able to tolerate oral intake:  Yes  Pain has adequate pain control using Oral analgesics:  Yes  Does patient have an identified :  Yes  Has goal D/C date and time been discussed with patient:  Yes     Patient upset that mscontin delayed. Frustrated with staff. Stated that he has been here for weeks and it is always the same. Pharmacy called. Stated they were waiting for a note before verifying the dose.

## 2022-04-15 NOTE — PROGRESS NOTES
[facility]  Hospitalist Progress Note  Demetrio Andrade MD 04/15/2022    Reason for Stay (Diagnosis): metastatic myeloma, femur fx         Assessment and Plan:      Summary of Stay: Beto Tran is a 46 year old male with a history of advanced multiple myeloma previously on hospice, active nicotine dependence with cigarettes, chronic pain with opiate dependence, anxiety, history of thoracic outlet syndrome, anemia of chronic disease who was admitted with a new left femur fracture, right lateral iliac wing fracture, multiple rib fractures after twisting while transferring from a wheelchair.     Orthopedic surgery was consulted and the patient went to the OR for left femur fracture repair with a cephalomedullary device on 4/3.  He also underwent device placement on the right side on 4/6 given impending fracture risk of the right femur.     Post-op course also complicated by respiratory failure with etiology unclear. Requiring resp support with BIPAP initially; now on HFNC. Pulmonary was consulted; bronchoscopy had been considered but given need for intubation and patient not wanting procedure this was cancelled     The pt has a very labile mood that can make caring for him extremely challenging for staff.  He has been complaining about different issues daily that he feels are not being managed well.     The patient is clearly improving from a respiratory standpoint.  He was initially requiring BIPAP, then transitioned to HFNC, and now on oximyzer.  Discharge is anticipated on Monday with hospice care and likely supplental oxygen need, pending progress in weaning O2 over the weekend.           Plans today:  - continue to wean O2 as able  - anticipate discharge when supplemental O2 need is near 4-6 liters or less.   - Home Monday with resumption of previous hospice care is anticipated.       Problem List/Assessment and Plan:   Acute hypoxemic respiratory failure:  - developed acute hypoxic  resp failure post-operatively requiring BIPAP initially.   - CT PE was completed which was negative for PE but did show extensive, confluent bilateral groundglass infiltrates in both lungs which could reflect infection, but etiology may also include hypersensitivity pneumonitis/drug reaction, atypical infection.    - work up:  Procalcitonin elevated.  LDH is elevated but beta-D-glucan negative therefore PCP PNA unlikely.  RVP negative.  CBC with diff without any eosinphilia.  sputum cx unimpressive.  COVID neg.    -Pulmonary consulted and the patient was started on empiric steroids.  Initiated pulse dose solumedrol 500 mg BID on 4/9; transitioned to oral prednisone on 4/12.  Would anticipate prednisone taper on discharge  - received dose of Rocephin 4/11.  Not currently on antibiotics and not exhibiting signs of infection  - had no improvement with attempt at IV diuresis; appears near euvolemia on exam   -There was consideration for bronchoscopy but he has declined this given need for intubation and anticipated prolonged vent support after procedure completed as he would not be able to extubate after procedure given current degree of hypoxia  - Had been requiring HFNC with FIO2 and work of breathing gradually improving; transitioned to oximyzer on 4/15  -DNR/DNI        Left femur fracture:  - Occurred during a traumatic fall/transition.    - Orthopedic surgery was consulted and the patient went to the operating room with femur fracture repair with a cephalomedullary device on 4/3.  He then underwent prophylactic fixation of the right impending pathologic femur fracture with intramedullary nail on 4/6.  -Orthopedic surgery following peripherally  -Pain pain team following, appreciate recommendations  -Incentive spirometry  -PT/OT     Right iliac wing and multiple rib fractures:  - Occurred during traumatic fall/transition as noted above.    - Seen in consultation by trauma surgery team and orthopedic surgery.   "Conservative management  -Pain medications as needed     Multiple myeloma:  - Distant autologous stem cell transplant.  Follows the oncology team at [facility].    - he has been on hospice for over 7 years now with no myeloma treatment. - Has extensive lytic bone lesions throughout with very high risk for fracture.  -Oncology consulted, appreciate commendations               -they recommend patient stay on hospice               -they do not believe that treating his myeloma would improve bone health for the stable future  - pain/palliative care seeing patient     Acute blood loss on chronic anemia:  - Secondary to acute blood loss anemia due to fx and surgery.    - received 2 units packed red blood cells on 4/5 and 4/7 for hemoglobin less than 7.      Chronic opiate related constipation:  -Continue methylnaltrexone injections every other day     Hypokalemia  Hypophosphatemia:  -Replace as needed with nurse driven protocol     Depression, suspected personality disorder:  - has very labile mood that can make caring for him extremely challenging for staff.  He is challenging to please and keep satisfied.    - continues to persistently complain about different issues daily that he feels are not being managed well.  Not interested in psychiatry consult during this admission.  -As needed lorazepam for anxiety     Tobacco use disorder:  -NRT        DVT Prophylaxis: lovenox  Code Status: DNR / DNI  Discharge Dispo/Date: Discharge TBD depending on improvement in respiratory failure but Monday is anticipated, with resumption of hospice on discharge           Interval History (Subjective):      C/o receiving his usual morphine dose 2 hours later than usual today due to a dose change (increase) made by pain service.  He feels that \"set him back for the whole day\".  C/o food not tasting good with some indigestion sx.  Has been weaned off HFNC to oximyzer today.  No fevers.  Anticipating discharge on Monday with resumption of " "previous hospice services                  Physical Exam:      Last Vital Signs:  /66 (BP Location: Right arm)   Pulse 96   Temp 96.9  F (36.1  C) (Temporal)   Resp 20   Ht 1.753 m (5' 9\")   Wt 71 kg (156 lb 9.6 oz)   SpO2 98%   BMI 23.13 kg/m        Intake/Output Summary (Last 24 hours) at 4/15/2022 1830  Last data filed at 4/15/2022 1534  Gross per 24 hour   Intake 360 ml   Output 1600 ml   Net -1240 ml       Constitutional: Awake, alert, cooperative, no apparent distress   Respiratory: Few scattered crackles without wheezing; improved.  No resp distress   Cardiovascular: Regular rate and rhythm, normal S1 and S2, and no murmur noted   Abdomen: Normal bowel sounds, soft, non-distended, non-tender   Skin: No rashes, no cyanosis, dry to touch   Neuro: Alert and oriented x3, no weakness, numbness, memory loss   Extremities: No edema, normal range of motion   Other(s):        All other systems: Negative          Medications:      All current medications were reviewed with changes reflected in problem list.         Data:      All new lab and imaging data was reviewed.   Labs:  Recent Labs   Lab 04/15/22  0601 04/14/22  0812 04/13/22  0750 04/12/22  0805 04/11/22  0628 04/10/22  0558   NA  --  141  --  139  --  138   POTASSIUM 4.5 4.2 3.8 4.2   < > 4.7  4.7   CHLORIDE  --  112*  --  112*  --  111*   CO2  --  24  --  23  --  21   ANIONGAP  --  5  --  4  --  6   GLC  --  92  --  115*  --  130*   BUN  --  12  --  31*  --  17   CR  --  0.39*  --  0.50*  --  0.50*   GFRESTIMATED  --  >90  --  >90  --  >90   JOSE  --  7.8*  --  7.8*  --  7.9*    < > = values in this interval not displayed.     Recent Labs   Lab 04/15/22  0601 04/14/22  0812   WBC  --  9.8   HGB  --  8.6*   HCT  --  27.0*   MCV  --  100    179      Imaging:   No results found for this or any previous visit (from the past 24 hour(s)).   "

## 2022-04-15 NOTE — PLAN OF CARE
A&Ox4. VSS. IV SL. Baseline numbness and tingling in hands and feet.   Patient vital signs are at baseline: Yes, except on high flow O2; RT following.   Patient able to ambulate as they were prior to admission or with assist devices provided by therapies during their stay:  Yes; ambulated with SBA using the walker and gait belt.   Patient MUST void prior to discharge:  Yes, voiding using the bedside urinals.   Patient able to tolerate oral intake:  Yes, tolerating a regular diet.   Pain has adequate pain control using Oral analgesics:  Yes, taking  prn ativan for anxiety and morphine for pain.   Does patient have an identified :  No,  Reason:  no discharge plan at this time.   Has goal D/C date and time been discussed with patient:  No,  Reason:  pt not medically stable to discharge yet.  Plan: pending pt progress; O2 needs to be weaned to 4-6L before pt can be discharged.

## 2022-04-15 NOTE — PROGRESS NOTES
Addendum: met with patient again.  Hospice coordinating care with potential discharge soon.  They are able to have nurse at his home in [place] Tuesday am.  Dr. Palomino plans to see patient Monday.  He is wanting to discharge Monday as preference.   Has good friend that is nurse who he say plans to be there to assist as well as girlfriend Troy.    He will need w/chair transport arranged.  Hospice meds 1 day supply ( increase MS Contin dose, Lyrica, Ativan concentrate, Zofran ) per patient request completed   Patient has valid POLST indicating DNR/comfort focused with [facility]  Palliative Care Progress Note  Text Page    Please be proactive with pain management.  Morphine IR/ Ms Contin,  Ativan (scheduled and prn) should be given unless concern for excess sedation or respiratory suppression are apparent.  These are patient's chronic doses so anticipate reasonable good tolerance.      Assessment & Plan   Recommendations:  1. Goals of Care- No CPR- Do NOT Intubate  Hospitalization goals discussed with patient at the bedside.  Goal for adequate symptom management while inpatient. Would like to discharge home when able   Decisional Capacity- Intact. Patient does not have an advance directive. Per  informed consent policy next of kin should be involved if patient becomes unable.  POLST on file and dated 01/13/2022.  DNR,Comfort focused measures only, no tube feeds, oral antibiotics permitted.       2. Pain   Chronic pain secondary to malignancy.  Now with acute left hip and right thoracic pain secondary to fall and hip fracture. Additionally, s/p bilateral femur nailings.    Patient's opioid use in past 24 hours: Morphine PO 1440 mg,  Morphine IV 8 mg   =  1680 mg 24 mg Daily Morphine Equivalent     Multimodal pain management plan:  - acetaminophen 975 mg three times daily  - naproxen 500 mg two times daily with meals  - gabapentin 1200 mg three times daily  - pregabalin 300  mg two times daily  - hydroxyzine 50 mg every 6 hrs daily,   - lidocaine patch 1 patch daily for 12 hours on/12 hours off, to right chest and thoracic area every day prn   - Add diclofenac gel 1 % 4 grams to right chest wall and thoracic area qid prn   - Methocarbamol 750 mg tid  times daily  - *MS Contin 345 mg PO four times daily  - *Morphine IR 60 mg every 3 hours prn re- evaluate in change to q 4 hr prn when able    - Morphine injection 4-8 mg every 2 hours prn     *changes to manage acute pain flare, resume home chronic home doses once pain controlled.      3. Anxiety-   Long term anxiety related to chronic and acute conditions. Keep on track with scheduled dose and prn doses, if patient not sedated or has respiratory suppression.     Associated frustration,  Feel he is not being heard., intermittent hopelessness.  Patient denies thoughts of suicide or harming others.  Acknowledges acute stress and feelings of being overwhelmed.    -  Lorazepam 2 mg at hs ( high concentrate ) 8 pm   - *lorazepam 1-2 mg SL solution two times daily (chronic dose 1 mg 10 and 4 pm)   - *lorazepam 0.5-1 mg SL solution every 4 hrs prn ( chronic dose 0.25-0.5 mg bid prn)   - appreciate  consult  - mindful exercises and distraction with TV, processing through situation with conversation.     *changes to manage acute pain flare/anxiety, resume home chronic home doses once pain controlled.  4. Acute respiratory failure confluent bilateral groundglass infiltrates in both lungs which could reflect infection, but etiology may also include hypersensitivity pneumonitis/drug reaction, atypical infection.   --slowly improving   -supplemental oxygen attempt titration to 6 liters as tolerated   -     5. Spiritual Care  Oriented to Spiritual Health as part of Palliative Care team. Appreciate Care of  .  Spiritual Background: undesignated  -appreciate input from Kurtis Mayberry, with spiritual and emotional support      6. Care  Planning  Appreciate Care of multidisciplinary team.  - disposition pending.  Return home on hospice plan of care vs TCU placement post op.  Advise pre op pain management consultation in future prior to any surgical intervention to manage pathologic fractures  Attempt to resume pain management regimen at discharge      Home chronic medication doses  Ms Contin 300 four times daily, Morphine IR 60 mg every 4 hrs prn  Methocarbamol ( Robaxin) 750 mg bid and 750 mg at hs  Lorazepam concentrate 0.25-0.50 mg bid prn, 1 mg at hs      Medical Decision Making and Goals of Care:  Discussed on April 15, 2022 with Kasia Luis, RN-C,APRN,CNP,ACHPN:   Spirits are good, he is calm.  Thoughts are clear and appropriate.  He working less to breath and 02 needs are reasonable to manage at home. Still has quite a bit of pain in femur, chest, ribs and arms. Would like increase in long acting Morphine with plan to decrease to PTA of 300 mg every 6 hrs one pain under better control.    Adjusted lorazepam concentrate and prefers this over pills.  States using mostly at hs , but sometimes dose not high enough so he can sleep, so would like to repeat dose in 4 hrs when needed.   No c/o with bowel, making good urine, appetite good.  Significant other is having health problems and he is worried.    Lives in [place] in mom's condo with Troy and has help from friend Vidal.  He is hopeful to discharge home by mom. Would like Physical Therapy at home, not sure if hospice will cover and may need to sign on later if Physical Therapy not covered.  Has established with PCP Dr. Rivera had one visit. Second visit was cancelled as he was here as IP.  Patient aware and agrees to side effect  Hospice nurse today. Patient was messaging with Dr. Palomino concerns and questions.     Kasia Luis RN-PGMT-BC, APRN, CNP, ACHPN  Pain Management and Palliative Care  [facility]  Office   Page: [phone]    Time Spent on this Encounter   Total unit/floor time 45 minutes (1215 - 13:00) time consisted of the following, examination of the patient, reviewing the record and completing documentation. >50% of time spent in counseling and coordination of care, Bedside Nurse Rodney eG RN  and Hospitalist Demetrio Andrade MD .  Time spend counseling with patient and family consisted of the following topics, care planning for discharge and symptom management.      Review of Systems    CONSTITUTIONAL: NEGATIVE for fever, chills, change in weight  ENT/MOUTH: NEGATIVE for ear, mouth and throat problems  CV: NEGATIVE for chest pain, palpitations or peripheral edema    Palliative Symptom Review (0=no symptom/no concern, 1=mild, 2=moderate, 3=severe):      Pain: 2- moderate, chest, arm, left thigh, ribs      Fatigue: 1-mild      Nausea: 0-none      Constipation: 0-none last BM 4/14      Diarrhea: 0-none      Depressive Symptoms: 0-none      Anxiety: 1-mild       Drowsiness: 0-none      Poor Appetite: 0-none      Shortness of Breath:1-mild       Insomnia: 1-mild      Other:        Overall (0 good/no concerns, 3 very poor):  2    Physical Exam   Temp:  [97.3  F (36.3  C)-98.3  F (36.8  C)] 98.3  F (36.8  C)  Pulse:  [] 74  Resp:  [16-20] 18  BP: (114-130)/(58-76) 114/58  FiO2 (%):  [40 %] 40 %  SpO2:  [93 %-99 %] 95 %  156 lbs 9.6 oz  Exam:  GEN:  Alert, oriented x 3, o2 at 7 liters   HEENT:  Normocephalic/atraumatic, no scleral icterus, no nasal discharge, mouth moist.  CV:  RRR, S1, S2; no murmurs or other irregularities noted.  +3 DP/PT pulses bilatererally; no edema BLE.  RESP:  Symmetric chest rise on inhalation noted.  Increased respiratory effort.  ABD:  Rounded, soft, non-tender/non-distended.  +BS  EXT:  Edema & pulses as noted above.  CMS intact x 4.     M/S:   Tender to palpation  throughout.    SKIN:  Dry to touch, no exanthems noted in the visualized areas.    NEURO: No focal deficits  PAIN BEHAVIOR:  Cooperative  Psych:  Normal affect.  Calm, conversant, cooperative, conversant appropriately.    Medications     - MEDICATION INSTRUCTIONS -       - MEDICATION INSTRUCTIONS -         acetaminophen  975 mg Oral Q8H GEETA     enoxaparin ANTICOAGULANT  40 mg Subcutaneous Q24H     famotidine  10 mg Oral Daily     fluticasone  1 spray Both Nostrils Daily     gabapentin  1,200 mg Oral TID     hydrOXYzine  50 mg Oral Q6H     lidocaine  2 patch Transdermal Q24h    And     lidocaine   Transdermal Q8H     LORazepam  1-2 mg Oral BID     methocarbamol  750 mg Oral TID     methylnaltrexone  12 mg Subcutaneous Every Other Day     morphine  345 mg Oral 4x Daily     multivitamin w/minerals  1 tablet Oral Daily     naproxen  500 mg Oral BID w/meals     nicotine  1 patch Transdermal Daily     nicotine   Transdermal Q8H     pantoprazole (PROTONIX) IV  40 mg Intravenous Daily with breakfast     polyethylene glycol  17 g Oral Daily     predniSONE  40 mg Per NG tube Daily     pregabalin  300 mg Oral BID     sodium chloride (PF)  3 mL Intracatheter Q8H       Data   Results for orders placed or performed during the hospital encounter of 04/03/22 (from the past 24 hour(s))   Magnesium   Result Value Ref Range    Magnesium 1.9 1.6 - 2.3 mg/dL   Phosphorus   Result Value Ref Range    Phosphorus 3.0 2.5 - 4.5 mg/dL   Platelet count   Result Value Ref Range    Platelet Count 201 150 - 450 10e3/uL   Potassium   Result Value Ref Range    Potassium 4.5 3.4 - 5.3 mmol/L

## 2022-04-15 NOTE — PLAN OF CARE
Evening RN    Patient vital signs are at baseline: No,  Reason:  HFNC at 30L and 40%.  Patient able to ambulate as they were prior to admission or with assist devices provided by therapies during their stay:  Yes  Patient MUST void prior to discharge:  Yes  Patient able to tolerate oral intake:  Yes  Pain has adequate pain control using Oral analgesics:  Yes  Does patient have an identified :  Yes  Has goal D/C date and time been discussed with patient:  Yes     Pt A/O x4.  Pt anxious and frustrated at times, mostly pleasant this shift.  VSS and afebrile - see above regarding oxygen needs.  Still intermittently coughing up blood clots/sinus concerns.  Pain managed adequately with scheduled meds, PRN PO IR morphine.  CMS intact - baseline neuropathy.  Dressings CDI -  all aquacels and all CDI.  Up A1 with walker and gait belt.  Voiding adequately.  Tolerating regular diet well. Nicotine patch.  Some nausea after dinner this shift, gave IV zofran and PO compazine - symptoms mostly improved.  Plan is TBD.  Will continue to monitor.

## 2022-04-15 NOTE — PROGRESS NOTES
Care Management Follow Up    Length of Stay (days): 12    Expected Discharge Date: 04/18/2022     Concerns to be Addressed: discharge planning  Patient plan of care discussed at interdisciplinary rounds: Yes    Anticipated Discharge Disposition: Home  [address]  [place] [address]    Anticipated Discharge Services:  [facility]  Anticipated Discharge DME:      Education Provided on the Discharge Plan:  yes  Patient/Family in Agreement with the Plan:  yes    Referrals Placed by CM/SW:    Private pay costs discussed: Not applicable    Additional Information:  Reviewed pt's status and discussed in rounds.     Discussed with [facility] hospice liaison . Per , they are able to re-enroll pt and the plan is to do this on Monday.     Met with pt to discuss this plan. Pt in agreement. Pt provided address he will be going to:  [address] [place] [address]. Discussed [facility] [employer] [facility] transportation- w/c vs stretcher. Pt requests wheelchair ride be arranged.     SW to arrange [facility] w/c ride on Monday for 1300 (waiting until Monday to confirm if [facility] has capacity to do the admission on Monday as well as unsure what pt's oxygen needs will be).     Social work will continue to follow and assist with discharge planning as needed.    CARLOS Valentine, LSW  Inpatient Care Coordination  [facility], [facility]  [phone]    CARLOS Gómez

## 2022-04-16 NOTE — PLAN OF CARE
Goal Outcome Evaluation:    Plan of Care Reviewed With: patient     .Patient vital signs are at baseline: Yes  Patient able to ambulate as they were prior to admission or with assist devices provided by therapies during their stay:  Yes  Patient MUST void prior to discharge:  Yes  Patient able to tolerate oral intake:  Yes  Pain has adequate pain control using Oral analgesics:  Yes  Does patient have an identified :  Yes  Has goal D/C date and time been discussed with patient:  Yes     VSS, Patient received his pain medications and PRNs.  Plan to discharge home potentially Monday. On oxygen nasal cannula 5 L.

## 2022-04-16 NOTE — PLAN OF CARE
To do:     Pertinent assessments: A&Ox4. VSS on RA. C/o bilateral hip and rib pain, given scheduled pain meds and PRN morphine with decrease in pain. LS clear. +BS. Nicotine patch to R deltoid. Up with assist of 1, gait belt, and walker. Dressings to R and L hip with small amount of dried drainage. CMS intact except for baseline numbness/tingling in hands and feet. Voiding via urinal. BSC for BMs. IV SL.     Major shift events: Weaned O2 to RA.    Treatment plan: Pain management, monitor incisions, PT.    Bedside nurse: Mary Johnson RN

## 2022-04-16 NOTE — PROGRESS NOTES
[facility]    Medicine Progress Note - Hospitalist Service    Date of Admission:  4/3/2022    Assessment & Plan            Summary of Stay: Beto Tran is a 46 year old male with a history of advanced multiple myeloma previously on hospice, active nicotine dependence with cigarettes, chronic pain with opiate dependence, anxiety, history of thoracic outlet syndrome, anemia of chronic disease who was admitted with a new left femur fracture, right lateral iliac wing fracture, multiple rib fractures after twisting while transferring from a wheelchair.     Orthopedic surgery was consulted and the patient went to the OR for left femur fracture repair with a cephalomedullary device on 4/3.  He also underwent device placement on the right side on 4/6 given impending fracture risk of the right femur.     Post-op course also complicated by respiratory failure with etiology unclear. Requiring resp support with BIPAP initially; now on HFNC. Pulmonary was consulted; bronchoscopy had been considered but given need for intubation and patient not wanting procedure this was cancelled     The pt has a very labile mood that can make caring for him extremely challenging for staff.  He has been complaining about different issues daily that he feels are not being managed well.      The patient is clearly improving from a respiratory standpoint.  He was initially requiring BIPAP, then transitioned to HFNC, and now on oximyzer.  Discharge is anticipated on Monday with hospice care and likely supplental oxygen need, pending progress in weaning O2 over the weekend.             Plans today:  - continue to wean O2 as able  - anticipate discharge when supplemental O2 need is near 4-6 liters or less.   - Home Monday with resumption of previous hospice care is anticipated.       Problem List/Assessment and Plan:   Acute hypoxemic respiratory failure:  - developed acute hypoxic resp failure post-operatively requiring  BIPAP initially.   - CT PE was completed which was negative for PE but did show extensive, confluent bilateral groundglass infiltrates in both lungs which could reflect infection, but etiology may also include hypersensitivity pneumonitis/drug reaction, atypical infection.    - work up:  Procalcitonin elevated.  LDH is elevated but beta-D-glucan negative therefore PCP PNA unlikely.  RVP negative.  CBC with diff without any eosinphilia.  sputum cx unimpressive.  COVID neg.    -Pulmonary consulted and the patient was started on empiric steroids.  Initiated pulse dose solumedrol 500 mg BID on 4/9; transitioned to oral prednisone on 4/12.  Would anticipate prednisone taper on discharge  - received dose of Rocephin 4/11.  Not currently on antibiotics and not exhibiting signs of infection  - had no improvement with attempt at IV diuresis; appears near euvolemia on exam   -There was consideration for bronchoscopy but he has declined this given need for intubation and anticipated prolonged vent support after procedure completed as he would not be able to extubate after procedure given current degree of hypoxia  - Had been requiring HFNC with FIO2 and work of breathing gradually improving; transitioned to oximyzer on 4/15  -DNR/DNI        Left femur fracture:  - Occurred during a traumatic fall/transition.    - Orthopedic surgery was consulted and the patient went to the operating room with femur fracture repair with a cephalomedullary device on 4/3.  He then underwent prophylactic fixation of the right impending pathologic femur fracture with intramedullary nail on 4/6.  -Orthopedic surgery following peripherally  -Pain pain team following, appreciate recommendations  -Incentive spirometry  -PT/OT     Right iliac wing and multiple rib fractures:  - Occurred during traumatic fall/transition as noted above.    - Seen in consultation by trauma surgery team and orthopedic surgery.  Conservative management  -Pain medications as  needed     Multiple myeloma:  - Distant autologous stem cell transplant.  Follows the oncology team at [facility].    - he has been on hospice for over 7 years now with no myeloma treatment. - Has extensive lytic bone lesions throughout with very high risk for fracture.  -Oncology consulted, appreciate commendations               -they recommend patient stay on hospice               -they do not believe that treating his myeloma would improve bone health for the stable future  - pain/palliative care seeing patient     Acute blood loss on chronic anemia:  - Secondary to acute blood loss anemia due to fx and surgery.    - received 2 units packed red blood cells on 4/5 and 4/7 for hemoglobin less than 7.      Chronic opiate related constipation:  -Continue methylnaltrexone injections every other day     Hypokalemia  Hypophosphatemia:  -Replace as needed with nurse driven protocol     Depression, suspected personality disorder:  - has very labile mood that can make caring for him extremely challenging for staff.  He is challenging to please and keep satisfied.    - continues to persistently complain about different issues daily that he feels are not being managed well.  Not interested in psychiatry consult during this admission.  -As needed lorazepam for anxiety     Tobacco use disorder:  -NRT         Diet: Diet  Snacks/Supplements Adult: Ensure Enlive; With Meals  Regular Diet Adult    DVT Prophylaxis: Enoxaparin (Lovenox) SQ  Tavares Catheter: Not present  Central Lines: None  Cardiac Monitoring: None  Code Status: No CPR- Do NOT Intubate      Disposition Plan   Expected Discharge: 04/18/2022     Anticipated discharge location:  Awaiting care coordination huddle  Delays:          The patient's care was discussed with the Patient.    Gorge Douglas MD  Hospitalist Service  [facility]  Securely message with the Vocera Web Console (learn more here)  Text page via [employer] Paging/Directory          Clinically Significant Risk Factors Present on Admission                    ______________________________________________________________________    Interval History     No fevers/chills/chest pain. Breathing is ok.    Data reviewed today: I reviewed all medications, new labs and imaging results over the last 24 hours. I personally reviewed no images or EKG's today.    Physical Exam   Vital Signs: Temp: 98.2  F (36.8  C) Temp src: Temporal BP: 119/72 Pulse: 85   Resp: 18 SpO2: 99 % O2 Device: Nasal cannula Oxygen Delivery: 4 LPM  Weight: 156 lbs 9.6 oz    Gen - AAO x 3.  PE deferred as patient wanted to rest.    Data   Recent Labs   Lab 04/16/22  0809 04/15/22  0601 04/14/22  0812 04/13/22  0750 04/12/22  0805 04/11/22  0628 04/10/22  0558   WBC  --   --  9.8  --  9.5 9.4 7.8   HGB  --   --  8.6*  --  7.7* 7.6* 7.7*   MCV  --   --  100  --  95 94 95   PLT  --  201 179  --  155 136* 175   NA  --   --  141  --  139  --  138   POTASSIUM 3.9 4.5 4.2   < > 4.2 4.2 4.7  4.7   CHLORIDE  --   --  112*  --  112*  --  111*   CO2  --   --  24  --  23  --  21   BUN  --   --  12  --  31*  --  17   CR  --   --  0.39*  --  0.50*  --  0.50*   ANIONGAP  --   --  5  --  4  --  6   JOSE  --   --  7.8*  --  7.8*  --  7.9*   GLC  --   --  92  --  115*  --  130*    < > = values in this interval not displayed.     No results found for this or any previous visit (from the past 24 hour(s)).  Medications     - MEDICATION INSTRUCTIONS -       - MEDICATION INSTRUCTIONS -         acetaminophen  975 mg Oral Q8H GEETA     enoxaparin ANTICOAGULANT  40 mg Subcutaneous Q24H     famotidine  10 mg Oral Daily     fluticasone  1 spray Both Nostrils Daily     gabapentin  1,200 mg Oral TID     hydrOXYzine  50 mg Oral Q6H     lidocaine  2 patch Transdermal Q24h    And     lidocaine   Transdermal Q8H     LORazepam  0.5-1 mg Oral BID     methocarbamol  750 mg Oral TID     methylnaltrexone  12 mg Subcutaneous Every Other Day     morphine  345 mg Oral  4x Daily     multivitamin w/minerals  1 tablet Oral Daily     naproxen  500 mg Oral BID w/meals     nicotine  1 patch Transdermal Daily     nicotine   Transdermal Q8H     pantoprazole (PROTONIX) IV  40 mg Intravenous Daily with breakfast     polyethylene glycol  17 g Oral Daily     predniSONE  40 mg Per NG tube Daily     pregabalin  300 mg Oral BID     sodium chloride (PF)  3 mL Intracatheter Q8H

## 2022-04-16 NOTE — PLAN OF CARE
Pt is alert and oriented x4, assist of 1 for transfers. Reported generalized pain, scheduled pain meds given, prn Ativan given per pt request. pt had a good sleep. Voiding good amounts. Dressings intact to bilat hips and lateral knees with small dry drainage. Pt is on 5L of O2 via NC. Plan to discharge home with hospice on Monday.

## 2022-04-17 NOTE — PLAN OF CARE
Goal Outcome Evaluation:      Patient vital signs are at baseline: Yes  Patient able to ambulate as they were prior to admission or with assist devices provided by therapies during their stay:  Yes  Patient MUST void prior to discharge:  Yes  Patient able to tolerate oral intake:  Yes  Pain has adequate pain control using Oral analgesics:  Yes  Does patient have an identified :  Yes  Has goal D/C date and time been discussed with patient:  Yes    VSS on room air. Ambulated in room. Patient patient and pleasant today.

## 2022-04-17 NOTE — PROGRESS NOTES
Hospitalist Medicine Progress Note   [facility]       Beto Tran is a46 year old male with a history of advanced multiple myeloma previously on hospice, active nicotine dependence with cigarettes, chronic pain with opiate dependence, anxiety, history of thoracic outlet syndrome, anemia of chronic disease who was admitted with a new left femur fracture, right lateral iliac wing fracture, multiple rib fractures after twisting while transferring from a wheelchair.     Orthopedic surgery was consulted and the patient went to the OR for left femur fracture repair with a cephalomedullary device on 4/3.  He also underwent device placement on the right side on 4/6 given impending fracture risk of the right femur.     Post-op course also complicated by respiratory failure with etiology unclear. Requiring resp support with BIPAP initially; now on HFNC. Pulmonary was consulted; bronchoscopy had been considered but given need for intubation and patient not wanting procedure this was cancelled     The pt has a very labile mood that can make caring for him extremely challenging for staff.  He has been complaining about different issues daily that he feels are not being managed well.      The patient is improving from a respiratory standpoint.  He was initially requiring BIPAP, then transitioned to HFNC, and now on oximyzer.  Discharge is anticipated on Monday with hospice care and likely supplental oxygen need, pending progress in weaning O2 over the weekend.  Oxygen levels are falling down from around 94-96% when awake to 80s when asleep because of which we will check oxygen saturation level for home oxygen at discharge      Date of Admission:  4/3/2022  Assessment & Plan     Problem List/Assessment and Plan:   Acute hypoxemic respiratory failure:  - developed acute hypoxic resp failure post-operatively requiring BIPAP initially.   - CT PE was completed which was negative for PE but did show  extensive, confluent bilateral groundglass infiltrates in both lungs which could reflect infection, but etiology may also include hypersensitivity pneumonitis/drug reaction, atypical infection.    - work up:  Procalcitonin elevated.  LDH is elevated but beta-D-glucan negative therefore PCP PNA unlikely.  RVP negative.  CBC with diff without any eosinphilia.  sputum cx unimpressive.  COVID neg.    -Pulmonary consulted and the patient was started on empiric steroids.  Initiated pulse dose solumedrol 500 mg BID on 4/9; transitioned to oral prednisone on 4/12.  Would anticipate prednisone taper on discharge  - received dose of Rocephin 4/11.  Not currently on antibiotics and not exhibiting signs of infection  - had no improvement with attempt at IV diuresis; appears near euvolemia on exam   -There was consideration for bronchoscopy but he has declined this given need for intubation and anticipated prolonged vent support after procedure completed as he would not be able to extubate after procedure given current degree of hypoxia  - Had been requiring HFNC with FIO2 and work of breathing gradually improving; transitioned to oximyzer on 4/15  -DNR/DNI        Left femur fracture:  - Occurred during a traumatic fall/transition.    - Orthopedic surgery was consulted and the patient went to the operating room with femur fracture repair with a cephalomedullary device on 4/3.  He then underwent prophylactic fixation of the right impending pathologic femur fracture with intramedullary nail on 4/6.  -Orthopedic surgery following peripherally  -Pain pain team following, appreciate recommendations  -Incentive spirometry  -PT/OT     Right iliac wing and multiple rib fractures:  - Occurred during traumatic fall/transition as noted above.    - Seen in consultation by trauma surgery team and orthopedic surgery.  Conservative management  -Pain medications as needed     Multiple myeloma:  - Distant autologous stem cell transplant.   Follows the oncology team at [facility].    - he has been on hospice for over 7 years now with no myeloma treatment. - Has extensive lytic bone lesions throughout with very high risk for fracture.  -Oncology consulted, appreciate commendations               -they recommend patient stay on hospice               -they do not believe that treating his myeloma would improve bone health for the stable future  - pain/palliative care seeing patient     Acute blood loss on chronic anemia:  - Secondary to acute blood loss anemia due to fx and surgery.    - received 2 units packed red blood cells on 4/5 and 4/7 for hemoglobin less than 7.      Chronic opiate related constipation:  -Continue methylnaltrexone injections every other day     Hypokalemia  Hypophosphatemia:  -Replace as needed with nurse driven protocol     Depression, suspected personality disorder:  - has very labile mood that can make caring for him extremely challenging for staff.  He is challenging to please and keep satisfied.    - continues to persistently complain about different issues daily that he feels are not being managed well.  Not interested in psychiatry consult during this admission.  -As needed lorazepam for anxiety     Tobacco use disorder:  -NRT        Plans for today  Continue oxygen wean as able  Check home O2 evaluation  Home Monday with resumption of previous hospice care    Diet: Diet  Snacks/Supplements Adult: Ensure Enlive; With Meals  Regular Diet Adult    DVT Prophylaxis: Enoxaparin (Lovenox) SQ  Tavares Catheter: Not present  Code Status: No CPR- Do NOT Intubate               The patient's care was discussed with the Patient .    Reece Jean Baptiste MD  Hospitalist Service  [facility]    ______________________________________________________________________    Interval History     Symptoms   Pain is 6/10 in the back and Buttock area   Patient feels that he is not getting medications regularly and he was upset about  this and about other things as well.     Review of Systems:   No shortness of breath    Data reviewed today: I reviewed all medications, new labs and imaging results over the last 24 hours.     Physical Exam   Vital Signs: Temp: 97.3  F (36.3  C) Temp src: Temporal BP: 130/66 Pulse: 80   Resp: 18 SpO2: 95 % O2 Device: None (Room air) Oxygen Delivery: 4 LPM  Weight: 156 lbs 9.6 oz      GENERAL: Patient is not in acute distress  HEENT: EOM+,Conjunctiva is clear   NECK: no Jugular Venous distention  HEART: S1 S2 regular Rate and Rhythm, there is no murmur,   LUNGS: Respirations are not laboured, Lungs are clear to auscultation without Crepitations or Wheezing   ABDOMEN: Soft, there is no tenderness ,Bowel Sounds are  Positive   CNS:  Alert,  Oriented x 3, Moving all the Four Limbs     Data   Recent Labs   Lab 04/17/22  0743 04/16/22  0809 04/15/22  0601 04/14/22  0812 04/13/22  0750 04/12/22  0805 04/11/22  0628   WBC  --   --   --  9.8  --  9.5 9.4   HGB  --   --   --  8.6*  --  7.7* 7.6*   MCV  --   --   --  100  --  95 94   PLT  --   --  201 179  --  155 136*   NA  --   --   --  141  --  139  --    POTASSIUM 3.9 3.9 4.5 4.2   < > 4.2 4.2   CHLORIDE  --   --   --  112*  --  112*  --    CO2  --   --   --  24  --  23  --    BUN  --   --   --  12  --  31*  --    CR  --   --   --  0.39*  --  0.50*  --    ANIONGAP  --   --   --  5  --  4  --    JOSE  --   --   --  7.8*  --  7.8*  --    GLC  --   --   --  92  --  115*  --     < > = values in this interval not displayed.         No results found for this or any previous visit (from the past 24 hour(s)).

## 2022-04-17 NOTE — PLAN OF CARE
Goal Outcome Evaluation:    Pt A&O x4. VSS, on RA saturation at 94-96 when awake. While sleeping saturation dropping down to upper 80s, placed on O2. Nicotine patch on Right deltoid. Continue with ordered pain regimen. Baseline N/T to all extremities. Voiding to urinal. Preventative metplex on coccyx. Ax1 with walker to BSC. x4 aquacel, mild dried drainage to dressings. IV on left forearm is NOT flushing. Plan is to discharge home with hospice on Monday with O2 if needed.

## 2022-04-18 NOTE — PROGRESS NOTES
Orthopedic Surgery  Beto Tran  2022  Admit Date:  4/3/2022  POD 12 Days Post-Op  S/P Procedure(s):  Prophylactic fixation of right impending pathologic femur fracture with intramedullary nail    Patient resting comfortably in bed.    Seeing patient for 2 week post op visit  RN removed staples  Patient on cell phone during attempted visit     Vital Sign Ranges  Temperature Temp  Av.3  F (36.3  C)  Min: 97.1  F (36.2  C)  Max: 97.5  F (36.4  C)   Blood pressure Systolic (24hrs), Av , Min:103 , Max:120        Diastolic (24hrs), Av, Min:60, Max:71      Pulse Pulse  Av.7  Min: 71  Max: 85   Respirations Resp  Av.7  Min: 17  Max: 18   Pulse oximetry SpO2  Av.3 %  Min: 95 %  Max: 96 %       Incisions healing well, approximated and closed, no drainage  Right lateral thigh with ecchymosis  Minimal erythema of the surrounding skin.   Bilateral calves are soft, non-tender.  Bilateral lower extremity is NVI.  Sensation intact bilateral lower extremities  +Dp pulse    XR reviewed showing bilateral stable IM nail Femurs  Left with noted pathologic fracture in stable configuration without migration or displacement    Labs:  Recent Labs   Lab Test 22  0656 22  0743 22  0809   POTASSIUM 3.8 3.9 3.9     Recent Labs   Lab Test 22  0812 22  0805 22  0628   HGB 8.6* 7.7* 7.6*     Recent Labs   Lab Test 14  0000   INR 1.0     Recent Labs   Lab Test 22  0656 04/15/22  0601 22  0812    201 179       A/P  1. 2 weeks s/p right prophylactic IM nail for impending fracture, left femur IM nail pathologic fracture, multiple myeloma   Continue Lovenox for DVT prophylaxis until patient more ambulatory     Mobilize with PT/OT    WBAT bilateral LE   May leave healing incisions open to air, no soaking but ok to shower.     Continue current pain regiment.   Follow-up with Dr. Quinton ASHFORD in 4-6 weeks [phone]    2. Disposition   Anticipate d/c to  home/hospice today.    Angeles Lemos PA-C

## 2022-04-18 NOTE — DISCHARGE SUMMARY
[facility]    Discharge Summary  Hospitalist    Date of Admission:  4/3/2022  Date of Discharge:  4/18/2022  Discharging Provider: Demetrio Herrera MD  Date of Service (when I saw the patient): 04/18/22    Discharge Diagnoses   #Acute hypoxemic respiratory failure possibly secondary to hypersensitivity pneumonitis, improved  #Fall c/b Left femur fracture  #Fall c/b Right iliac wing and multiple rib fractures  #Multiple myeloma  #Acute blood loss on chronic anemia  #Chronic opiate related constipation  #Hypokalemia, Hypophosphatemia  #Tobacco use disorder    Hospital Course   Beto Tran is a 46 year old male with a history of advanced multiple myeloma previously on hospice, active nicotine dependence with cigarettes, chronic pain with opiate dependence, anxiety, history of thoracic outlet syndrome, anemia of chronic disease who was admitted with a new left femur fracture, right lateral iliac wing fracture, multiple rib fractures after twisting while transferring from a wheelchair.     Orthopedic surgery was consulted and the patient went to the OR for left femur fracture repair with a cephalomedullary device on 4/3.  He also underwent device placement on the right side on 4/6 given impending fracture risk of the right femur.     Post-op course also complicated by respiratory failure with etiology unclear. Requiring resp support with BIPAP initially; now on HFNC. Pulmonary was consulted; bronchoscopy had been considered but given need for intubation and patient not wanting procedure this was cancelled     The pt has a very labile mood that can make caring for him extremely challenging for staff.  He has been complaining about different issues daily that he feels are not being managed well.     #Acute hypoxemic respiratory failure: Developed acute hypoxic resp failure post-operatively requiring BIPAP initially. CT PE was completed which was negative for PE but did show extensive, confluent  bilateral groundglass infiltrates in both lungs which could reflect infection, but etiology may also include hypersensitivity pneumonitis/drug reaction, atypical infection.    - work up:  Procalcitonin elevated.  LDH is elevated but beta-D-glucan negative therefore PCP PNA unlikely.  RVP negative.  CBC with diff without any eosinphilia.  sputum cx unimpressive.  COVID neg.    -Pulmonary consulted and the patient was started on empiric steroids.  Initiated pulse dose solumedrol 500 mg BID on 4/9; transitioned to oral prednisone on 4/12.  He will complete a prednisone taper on discharge.  He did receive 1 dose of Rocephin on 4/11.  Further antibiotics were held.  -There was consideration for bronchoscopy but he has declined this given need for intubation and anticipated prolonged vent support after procedure completed as he would not be able to extubate after procedure given current degree of hypoxia  - Patient did improve from a pulmonary standpoint.  He was weaned off of supplemental oxygen and remained off of supplemental oxygen for over 24 hours prior to discharge.  -Patient was adamant that he was going to leave the hospital on 4/18.  I did tell him I would try to optimize things as best we could prior to discharge.  He again voiced frustration over the course of his hospitalization and was not willing to stay in the hospital any longer.  -DNR/DNI    Addendum: Patient's father reportedly called with concerns over Matt's ability to care for himself at home.  I asked Matt's permission to call his dad but Matt does not want me to call him and has asked that I do not update him.  I will not call his father per patient's wishes.       #Left femur fracture: Occurred during a traumatic fall/transition.  Orthopedic surgery was consulted and the patient went to the operating room with femur fracture repair with a cephalomedullary device on 4/3.  He then underwent prophylactic fixation of the right impending pathologic  femur fracture with intramedullary nail on 4/6.  -Pain pain team following, appreciate recommendations  -Incentive spirometry  -PT/OT     #Right iliac wing and multiple rib fractures: Occurred during traumatic fall/transition as noted above.  Seen in consultation by trauma surgery team and orthopedic surgery.  Conservative management  -Pain medications as needed     #Multiple myeloma: Distant autologous stem cell transplant.  Follows the oncology team at [facility].  He has been on hospice for over 7 years now with no myeloma treatment. - Has extensive lytic bone lesions throughout with very high risk for fracture.  -Oncology consulted, appreciate commendations               -they recommend patient stay on hospice               -they do not believe that treating his myeloma would improve bone health for the stable future  - pain/palliative care seeing patient     #Acute blood loss on chronic anemia: Secondary to acute blood loss anemia due to fx and surgery.  Received 2 units packed red blood cells on 4/5 and 4/7 for hemoglobin less than 7.      #Chronic opiate related constipation: Continue methylnaltrexone injections every other day     #Hypokalemia, Hypophosphatemia: Replace as needed with nurse driven protocol     #Depression, suspected personality disorder:He has very labile mood that can make caring for him extremely challenging for staff.  He is challenging to please and keep satisfied.  He has persistently complained about different issues daily that he feels are not being managed well.  Not interested in psychiatry consult during this admission.     #Tobacco use disorder:  -NRT    Demetrio Herrera MD    Code Status   DNR / DNI       Primary Care Physician   [facility]    Physical Exam   Temp: 97.1  F (36.2  C) Temp src: Temporal BP: 111/60 Pulse: 71   Resp: 18 SpO2: 96 % O2 Device: None (Room air)    Vitals:    04/03/22 0855 04/11/22 0710   Weight: 69.5 kg (153 lb 3.2 oz) 71 kg (156 lb 9.6 oz)      Vital Signs with Ranges  Temp:  [97.1  F (36.2  C)-97.2  F (36.2  C)] 97.1  F (36.2  C)  Pulse:  [71-83] 71  Resp:  [17-18] 18  BP: (111-120)/(60-67) 111/60  SpO2:  [95 %-96 %] 96 %  I/O last 3 completed shifts:  In: 1000 [P.O.:1000]  Out: 3450 [Urine:3450]    Gen: Deconditioned chronically. Thin. A bit anxious  HEENT: MMM, no oral lesions. No elevation of JVD  CV: Regular. No murmur  Resp: Nl WOB, Moves air bilaterally. Some inspiratory crackles at bases  Abd: BS+, NT  Ext: Thin. WWP  Neuro: nonfocal. Moves extremities. Answers appropriately.   Psych: A bit anxious    Discharge Disposition   Discharged to home  Condition at discharge: Stable    Consultations This Hospital Stay   HOSPITALIST IP CONSULT  PALLIATIVE CARE ADULT IP CONSULT  ORTHOPEDIC SURGERY IP CONSULT  PHYSICAL THERAPY ADULT IP CONSULT  PAIN MANAGEMENT ADULT IP CONSULT  CARE MANAGEMENT / SOCIAL WORK IP CONSULT  PHYSICAL THERAPY ADULT IP CONSULT  OCCUPATIONAL THERAPY ADULT IP CONSULT  PAIN MANAGEMENT ADULT IP CONSULT  SPIRITUAL HEALTH SERVICES IP CONSULT  HEMATOLOGY & ONCOLOGY IP CONSULT  NEUROSURGERY IP CONSULT  PHYSICAL THERAPY ADULT IP CONSULT  OCCUPATIONAL THERAPY ADULT IP CONSULT  PULMONARY IP CONSULT  SPEECH LANGUAGE PATH ADULT IP CONSULT  OCCUPATIONAL THERAPY ADULT IP CONSULT  CARE MANAGEMENT / SOCIAL WORK IP CONSULT  CARE MANAGEMENT / SOCIAL WORK IP CONSULT  ANESTHESIOLOGY IP CONSULT  PHYSICAL THERAPY ADULT IP CONSULT  OCCUPATIONAL THERAPY ADULT IP CONSULT    Time Spent on this Encounter   Demetrio CARD MD, personally saw the patient today and spent greater than 30 minutes discharging this patient.    Discharge Orders      Reason for your hospital stay    Left intertrochanteric femur fracture IM nail     Activity    Your activity upon discharge: activity as tolerated, ambulate in house, and no driving while on analgesics     Wound care and dressings    Instructions to care for your wound at home: as directed, daily dressing changes, ice to  area for comfort, and may get incision wet in shower but do not soak or scrub.     Follow-up and recommended labs and tests     Follow up with Dr. Alcantara, at [facility] [place], within 2 weeks  to evaluate after surgery. No follow up labs or test are needed.  Call [facility] for appointment and questions during normal business hours [phone]  After hours and on-call [phone]    If emergent need with dressing/splint or operative site you may also be seen at walk-in clinic open from 8am to 8 pm everyday    Follow-up with your outpatient hospice team for continued pain management for multiple myeloma in the next 1 week.    Follow-up with your primary care physician in the next 1 week for hospital follow-up.     [facility]    DME Documentation: Describe the reason for need to support medical necessity: Impaired gait status post hip surgery. I, the undersigned, certify that the above prescribed supplies are medically necessary for this patient and is both reasonable and necessary in reference to accepted standards of medical practice in the treatment of this patient's condition and is not prescribed as a convenience.     [facility]    DME Documentation: Describe the reason for need to support medical necessity: Impaired gait status post hip surgery. I, the undersigned, certify that the above prescribed supplies are medically necessary for this patient and is both reasonable and necessary in reference to accepted standards of medical practice in the treatment of this patient's condition and is not prescribed as a convenience.     [facility]    : DME Documentation: Describe the reason for need to support medical necessity: Impaired gait status post hip surgery. I, the undersigned, certify that the above prescribed supplies are medically necessary for this patient and is both reasonable and necessary in reference to accepted standards of medical practice in the treatment of this patient's condition and is not  prescribed as a convenience.     [facility]    DME Documentation: Describe the reason for need to support medical necessity: Impaired gait status post hip surgery. I, the undersigned, certify that the above prescribed supplies are medically necessary for this patient and is both reasonable and necessary in reference to accepted standards of medical practice in the treatment of this patient's condition and is not prescribed as a convenience.     [facility]    : DME Documentation: Describe the reason for need to support medical necessity: Impaired gait status post hip surgery. I, the undersigned, certify that the above prescribed supplies are medically necessary for this patient and is both reasonable and necessary in reference to accepted standards of medical practice in the treatment of this patient's condition and is not prescribed as a convenience.     Diet    Follow this diet upon discharge: Orders Placed This Encounter      Advance Diet as Tolerated: Regular Diet Adult     Discharge Medications   Current Discharge Medication List      START taking these medications    Details   bisacodyl (DULCOLAX) 10 MG suppository Place 1 suppository (10 mg) rectally daily as needed for constipation (Use if Magnesium hydroxide (MILK of MAGNESIA) not effective after 24 hours. May discontinue if patient having bowel movement.)  Qty: 2 suppository, Refills: 0    Associated Diagnoses: Hospice care patient      enoxaparin ANTICOAGULANT (LOVENOX) 40 MG/0.4ML syringe Inject 0.4 mLs (40 mg) Subcutaneous every 24 hours for 14 days  Qty: 5.6 mL, Refills: 0    Associated Diagnoses: Closed fracture of left femur, unspecified fracture morphology, unspecified portion of femur, initial encounter (H)      !! morphine (MS CONTIN) 15 MG CR tablet Take 1 tablet (15 mg) by mouth 4 times daily for 1 day  Qty: 4 tablet, Refills: 0    Comments: Take with 300 mg and 30 mg ER for total of 345 mg each dose  Associated Diagnoses: Hospice care patient       !! morphine (MS CONTIN) 30 MG CR tablet Take 1 tablet (30 mg) by mouth 4 times daily for 1 day  Qty: 4 tablet, Refills: 0    Comments: Take with 300 mg and 15 mg ER for total dose of 345 mg  Associated Diagnoses: Hospice care patient      predniSONE (DELTASONE) 10 MG tablet Take 4 tablets (40 mg) by mouth daily for 3 days, THEN 2 tablets (20 mg) daily for 3 days, THEN 1 tablet (10 mg) daily for 3 days, THEN 0.5 tablets (5 mg) daily for 3 days.  Qty: 23 tablet, Refills: 0    Associated Diagnoses: Hypersensitivity pneumonitis (H)       !! - Potential duplicate medications found. Please discuss with provider.      CONTINUE these medications which have CHANGED    Details   hydrOXYzine (ATARAX) 50 MG tablet Take 1 tablet (50 mg) by mouth every 6 hours  Qty: 60 tablet, Refills: 0    Associated Diagnoses: Closed fracture of left femur, unspecified fracture morphology, unspecified portion of femur, initial encounter (H)      !! LORazepam (LORAZEPAM INTENSOL) 2 MG/ML (HIGH CONC) oral solution Take 0.25-0.5 mLs (0.5-1 mg) by mouth 2 times daily as needed for anxiety  Qty: 1 mL, Refills: 0    Associated Diagnoses: Hospice care patient      !! methocarbamol (ROBAXIN) 750 MG tablet Take 1 tablet (750 mg) by mouth 2 times daily  Qty: 40 tablet, Refills: 0    Associated Diagnoses: Closed fracture of left femur, unspecified fracture morphology, unspecified portion of femur, initial encounter (H)      pregabalin (LYRICA) 300 MG capsule Take 1 capsule (300 mg) by mouth 2 times daily  Qty: 8 capsule, Refills: 0    Associated Diagnoses: Hospice care patient       !! - Potential duplicate medications found. Please discuss with provider.      CONTINUE these medications which have NOT CHANGED    Details   acetaminophen (TYLENOL) 500 MG tablet Take 2 tablets (1,000 mg) by mouth every 8 hours as needed for mild pain  Qty: 20 tablet, Refills: 0    Associated Diagnoses: Hospice care patient      gabapentin (NEURONTIN) 600 MG tablet Take  1,200 mg by mouth 3 times daily      loratadine (CLARITIN) 10 MG tablet Take 10 mg by mouth daily as needed for allergies      !! LORazepam (LORAZEPAM INTENSOL) 2 MG/ML (HIGH CONC) oral solution Take 1 mg by mouth At Bedtime      !! methocarbamol (ROBAXIN) 750 MG tablet Take 1 tablet (750 mg) by mouth At Bedtime  Qty: 20 tablet, Refills: 0    Associated Diagnoses: Hospice care patient      methylnaltrexone (RELISTOR) 12 MG/0.6ML SOLN injection Inject 12 mg Subcutaneous Every other day as needed      !! morphine (MS CONTIN) 60 MG 12 hr tablet Take 300 mg by mouth 4 times daily      morphine (MSIR) 15 MG IR tablet Take 4 tablets (60 mg) by mouth every 3 hours as needed for severe pain  Qty: 90 tablet, Refills: 0    Associated Diagnoses: Hospice care patient      multivitamin w/minerals (THERA-VIT-M) tablet Take 1 tablet by mouth daily      naproxen (NAPROSYN) 500 MG tablet Take 1 tablet (500 mg) by mouth 2 times daily (with meals)  Qty: 10 tablet, Refills: 0    Associated Diagnoses: Hospice care patient      ondansetron (ZOFRAN-ODT) 8 MG ODT tab Take 1 tablet (8 mg) by mouth every 8 hours as needed for nausea  Qty: 8 tablet, Refills: 0    Associated Diagnoses: Multiple myeloma not having achieved remission (H); Hospice care patient      polyethylene glycol (MIRALAX) 17 GM/Dose powder Take 17 g by mouth daily  Qty: 510 g, Refills: 0    Associated Diagnoses: Hospice care patient      haloperidol (HALDOL) 2 MG tablet Take 1 tablet (2 mg) by mouth 4 times daily  Qty: 6 tablet, Refills: 0    Associated Diagnoses: Multiple myeloma not having achieved remission (H); Hospice care patient      omeprazole (PRILOSEC OTC) 20 MG EC tablet Take 1 tablet (20 mg) by mouth daily  Qty: 5 tablet, Refills: 0    Associated Diagnoses: Hospice care patient      SENNA-docusate sodium (SENNA S) 8.6-50 MG tablet Take 1 tablet by mouth 2 times daily as needed (constipation)  Qty: 10 tablet, Refills: 0    Associated Diagnoses: Hospice care  patient       !! - Potential duplicate medications found. Please discuss with provider.        Allergies   Allergies   Allergen Reactions     Nka [No Known Allergies]      Data   Most Recent 3 CBC's:Recent Labs   Lab Test 04/18/22  0656 04/15/22  0601 04/14/22  0812 04/12/22  0805 04/11/22 0628   WBC  --   --  9.8 9.5 9.4   HGB  --   --  8.6* 7.7* 7.6*   MCV  --   --  100 95 94    201 179 155 136*      Most Recent 3 BMP's:  Recent Labs   Lab Test 04/18/22  0656 04/17/22  0743 04/16/22  0809 04/15/22  0601 04/14/22  0812 04/13/22  0750 04/12/22  0805 04/11/22  0628 04/10/22  0558   NA  --   --   --   --  141  --  139  --  138   POTASSIUM 3.8 3.9 3.9   < > 4.2   < > 4.2   < > 4.7  4.7   CHLORIDE  --   --   --   --  112*  --  112*  --  111*   CO2  --   --   --   --  24  --  23  --  21   BUN  --   --   --   --  12  --  31*  --  17   CR  --   --   --   --  0.39*  --  0.50*  --  0.50*   ANIONGAP  --   --   --   --  5  --  4  --  6   JOSE  --   --   --   --  7.8*  --  7.8*  --  7.9*   GLC  --   --   --   --  92  --  115*  --  130*    < > = values in this interval not displayed.     Most Recent 2 LFT's:  Recent Labs   Lab Test 04/03/22  0147 01/30/15  0933   AST 40 9   ALT 38 23   ALKPHOS 215* 77   BILITOTAL 0.2 0.2     Most Recent INR's and Anticoagulation Dosing History:  Anticoagulation Dose History     Recent Dosing and Labs 5/20/2014    INR 1.0        Most Recent 3 Troponin's:No lab results found.  Most Recent Cholesterol Panel:  Recent Labs   Lab Test 04/18/14  0000 04/14/14  1125   CHOL  --  159   LDL  --  93   HDL  --  33*   TRIG 244 164*     Most Recent 6 Bacteria Isolates From Any Culture (See EPIC Reports for Culture Details):  Recent Labs   Lab Test 12/19/14  0151 12/12/14  1415 10/21/14  0146   CULT No growth No growth No growth     Most Recent TSH, T4 and A1c Labs:  Recent Labs   Lab Test 06/11/14  1747 04/14/14  1125   TSH 1.72 1.25   T4  --  0.78

## 2022-04-18 NOTE — PLAN OF CARE
Physical Therapy Discharge Summary    Reason for therapy discharge:    Discharged to home.    Progress towards therapy goal(s). See goals on Care Plan in [facility] electronic health record for goal details.  Goals partially met.  Barriers to achieving goals:   discharge from facility.  Pt met bed mobility goals from hospital bed, transfer and there ex goals.  Pt did not meet gait goals, only ambulating 6' with FWW and SBA  Therapy recommendation(s):    Continued therapy is recommended.  Rationale/Recommendations:   .  Continued HHPT to progress independence with all mobility to enable pt to safely navigate his home environment.  Pt to resume Hospice after discharge, unsure if pt able to have HHPT.  Goal Outcome Evaluation:

## 2022-04-18 NOTE — PROGRESS NOTES
Care Management Discharge Note    Discharge Date: 04/18/2022 @ 1330     Discharge Disposition:  [address] [place] [address]    Discharge Services:  Will re-enroll in hospice on 4/19 @ 9am with [facility] hospice    Discharge DME: hospice to arrange    Discharge Transportation:  [employer] w/c    Private pay costs discussed: transportation costs    Education Provided on the Discharge Plan: yes  Persons Notified of Discharge Plans: pt, nursing, MD, [facility] hospice   Patient/Family in Agreement with the Plan: yes    Handoff Referral Completed: No    Additional Information:  Reviewed pt's status and discussed in rounds.    Plan is for pt to discharge today and re-enroll in [facility] hospice tomorrow at 9am.     SW arranged  [employer]  w/c ride for today at 1330.     Updated nursing, MD and [facility] hospice liaison .     CARLOS Valentine, LSW  Inpatient Care Coordination  [facility]  [phone]    CARLOS Gómez

## 2022-04-18 NOTE — PLAN OF CARE
Goal Outcome Evaluation:    Patient vital signs are at baseline: Yes  Patient able to ambulate as they were prior to admission or with assist devices provided by therapies during their stay:  Yes  Patient MUST void prior to discharge:  Yes  Patient able to tolerate oral intake:  Yes  Pain has adequate pain control using Oral analgesics:  Yes  Does patient have an identified :  Yes  Has goal D/C date and time been discussed with patient:  Yes     No major events during shift. Plan to discharge today at 1300 home with [facility] transport via wheelchair.

## 2022-04-18 NOTE — PLAN OF CARE
All pt belongings packed up; no pt belongings left behind in room. Pt got dressed and ambulated to wheelchair. Reviewed discharge instructions with pt and gave pt all his meds. Pt left via transport.

## 2022-04-18 NOTE — PLAN OF CARE
Goal Outcome Evaluation:    Plan of Care Reviewed With: patient     Patient vital signs are at baseline: Yes  Patient able to ambulate as they were prior to admission or with assist devices provided by therapies during their stay:  Yes  Patient MUST void prior to discharge:  Yes  Patient able to tolerate oral intake:  Yes  Pain has adequate pain control using Oral analgesics:  Yes  Does patient have an identified :  Yes  Has goal D/C date and time been discussed with patient:  Yes    Patient pleasant on this shift, pain meds given as scheduled , VSS. Plan to discharge tomorrow 4/17/22 to home.

## 2022-08-28 NOTE — H&P
[facility]  Admission History and Physical Examination    NAME: Beto Tran   : 1975   MRN: [id number]     Date of Admission:  2022    Assessment & Plan   Beto Tran is a 46 year old  man with a significant past medical history of advanced multiple myeloma who has failed multiple treatments, chronic pain, thoracic outlet syndrome who was just discharged from [facility] (-) after being seen by Palliative care for pain med optimization and transition to a new hospice service.    He was briefly evaluated by [facility] as sounds like there was some misunderstanding as whether he was going to have an additional person helping him at home (father backed out) so he was not officially enrolled in [facility] and had not been able to receive the rest of his pain medications.  He ran of his medication and therefore presented to the emergency room for assistance.    #Advanced multiple myeloma.  Has been treated at [facility]. Failed velcade, carfilzomib, pamalidomide, cytoxin, stem cell transplant.  Per Onc: he is no longer a candidate for any disease directed therapy and life expectancy is likely < 6 months, thus transitioned to Hospice   - Most recently was on home hospice through [facility], however agency discharged patient due to nobody being home with him and he was running out of his pain medications.  Rather than admitting for either inpatient hospice or respite, they discharged him and recommended that he come to the hospital, which prompted his last admission to [facility].  -Was seen by palliative care team and pain meds adjusted  -Seen by [facility] but patient states that he was quite sleepy that day and they left without having a formal discussion, sounds like hospice is concerned that he does not have enough assistance at home to help him manage his medication  -Previously father has said he was going to help him along with his  girlfriend but now sounds like he has backed out?  -At this point I am not sure what the qualification for home hospice is (need 24 hr home assistance?) , but he is unwilling to go to a facility as he states that he is only 46 and does not want to go to nursing home  -He needs to talk to hospice and social work again to work out additional help at home  -In the meantime he has run out of all of his pain meds (only 4 tablets were given at the time of discharge), so all of them will be resumed.     # Right hand paraesthesia  # History of thoracic outlet syndrome: s/p 1st rib resection and sclenectomy  -started last couple of days  -possibly due to known hx of thoracic outlet syndrome   - Cont gabapentin for now      #Hypokalemia  - Replaced per K protocol  - Looks dry will give )9 hours NS +KCL overnight    #Known hx of anemia/hypercalcemia from [facility]  - Numbers at baseline     Awaiting formal pharmacy reconciliation to resume home medications.     DVT Prophylaxis: Pneumatic Compression Devices  Code Status: DNR / DNI  COVID PCR TESTING STATUS: Pending, Asymptomatic. No precautions.   Mooers to OBS for now     Expected Discharge:  1-2 days    Anticipated discharge location:  Awaiting care coordination huddle  Delays:     hospice coordination        Beverley Blair PA-C    Primary Care Physician   Physician No Ref-Primary    Chief Complaint   Pain     History is obtained from the patient    Discussed with Dr. Anthony in the ED, full chart review including lab work, imaging, and vital signs were reviewed.     History of Present Illness   Beto Tran is a 46 year old  man with a significant past medical history of advanced multiple myeloma who has failed multiple treatments, chronic pain, thoracic outlet syndrome who was just discharged from [facility] (12/21-12/28) after being seen by Palliative care for pain med optimization and transition to a new hospice service.    He was briefly evaluated by [facility] as sounds like there was some misunderstanding as whether he was going to have an additional person helping him at home (father backed out) so he was not officially enrolled in [facility] and had not been able to initiate the rest of  his pain medications.  He ran of his medication and therefore presented to the emergency room for assistance.    Past Medical History    I have reviewed this patient's medical history and updated it with pertinent information if needed.   Past Medical History:   Diagnosis Date     Anesthesia complication     states woke up during procedure (endoscopic ultrasound) in June of 2014     Broken rib      Cancer (H)      Gastro-oesophageal reflux disease      Multiple myeloma (H)      Osteoporosis        Past Surgical History   I have reviewed this patient's surgical history and updated it with pertinent information if needed.  Past Surgical History:   Procedure Laterality Date     OPTICAL TRACKING SYSTEM KYPHOPLASTY N/A 12/16/2014    Procedure: OPTICAL TRACKING SYSTEM KYPHOPLASTY;  Surgeon: Abner Vasquez MD;  Location: [facility] OR     ORTHOPEDIC SURGERY      right thumb surgery     RESECT FIRST RIB  8/14/2012    Procedure: RESECT FIRST RIB;  Left First Rib Resection & Scalentectomy;  Surgeon: Keith Tucker MD;  Location: [facility] OR       Prior to Admission Medications   Prior to Admission Medications   Prescriptions Last Dose Informant Patient Reported? Taking?   LORazepam (ATIVAN) 2 MG/ML (HIGH CONC) oral solution   No No   Sig: Take 2 mLs (4 mg) by mouth every 6 hours   SENNA-docusate sodium (SENNA S) 8.6-50 MG tablet   Yes No   Sig: Take 1 tablet by mouth 2 times daily as needed   acetaminophen (TYLENOL) 500 MG tablet   Yes No   Sig: Take 1,000 mg by mouth every 8 hours as needed for mild pain   artificial saliva (BIOTENE DRY MOUTHWASH) LIQD liquid   No No   Sig: Swish and spit 5 mLs in mouth 4 times daily   bisacodyl (DULCOLAX) 10 MG suppository   Yes No   Sig: Place 10  mg rectally daily as needed for constipation   bisacodyl (DULCOLAX) 10 MG suppository   No No   Sig: Place 1 suppository (10 mg) rectally daily as needed for constipation   gabapentin (NEURONTIN) 600 MG tablet   Yes No   Sig: Take 1,200 mg by mouth 3 times daily   haloperidol (HALDOL) 2 MG tablet   No No   Sig: Take 1 tablet (2 mg) by mouth 4 times daily   hyoscyamine (LEVSIN) 0.125 MG tablet   Yes No   Sig: Take 0.125 mg by mouth every 4 hours as needed for cramping   ibuprofen (ADVIL/MOTRIN) 600 MG tablet   Yes No   Sig: Take 600 mg by mouth 3 times daily   lactulose (CHRONULAC) 10 GM/15ML solution   Yes No   Sig: Take 20 g by mouth daily as needed for constipation   loratadine (CLARITIN) 10 MG tablet   Yes No   Sig: Take 10 mg by mouth daily as needed for allergies   methocarbamol (ROBAXIN) 750 MG tablet   Yes No   Sig: Take 750 mg by mouth 3 times daily   methylnaltrexone (RELISTOR) 12 MG/0.6ML SOLN injection   Yes No   Sig: Inject 12 mg Subcutaneous Every other day as needed   morphine (MS CONTIN) 30 MG CR tablet   No No   Sig: Take 1 tablet (30 mg) by mouth every 6 hours   morphine (MS CONTIN) 60 MG 12 hr tablet   No No   Sig: Take 3 tablets (180 mg) by mouth every 6 hours   morphine (MSIR) 30 MG IR tablet   No No   Sig: Take 3 tablets (90 mg) by mouth every 3 hours as needed for severe pain   multivitamin w/minerals (THERA-VIT-M) tablet   Yes No   Sig: Take 1 tablet by mouth daily   omeprazole (PRILOSEC) 20 MG DR capsule   Yes No   Sig: Take 40 mg by mouth daily   ondansetron (ZOFRAN-ODT) 8 MG ODT tab   No No   Sig: Take 1 tablet (8 mg) by mouth every 8 hours as needed for nausea   polyethylene glycol (MIRALAX) 17 g packet   Yes No   Sig: Take 1 packet by mouth daily   prochlorperazine (COMPAZINE) 10 MG tablet   Yes No   Sig: Take 10 mg by mouth every 4 hours as needed for nausea or vomiting   sennosides (SENOKOT) 8.6 MG tablet   Yes No   Sig: Take 1 tablet by mouth 2 times daily      Facility-Administered  Medications: None     Allergies   Allergies   Allergen Reactions     Nka [No Known Allergies]        Social History   I have reviewed this patient's social history and updated it with pertinent information if needed. Beto RODRIGUEZ Marcinteresa  reports that he has been smoking cigarettes. He has a 6.25 pack-year smoking history. He has never used smokeless tobacco. He reports current alcohol use. He reports that he does not use drugs.    Family History   I have reviewed this patient's family history and updated it with pertinent information if needed.   Family History   Problem Relation Age of Onset     Unknown/Adopted No family hx of      Family History Negative No family hx of      Asthma No family hx of      C.A.D. No family hx of      Diabetes No family hx of      Hypertension No family hx of      Cerebrovascular Disease No family hx of      Breast Cancer No family hx of      Cancer - colorectal No family hx of      Prostate Cancer No family hx of      Alcohol/Drug No family hx of      Allergies No family hx of      Alzheimer Disease No family hx of      Anesthesia Reaction No family hx of      Arthritis No family hx of      Blood Disease No family hx of      Cancer No family hx of      Cardiovascular No family hx of      Circulatory No family hx of      Congenital Anomalies No family hx of      Connective Tissue Disorder No family hx of      Depression No family hx of      Endocrine Disease No family hx of      Eye Disorder No family hx of      Genetic Disorder No family hx of      Gastrointestinal Disease No family hx of      Genitourinary Problems No family hx of      Gynecology No family hx of      Heart Disease No family hx of      Lipids No family hx of      Musculoskeletal Disorder No family hx of      Neurologic Disorder No family hx of      Obesity No family hx of      Osteoporosis No family hx of      Psychotic Disorder No family hx of      Respiratory No family hx of      Thyroid Disease No family hx of       Hearing Loss No family hx of        Review of Systems   The 10 point Review of Systems is negative other than noted in the HPI or here.     Physical Exam   Temp: 98.6  F (37  C) Temp src: Oral BP: (!) 144/84 Pulse: 101   Resp: 16 SpO2: 98 %      Vital Signs with Ranges  Temp:  [98.6  F (37  C)] 98.6  F (37  C)  Pulse:  [101] 101  Resp:  [16] 16  BP: (144)/(84) 144/84  SpO2:  [98 %] 98 %  0 lbs 0 oz    Constitutional: Awake, alert,  no apparent distress.  Eyes: Conjunctiva and pupils examined and normal.  HEENT: Moist mucous membranes, normal dentition.  Respiratory: Clear to auscultation bilaterally, no crackles or wheezing.  Cardiovascular: Regular rate and rhythm, normal S1 and S2, and no murmur noted.  GI: Soft, non-distended, non-tender, bowel sounds present. No rebound tenderness or guarding.  Lymph/Hematologic: No anterior cervical or supraclavicular adenopathy.  Skin: No rashes, no cyanosis, no edema.  Musculoskeletal: No deformities noted.  No erythema or tenderness. Moving all extremities.  Neurologic: No focal deficits noted. Speech is clear. Coordination and strength grossly normal.   Psychiatric: Appropriate affect.    Data   Data reviewed today:      Imaging:   Recent Results (from the past 24 hour(s))   Cervical spine CT w/o contrast    Narrative    EXAM: CT CERVICAL SPINE W/O CONTRAST  LOCATION: [facility]  DATE/TIME: 1/1/2022 10:05 PM    INDICATION: Acute neck pain with multiple myeloma.  COMPARISON: None.  TECHNIQUE: CT cervical spine without contrast. Dose reduction techniques were performed.    FINDINGS: There is good anatomic alignment of the C1 and C2 vertebral bodies and also with the occipital condyles. The prevertebral soft tissues and the predental space are maintained. There is good anatomic alignment. The vertebral body heights are   maintained. There are diffuse lytic lesions seen throughout the bony structures visualized on this study consistent with the patient's  known multiple myeloma. The vertebral body heights are well-maintained throughout with no evidence of a pathologic   compression fracture. There is though an expansile lytic lesion involving the left first rib with significant bony destruction also associated with expansion of the left T1 transverse process. Recommend clinical correlation for pain in this region.    There is no significant canal compromise or neural foraminal narrowing noted throughout the cervical spine. The lung apices are clear. The paraspinal soft tissues are unremarkable.      Impression    IMPRESSION:  1. Diffuse lytic lesions throughout bony structures visualized on this study consistent with multiple myeloma.  2. Expansile destructive lytic lesion of left first rib and left T1 transverse process. Recommend clinical correlation for pain in this region.  3. No significant canal compromise or neural foraminal narrowing noted throughout cervical spine.       Recent Labs   Lab 01/01/22  2304      POTASSIUM 3.0*   CHLORIDE 107           Beverley Blair PA-C  [facility]  January 1, 2022  Securely message with the Vocera Web Console (learn more here)  Text page via [employer] Paging/Directory     Yes

## 2023-04-23 ENCOUNTER — HEALTH MAINTENANCE LETTER (OUTPATIENT)
Age: 48
End: 2023-04-23

## (undated) DEVICE — SYR 30ML LL W/O NDL 302832

## (undated) DEVICE — DRSG GAUZE 4X4" 8044

## (undated) DEVICE — GLOVE PROTEXIS BLUE W/NEU-THERA 7.5  2D73EB75

## (undated) DEVICE — GLOVE PROTEXIS POWDER FREE 7.5 ORTHOPEDIC 2D73ET75

## (undated) DEVICE — SU VICRYL 2-0 CT-2 27" UND J269H

## (undated) DEVICE — WIRE GUIDE 3.2X400MM  357.399

## (undated) DEVICE — GOWN IMPERVIOUS SPECIALTY XL/XLONG 39049

## (undated) DEVICE — NDL 22GA 1.5"

## (undated) DEVICE — SYR 10ML FINGER CONTROL W/O NDL 309695

## (undated) DEVICE — DECANTER VIAL 2006S

## (undated) DEVICE — GLOVE PROTEXIS W/NEU-THERA 7.5  2D73TE75

## (undated) DEVICE — DRSG TEGADERM 6X8" 1628

## (undated) DEVICE — NDL SPINAL 18GA 3.5" 405184

## (undated) DEVICE — SU VICRYL 0 CT-1 27" J340H

## (undated) DEVICE — GLOVE PROTEXIS W/NEU-THERA 6.5  2D73TE65

## (undated) DEVICE — CAST PADDING 4" UNSTERILE 9044

## (undated) DEVICE — PACK HIP NAILING SOP32HPFC4

## (undated) DEVICE — ESU GROUND PAD UNIVERSAL W/O CORD

## (undated) DEVICE — SU VICRYL 0 CT-1 36" J346H

## (undated) DEVICE — ESU GROUND PAD ADULT W/CORD E7507

## (undated) DEVICE — LINEN HALF SHEET 5512

## (undated) DEVICE — BAG CLEAR TRASH 1.3M 39X33" P4040C

## (undated) DEVICE — DRILL BIT QUICK COUPLING 3 FLUTE 4.2MMX145MM NDL  POINT

## (undated) DEVICE — SU VICRYL 2-0 CT-1 27" UND J259H

## (undated) DEVICE — LINEN TOWEL PACK X5 5464

## (undated) DEVICE — STPL SKIN 35W ROTATING HEAD PRW35

## (undated) DEVICE — SU VICRYL 0 CT-1 27" UND J260H

## (undated) DEVICE — PREP CHLORAPREP 26ML TINTED HI-LITE ORANGE 930815

## (undated) DEVICE — DRSG GAUZE 4X4" TRAY

## (undated) DEVICE — STPL SKIN 35W 6.9MM  PXW35

## (undated) DEVICE — DRAPE IOBAN ISOLATION VERTICAL 320X21CM 6617

## (undated) DEVICE — DECANTER BAG 2002S

## (undated) DEVICE — DRSG TEGADERM 4X4 3/4" 1626W

## (undated) DEVICE — SU MONOCRYL 3-0 PS-2 27" Y427H

## (undated) DEVICE — SUCTION MANIFOLD NEPTUNE SGL

## (undated) DEVICE — DRSG XEROFORM 5X9" 8884431605

## (undated) DEVICE — ROD SYN REAMER BALL TIP 2.5X950MM  351.706S

## (undated) DEVICE — DRSG ABDOMINAL 07 1/2X8" 7197D

## (undated) DEVICE — DRILL BIT CANNULATED 16MM FLEX

## (undated) DEVICE — DRAPE X-RAY TUBE 00-901169-01-OEC

## (undated) DEVICE — LINEN FULL SHEET 5511

## (undated) DEVICE — GLOVE PROTEXIS BLUE W/NEU-THERA 6.5  2D73EB65

## (undated) DEVICE — KIT PATIENT CARE HANA TABLE PROFX SUPINE 6855

## (undated) DEVICE — LINEN ORTHO ACL PACK 5447

## (undated) RX ORDER — CELECOXIB 200 MG/1
CAPSULE ORAL
Status: DISPENSED
Start: 2022-04-06

## (undated) RX ORDER — CEFAZOLIN SODIUM/WATER 2 G/20 ML
SYRINGE (ML) INTRAVENOUS
Status: DISPENSED
Start: 2022-04-06

## (undated) RX ORDER — METHADONE HYDROCHLORIDE 10 MG/ML
INJECTION, SOLUTION INTRAMUSCULAR; INTRAVENOUS; SUBCUTANEOUS
Status: DISPENSED
Start: 2022-04-06

## (undated) RX ORDER — DEXAMETHASONE SODIUM PHOSPHATE 4 MG/ML
INJECTION, SOLUTION INTRA-ARTICULAR; INTRALESIONAL; INTRAMUSCULAR; INTRAVENOUS; SOFT TISSUE
Status: DISPENSED
Start: 2022-04-03

## (undated) RX ORDER — METHADONE HYDROCHLORIDE 10 MG/ML
INJECTION, SOLUTION INTRAMUSCULAR; INTRAVENOUS; SUBCUTANEOUS
Status: DISPENSED
Start: 2022-04-03

## (undated) RX ORDER — PROPOFOL 10 MG/ML
INJECTION, EMULSION INTRAVENOUS
Status: DISPENSED
Start: 2022-04-03

## (undated) RX ORDER — FENTANYL CITRATE-0.9 % NACL/PF 10 MCG/ML
PLASTIC BAG, INJECTION (ML) INTRAVENOUS
Status: DISPENSED
Start: 2022-04-06

## (undated) RX ORDER — BUPIVACAINE HYDROCHLORIDE AND EPINEPHRINE 5; 5 MG/ML; UG/ML
INJECTION, SOLUTION EPIDURAL; INTRACAUDAL; PERINEURAL
Status: DISPENSED
Start: 2022-04-06

## (undated) RX ORDER — KETOROLAC TROMETHAMINE 15 MG/ML
INJECTION, SOLUTION INTRAMUSCULAR; INTRAVENOUS
Status: DISPENSED
Start: 2022-04-03

## (undated) RX ORDER — DEXMEDETOMIDINE HYDROCHLORIDE 4 UG/ML
INJECTION, SOLUTION INTRAVENOUS
Status: DISPENSED
Start: 2022-04-03

## (undated) RX ORDER — CELECOXIB 200 MG/1
CAPSULE ORAL
Status: DISPENSED
Start: 2022-04-03

## (undated) RX ORDER — LIDOCAINE HYDROCHLORIDE 10 MG/ML
INJECTION, SOLUTION EPIDURAL; INFILTRATION; INTRACAUDAL; PERINEURAL
Status: DISPENSED
Start: 2022-04-03

## (undated) RX ORDER — FENTANYL CITRATE 50 UG/ML
INJECTION, SOLUTION INTRAMUSCULAR; INTRAVENOUS
Status: DISPENSED
Start: 2022-04-03

## (undated) RX ORDER — EPHEDRINE SULFATE 50 MG/ML
INJECTION, SOLUTION INTRAMUSCULAR; INTRAVENOUS; SUBCUTANEOUS
Status: DISPENSED
Start: 2022-04-06

## (undated) RX ORDER — ONDANSETRON 2 MG/ML
INJECTION INTRAMUSCULAR; INTRAVENOUS
Status: DISPENSED
Start: 2022-04-03

## (undated) RX ORDER — GLYCOPYRROLATE 0.2 MG/ML
INJECTION INTRAMUSCULAR; INTRAVENOUS
Status: DISPENSED
Start: 2022-04-03

## (undated) RX ORDER — ONDANSETRON 2 MG/ML
INJECTION INTRAMUSCULAR; INTRAVENOUS
Status: DISPENSED
Start: 2022-04-06

## (undated) RX ORDER — ACETAMINOPHEN 325 MG/1
TABLET ORAL
Status: DISPENSED
Start: 2022-04-06

## (undated) RX ORDER — ACETAMINOPHEN 325 MG/1
TABLET ORAL
Status: DISPENSED
Start: 2022-04-03

## (undated) RX ORDER — BUPIVACAINE HYDROCHLORIDE AND EPINEPHRINE 5; 5 MG/ML; UG/ML
INJECTION, SOLUTION EPIDURAL; INTRACAUDAL; PERINEURAL
Status: DISPENSED
Start: 2022-04-03

## (undated) RX ORDER — CEFAZOLIN SODIUM/WATER 2 G/20 ML
SYRINGE (ML) INTRAVENOUS
Status: DISPENSED
Start: 2022-04-03

## (undated) RX ORDER — TRANEXAMIC ACID 10 MG/ML
INJECTION, SOLUTION INTRAVENOUS
Status: DISPENSED
Start: 2022-04-03